# Patient Record
Sex: MALE | Race: WHITE | NOT HISPANIC OR LATINO | Employment: OTHER | URBAN - METROPOLITAN AREA
[De-identification: names, ages, dates, MRNs, and addresses within clinical notes are randomized per-mention and may not be internally consistent; named-entity substitution may affect disease eponyms.]

---

## 2023-01-12 ENCOUNTER — TRANSFERRED RECORDS (OUTPATIENT)
Dept: HEALTH INFORMATION MANAGEMENT | Facility: CLINIC | Age: 63
End: 2023-01-12

## 2023-06-19 ENCOUNTER — TRANSFERRED RECORDS (OUTPATIENT)
Dept: HEALTH INFORMATION MANAGEMENT | Facility: CLINIC | Age: 63
End: 2023-06-19
Payer: COMMERCIAL

## 2023-06-20 ENCOUNTER — TRANSFERRED RECORDS (OUTPATIENT)
Dept: HEALTH INFORMATION MANAGEMENT | Facility: CLINIC | Age: 63
End: 2023-06-20

## 2023-06-27 ENCOUNTER — TRANSCRIBE ORDERS (OUTPATIENT)
Dept: OTHER | Age: 63
End: 2023-06-27

## 2023-06-27 ENCOUNTER — TRANSFERRED RECORDS (OUTPATIENT)
Dept: HEALTH INFORMATION MANAGEMENT | Facility: CLINIC | Age: 63
End: 2023-06-27

## 2023-06-27 DIAGNOSIS — H46.9 OPTIC NEUROPATHY: ICD-10-CM

## 2023-06-27 DIAGNOSIS — H25.10 NUCLEAR SCLEROSIS: ICD-10-CM

## 2023-06-27 DIAGNOSIS — H52.10 MYOPIA, UNSPECIFIED LATERALITY: Primary | ICD-10-CM

## 2023-06-28 ENCOUNTER — TELEPHONE (OUTPATIENT)
Dept: OPHTHALMOLOGY | Facility: CLINIC | Age: 63
End: 2023-06-28
Payer: COMMERCIAL

## 2023-06-28 NOTE — TELEPHONE ENCOUNTER
Called and lvm     Don can make an appointment with DR. Serrano for next available and add to wait list    Thao Gayle Communication Facilitator on 6/28/2023 at 12:32 PM

## 2023-07-03 ENCOUNTER — HOSPITAL ENCOUNTER (EMERGENCY)
Facility: CLINIC | Age: 63
Discharge: HOME | End: 2023-07-04
Attending: EMERGENCY MEDICINE | Admitting: EMERGENCY MEDICINE
Payer: COMMERCIAL

## 2023-07-03 ENCOUNTER — APPOINTMENT (OUTPATIENT)
Dept: MRI IMAGING | Facility: CLINIC | Age: 63
End: 2023-07-03
Attending: EMERGENCY MEDICINE
Payer: COMMERCIAL

## 2023-07-03 DIAGNOSIS — H53.9 VISION CHANGES: ICD-10-CM

## 2023-07-03 DIAGNOSIS — H57.11 EYE PAIN, RIGHT: ICD-10-CM

## 2023-07-03 LAB
ALBUMIN SERPL BCG-MCNC: 4.4 G/DL (ref 3.5–5.2)
ALP SERPL-CCNC: 64 U/L (ref 40–129)
ALT SERPL W P-5'-P-CCNC: 18 U/L (ref 0–70)
ANION GAP SERPL CALCULATED.3IONS-SCNC: 12 MMOL/L (ref 7–15)
AST SERPL W P-5'-P-CCNC: 24 U/L (ref 0–45)
BASOPHILS # BLD MANUAL: 0.1 10E3/UL (ref 0–0.2)
BASOPHILS NFR BLD MANUAL: 1 %
BILIRUB SERPL-MCNC: 0.7 MG/DL
BUN SERPL-MCNC: 17.1 MG/DL (ref 8–23)
CALCIUM SERPL-MCNC: 9.4 MG/DL (ref 8.8–10.2)
CHLORIDE SERPL-SCNC: 96 MMOL/L (ref 98–107)
CREAT SERPL-MCNC: 0.93 MG/DL (ref 0.67–1.17)
CRP SERPL-MCNC: <3 MG/L
DEPRECATED HCO3 PLAS-SCNC: 25 MMOL/L (ref 22–29)
EOSINOPHIL # BLD MANUAL: 0.1 10E3/UL (ref 0–0.7)
EOSINOPHIL NFR BLD MANUAL: 1 %
ERYTHROCYTE [DISTWIDTH] IN BLOOD BY AUTOMATED COUNT: 13.4 % (ref 10–15)
ERYTHROCYTE [SEDIMENTATION RATE] IN BLOOD BY WESTERGREN METHOD: 2 MM/HR (ref 0–20)
GFR SERPL CREATININE-BSD FRML MDRD: >90 ML/MIN/1.73M2
GLUCOSE BLDC GLUCOMTR-MCNC: 132 MG/DL (ref 70–99)
GLUCOSE SERPL-MCNC: 129 MG/DL (ref 70–99)
GLUCOSE SERPL-MCNC: 129 MG/DL (ref 70–99)
HCT VFR BLD AUTO: 40.1 % (ref 40–53)
HGB BLD-MCNC: 13.8 G/DL (ref 13.3–17.7)
LYMPHOCYTES # BLD MANUAL: 1.8 10E3/UL (ref 0.8–5.3)
LYMPHOCYTES NFR BLD MANUAL: 32 %
MCH RBC QN AUTO: 28.5 PG (ref 26.5–33)
MCHC RBC AUTO-ENTMCNC: 34.4 G/DL (ref 31.5–36.5)
MCV RBC AUTO: 83 FL (ref 78–100)
MONOCYTES # BLD MANUAL: 2 10E3/UL (ref 0–1.3)
MONOCYTES NFR BLD MANUAL: 36 %
NEUTROPHILS # BLD MANUAL: 1.7 10E3/UL (ref 1.6–8.3)
NEUTROPHILS NFR BLD MANUAL: 30 %
PLAT MORPH BLD: ABNORMAL
PLATELET # BLD AUTO: 117 10E3/UL (ref 150–450)
POTASSIUM SERPL-SCNC: 3.6 MMOL/L (ref 3.4–5.3)
PROT SERPL-MCNC: 7.1 G/DL (ref 6.4–8.3)
RBC # BLD AUTO: 4.84 10E6/UL (ref 4.4–5.9)
RBC MORPH BLD: ABNORMAL
SODIUM SERPL-SCNC: 133 MMOL/L (ref 136–145)
WBC # BLD AUTO: 5.6 10E3/UL (ref 4–11)

## 2023-07-03 PROCEDURE — 82962 GLUCOSE BLOOD TEST: CPT

## 2023-07-03 PROCEDURE — 99207 PR SERVICE NOT STAFFED W/SUPERV PROV: CPT | Performed by: OPHTHALMOLOGY

## 2023-07-03 PROCEDURE — 36415 COLL VENOUS BLD VENIPUNCTURE: CPT | Performed by: EMERGENCY MEDICINE

## 2023-07-03 PROCEDURE — 70553 MRI BRAIN STEM W/O & W/DYE: CPT

## 2023-07-03 PROCEDURE — 250N000011 HC RX IP 250 OP 636: Performed by: EMERGENCY MEDICINE

## 2023-07-03 PROCEDURE — 99285 EMERGENCY DEPT VISIT HI MDM: CPT | Mod: 25 | Performed by: EMERGENCY MEDICINE

## 2023-07-03 PROCEDURE — 255N000002 HC RX 255 OP 636: Performed by: EMERGENCY MEDICINE

## 2023-07-03 PROCEDURE — 80053 COMPREHEN METABOLIC PANEL: CPT | Performed by: EMERGENCY MEDICINE

## 2023-07-03 PROCEDURE — 85027 COMPLETE CBC AUTOMATED: CPT | Performed by: EMERGENCY MEDICINE

## 2023-07-03 PROCEDURE — 85007 BL SMEAR W/DIFF WBC COUNT: CPT | Performed by: EMERGENCY MEDICINE

## 2023-07-03 PROCEDURE — A9585 GADOBUTROL INJECTION: HCPCS | Performed by: EMERGENCY MEDICINE

## 2023-07-03 PROCEDURE — 99285 EMERGENCY DEPT VISIT HI MDM: CPT | Performed by: EMERGENCY MEDICINE

## 2023-07-03 PROCEDURE — 86140 C-REACTIVE PROTEIN: CPT | Performed by: EMERGENCY MEDICINE

## 2023-07-03 PROCEDURE — 96365 THER/PROPH/DIAG IV INF INIT: CPT | Mod: 59 | Performed by: EMERGENCY MEDICINE

## 2023-07-03 PROCEDURE — 85652 RBC SED RATE AUTOMATED: CPT | Performed by: EMERGENCY MEDICINE

## 2023-07-03 PROCEDURE — 258N000003 HC RX IP 258 OP 636: Performed by: EMERGENCY MEDICINE

## 2023-07-03 RX ORDER — LISINOPRIL 10 MG/1
10 TABLET ORAL EVERY EVENING
Status: ON HOLD | COMMUNITY
End: 2023-08-18

## 2023-07-03 RX ORDER — METHYLPREDNISOLONE 32 MG/1
96 TABLET ORAL DAILY
Qty: 21 TABLET | Refills: 0 | Status: SHIPPED | OUTPATIENT
Start: 2023-07-03 | End: 2023-07-10

## 2023-07-03 RX ORDER — LISINOPRIL AND HYDROCHLOROTHIAZIDE 20; 25 MG/1; MG/1
1 TABLET ORAL DAILY
COMMUNITY
Start: 2022-11-08

## 2023-07-03 RX ORDER — GADOBUTROL 604.72 MG/ML
9.85 INJECTION INTRAVENOUS ONCE
Status: COMPLETED | OUTPATIENT
Start: 2023-07-03 | End: 2023-07-03

## 2023-07-03 RX ORDER — METHYLPREDNISOLONE SODIUM SUCCINATE 125 MG/2ML
1000 INJECTION, POWDER, LYOPHILIZED, FOR SOLUTION INTRAMUSCULAR; INTRAVENOUS ONCE
Status: DISCONTINUED | OUTPATIENT
Start: 2023-07-03 | End: 2023-07-03

## 2023-07-03 RX ORDER — CARVEDILOL 6.25 MG/1
TABLET ORAL
Status: ON HOLD | COMMUNITY
Start: 2022-09-07 | End: 2023-08-18

## 2023-07-03 RX ADMIN — METHYLPREDNISOLONE SODIUM SUCCINATE 1000 MG: 1 INJECTION INTRAMUSCULAR; INTRAVENOUS at 23:44

## 2023-07-03 RX ADMIN — GADOBUTROL 9.85 ML: 604.72 INJECTION INTRAVENOUS at 22:36

## 2023-07-03 ASSESSMENT — ACTIVITIES OF DAILY LIVING (ADL)
ADLS_ACUITY_SCORE: 35
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
## 2023-07-03 NOTE — DISCHARGE INSTRUCTIONS
TODAY'S VISIT:  You were seen today for vision problem     - We suspect you MAY have temporal arteritis, you will need an additional test (biopsy) to evaluate for this    - you will be prescribed a 7 day course of steroids     - If you had any labs or imaging/radiology tests performed today, you should also discuss these tests with your usual provider.     FOLLOW-UP:  A provider from neuro-ophthalmology should call you tomorrow to schedule an appointment for this Wednesday.   If they do not call, please make an appointment to follow up with:  - [facility] (phone: [phone]) - ask for the neuro-ophthalmology clinic    - Have your provider review the results from today's visit with you again to make sure no further follow-up or additional testing is needed based on those results.     RETURN TO THE EMERGENCY DEPARTMENT  Return to the Emergency Department at any time for any new or worsening symptoms or any concerns.

## 2023-07-03 NOTE — ED TRIAGE NOTES
Pt has been having diminishing vision in right eye and pain around right eye; also more fatigued this weekend.  Unable to receive explanation for this issues; pain has been present since June 1 and has seen different providers but unable to diagnose.  He has been told he had a microinfarction of optic nerve but hasn't been able to have pain explanation;  Pain started over right eye but now has spread to right temple area. Pain 6/10

## 2023-07-03 NOTE — CONSULTS
"  OPHTHALMOLOGY CONSULT NOTE  07/03/23    Patient: Amado Butler      ASSESSMENT/PLAN:     Amado Butler is a 63 year old male who presented with acute on sub-acute painful vision loss.    Optic disc edema right eye   APD right eye   Progressive vision loss right eye   History of HTN, HLD,  Patient reports right temporal headache and worsening vision over the last week. Previously evaluated with inflammatory markers and MRI with no concerning findings. Endorses right shoulder pain which started about a month ago at the same time that his right frontal headache and vision loss started. The shoulder pain he describes as scapular and neck \"strain\" which has improved with time. Pain is present at most times and can be exacerbated with movement but does not require consistent, repetitive movement to elicit. No pain of left shoulder or either hip. Denies scalp tenderness, jaw claudication, fever, fatigue, unintended weight loss, double vision, vision loss, muscle pain in shoulders.  Exam showed no temporal tenderness. Temporal artery is non-pulsatile right temple without gross palpable thickening of either side. APD right eye, disc edema right eye. Right abduction deficit was suspected, but not elicited on cover testing.  He was previously diagnosed with NAION 6/21/23 at [facility], at that time vision right eye was 20/50. However, given worsening pain and new abduction deficit in the context of persistent, worsening vision loss, recommend further workup for GCA. NAION remains high on the differential.   Recommended admission for further workup but did offer outpatient workup as an option. He prefers outpatient workup and given normal inflammatory markers with minimal GCA symptoms, this seems like a safe option for the patient.  -Recommend ESR, CRP, and CBC STAT  -Recommend TAB outpatient - Wednesday at 11 am at [address] [facility], 4th floor  -1g IV methylprednisolone now then 1mg/kg steroids PO daily to start tomorrow at " noon  -Repeat MRI wwo orbits before discharge    It is our pleasure to participate in this patient's care and treatment. Please contact us with any further questions or concerns.    Discussed with Dr. Zuniga who agreed with this assessment and plan.     Ophthalmology will continue to follow. Please contact ophthalmologist on call with any questions or concerns. We appreciate your care of this patient.    Thank you for entrusting us with your care  Mirian Chau MD, PGY2  Ophthalmology Resident  [facility]    HISTORY OF PRESENTING ILLNESS:     Amado Butler is a 63 year old male who presented originally 6/17/23 with temporal headache and right vision loss. His inflammatory markers were tested at the time and were normal. He was subsequently seen with [facility] and found to have NAION and sent to neuro-ophthalmology for further follow up. Has not yet been able to get appointment with neuro-ophthalmology.    Today he reports that his right temporal headache has worsened.      10+ review of systems were otherwise negative except for that which has been stated above.      OCULAR/MEDICAL/SURGICAL HISTORIES:     Past Ocular History:   Last eye exam: 6/21/23  Previously diagnosed ocular conditions: NAION right eye , choroidal nevus right eye  Prior eye surgery/laser: None  Contact lens wear: Yes  Glasses: Yes  Eyedrops: None    Pertinent Systemic Medications:   Antihypertensives    Past Medical History:  HTN, T2DM    Past Surgical History:   History reviewed. No pertinent surgical history.    Family History:   No history of macular degeneration or glaucoma    Social History:   No tobacco use    EXAMINATION:     Base Eye Exam       Visual Acuity (Snellen - Linear)         Right Left    Dist cc 20/200 20/25 -2              Tonometry (Tonopen, 8:59 PM)         Right Left    Pressure 20 21              Pupils         Shape React APD    Right Round Brisk Yes    Left Round Brisk None               Visual Fields         Left Right     Full     Restrictions  Partial outer superior nasal deficiency              Extraocular Movement         Right Left     0 0 0   -1  0   0 0 0    0 0 0   0  0   0 0 0               *Per Dr. Chau, orthophoric on cover testing in ED       Dilation       Both eyes: 1.0% Mydriacyl, 2.5% Sriram Synephrine @ 6:34 PM                  Additional Tests       Color         Right Left    Ishihara 1/16 16/16                  Slit Lamp and Fundus Exam       External Exam         Right Left    External Normal Normal              Slit Lamp Exam         Right Left    Lids/Lashes Normal Normal    Conjunctiva/Sclera White and quiet White and quiet    Cornea PEE PEE    Anterior Chamber Deep and quiet Deep and quiet    Iris Round and reactive Round and reactive    Lens Clear Clear    Anterior Vitreous Normal Normal              Fundus Exam         Right Left    Disc Grade 3 papilledema with ITdisc hemorrhage Normal    C/D Ratio  0.1    Macula Choroidal nevus temporal Normal    Vessels Normal Normal    Periphery Normal Normal                    Labs/Studies/Imaging Performed  MR head/brains/orbit 6/27/23  HEAD MRI:   1.  Right frontal lobe encephalomalacia and surrounding gliotic   change without acute intracranial abnormality.   ORBIT MRI:   1.  Unremarkable orbits.    ESR 6/17/23 1, CRP 6/17/23 <0.3     Thank you for entrusting us with your care  Denise Chau MD  Resident Physician, PGY2  Department of Ophthalmology  07/03/23 6:32 PM   Pager: [phone]

## 2023-07-04 VITALS
BODY MASS INDEX: 26.99 KG/M2 | DIASTOLIC BLOOD PRESSURE: 85 MMHG | SYSTOLIC BLOOD PRESSURE: 161 MMHG | OXYGEN SATURATION: 97 % | HEART RATE: 63 BPM | WEIGHT: 217.1 LBS | RESPIRATION RATE: 17 BRPM | TEMPERATURE: 97.8 F | HEIGHT: 75 IN

## 2023-07-04 NOTE — ED NOTES
/85 right before pt discharge. Pt declined to take BP meds stating he will take his own when gets home.

## 2023-07-05 ENCOUNTER — OFFICE VISIT (OUTPATIENT)
Dept: OPHTHALMOLOGY | Facility: CLINIC | Age: 63
End: 2023-07-05
Payer: COMMERCIAL

## 2023-07-05 ENCOUNTER — LAB (OUTPATIENT)
Dept: LAB | Facility: CLINIC | Age: 63
End: 2023-07-05
Payer: COMMERCIAL

## 2023-07-05 DIAGNOSIS — H46.9 OPTIC NEUROPATHY: Primary | ICD-10-CM

## 2023-07-05 DIAGNOSIS — H46.9 OPTIC NEUROPATHY: ICD-10-CM

## 2023-07-05 LAB
B BURGDOR IGG+IGM SER QL: 0.08
T PALLIDUM AB SER QL: NONREACTIVE

## 2023-07-05 PROCEDURE — 82164 ANGIOTENSIN I ENZYME TEST: CPT | Mod: 90 | Performed by: PATHOLOGY

## 2023-07-05 PROCEDURE — 88305 TISSUE EXAM BY PATHOLOGIST: CPT | Mod: 26 | Performed by: OPHTHALMOLOGY

## 2023-07-05 PROCEDURE — 36415 COLL VENOUS BLD VENIPUNCTURE: CPT | Performed by: PATHOLOGY

## 2023-07-05 PROCEDURE — 88305 TISSUE EXAM BY PATHOLOGIST: CPT | Mod: TC | Performed by: OPHTHALMOLOGY

## 2023-07-05 PROCEDURE — 88313 SPECIAL STAINS GROUP 2: CPT | Mod: 26 | Performed by: OPHTHALMOLOGY

## 2023-07-05 PROCEDURE — 82787 IGG 1 2 3 OR 4 EACH: CPT | Performed by: OPHTHALMOLOGY

## 2023-07-05 PROCEDURE — 37609 LIGATION/BX TEMPORAL ARTERY: CPT | Mod: RT | Performed by: OPHTHALMOLOGY

## 2023-07-05 PROCEDURE — 86618 LYME DISEASE ANTIBODY: CPT | Performed by: OPHTHALMOLOGY

## 2023-07-05 PROCEDURE — 99000 SPECIMEN HANDLING OFFICE-LAB: CPT | Performed by: PATHOLOGY

## 2023-07-05 PROCEDURE — 86038 ANTINUCLEAR ANTIBODIES: CPT | Performed by: OPHTHALMOLOGY

## 2023-07-05 PROCEDURE — 86753 PROTOZOA ANTIBODY NOS: CPT | Mod: 90 | Performed by: PATHOLOGY

## 2023-07-05 PROCEDURE — 85549 MURAMIDASE: CPT | Mod: 90 | Performed by: PATHOLOGY

## 2023-07-05 PROCEDURE — 86780 TREPONEMA PALLIDUM: CPT | Performed by: OPHTHALMOLOGY

## 2023-07-05 PROCEDURE — 86037 ANCA TITER EACH ANTIBODY: CPT | Performed by: OPHTHALMOLOGY

## 2023-07-05 PROCEDURE — 86363 MOG-IGG1 ANTB FLO CYTMTRY EA: CPT | Mod: 90 | Performed by: PATHOLOGY

## 2023-07-05 PROCEDURE — 86053 AQAPRN-4 ANTB FLO CYTMTRY EA: CPT | Mod: 90 | Performed by: PATHOLOGY

## 2023-07-05 PROCEDURE — 86666 EHRLICHIA ANTIBODY: CPT | Mod: 90 | Performed by: PATHOLOGY

## 2023-07-05 NOTE — PROGRESS NOTES
Assessment & Plan     Amado Butler is a 63 year old male with the following diagnoses:   1. Optic neuropathy       Patient underwent right temporal artery biopsy without complication.  Patient instructed to remove patch tomorrow.  No swimming for a week.  Stitches will dissolve. Ok to wash starting tomorrow.  Will use bacitracin ointment twice a day until tube is gone.  Patient instructed to put ice on the patch today and take tylenol for pain.  Patient instructed to put direct pressure on the wound if it bleeds.  If still bleeding > 20 min, patient instructed to call the office at [phone].                 Attending Physician Attestation:  Complete documentation of historical and exam elements from today's encounter can be found in the full encounter summary report (not reduplicated in this progress note).  I personally obtained the chief complaint(s) and history of present illness.  I confirmed and edited as necessary the review of systems, past medical/surgical history, family history, social history, and examination findings as documented by others; and I examined the patient myself.  I personally reviewed the relevant tests, images, and reports as documented above.  I formulated and edited as necessary the assessment and plan and discussed the findings and management plan with the patient and family. - Darin Serrano MD

## 2023-07-06 ENCOUNTER — TELEPHONE (OUTPATIENT)
Dept: OPHTHALMOLOGY | Facility: CLINIC | Age: 63
End: 2023-07-06
Payer: COMMERCIAL

## 2023-07-06 DIAGNOSIS — H46.9 OPTIC NEUROPATHY: Primary | ICD-10-CM

## 2023-07-06 LAB
ACE SERPL-CCNC: <10 U/L
ANA SER QL IF: NEGATIVE
PATH REPORT.COMMENTS IMP SPEC: NORMAL
PATH REPORT.COMMENTS IMP SPEC: NORMAL
PATH REPORT.FINAL DX SPEC: NORMAL
PATH REPORT.GROSS SPEC: NORMAL
PATH REPORT.MICROSCOPIC SPEC OTHER STN: NORMAL
PATH REPORT.RELEVANT HX SPEC: NORMAL
PHOTO IMAGE: NORMAL

## 2023-07-06 RX ORDER — OMEPRAZOLE 40 MG/1
40 CAPSULE, DELAYED RELEASE ORAL DAILY
Qty: 70 CAPSULE | Refills: 3 | Status: ON HOLD | OUTPATIENT
Start: 2023-07-06 | End: 2023-08-18

## 2023-07-06 RX ORDER — PREDNISONE 20 MG/1
TABLET ORAL
Qty: 120 TABLET | Refills: 0 | Status: ON HOLD | OUTPATIENT
Start: 2023-07-06 | End: 2023-08-18

## 2023-07-06 NOTE — TELEPHONE ENCOUNTER
Called patient to follow-up on results of his temporal artery biopsy, which was negative for arteritis.  In the context of his normal acute phase reactants this result significantly lowers the likelihood of giant cell arteritis.  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  He will continue PPI and close monitoring of his blood sugars while on this medication.    Many labs drawn yesterday for evaluation of optic perineuritis have returned as normal thus far.  Others may not return for another 1-2 weeks. We discussed that many cases of optic perineuritis are ultimately idiopathic.  The subjective improvement of his vision and pain following initiation of steroids is encouraging.    He reports swelling at the incision site with extension of edema to the right periorbital region, but otherwise no significant bleeding or discharge.  We discussed that edema and ecchymosis can track along fascial planes to cause these changes after temporal artery biopsy.  Callback precautions discussed and patient verbalized understanding.    All other questions answered.    Niels Zuniga MD PhD  Fellow, Neuro-Ophthalmology

## 2023-07-07 LAB
B MICROTI IGG TITR SER: NORMAL {TITER}
B MICROTI IGM TITR SER: NORMAL {TITER}
IGG SERPL-MCNC: 1174 MG/DL (ref 610–1616)
IGG1 SER-MCNC: 741 MG/DL (ref 382–929)
IGG2 SER-MCNC: 273 MG/DL (ref 242–700)
IGG3 SER-MCNC: 46 MG/DL (ref 22–176)
IGG4 SER-MCNC: 37 MG/DL (ref 4–86)
SUBCLASSES, PERCENT: 93 %

## 2023-07-08 LAB
A PHAGOCYTOPH IGG TITR SER IF: NORMAL {TITER}
A PHAGOCYTOPH IGM TITR SER IF: NORMAL {TITER}
LYSOZYME SERPL-MCNC: >10 UG/ML

## 2023-07-09 LAB
E CHAFFEENSIS IGG TITR SER IF: NORMAL {TITER}
E CHAFFEENSIS IGM TITR SER IF: NORMAL {TITER}

## 2023-07-10 DIAGNOSIS — H46.9 OPTIC PERINEURITIS: Primary | ICD-10-CM

## 2023-07-10 LAB
ANCA AB PATTERN SER IF-IMP: ABNORMAL
AQP4 H2O CHANNEL IGG SERPL QL: NEGATIVE
C-ANCA TITR SER IF: ABNORMAL {TITER}
CNS DEMYELINATING DISEASE INTERPRETATION, SERUM: NORMAL
MOG FACS, S: NEGATIVE

## 2023-07-11 ENCOUNTER — ANCILLARY PROCEDURE (OUTPATIENT)
Dept: CT IMAGING | Facility: CLINIC | Age: 63
End: 2023-07-11
Attending: OPHTHALMOLOGY
Payer: COMMERCIAL

## 2023-07-11 ENCOUNTER — TELEPHONE (OUTPATIENT)
Dept: OPHTHALMOLOGY | Facility: CLINIC | Age: 63
End: 2023-07-11

## 2023-07-11 DIAGNOSIS — H46.9 OPTIC PERINEURITIS: ICD-10-CM

## 2023-07-11 PROCEDURE — 71260 CT THORAX DX C+: CPT | Mod: TC | Performed by: RADIOLOGY

## 2023-07-11 RX ORDER — IOPAMIDOL 755 MG/ML
100 INJECTION, SOLUTION INTRAVASCULAR ONCE
Status: COMPLETED | OUTPATIENT
Start: 2023-07-11 | End: 2023-07-11

## 2023-07-11 RX ADMIN — IOPAMIDOL 100 ML: 755 INJECTION, SOLUTION INTRAVASCULAR at 15:30

## 2023-07-11 NOTE — TELEPHONE ENCOUNTER
Health Call Center    Phone Message    May a detailed message be left on voicemail: yes     Reason for Call: Other: Pt had symptoms over the weekend of severe stomach pain, nausea, jaw pain; in addition to the vision in the Rt eye not being as good. He states Dr. Serrano had placed an order for at CT for him. He is leaving out of state tomorrow for 2 weeks and is concerned as to whether he should be going out of town with these symptoms and the pending CT scan. Please call pt ASAP to discuss. Thank you.     Action Taken: Message routed to:  [facility] ([facility] EYE    Travel Screening: Not Applicable

## 2023-07-11 NOTE — TELEPHONE ENCOUNTER
Spoke to pt at 1545    Pt reviewed nausea, vomiting, stomach pain last Saturday and Sunday that resolved now    Pt on 60 mg oral prednisone and believes taking prilosec.    Pt leaving tomorrow to [place] for 2 weeks.    Reviewed if reoccurs may to to urgent care and likely can assist in treatment to ease pain/nausea and provide IV fluids if electrolytes inbalanced.    Per Dr. Zuniga the biopsy for Giant Cell Arteritis was negative and greatly reduces the diagnosis of GCA.    Pt lower the oral prednisone last week.    Pt states was having improvement in vision until lower and feels maybe having some more haze in vision--slightly worsening maybe.    Offered eye exam tomorrow for any vision changes- pt did not feel necessary.    Pt reporting when eating couple times over past week had jaw pain    Pt also with some headaches, but unsure related to N/V over weekend    Pt also had CT done today-- results not finalized by call.    Pt requesting call from Dr. Zuniga/Dr. Serrano to review symptoms and review the CT results    Again I offered appt tomorrow before leaving and pt declining at this time.    Reviewed with pt if has any acute vision changes may seek care in [place] emergency department if would occur and pt verbally demonstrated understanding    Note to Dr. Serrano/team for review/confirming plan of care and review request for callback from provider.    Garland Champagne, RN 4:19 PM 07/11/23

## 2023-07-17 ENCOUNTER — NURSE TRIAGE (OUTPATIENT)
Dept: NURSING | Facility: CLINIC | Age: 63
End: 2023-07-17

## 2023-07-17 NOTE — TELEPHONE ENCOUNTER
Nurse Triage SBAR    Is this a 2nd Level Triage?  None    Situation:   Increased vision loss in Right eye    Background/Assessment:     Pt reporting increased vision loss in the right eye after starting the Prednisone order.     Pt is supposed to start tapering the dose this coming Saturday.  But seems to be having more difficulty seeing out of the right eye.    Pt flew to [place] last Thursday and noticed his sight was worse in the right eye after flying.     Pt went to a Ophthalmologist  in [place] after he got to [place] because his eye sight was worse in the right eye.      But he had 20/20 vision in the left eye and 20/50 vision in the right eye.    Pt is having more pain in the right side of his neck, right side of his skull and shoulder.      The vision in the right eye seems more hazy, loss of contrast and washing out of color.    When he closes his eyes he can see like ghostly shapes from what he saw when he had his eyes open.     No floaters or flashes of light noted.    Pt is wondering if there is anything else they can do for the Pt?   Any other treatment or procedure for the loss of vision in the right eye?    Pt would like to speak with a Provider to get all of his questions, concerns and any new ideas of what can be done for Pt care.    Please have a Provider call the Pt back @ [phone] for further assistance for Pt care.    Roxy Bradshaw RN  Central Triage Red Flags/Med Refills    Protocol Recommended Disposition:   See in Office Today      Reason for Disposition    Patient wants to be seen    Additional Information    Negative: Weakness of the face, arm or leg on one side of the body    Negative: Followed getting substance in the eye    Negative: Foreign body or object is or was lodged in the eye    Negative: Followed an eye injury    Negative: Followed sun lamp or sun exposure (UV keratitis)    Negative: Yellow or green discharge (pus) in the eye    Negative: Pregnant    Negative: Complete loss of  vision in 1 or both eyes    Negative: SEVERE eye pain    Negative: Severe headache    Negative: Double vision    Negative: Blurred vision or visual changes and present now and sudden onset or new (e.g., minutes, hours, days)  (Exception: Seeing floaters / black specks OR previously diagnosed migraine headaches with same symptoms.)    Negative: Patient sounds very sick or weak to the triager    Negative: Flashes of light  (Exception: brief from pressing on the eyeball)    Negative: Eye pain and brief (now gone) blurred vision or visual changes    Negative: Taking digoxin (e.g., Lanoxin, Digitek, Cardoxin, Lanoxicaps; Toloxin in [place]) and blurred vision, yellow vision, or yellow-green halos    Negative: Many floaters in the eye    Negative: Jaw pain while eating and age > 50 years    Negative: Headache and age > 50 years    Protocols used: VISION LOSS OR CHANGE-A-OH

## 2023-07-18 NOTE — TELEPHONE ENCOUNTER
Spoke to pt at 0915    Left eye vision hazy after plane flight last Wednesday    Pt seen last Thursday by ophthalmologist and left eye 20/20-- pt states vision improved that day.  Pt also reports the vision in Right eye was 20/50.    Pt states right eye vision past 2 days worsening and reports the neck and shoulder pain returning along with temple pain-- symptoms pt previous not had prior to vision loss.    Pt now on 60 mg oral prednisone-- pt reviewed recommended to decrease more on Saturday per tapering instructions.    Reviewed with patient to see the ophthalmologist seen last Thursday for exam an may go to Emergency Department.    Pt currently in [place] til July 31st and chartering sailboat with family.    Pt would like to know testing or if has recommendations if needs to be seen or can hold on seeing provider/ED visit.    Reviewed with patient Dr. Serrano would Not be able to provide an OK to hold on being seen if having vision changes or new symptoms.    Reviewed would need to see providers while in [place] for acute vision changes/symptoms    Reviewed able to send eye notes to provider and can review provider to provider call if applicable.    Reviewed several times with pt the recommendations and pt very hestistant to seek treatment--- pt would need to leave family/sailing charter for eval.    Reviewed again the recommendation to seek treatment in [place] if having new vision changes systemic symptoms.      Reviewed would forward to Dr. Serrano for review and provider alternate plan if applicable, but reviewed not likely would be able to provide care for patient while in [place] if having vision changes and systemic symptoms.    Garland Champagne RN 9:41 AM 07/18/23

## 2023-07-18 NOTE — TELEPHONE ENCOUNTER
Left message at 1215    Reviewed by Dr. Serrano and agrees if feels having worsening vision or symptoms should be evaluated in [place].    Reviewed the recommendations and provided direct number for questions, able to assist in records to eye provider, and can assist in coordinating call if indicated.    Reviewed facilitator will also look into scheduling visit with Dr. Serrano when pt travels back on July 31st      Garland Champagne RN 12:17 PM 07/18/23

## 2023-07-18 NOTE — TELEPHONE ENCOUNTER
Pt in [place] for two weeks per previous note from last week.    Vision 7-3-2023 in right eye 20/200    Per note vision in [place] at visit 20/50 right eye    I will reach out to pt today to review plan of care.    Garland Champagne RN 8:32 AM 07/18/23

## 2023-07-18 NOTE — TELEPHONE ENCOUNTER
Ok for august 9th at 0815 with Dr. Serrano at [facility] location    Will send [employer] message with information  -- not able to reach pt at last call    Garland Champagne RN 2:22 PM 07/18/23

## 2023-08-07 NOTE — PROGRESS NOTES
Amado Butler is a 63 year old male with the following diagnoses:   1. Visual field defect    2. Optic neuropathy    3. Optic perineuritis         Patient was sent for consultation by Dr. Zuniga for NAION left eye.    HPI:    Patient was last seen 7/5/23 for temporal artery biopsy due to concern for GCA in the right eye. Pathology was negative for GCA.    Patient went to [place] and experienced worsening vision in his right eye several times. On 7/24/23 he went to [facility]. He was restarted on high dose 1000 mg IV steroids. The vision in the right eye improved with the high dose IV steroids. He was seen again the following Monday, however over the week the vision deteriorated some but not significantly. On 7/31/23 he was seen in the eye clinic against and was admitted directly to the hospital on a Neurology floor for 5 days. He underwent another course of daily 1000 mg IV steroids. He underwent PET and MRI imaging and was diagnosed with optic perineuritis. He reports they have done extensive testing but nothing has come back positive. He is currently taking 80 mg daily of steroids. He is also taking an antiviral and antibiotic. The vision improved with steroids each time back to 20/50. One week ago his vision was improved to 20/30 at visit with eye doctor which it was the best it had been since onset of symptoms. He was hospitalized however last week and his vision continued to deteriorate throughout admission. He flew home three days ago and the vision has deteriorated dramatically in the last 3 days. He is currently on oral prednisone 80 mg daily. He has had a general headache but does not have eye pain. During his hospital admission his pupils initially had an afferent defect in the right eye but reports it went away and both pupils were reacting equally last week after high dose IV steroids.    He has had difficulty with controlling blood sugars while on steroids. His A1c was >9. His blood  sugars have been running 200-300s. He was started on glipizide 5 mg daily.    The patient is presenting with an acute illness that potentially poses a threat to life or bodily function (vision).    Independent historians:  Patient  Wife  Daughter    Review of outside testing:    MRI Brain/Orbits 7/3/23  HEAD MRI:  1.  No acute infarct.  2.  Age-related changes described above.  3.  Right frontal lobe small area tissue loss and gliosis.     ORBIT MRI:  1.  Unremarkable MRI of the orbits.   2.  No evidence for optic neuritis..    MRI Brain/Orbits 6/28/23  HEAD MRI:   1.  Right frontal lobe encephalomalacia and surrounding gliotic   change without acute intracranial abnormality.     ORBIT MRI:   1.  Unremarkable orbits     Ehrlichia - negative  Babesia- negative  Anaplasma - negative  Lysozyme elevated to >10  IgG subclasses - normal  ANCA elevated to 1:20  Lyme negative  Treponema negative  BUDDY negative  ACE decreased to <10  CNS demyelinating panel negative  CRP <3  ESR 2  CBC normal      Temporal artery biopsy 7/5/23  Temporal artery, right, biopsy:   No evidence of giant cell arteritis.  Moderate arteriosclerosis.  Mild medial calcific sclerosis.     Labs from [facility] 8/1/23  Lyme CNS negative  Hep B surface antibody negative      My interpretation performed today of outside testing:  I have independently reviewed the MRI images.  There is an enhancing abnormality in the posterior orbit on the right.  This is lateral to the optic nerve        Review of outside clinical notes:    7/31/23  #Vision Loss OD  Patient presents with subacute course of progressive vision loss in the R eye that has been somewhat responsive to Solumedrol in the past although has relapsed even on high dose prednisone. MRI brain and orbit done at previous visit with evidence of perineuritis but not optic neuritis. Previously had Lyme, ANCA, Quantiferon, Bartonella negative. VZV IgG/IgM consistent with prior infection. Other serum  studies including tspot, erlichia/anaplasma were negative. LP was performed which was unremarkable (no nucleated cells, 2000 RBCs from traumatic tap, VZV/ACE/EBV/Cytology/MS/Cx/Smear all negative). PET-CT without evidence of FDG-avid malignancy (adrenal nodules not avid). Patient was maintained on valacyclovir. Patient received 5 days total of 1g IV solumedrol (1 before the LP, 4 after).  MRI C/T spine with no evidence of demyelinating disease or transverse myelitis.   [] Continue prednisone 80mg until follow up with your neuro-ophthalmologist (discharged on 80mg with 30 month aliquot)  [] Follow up pending CSF autoimmune panel  [] Follow up pending serum autoimmune encephalopathy panel  [] Follow up formal read of MRI adrenals (study performed inpatient but no final read)    #Anemia  #Thrombocytopenia  Patient noted to have persistent anemia and thrombocytopenia this admission. On PET-CT, marrow was slightly avid (read as possibly in the setting of anemia). Differential notable for monocytosis to 12% on differential, some neutrophilia as well this admission although notably in the setting of steroids. In the past, had more significant monocytosis. Ordered SPEP/SFLC/UPEP, peripheral smear. Peripheral smear on quick read in the lab notable for some atypical lymphocytes, although not officially read by hematology. May be worth repeating in the outpatient setting if ongoing concerns.  [] Follow up SPEP/SFLC/UPEP    #Pre-diabetes  Patient with h/o DM2, not currently on medications. Rising glucose iso receiving prednisone, A1C on admission 9.1% in setting of receiving 2 months of steroids. Maintained on low dose ISS while inpatient.  [] Start taking metformin 500mg daily with dinner x1 week followed by metformin 500mg BID with meals - uptitration to be further followed by PCP  [] Follow up outpatient with PCP    #SARA  Patient uses CPAP at night at home. RT involved this admission for CPAP.    #HTN  Continued home BP meds  (carvedilol, lisinopril, lisinopril-HCTZ)    #Hepatitis B non-immune  Patient found to be non-immune on hepatitis testing.   [] Hepatitis B vaccine booster         7/6/23 -- Telephone encounter with Dr. Zuniga    Called patient to follow-up on results of his temporal artery biopsy, which was negative for arteritis.  In the context of his normal acute phase reactants this result significantly lowers the likelihood of giant cell arteritis.  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  He will continue PPI and close monitoring of his blood sugars while on this medication.     Many labs drawn yesterday for evaluation of optic perineuritis have returned as normal thus far.  Others may not return for another 1-2 weeks. We discussed that many cases of optic perineuritis are ultimately idiopathic.  The subjective improvement of his vision and pain following initiation of steroids is encouraging.     He reports swelling at the incision site with extension of edema to the right periorbital region, but otherwise no significant bleeding or discharge.  We discussed that edema and ecchymosis can track along fascial planes to cause these changes after temporal artery biopsy.  Callback precautions discussed and patient verbalized understanding.    7/3/23 -- Hospital Consult - Dr. Zuniga  Attestation with edits by Niels Zuniga MD at 7/4/2023  4:27 PM (Updated)     I have not seen or examined the patient, but was available if the need had arisen. I have reviewed the chart and key elements of this encounter and I concur with the resident's assessment and plan with the following summary/additions:     Gentleman recently diagnosed with NAION OD after presenting in mid-6/2023 with vision loss and frontal headache. His ESR/CRP at that time were normal. He describes gradually worsening vision since then until 4-5 days ago, when his vision deteriorated and his frontal headache  "migrated to the right temple. On GCA ROS, he does report right neck and shoulder \"strain\" that has gradually improved over the past month without therapy.      In addition to decreasing VA to 20/200 (from 20/50 at outside clinic last month) our exam notes difficulty palpating the right STA. There was some suggestion of a right abduction deficit based on motility, which was not corroborated by cover testing. He does have a conventional \"disc-at-risk\" in the fellow eye.  His acute phase reactants remain normal. Interestingly, his MRI shows mild right-sided perineural enhancement that increases in prominence near the orbital apex; there is some accompanying enhancement in the vicinity of the optic canal.     Although unilateral, receding shoulder pain is not suggestive of PMR -- and progressive vision loss can certainly occur in NAION -- these features in combination with the patient's perineural enhancement do raise the possibility of GCA despite his normal acute phase reactants. I think initiation of high-dose steroids and TAB are reasonable in an abundance of caution. Patient discussed the risks/benefits of inpatient versus outpatient management and elected for the latter.      Will plan for TAB in Dr. Serrano's clinic on 7/5 AM. If pathology is negative for arteritis, it may still be prudent to continue steroid therapy in case this represents optic perineuritis or another inflammatory process. Following this visit we will draw expanded labs including CDS1, BUDDY, ANCA, ACE, lysozyme, IgG/subclasses, treponemal serologies, and Lyme screen. Additional workup, such as LP, will depend on his response to steroids.         Past medical history:  Hypertension  Type 2 Diabetes    Medications:   carvedilol  lisinopril  lisinopril-hydrochlorothiazide  omeprazole  predniSONE 80 mg daily      Family history / social history:  Patient's family history is not on file.     Patient  reports that he has never smoked. He has never used " smokeless tobacco. He reports current alcohol use. He reports that he does not currently use drugs.       Exam:  Visual acuity Hand Motion right eye 20/20 left eye.  Color vision 0/11 right eye and 11/11 left eye.  Pupils with right APD.  Intraocular pressure 21 right eye and 20 left eye. Color vision   Anterior segment exam with mild cataracts bilaterally.  Fundus exam with grade 1 optic disc edema in the right eye.     Tests ordered and interpreted today:  OCT RNFL 8/9/23  Right eye: mean thickness 96, elevation inferonasal  Left eye: mean thickness 76 with thinning temporally    GTOP visual fields 8/9/23:  Right eye: reliable, mean deviation -30.0, dense peripheral constriction  Left eye: reliable, mean deviation -3.4, scattered non-specific deficits    Normal both eyes    Discussion of management / interpretation with another provider:   None    Assessment/Plan:   It is my impression that patient has a right optic neuropathy that has been going on since late June/early July.  He has undergone a right temporal artery biopsy which was negative.  His optic neuropathy has been steroid responsive.  He has been on prednisone 60 mg or more since July 4.  He has had multiple rounds of IV steroids with significant improvement in his vision.  Most recently he states that his vision started to decline while on IV steroids in the hospital on Saturday.  He is currently on 80 mg of prednisone and has continued to experience declining vision in the right eye.  He has a lesion that is lateral to the right optic nerve in the right orbit at the orbital apex.  This does not appear to be changing over time.  It has been there on July 3, July 24, and today.  The differential diagnosis here would be infectious, inflammatory, and neoplastic.  Given that he has enjoyed some improvement with corticosteroids in the past, it is reasonable to consider plasma exchange to see if we can improve his vision.  If this is not beneficial after 2  or 3 treatments, then it may be reasonable to consider an exploration of this area with a biopsy and cultures.  I discussed his case with Dr. Lesley Boykin who agreed to admit the patient.  I called bed placement and the hospital is full.  Therefore I will send the patient to the emergency room to begin a central line placement and plasmapheresis.    The patient is presenting with an acute illness that potentially poses a threat to life or bodily function (vision).              Attending Physician Attestation:  Complete documentation of historical and exam elements from today's encounter can be found in the full encounter summary report (not reduplicated in this progress note).  I personally obtained the chief complaint(s) and history of present illness.  I confirmed and edited as necessary the review of systems, past medical/surgical history, family history, social history, and examination findings as documented by others; and I examined the patient myself.  I personally reviewed the relevant tests, images, and reports as documented above.  I formulated and edited as necessary the assessment and plan and discussed the findings and management plan with the patient and family. I personally reviewed the ophthalmic test(s) associated with this encounter, agree with the interpretation(s) as documented by the resident/fellow, and have edited the corresponding report(s) as necessary.  - Darin Estrada MD  Ophthalmology Resident PGY3

## 2023-08-09 ENCOUNTER — OFFICE VISIT (OUTPATIENT)
Dept: OPHTHALMOLOGY | Facility: CLINIC | Age: 63
End: 2023-08-09
Payer: COMMERCIAL

## 2023-08-09 ENCOUNTER — TELEPHONE (OUTPATIENT)
Dept: OPHTHALMOLOGY | Facility: CLINIC | Age: 63
End: 2023-08-09

## 2023-08-09 ENCOUNTER — PRE VISIT (OUTPATIENT)
Dept: OPHTHALMOLOGY | Facility: CLINIC | Age: 63
End: 2023-08-09

## 2023-08-09 ENCOUNTER — HOSPITAL ENCOUNTER (OUTPATIENT)
Dept: MRI IMAGING | Facility: CLINIC | Age: 63
Discharge: HOME | DRG: 025 | End: 2023-08-09
Attending: OPHTHALMOLOGY | Admitting: OPHTHALMOLOGY
Payer: COMMERCIAL

## 2023-08-09 ENCOUNTER — HOSPITAL ENCOUNTER (INPATIENT)
Facility: CLINIC | Age: 63
LOS: 9 days | Discharge: HOME | DRG: 025 | End: 2023-08-18
Attending: EMERGENCY MEDICINE | Admitting: PSYCHIATRY & NEUROLOGY
Payer: COMMERCIAL

## 2023-08-09 DIAGNOSIS — H46.9 OPTIC NEUROPATHY: ICD-10-CM

## 2023-08-09 DIAGNOSIS — I10 BENIGN ESSENTIAL HYPERTENSION: ICD-10-CM

## 2023-08-09 DIAGNOSIS — T38.0X5A STEROID-INDUCED HYPERGLYCEMIA: ICD-10-CM

## 2023-08-09 DIAGNOSIS — H53.131 VISION, LOSS, SUDDEN, RIGHT: ICD-10-CM

## 2023-08-09 DIAGNOSIS — H46.9 OPTIC PERINEURITIS: ICD-10-CM

## 2023-08-09 DIAGNOSIS — Z98.890 S/P CRANIOTOMY: ICD-10-CM

## 2023-08-09 DIAGNOSIS — E87.1 HYPONATREMIA: ICD-10-CM

## 2023-08-09 DIAGNOSIS — H53.40 VISUAL FIELD DEFECT: Primary | ICD-10-CM

## 2023-08-09 DIAGNOSIS — R73.9 STEROID-INDUCED HYPERGLYCEMIA: ICD-10-CM

## 2023-08-09 DIAGNOSIS — D64.9 ANEMIA, UNSPECIFIED TYPE: ICD-10-CM

## 2023-08-09 DIAGNOSIS — H54.61 VISION LOSS OF RIGHT EYE: Primary | ICD-10-CM

## 2023-08-09 LAB
ALBUMIN SERPL BCG-MCNC: 4.3 G/DL (ref 3.5–5.2)
ALP SERPL-CCNC: 71 U/L (ref 40–129)
ALT SERPL W P-5'-P-CCNC: 41 U/L (ref 0–70)
ANION GAP SERPL CALCULATED.3IONS-SCNC: 13 MMOL/L (ref 7–15)
AST SERPL W P-5'-P-CCNC: 35 U/L (ref 0–45)
BASOPHILS # BLD AUTO: 0 10E3/UL (ref 0–0.2)
BASOPHILS NFR BLD AUTO: 0 %
BILIRUB SERPL-MCNC: 1.3 MG/DL
BUN SERPL-MCNC: 21.8 MG/DL (ref 8–23)
CALCIUM SERPL-MCNC: 9.1 MG/DL (ref 8.8–10.2)
CHLORIDE SERPL-SCNC: 92 MMOL/L (ref 98–107)
CREAT SERPL-MCNC: 1.07 MG/DL (ref 0.67–1.17)
CRP SERPL-MCNC: <3 MG/L
DEPRECATED HCO3 PLAS-SCNC: 22 MMOL/L (ref 22–29)
EOSINOPHIL # BLD AUTO: 0 10E3/UL (ref 0–0.7)
EOSINOPHIL NFR BLD AUTO: 0 %
ERYTHROCYTE [DISTWIDTH] IN BLOOD BY AUTOMATED COUNT: 15.4 % (ref 10–15)
ERYTHROCYTE [SEDIMENTATION RATE] IN BLOOD BY WESTERGREN METHOD: 2 MM/HR (ref 0–20)
GFR SERPL CREATININE-BSD FRML MDRD: 77 ML/MIN/1.73M2
GLUCOSE BLDC GLUCOMTR-MCNC: 288 MG/DL (ref 70–99)
GLUCOSE SERPL-MCNC: 278 MG/DL (ref 70–99)
HCT VFR BLD AUTO: 37.7 % (ref 40–53)
HGB BLD-MCNC: 13.4 G/DL (ref 13.3–17.7)
IMM GRANULOCYTES # BLD: 0.2 10E3/UL
IMM GRANULOCYTES NFR BLD: 2 %
LYMPHOCYTES # BLD AUTO: 1.2 10E3/UL (ref 0.8–5.3)
LYMPHOCYTES NFR BLD AUTO: 17 %
MCH RBC QN AUTO: 29.7 PG (ref 26.5–33)
MCHC RBC AUTO-ENTMCNC: 35.5 G/DL (ref 31.5–36.5)
MCV RBC AUTO: 84 FL (ref 78–100)
MONOCYTES # BLD AUTO: 1.2 10E3/UL (ref 0–1.3)
MONOCYTES NFR BLD AUTO: 17 %
NEUTROPHILS # BLD AUTO: 4.6 10E3/UL (ref 1.6–8.3)
NEUTROPHILS NFR BLD AUTO: 64 %
NRBC # BLD AUTO: 0 10E3/UL
NRBC BLD AUTO-RTO: 0 /100
PLATELET # BLD AUTO: 114 10E3/UL (ref 150–450)
POTASSIUM SERPL-SCNC: 3.6 MMOL/L (ref 3.4–5.3)
PROT SERPL-MCNC: 6.4 G/DL (ref 6.4–8.3)
RBC # BLD AUTO: 4.51 10E6/UL (ref 4.4–5.9)
SODIUM SERPL-SCNC: 127 MMOL/L (ref 136–145)
WBC # BLD AUTO: 7.3 10E3/UL (ref 4–11)

## 2023-08-09 PROCEDURE — 86362 MOG-IGG1 ANTB CBA EACH: CPT | Performed by: STUDENT IN AN ORGANIZED HEALTH CARE EDUCATION/TRAINING PROGRAM

## 2023-08-09 PROCEDURE — 85025 COMPLETE CBC W/AUTO DIFF WBC: CPT | Performed by: EMERGENCY MEDICINE

## 2023-08-09 PROCEDURE — 120N000002 HC R&B MED SURG/OB UMMC

## 2023-08-09 PROCEDURE — 85652 RBC SED RATE AUTOMATED: CPT | Performed by: EMERGENCY MEDICINE

## 2023-08-09 PROCEDURE — 36415 COLL VENOUS BLD VENIPUNCTURE: CPT | Performed by: EMERGENCY MEDICINE

## 2023-08-09 PROCEDURE — 99285 EMERGENCY DEPT VISIT HI MDM: CPT | Performed by: EMERGENCY MEDICINE

## 2023-08-09 PROCEDURE — 92083 EXTENDED VISUAL FIELD XM: CPT | Mod: GC | Performed by: OPHTHALMOLOGY

## 2023-08-09 PROCEDURE — 99215 OFFICE O/P EST HI 40 MIN: CPT | Mod: GC | Performed by: OPHTHALMOLOGY

## 2023-08-09 PROCEDURE — 80053 COMPREHEN METABOLIC PANEL: CPT | Performed by: EMERGENCY MEDICINE

## 2023-08-09 PROCEDURE — 84999 UNLISTED CHEMISTRY PROCEDURE: CPT | Performed by: STUDENT IN AN ORGANIZED HEALTH CARE EDUCATION/TRAINING PROGRAM

## 2023-08-09 PROCEDURE — 92133 CPTRZD OPH DX IMG PST SGM ON: CPT | Mod: GC | Performed by: OPHTHALMOLOGY

## 2023-08-09 PROCEDURE — 255N000002 HC RX 255 OP 636: Mod: JZ | Performed by: OPHTHALMOLOGY

## 2023-08-09 PROCEDURE — 70543 MRI ORBT/FAC/NCK W/O &W/DYE: CPT

## 2023-08-09 PROCEDURE — 86140 C-REACTIVE PROTEIN: CPT | Performed by: EMERGENCY MEDICINE

## 2023-08-09 PROCEDURE — 99285 EMERGENCY DEPT VISIT HI MDM: CPT | Mod: 25 | Performed by: EMERGENCY MEDICINE

## 2023-08-09 PROCEDURE — 70543 MRI ORBT/FAC/NCK W/O &W/DYE: CPT | Mod: 26 | Performed by: RADIOLOGY

## 2023-08-09 PROCEDURE — A9585 GADOBUTROL INJECTION: HCPCS | Mod: JZ | Performed by: OPHTHALMOLOGY

## 2023-08-09 RX ORDER — LISINOPRIL AND HYDROCHLOROTHIAZIDE 20; 25 MG/1; MG/1
1 TABLET ORAL DAILY
Status: DISCONTINUED | OUTPATIENT
Start: 2023-08-10 | End: 2023-08-09

## 2023-08-09 RX ORDER — HYDROCHLOROTHIAZIDE 25 MG/1
25 TABLET ORAL DAILY
Status: DISCONTINUED | OUTPATIENT
Start: 2023-08-10 | End: 2023-08-14

## 2023-08-09 RX ORDER — CALCIUM CARBONATE/VITAMIN D3 600 MG-10
1 TABLET ORAL 2 TIMES DAILY WITH MEALS
Status: DISCONTINUED | OUTPATIENT
Start: 2023-08-09 | End: 2023-08-14

## 2023-08-09 RX ORDER — DEXTROSE MONOHYDRATE 25 G/50ML
25-50 INJECTION, SOLUTION INTRAVENOUS
Status: DISCONTINUED | OUTPATIENT
Start: 2023-08-09 | End: 2023-08-14

## 2023-08-09 RX ORDER — CARVEDILOL 12.5 MG/1
12.5 TABLET ORAL 2 TIMES DAILY WITH MEALS
Status: DISCONTINUED | OUTPATIENT
Start: 2023-08-09 | End: 2023-08-14

## 2023-08-09 RX ORDER — ENOXAPARIN SODIUM 100 MG/ML
40 INJECTION SUBCUTANEOUS EVERY 24 HOURS
Status: DISCONTINUED | OUTPATIENT
Start: 2023-08-10 | End: 2023-08-14

## 2023-08-09 RX ORDER — SULFAMETHOXAZOLE AND TRIMETHOPRIM 400; 80 MG/1; MG/1
1 TABLET ORAL
Status: DISCONTINUED | OUTPATIENT
Start: 2023-08-11 | End: 2023-08-14

## 2023-08-09 RX ORDER — POLYETHYLENE GLYCOL 3350 17 G/17G
17 POWDER, FOR SOLUTION ORAL DAILY PRN
Status: DISCONTINUED | OUTPATIENT
Start: 2023-08-09 | End: 2023-08-14

## 2023-08-09 RX ORDER — GADOBUTROL 604.72 MG/ML
10 INJECTION INTRAVENOUS ONCE
Status: COMPLETED | OUTPATIENT
Start: 2023-08-09 | End: 2023-08-09

## 2023-08-09 RX ORDER — PANTOPRAZOLE SODIUM 40 MG/1
40 TABLET, DELAYED RELEASE ORAL
Status: DISCONTINUED | OUTPATIENT
Start: 2023-08-10 | End: 2023-08-14

## 2023-08-09 RX ORDER — NICOTINE POLACRILEX 4 MG
15-30 LOZENGE BUCCAL
Status: DISCONTINUED | OUTPATIENT
Start: 2023-08-09 | End: 2023-08-14

## 2023-08-09 RX ORDER — LIDOCAINE 40 MG/G
CREAM TOPICAL
Status: DISCONTINUED | OUTPATIENT
Start: 2023-08-09 | End: 2023-08-14

## 2023-08-09 RX ADMIN — GADOBUTROL 10 ML: 604.72 INJECTION INTRAVENOUS at 10:51

## 2023-08-09 ASSESSMENT — SLIT LAMP EXAM - LIDS
COMMENTS: NORMAL
COMMENTS: NORMAL

## 2023-08-09 ASSESSMENT — EXTERNAL EXAM - RIGHT EYE: OD_EXAM: NORMAL

## 2023-08-09 ASSESSMENT — REFRACTION_WEARINGRX
SPECS_TYPE: TRIFOCAL
OS_SPHERE: -8.75
OD_CYLINDER: +1.00
OS_ADD: +2.50
OS_AXIS: 009
OD_ADD: +2.50
OS_CYLINDER: +1.00
OD_SPHERE: -8.50
OD_AXIS: 009

## 2023-08-09 ASSESSMENT — TONOMETRY
IOP_METHOD: ICARE
OS_IOP_MMHG: 20
OD_IOP_MMHG: 21

## 2023-08-09 ASSESSMENT — ACTIVITIES OF DAILY LIVING (ADL)
WALKING_OR_CLIMBING_STAIRS_DIFFICULTY: NO
DIFFICULTY_COMMUNICATING: NO
CONCENTRATING,_REMEMBERING_OR_MAKING_DECISIONS_DIFFICULTY: NO
ADLS_ACUITY_SCORE: 35
ADLS_ACUITY_SCORE: 35
DIFFICULTY_EATING/SWALLOWING: NO
DOING_ERRANDS_INDEPENDENTLY_DIFFICULTY: NO
CHANGE_IN_FUNCTIONAL_STATUS_SINCE_ONSET_OF_CURRENT_ILLNESS/INJURY: NO
VISION_MANAGEMENT: GLASSES/CONTACTS
WEAR_GLASSES_OR_BLIND: YES
FALL_HISTORY_WITHIN_LAST_SIX_MONTHS: NO
DRESSING/BATHING_DIFFICULTY: NO
TOILETING_ISSUES: NO

## 2023-08-09 ASSESSMENT — VISUAL ACUITY
METHOD: SNELLEN - LINEAR
CORRECTION_TYPE: GLASSES
OD_CC: HM
OS_CC: 20/20

## 2023-08-09 ASSESSMENT — CUP TO DISC RATIO: OS_RATIO: 0.1

## 2023-08-09 ASSESSMENT — EXTERNAL EXAM - LEFT EYE: OS_EXAM: NORMAL

## 2023-08-09 ASSESSMENT — CONF VISUAL FIELD: OD_SUPERIOR_NASAL_RESTRICTION: 3

## 2023-08-09 NOTE — TELEPHONE ENCOUNTER
Spoke to patient on the phone. We discussed that his MRI findings, the lack of resolution of the lesion in the right orbit suggesting that this may not be typical inflammation.  We discussed the differential diagnosis includes infection and neoplasm as well.     I asked him to go to the ED to initiate plasmapheresis, since the hospital has no beds. Dr. Boykin had suggested this to get the process started with a central line.

## 2023-08-09 NOTE — NURSING NOTE
Chief Complaints and History of Present Illnesses   Patient presents with    Optic Atrophy Follow Up     Pt here for 5 week follow up on Optic atrophy.      Chief Complaint(s) and History of Present Illness(es)       Optic Atrophy Follow Up              Associated symptoms: headache.  Negative for double vision    Comments: Pt here for 5 week follow up on Optic atrophy.               Comments    Pt notes decreased vision since last exam. He also brought in an imaging disc from [place] [facility]. Pt has a headache today.   MARIE Ayala on 8/9/2023 at 7:54 AM

## 2023-08-10 ENCOUNTER — APPOINTMENT (OUTPATIENT)
Dept: LAB | Facility: CLINIC | Age: 63
DRG: 025 | End: 2023-08-10
Payer: COMMERCIAL

## 2023-08-10 ENCOUNTER — APPOINTMENT (OUTPATIENT)
Dept: INTERVENTIONAL RADIOLOGY/VASCULAR | Facility: CLINIC | Age: 63
DRG: 025 | End: 2023-08-10
Attending: PHYSICIAN ASSISTANT
Payer: COMMERCIAL

## 2023-08-10 LAB
ABO/RH(D): NORMAL
ANION GAP SERPL CALCULATED.3IONS-SCNC: 10 MMOL/L (ref 7–15)
ANTIBODY SCREEN: NEGATIVE
BUN SERPL-MCNC: 23 MG/DL (ref 8–23)
CALCIUM SERPL-MCNC: 8.7 MG/DL (ref 8.8–10.2)
CHLORIDE SERPL-SCNC: 97 MMOL/L (ref 98–107)
CREAT SERPL-MCNC: 0.98 MG/DL (ref 0.67–1.17)
DEPRECATED HCO3 PLAS-SCNC: 25 MMOL/L (ref 22–29)
ERYTHROCYTE [DISTWIDTH] IN BLOOD BY AUTOMATED COUNT: 15.3 % (ref 10–15)
FIBRINOGEN PPP-MCNC: 174 MG/DL (ref 170–490)
GFR SERPL CREATININE-BSD FRML MDRD: 86 ML/MIN/1.73M2
GLUCOSE BLDC GLUCOMTR-MCNC: 102 MG/DL (ref 70–99)
GLUCOSE BLDC GLUCOMTR-MCNC: 106 MG/DL (ref 70–99)
GLUCOSE BLDC GLUCOMTR-MCNC: 123 MG/DL (ref 70–99)
GLUCOSE BLDC GLUCOMTR-MCNC: 192 MG/DL (ref 70–99)
GLUCOSE BLDC GLUCOMTR-MCNC: 354 MG/DL (ref 70–99)
GLUCOSE BLDC GLUCOMTR-MCNC: 473 MG/DL (ref 70–99)
GLUCOSE BLDC GLUCOMTR-MCNC: 493 MG/DL (ref 70–99)
GLUCOSE SERPL-MCNC: 131 MG/DL (ref 70–99)
HCT VFR BLD AUTO: 35.2 % (ref 40–53)
HGB BLD-MCNC: 12.5 G/DL (ref 13.3–17.7)
INR PPP: 1.07 (ref 0.85–1.15)
INR PPP: 1.13 (ref 0.85–1.15)
MCH RBC QN AUTO: 30.1 PG (ref 26.5–33)
MCHC RBC AUTO-ENTMCNC: 35.5 G/DL (ref 31.5–36.5)
MCV RBC AUTO: 85 FL (ref 78–100)
PLATELET # BLD AUTO: 94 10E3/UL (ref 150–450)
POTASSIUM SERPL-SCNC: 3.4 MMOL/L (ref 3.4–5.3)
RBC # BLD AUTO: 4.15 10E6/UL (ref 4.4–5.9)
SODIUM SERPL-SCNC: 132 MMOL/L (ref 136–145)
SPECIMEN EXPIRATION DATE: NORMAL
WBC # BLD AUTO: 5.7 10E3/UL (ref 4–11)

## 2023-08-10 PROCEDURE — 120N000002 HC R&B MED SURG/OB UMMC

## 2023-08-10 PROCEDURE — 250N000011 HC RX IP 250 OP 636: Performed by: PATHOLOGY

## 2023-08-10 PROCEDURE — 250N000013 HC RX MED GY IP 250 OP 250 PS 637: Performed by: STUDENT IN AN ORGANIZED HEALTH CARE EDUCATION/TRAINING PROGRAM

## 2023-08-10 PROCEDURE — 77001 FLUOROGUIDE FOR VEIN DEVICE: CPT | Mod: 26 | Performed by: PHYSICIAN ASSISTANT

## 2023-08-10 PROCEDURE — 250N000009 HC RX 250: Performed by: PHYSICIAN ASSISTANT

## 2023-08-10 PROCEDURE — 36415 COLL VENOUS BLD VENIPUNCTURE: CPT | Performed by: PHYSICIAN ASSISTANT

## 2023-08-10 PROCEDURE — 250N000011 HC RX IP 250 OP 636: Mod: JZ

## 2023-08-10 PROCEDURE — 250N000009 HC RX 250: Performed by: PATHOLOGY

## 2023-08-10 PROCEDURE — 258N000003 HC RX IP 258 OP 636

## 2023-08-10 PROCEDURE — 5A09357 ASSISTANCE WITH RESPIRATORY VENTILATION, LESS THAN 24 CONSECUTIVE HOURS, CONTINUOUS POSITIVE AIRWAY PRESSURE: ICD-10-PCS | Performed by: PSYCHIATRY & NEUROLOGY

## 2023-08-10 PROCEDURE — 86901 BLOOD TYPING SEROLOGIC RH(D): CPT | Performed by: PATHOLOGY

## 2023-08-10 PROCEDURE — 36415 COLL VENOUS BLD VENIPUNCTURE: CPT | Performed by: STUDENT IN AN ORGANIZED HEALTH CARE EDUCATION/TRAINING PROGRAM

## 2023-08-10 PROCEDURE — C1769 GUIDE WIRE: HCPCS

## 2023-08-10 PROCEDURE — 85384 FIBRINOGEN ACTIVITY: CPT | Performed by: PATHOLOGY

## 2023-08-10 PROCEDURE — 272N000504 HC NEEDLE CR4

## 2023-08-10 PROCEDURE — 76937 US GUIDE VASCULAR ACCESS: CPT | Mod: 26 | Performed by: PHYSICIAN ASSISTANT

## 2023-08-10 PROCEDURE — 86850 RBC ANTIBODY SCREEN: CPT | Performed by: PATHOLOGY

## 2023-08-10 PROCEDURE — 85048 AUTOMATED LEUKOCYTE COUNT: CPT | Performed by: STUDENT IN AN ORGANIZED HEALTH CARE EDUCATION/TRAINING PROGRAM

## 2023-08-10 PROCEDURE — 36556 INSERT NON-TUNNEL CV CATH: CPT

## 2023-08-10 PROCEDURE — 250N000012 HC RX MED GY IP 250 OP 636 PS 637: Performed by: STUDENT IN AN ORGANIZED HEALTH CARE EDUCATION/TRAINING PROGRAM

## 2023-08-10 PROCEDURE — 02H633Z INSERTION OF INFUSION DEVICE INTO RIGHT ATRIUM, PERCUTANEOUS APPROACH: ICD-10-PCS | Performed by: PHYSICIAN ASSISTANT

## 2023-08-10 PROCEDURE — C1752 CATH,HEMODIALYSIS,SHORT-TERM: HCPCS

## 2023-08-10 PROCEDURE — 80048 BASIC METABOLIC PNL TOTAL CA: CPT | Performed by: STUDENT IN AN ORGANIZED HEALTH CARE EDUCATION/TRAINING PROGRAM

## 2023-08-10 PROCEDURE — 36514 APHERESIS PLASMA: CPT

## 2023-08-10 PROCEDURE — 999N000157 HC STATISTIC RCP TIME EA 10 MIN

## 2023-08-10 PROCEDURE — P9045 ALBUMIN (HUMAN), 5%, 250 ML: HCPCS | Mod: JZ | Performed by: PATHOLOGY

## 2023-08-10 PROCEDURE — 99223 1ST HOSP IP/OBS HIGH 75: CPT | Performed by: PSYCHIATRY & NEUROLOGY

## 2023-08-10 PROCEDURE — 99222 1ST HOSP IP/OBS MODERATE 55: CPT | Mod: 25 | Performed by: PHYSICIAN ASSISTANT

## 2023-08-10 PROCEDURE — 85610 PROTHROMBIN TIME: CPT | Performed by: PATHOLOGY

## 2023-08-10 PROCEDURE — 250N000011 HC RX IP 250 OP 636: Mod: JZ | Performed by: PHYSICIAN ASSISTANT

## 2023-08-10 PROCEDURE — 258N000003 HC RX IP 258 OP 636: Performed by: STUDENT IN AN ORGANIZED HEALTH CARE EDUCATION/TRAINING PROGRAM

## 2023-08-10 PROCEDURE — 85014 HEMATOCRIT: CPT | Performed by: STUDENT IN AN ORGANIZED HEALTH CARE EDUCATION/TRAINING PROGRAM

## 2023-08-10 PROCEDURE — 85610 PROTHROMBIN TIME: CPT | Performed by: PHYSICIAN ASSISTANT

## 2023-08-10 PROCEDURE — 272N000192 HC ACCESSORY CR2

## 2023-08-10 PROCEDURE — 36556 INSERT NON-TUNNEL CV CATH: CPT | Performed by: PHYSICIAN ASSISTANT

## 2023-08-10 PROCEDURE — 250N000012 HC RX MED GY IP 250 OP 636 PS 637: Performed by: PHYSICIAN ASSISTANT

## 2023-08-10 RX ORDER — ALBUMIN HUMAN 25 %
4000 INTRAVENOUS SOLUTION INTRAVENOUS
Status: COMPLETED | OUTPATIENT
Start: 2023-08-10 | End: 2023-08-10

## 2023-08-10 RX ORDER — HEPARIN SODIUM (PORCINE) LOCK FLUSH IV SOLN 100 UNIT/ML 100 UNIT/ML
3 SOLUTION INTRAVENOUS ONCE
Status: CANCELLED | OUTPATIENT
Start: 2023-08-10 | End: 2023-08-10

## 2023-08-10 RX ORDER — HEPARIN SODIUM (PORCINE) LOCK FLUSH IV SOLN 100 UNIT/ML 100 UNIT/ML
3 SOLUTION INTRAVENOUS
Status: DISCONTINUED | OUTPATIENT
Start: 2023-08-10 | End: 2023-08-19 | Stop reason: HOSPADM

## 2023-08-10 RX ORDER — CEFAZOLIN SODIUM 2 G/100ML
2 INJECTION, SOLUTION INTRAVENOUS
Status: DISCONTINUED | OUTPATIENT
Start: 2023-08-10 | End: 2023-08-10

## 2023-08-10 RX ORDER — CALCIUM GLUCONATE 100 MG/ML
AMPUL (ML) INTRAVENOUS
Status: COMPLETED | OUTPATIENT
Start: 2023-08-10 | End: 2023-08-10

## 2023-08-10 RX ORDER — HEPARIN SODIUM (PORCINE) LOCK FLUSH IV SOLN 100 UNIT/ML 100 UNIT/ML
3 SOLUTION INTRAVENOUS ONCE
Status: COMPLETED | OUTPATIENT
Start: 2023-08-10 | End: 2023-08-10

## 2023-08-10 RX ORDER — HEPARIN SODIUM (PORCINE) LOCK FLUSH IV SOLN 100 UNIT/ML 100 UNIT/ML
3 SOLUTION INTRAVENOUS EVERY 24 HOURS
Status: DISCONTINUED | OUTPATIENT
Start: 2023-08-10 | End: 2023-08-11

## 2023-08-10 RX ORDER — HEPARIN SODIUM (PORCINE) LOCK FLUSH IV SOLN 100 UNIT/ML 100 UNIT/ML
5 SOLUTION INTRAVENOUS ONCE
Status: DISCONTINUED | OUTPATIENT
Start: 2023-08-10 | End: 2023-08-10

## 2023-08-10 RX ORDER — LIDOCAINE HYDROCHLORIDE 10 MG/ML
1-30 INJECTION, SOLUTION EPIDURAL; INFILTRATION; INTRACAUDAL; PERINEURAL
Status: COMPLETED | OUTPATIENT
Start: 2023-08-10 | End: 2023-08-10

## 2023-08-10 RX ORDER — DIPHENHYDRAMINE HYDROCHLORIDE 50 MG/ML
50 INJECTION INTRAMUSCULAR; INTRAVENOUS
Status: CANCELLED | OUTPATIENT
Start: 2023-08-10

## 2023-08-10 RX ORDER — ACETAMINOPHEN 325 MG/1
650 TABLET ORAL EVERY 4 HOURS PRN
Status: DISCONTINUED | OUTPATIENT
Start: 2023-08-10 | End: 2023-08-14

## 2023-08-10 RX ADMIN — INSULIN HUMAN 15 UNITS: 100 INJECTION, SUSPENSION SUBCUTANEOUS at 16:36

## 2023-08-10 RX ADMIN — CALCIUM CARBONATE 600 MG (1,500 MG)-VITAMIN D3 400 UNIT TABLET 1 TABLET: at 07:44

## 2023-08-10 RX ADMIN — PANTOPRAZOLE SODIUM 40 MG: 40 TABLET, DELAYED RELEASE ORAL at 07:44

## 2023-08-10 RX ADMIN — Medication 1.5 ML: at 10:30

## 2023-08-10 RX ADMIN — SODIUM CHLORIDE 1000 MG: 9 INJECTION, SOLUTION INTRAVENOUS at 16:36

## 2023-08-10 RX ADMIN — ANTICOAGULANT CITRATE DEXTROSE SOLUTION FORMULA A 914 ML: 12.25; 11; 3.65 SOLUTION INTRAVENOUS at 14:35

## 2023-08-10 RX ADMIN — LIDOCAINE HYDROCHLORIDE 2 ML: 10 INJECTION, SOLUTION EPIDURAL; INFILTRATION; INTRACAUDAL; PERINEURAL at 10:15

## 2023-08-10 RX ADMIN — INSULIN ASPART 5 UNITS: 100 INJECTION, SOLUTION INTRAVENOUS; SUBCUTANEOUS at 12:54

## 2023-08-10 RX ADMIN — CALCIUM CARBONATE 600 MG (1,500 MG)-VITAMIN D3 400 UNIT TABLET 1 TABLET: at 17:28

## 2023-08-10 RX ADMIN — ALBUMIN HUMAN 4000 ML: 0.05 INJECTION, SOLUTION INTRAVENOUS at 14:35

## 2023-08-10 RX ADMIN — CARVEDILOL 12.5 MG: 12.5 TABLET, FILM COATED ORAL at 07:44

## 2023-08-10 RX ADMIN — CALCIUM GLUCONATE 4.8 G: 98 INJECTION, SOLUTION INTRAVENOUS at 14:35

## 2023-08-10 RX ADMIN — Medication 3 ML: at 16:30

## 2023-08-10 RX ADMIN — HYDROCHLOROTHIAZIDE 25 MG: 25 TABLET ORAL at 07:43

## 2023-08-10 RX ADMIN — PREDNISONE 80 MG: 20 TABLET ORAL at 07:43

## 2023-08-10 RX ADMIN — Medication 1 MG: at 02:15

## 2023-08-10 RX ADMIN — SODIUM CHLORIDE 1000 ML: 9 INJECTION, SOLUTION INTRAVENOUS at 00:07

## 2023-08-10 RX ADMIN — ACETAMINOPHEN 650 MG: 325 TABLET, FILM COATED ORAL at 05:55

## 2023-08-10 RX ADMIN — CARVEDILOL 12.5 MG: 12.5 TABLET, FILM COATED ORAL at 17:28

## 2023-08-10 ASSESSMENT — ACTIVITIES OF DAILY LIVING (ADL)
ADLS_ACUITY_SCORE: 20

## 2023-08-10 NOTE — UTILIZATION REVIEW
Inpatient appropriate    Admission Status; Secondary Review Determination      Under the authority of the Utilization Management Committee, the utilization review process indicated a secondary review on the above patient. The review outcome is based on review of the medical records, discussions with staff, and applying clinical experience noted on the date of the review.    (x) Inpatient Status Appropriate - This patient's medical care is consistent with medical management for inpatient care and reasonable inpatient medical practice.    RATIONALE FOR DETERMINATION  63-year-old male with history of obstructive sleep apnea, hypertension, diabetes mellitus type 2 was admitted to [facility] on 8/9/2023 with waxing and waning right eye pain and visual loss since June.  MRI showed enhancement of the right optic nerve sheath suggesting optic perineuritis.  Patient has been started on high-dose methylprednisolone at 1 g IV daily and will also be started on plasma exchange with plan for 5 exchanges every other day.  Inpatient care is appropriate at this time.      At the time of admission with the information available to the attending physician more than 2 nights Hospital complex care was anticipated, based on patient risk of adverse outcome if treated as outpatient and complex care required. Inpatient admission is appropriate based on the Medicare guidelines.    This document was produced using voice recognition software      The information on this document is developed by the utilization review team in order for the business office to ensure compliance. This only denotes the appropriateness of proper admission status and does not reflect the quality of care rendered.  The definitions of Inpatient Status and Observation Status used in making the determination above are those provided in the CMS Coverage Manual, Chapter 1 and Chapter 6, section 70.4.    Sincerely,    Utilization Review  Physician Advisor  [facility]  Services.

## 2023-08-10 NOTE — PLAN OF CARE
Status: pt admitted for optic neuritis  Vitals: VSS on RA; CPAP at bedside for NOC  Neuros: intact except R eye opaque per pt and bilateral sluggish pupils  IV: PIV SL  Labs/Electrolytes: BG ACHS  Resp/trach: LSC  Diet: regular w/ carb coverage  Bowel status: BS+ LBM 8/10  : void spont  Skin: R eye bruise  Pain: denies  Activity: IND  Social: pt family at bedside  Plan: continue PLEX for 5 total treatments, continue IV steroids; needs UA  Updates this shift: PLEX #1 completed this shift

## 2023-08-10 NOTE — ED TRIAGE NOTES
Pt ambulated into the ER due to loss of vision in his right eye  Pt had a biopsy done 5 weeks ago

## 2023-08-10 NOTE — PLAN OF CARE
Pt admitted for optic perineuritis, vss, neuros include: R eye blurry and bilateral sluggish pupils, otherwise intact. PIV SL, CVC HL'd, regular diet, voids spont, up ad john, BM today. Pt received first dose of PLEX, c/o minor headache but declined any meds. Pt ambulates a lot, working on phone and laptop. Continue to care per orders.

## 2023-08-10 NOTE — H&P
"[facility]  Neurology History and Physical    Patient Name:  Amado Butler  MRN:  [id number]    :  1960  Date of Admission:  2023  Date of Service:  2023  Primary care provider:  Joel Sandra      Chief Complaint:  \"worsening right eye vision\"    History of Present Illness:   Amado Butler is a 63 year old male PMHx of SARA, HTN, DM2, paroxysmal SVT p/w waxing and waning R-eye pain and visual loss since .     Per chart review, he has had a complicated course since Sx first developed , when he first started experiencing pain superior and lateral to his R-eye, w/ hazy monocular vision, no pain on eye movements.  He was seen in urgent care, subsequently an ED, and ultimately general ophthalmology and finally a retinal specialist Dr. Huan Conde.  Evaluation at the time n/f R 20/50 L 20/20, IOP 21/22 (R/L), R disc edema w/ c/f non-arteritic anterior ischemic optic neuropathy (NAION) vs much less-likely giant cell arteritis in c/o reportedly normal ESR/CRP.    Pain and visual Sx persisted and he saw his PCP  who ordered an MRI Brain/Orbits wwo.  ROS per PCP Dr. Sandra at the time n/f pan-negativity: no scalp tenderness, no fevers, no jaw claudication, no problems with chewing, no recent medication changes.  Per read (image not avail) MRI returned s/f R-frontal encephalomalacia, presumed microvascular changes but was otherwise unremarkable -- MR orbits read as normal.   He was referred to Dr. Serrano of neuro-ophthalmology, but was unable to get in before August.      However, pain/vision loss persisted and patient re-presented to the [facility] ED 7/3.  He was seen by ophthalmology who noted decreasing visual acuity (down to 20/200 R), with repeat MRI brain/orbits showing subtle perineural enhancement per ophthalmology overread, most prominent at the apex.  Acute phase reactants remained normal. However, given R-shoulder pain noted by ophthalmology, progressing " "vision loss, and perineural enhancement, concern for GCA was high enough that, in addition to a number of labs (see EMR), and high-dose steroids (1g methylpred IV x1 to 96 mg PO for 1 week) biopsy per Dr. Serrano of neuro-ophthalmology was pursued on an outpatient basis (per pt preference).      Biopsy was negative for arteritis, given this and persistently normal acute phase reactants steroids were tapered to 60 mg qday.  However, he represented to a [place] ED 7/22 (was on vacation) with worsening vision.  He reported to the ED physician that vision and pain had significantly improved on methylprednisolone, but had progressively worsened on 60 mg prednisone.  He was again given 1g IV methylpred w/ improvement in pain and vision.  After a couple additional ED visits in [place], he was ultimately admitted to the [facility] Neurology service 7/31.    During his course at [facility], he was restarted on high-dose steroids (1g IV methylpred x5) w/ improvement and received an extensive work-up including LP w/ broad laboratory w/u, as well as extensive imaging (MRI Brain, Face, C/T spine, Abdomen, and a PET-CT ).  Per [facility] Neurology notes, LP was unremarkable, PET-CT was without evidence of abnormal metabolic activity, and no specific diagnosis was reached.  Vision at the time of discharge 8/5 was 20/40 R 20/20 L.      This morning (8/9), he presented to Dr. Serrano of neuro-ophthalmology.  Dr. Serrano's exam was n/f severe R vision loss (hand motion) w/ APD, still 20/20 on L.  Dr. Serrano's read of Mr Butler's prior imaging is notable for a lesion lateral to his R optic nerve near the R orbital apex which has been stable over time.  Again, no clear diagnosis was reached, and Dr. Serrano feels the differential remains broad: \"infectious, inflammatory, neoplastic.\"   In the interest of preserving vision Mr. Butler was instructed to present to the [facility] ED to initiate plasmapheresis, with plan for possible biopsy and " "cultures of his orbit.      On discussion with Mr. Butler, he reports that he presents emergency department due to worsening vision right eye since discharge from [facility].  He reports that he grades his vision by having different levels of loss being \" hazy,\" \"foggy,\" and \" opaque,\" with opaque being the worst and that what he is reporting today.  He says that he can see movement but cannot discern objects or faces.  He also cannot see color out of his right eye.  Denies any left eye involvement.  Also without any painful eye movements.  He said he previously did have pain behind his right eye that radiated down into his right shoulder though this is not present today.  Denies any changes in sensation on the face.  No facial drooping or slurring speech.  No involvement of the extremities.      ROS: A comprehensive ROS was performed and pertinent findings were included in HPI.     PMH:  History reviewed. No pertinent past medical history.  History reviewed. No pertinent surgical history.    Medications   I have personally reviewed the patient's medication list.     Allergies  I have personally reviewed the patient's allergy list.     Social History:  Social History     Socioeconomic History    Marital status:      Spouse name: None    Number of children: None    Years of education: None    Highest education level: None   Tobacco Use    Smoking status: Never    Smokeless tobacco: Never   Substance and Sexual Activity    Alcohol use: Yes     Comment: social    Drug use: Not Currently      Family History:    History reviewed. No pertinent family history.     Physical Examination:   Constitutional  Vitals: BP (!) 165/91   Pulse 55   Temp 97.7  F (36.5  C) (Oral)   Resp 18   SpO2 96%   General: sitting up in chair, no acute distress  Head: NC/AT  Eyes: no icterus, right bethel orbital bruising  Cardiac: Regular rate on monitor  Respiratory: non-labored on RA  GI: Nontender  Skin: scattered bruises on " extremities  Psych: Mood pleasant, affect congruent  Neuro:  Mental status: Awake, alert, attentive, oriented to self, time, place, and circumstance. Language is fluent and coherent with intact comprehension of complex commands, naming and repetition.  Cranial nerves: VFF in left eye, only able to see hand movement in right, R APD, conjugate gaze, EOMI, facial sensation intact, face symmetric, shoulder shrug strong, tongue/uvula midline, no dysarthria  Motor: Normal bulk and tone. No abnormal movements. 5/5 strength bilaterally in deltoids, biceps, triceps, hand , hip flexors, hip extensors, knee flexion, knee extension, plantarflexion, dorsiflexion.   Reflexes: Normoreflexic and symmetric biceps, brachioradialis, triceps, patellae, and achilles.   Sensory: Intact to light touch in all 4 extremities  Coordination: FNF without ataxia or dysmetria.    Gait: Normal width, stride length, turn, and arm swing.    Investigations:    MRI brain and orbits w/o contrast 6/28/23  IMPRESSION:  HEAD MRI:   1.  Right frontal lobe encephalomalacia and surrounding gliotic   change without acute intracranial abnormality.   ORBIT MRI:   1.  Unremarkable orbits.     MRI brain and orbits with and w/o contrast 7/3/23  IMPRESSION:  HEAD MRI:  1.  No acute infarct.  2.  Age-related changes described above.  3.  Right frontal lobe small area tissue loss and gliosis.  ORBIT MRI:  1.  Unremarkable MRI of the orbits.   2.  No evidence for optic neuritis.    MRI BRAIN WITH AND WITHOUT CONTRAST, MRI FACE (ORBITS) WITH AND WITHOUT CONTRAST 7/28/23  1.  No acute intracranial hemorrhage, mass lesion, or acute infarction.   2.  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   3.  Minimal T2 hyperintensity and enhancement in the right optic nerve sheath, which may represent mild perineuritis. No clear evidence of optic neuritis.     18F-FDG PET Scan With Attenuation Correction CT Only 8/3/23  1. No specific evidence of an FDG-avid malignancy.   2.  Non-avid left adrenal nodule, which may be too small to be accurately characterized by FDG PET. Reported right adrenal nodules not visualized on the low dose CT. Adrenal nodules are not well characterized by FDG-PET, and would be more sensitively/specifically assessed with an adrenal protocolled CT or MRI.   3. Apparent abrupt termination of the left maxilla posteriorly without abnormal tracer uptake, suspected to be related to prior dental extraction or post-surgical changes.     MRI CERVICAL SPINE WITH AND WITHOUT CONTRAST, MRI THORACIC SPINE WITH AND WITHOUT CONTRAST 23  No evidence of demyelinating disease or transverse myelitis within the cervical or thoracic spine.     MR ORBIT W/O & W CONTRAST 2023 11:35 AM  Impression:    1. Regarding the orbits and globes, there is stable slight asymmetric  hyperintensity and perhaps slight enhancement of the right optic nerve  sheath, suggesting optic perineuritis. No underlying mass lesion.  2. Regarding the remainder of the brain, chronic right frontal infarct  and mild small vessel ischemic disease.     Temporal artery, right, biopsy 23  No evidence of giant cell arteritis.  Moderate arteriosclerosis.  Mild medial calcific sclerosis.     CRP < 3.0 on multiple occasions  ESR < 2 on multiple occasions    CNS demyelinating disease (serum): negative  Encephalopathy/autoimmune panel (serum): negative  ACE: < 10  BUDDY: negative  ANCA Ig:20 (high)  IgG subclass: negative  [place], [place] light chain: negative  SPEP: negative  Lysozyme (serum): > 10.00 (high)    HBV: negative  Treponema juany: negative  Lyme juany: negative  Anaplamsa IgG IgM: negative  [place] IgG IgM: negative  Erlichia IgG IgM: negative  Quantiferon TB: negative    CSF 23  Nucleated cells: 0  RBC: 2,967  Protein: 20  Glucose: 133  Kappa Free light chain: negative  ACE: 4  Lyme: non-reactive    Assessment & Plan:  Amado Butler is a 63 year old male w/ PMHx of of SARA, HTN, DM2, paroxysmal SVT p/w  vision loss and periorbital pain w/ MRI showing subtle perineural enhancement, disc edema on prior dilated exams, c/f unilateral optic perineuritis of unclear etiology.    Extensive work-up by multiple services as summarized in HPI without clear final diagnosis.  Suspect auto-immune etiology given some degree of steroid responsiveness, negative extensive work-up, however, especially in light of seemingly isolated involvement of his R-eye (w/o evidence of even subtle involvement elsewhere), we cannot necessarily rule out a simmering infiltrative vs significantly less likely infectious process.      Given persistent symptoms and suspicion for an autoimmune etiology, we are admitting for empiric PLEX, further work-up pending further discussion and patient course.      #R-eye vision loss, concern for optic perineuritis of unclear etiology  -Start PLEX, planned 5 sessions  -IR consult for non-tunneled central catheter  -Transfusion medicine consult  -Ophthalmology consult  -Continue pta prednisone 80 mg daily, will consider high-dose pending clinical course   - Continue GI ppx with pantoprazole   - Vitamin D and calcium supplementation    - PJP ppx with Bactrim three times weekly  -Serum MOG ([facility] send out lab)  -No need for immediate repeat LP / repeat imaging at this time  -Low threshold for NSGY involvement / biopsy if non-responsive to PLEX    #Hyponatremia   #Hypochloremia  Possibly secondary to hypovolemia as patient endorses feeling dehydrated and poor PO intake. Also possible component of chronic steroids causing hyponatremia.  - Will give 1 L NS IV and recheck Na in AM  - If no improvement may need urine sodium studies    #Thrombocytopenia  Appears to be chronic with unclear etiology. No concerns for active bleeding currently. Will continue to monitor.  - Daily CBC    #Hypertension  - Continue pta coreg 12.5 mg BID  - Takes combination lisinopril-hydrochlorothiazide at home however ACE not recommended when  undergoing PLEX therefore will just continue hydrochlorothiazide at 25 mg daily    #Pre-diabetes  - Insulin sliding scale  - Holding pta metformin and glipizide while inpatient    #SARA  - CPAP at night    FEN: Regular diet  Malnutrition: Patient does not meet two of the above criteria necessary for diagnosing malnutrition  PPx: lovenox  Code: FULL    Patient was seen and discussed with Dr. Boykin.    Anneliese Anderson MD  Neurology Resident PGY4

## 2023-08-10 NOTE — PROGRESS NOTES
"CLINICAL NUTRITION SERVICES - ASSESSMENT NOTE     Nutrition Prescription    RECOMMENDATIONS FOR MDs/PROVIDERS TO ORDER:  - [place] diet as tolerated     Malnutrition Status:    - Unable to determine due to unable to assess all parameters of NFPE    Recommendations already ordered by Registered Dietitian (RD):  - Ordered oral nutrition supplements: Ensure Enlive with meals     Future/Additional Recommendations:  - PO/supp adequacy   - Weight trends      REASON FOR ASSESSMENT  Amado Butler is a/an 63 year old male assessed by the dietitian for Admission Nutrition Risk Screen for positive    Medical History: PMHx of of SARA, HTN, DM2, paroxysmal SVT p/w vision loss and periorbital pain w/ MRI showing subtle perineural enhancement, disc edema on prior dilated exams, c/f unilateral optic perineuritis of unclear etiology.     NUTRITION HISTORY  Assess as able. Some weight loss noted per weight history, but overall not significantly down compared to 2022. Attempted visits, pt with procedure and then other provider/cares.     CURRENT NUTRITION ORDERS  Diet: Regular  Intake/Tolerance: 100% PO     LABS  Labs reviewed  Na+ 132 (L)  Glucose: 131    MEDICATIONS  Medications reviewed  Caltrate  Insulin   Prednisone  Miralax     ANTHROPOMETRICS  Height: 0 cm (Data Unavailable)   Ht Readings from Last 2 Encounters:   07/03/23 1.905 m (6' 3\")   75\"  Most Recent Weight: 91.1 kg (200 lb 12.8 oz)    IBW: 89 kg  BMI: Overweight BMI 25-29.9  Weight History:   Wt Readings from Last 9 Encounters:   08/10/23 91.1 kg (200 lb 12.8 oz)   07/03/23 98.5 kg (217 lb 1.6 oz)   8% weight loss over ~ 1.25 months     Dosing Weight: 91 kg (admit weight)    ASSESSED NUTRITION NEEDS  Estimated Energy Needs: 2028-0555 kcals/day (25 - 30 kcals/kg)  Justification: Maintenance  Estimated Protein Needs: 91 -109 grams protein/day (1.2 - 1.5 grams of pro/kg)  Justification: Increased needs  Estimated Fluid Needs:(1 mL/kcal)   Justification: Maintenance and Per " provider pending fluid status    PHYSICAL FINDINGS  See malnutrition section below.     MALNUTRITION  % Intake: Unable to assess  % Weight Loss: > 5% in 1 month (severe) - follow trends and weight history   Subcutaneous Fat Loss: Unable to assess  Muscle Loss: Unable to assess  Fluid Accumulation/Edema: Unable to assess  Malnutrition Diagnosis: Unable to determine due to unable to assess all parameters of NFPE    NUTRITION DIAGNOSIS  Predicted inadequate nutrient intake related to suspected decreased appetite vs neur status as evidenced by pt report of decreased PO and weight loss PTA       INTERVENTIONS  Implementation  Nutrition Education: Will be provided if education needs arise   Medical food supplement therapy     Goals  Patient to consume % of nutritionally adequate meal trays TID, or the equivalent with supplements/snacks.     Monitoring/Evaluation  Progress toward goals will be monitored and evaluated per protocol.  Blaze Mancilla RD, [place], [facility]  Neuro ICU  Pager: [phone]

## 2023-08-10 NOTE — PROGRESS NOTES
"[facility]  General Neurology - Progress Note    Patient Name:  Amado Butler  Date of Service:  August 10, 2023    Subjective:    NAEO.  Pt confirms history taken overnight, please see HPI.  Newly reported that during [facility] hospitalization he improved during the first 2 doses of methylprednisolone (1g IV), however his vision worsened over the final 3 doses, and has continued to worsen since on 80 mg pred PO.      Reports ongoing vision loss, minimal pain, although he does report some post-orbital / temporal \"hints\" of pain.  When asked to rate: 1-2/10 vs 5/10 at peak.      Objective:    Vitals: BP (!) 140/79 (BP Location: Right arm, Patient Position: Supine, Cuff Size: Adult Regular)   Pulse 63   Temp 97.7  F (36.5  C) (Oral)   Resp 16   Wt 91.1 kg (200 lb 12.8 oz)   SpO2 96%   BMI 25.10 kg/m    General: Seated in chair, NAD  Head: Atraumatic, normocephalic   Cardiac: no lower extremity edema  Neurologic:  Mental Status: Fully alert, attentive and oriented. Speech clear and fluent.  Asks thoughtful questions.  Cranial Nerves: EOM intact, without nystagmus. Facial movements symmetric at rest and with activation. No dysarthria.  RAPD on right.  Visual acuity 25/20 on L, no perception of hand motion on R.    Motor: No abnormal movements.  Moving all 4 extremities antigravity.  Able to stand with crossed legs without difficulty.   Station/Gait: Stable, steady casual gait.    Pertinent Investigations:    I have personally reviewed most recent and pertinent labs, tests, and radiological images.     ~Prior to admission~  MRI brain and orbits w/o contrast 6/28/23  IMPRESSION:  HEAD MRI:   1.  Right frontal lobe encephalomalacia and surrounding gliotic   change without acute intracranial abnormality.   ORBIT MRI:   1.  Unremarkable orbits.      MRI brain and orbits with and w/o contrast 7/3/23  IMPRESSION:  HEAD MRI:  1.  No acute infarct.  2.  Age-related changes described " above.  3.  Right frontal lobe small area tissue loss and gliosis.  ORBIT MRI:  1.  Unremarkable MRI of the orbits.   2.  No evidence for optic neuritis.     MRI BRAIN WITH AND WITHOUT CONTRAST, MRI FACE (ORBITS) WITH AND WITHOUT CONTRAST 7/28/23  1.  No acute intracranial hemorrhage, mass lesion, or acute infarction.   2.  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   3.  Minimal T2 hyperintensity and enhancement in the right optic nerve sheath, which may represent mild perineuritis. No clear evidence of optic neuritis.      18F-FDG PET Scan With Attenuation Correction CT Only 8/3/23  1. No specific evidence of an FDG-avid malignancy.   2. Non-avid left adrenal nodule, which may be too small to be accurately characterized by FDG PET. Reported right adrenal nodules not visualized on the low dose CT. Adrenal nodules are not well characterized by FDG-PET, and would be more sensitively/specifically assessed with an adrenal protocolled CT or MRI.   3. Apparent abrupt termination of the left maxilla posteriorly without abnormal tracer uptake, suspected to be related to prior dental extraction or post-surgical changes.      MRI CERVICAL SPINE WITH AND WITHOUT CONTRAST, MRI THORACIC SPINE WITH AND WITHOUT CONTRAST 8/5/23  No evidence of demyelinating disease or transverse myelitis within the cervical or thoracic spine.      MR ORBIT W/O & W CONTRAST 8/9/2023 11:35 AM  Impression:    1. Regarding the orbits and globes, there is stable slight asymmetric  hyperintensity and perhaps slight enhancement of the right optic nerve  sheath, suggesting optic perineuritis. No underlying mass lesion.  2. Regarding the remainder of the brain, chronic right frontal infarct  and mild small vessel ischemic disease.      Temporal artery, right, biopsy 7/6/23  No evidence of giant cell arteritis.  Moderate arteriosclerosis.  Mild medial calcific sclerosis.      CRP < 3.0 on multiple occasions  ESR < 2 on multiple occasions     CNS  demyelinating disease (serum): negative  Encephalopathy/autoimmune panel (serum): negative  ACE: < 10  BUDDY: negative  ANCA Ig:20 (high)  IgG subclass: negative  [place], [place]an light chain: negative  SPEP: negative  Lysozyme (serum): > 10.00 (high)     HBV: negative  Treponema juany: negative  Lyme juany: negative  Anaplamsa IgG IgM: negative  Babseia IgG IgM: negative  Erlichia IgG IgM: negative  Quantiferon TB: negative     CSF 23  Nucleated cells: 0  RBC: 2,967  Protein: 20  Glucose: 133  Kappa Free light chain: negative  ACE: 4  Lyme: non-reactive    Assessment and Plan:  Amado Butler is a 63 year old male w/ PMHx of of SARA, HTN, DM2, paroxysmal SVT p/w vision loss and periorbital pain w/ MRI showing subtle perineural enhancement, disc edema on prior dilated exams, 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     Extensive work-up by multiple services as summarized in HPI without clear final diagnosis.  Auto-immune etiology certainly possible given steroid responsiveness.  However, given negative extensive work-up, responsiveness to high-dose steroids with rapid return of visual loss at lower doses, his presentation is concerning for infiltrative (donny. lymphoma) vs significantly less likely a simmering infectious process.     Given persistent symptoms and suspicion for an autoimmune etiology, we are admitting for empiric PLEX.  Should he significantly respond to PLEX, this would point us towards an autoimmune cause. Should he not, infiltrative pathology would be significantly more likely and we would likely recommend perineural biopsy per ENT.   We discussed the risks/benefits of repeat steroid administration w/ Mr. Butler (preserving vision vs interfering w/ utility of biopsy), and he would like to proceed.  See details below.      #R-eye vision loss, concern for optic perineuritis of unclear etiology  -PLEX, planned 5 sessions  -s/p  non-tunneled cath per IR  -Transfusion medicine following   *Planning for accelerated 7d protocol pending further review  -Ophthalmology consult pending  -Starting 1 g IV methylprednisolone for planned 3 day course   - Anticipate restart of PTA 80 mg prednisone thereafter             - Continue GI ppx with pantoprazole             - Vitamin D and calcium supplementation              - PJP ppx with Bactrim three times weekly  -Will assess response to PLEX, further work-up and mgmt pending course   - If not responsive, will likely recommend perineural biopsy per ENT     #Pseudohyponatremia vs Euvolemic Hyponatremia   #Hypochloremia  #b/l Hypokalemia  To 127 at admission c/o BG near 300.  Improved s/p 1L NS to 132, but with concomitant reduction in BG to 131.  Suspect combination of pseudohyponatremia 2/2 hyperglycemia vs medication effect on thiazide and sulfonylurea (donny c/o borderline hypokalemia).  Possible nutritional component given report of recently poor PO intake, however he appears well nourished and is euvolemic on exam.    - Sending studies   >Serum Osm in AM w/ BMP   >FeNa, Urine Urea (c/o thiazide), Urine Osm, UA  - Daily BMP     #Thrombocytopenia  Appears to be chronic with unclear etiology. No concerns for active bleeding currently. Will continue to monitor.  - Transfusion medicine following, appreciate recommendations on this front  - Daily CBC     #Hypertension  - Continue pta coreg 12.5 mg BID  - HOLD lisinopril-hydrochlorothiazide given contraindicated w/ PLEX  - Continue hydrochlorothiazide   >Depending on final hyponatremia diagnosis, may consider recommending a change    #Pre-diabetes  #Steroid induced hyperglycemia  Pt w/ Hx of pre-diabetes PTA on glipizide and metformin, now presenting with BG in intermittent in the 300s, with plan to start high dose methylpred as above.  - High resistance sliding scale  - Endocrine diabetes consulted, assistance appreciated  - Holding pta metformin and  glipizide while inpatient     #SARA  - CPAP at night, home settings     FEN: Regular diet  Malnutrition: Patient does not meet two of the above criteria necessary for diagnosing malnutrition  PPx: lovenox  Code: FULL     Patient was seen and discussed with Dr. Boykin.      Richmond Joseph MD, MS  PGY-2, Neurology  Please page GenNeuro pass pager at [phone] ([facility])

## 2023-08-10 NOTE — CONSULTS
Transfusion Medicine Consultation    Amado Butler MRN# [id number]   YOB: 1960 Age: 63 year old   Date of Admission: 8/9/2023     Reason for consult: Therapeutic Plasma Exchange           Assessment and Plan:   63 year old male presents for consultation for therapeutic plasma exchange in the setting of optic perineuritis of uncertain etiology. The current plan is for 5 exchanges occurring every other day beginning on 08/10/2023.          Chief Complaint:   Transfusion medicine consultation.         History of Present Illness:   63 year old male presents for consultation for therapeutic plasma exchange.  His past medical history includes a two month history of intermittent vision problems that have been treated with various modalities including steroids with varying success. However, his vision concerns have acutely worsened and resulted in the need for a more aggressive treatment plan. The patient reports a history of idiopathic thrombocytopenia that has been present for many years that results in easy bruising but has never been significant enough to require transfusion. His medical history is also notable for diabetes and hypertension which is treated with lisinopril-hydrochlorothiazide that will be held for plasma exchange due to significant interactions between ACE inhibitors and plasmapheresis. Other than his vision complaints, he is currently well.  The patient does not report any other significant history or allergies that would make therapeutic plasma exchange contraindicated.  The procedure, risks/benefits were discussed with the patient, his questions were answered to the best of my ability, and consent was obtained.              Past Medical History:   Hypertension  Type II Diabetes  Idiopathic thrombocytopenia          Past Surgical History:   History reviewed. No pertinent surgical history.           Social History:     Social History     Tobacco Use    Smoking status: Never    Smokeless  tobacco: Never   Substance Use Topics    Alcohol use: Yes     Comment: social             Family History:   History reviewed. No pertinent family history.          Immunizations:     Immunization History   Administered Date(s) Administered    COVID-19 Bivalent 12+ (Pfizer) 10/20/2022    COVID-19 Monovalent 18+ (Moderna) 02/26/2021, 03/26/2021, 05/25/2022             Allergies:   No Known Allergies          Medications:     Current Facility-Administered Medications   Medication    acetaminophen (TYLENOL) tablet 650 mg    albumin human 5 % injection 4,000 mL    Anticoagulant Citrate Dextrose Formula A at ratio of 1:10 with blood ([facility])    calcium carbonate-vitamin D (CALTRATE) 600-10 MG-MCG per tablet 1 tablet    calcium gluconate with 5% albumin (administered by [facility]heres[facility] Staff ONLY)    carvedilol (COREG) tablet 12.5 mg    glucose gel 15-30 g    Or    dextrose 50 % injection 25-50 mL    Or    glucagon injection 1 mg    enoxaparin ANTICOAGULANT (LOVENOX) injection 40 mg    heparin 100 unit/mL injection 3 mL    heparin 100 unit/mL injection 3 mL    hydrochlorothiazide (HYDRODIURIL) tablet 25 mg    insulin aspart (NovoLOG) injection (RAPID ACTING)    insulin aspart (NovoLOG) injection (RAPID ACTING)    lidocaine (LMX4) cream    lidocaine 1 % 0.1-1 mL    melatonin tablet 1 mg    methylPREDNISolone sodium succinate (solu-MEDROL) 1,000 mg in sodium chloride 0.9 % 291 mL intermittent infusion    pantoprazole (PROTONIX) EC tablet 40 mg    polyethylene glycol (MIRALAX) Packet 17 g    [Held by provider] predniSONE (DELTASONE) tablet 80 mg    sodium chloride (PF) 0.9% PF flush 10 mL    sodium chloride (PF) 0.9% PF flush 10 mL    sodium chloride (PF) 0.9% PF flush 3 mL    sodium chloride (PF) 0.9% PF flush 3 mL    [START ON 8/11/2023] sulfamethoxazole-trimethoprim (BACTRIM) 400-80 MG per tablet 1 tablet             Review of Systems:     The Review of Systems is negative other than noted in the HPI           Vital  Signs:   Vitals were reviewed  Temp: 97.7  F (36.5  C) Temp src: Oral BP: (!) 140/79 Pulse: 63   Resp: 16 SpO2: 96 % O2 Device: None (Room air)               Data:      Blood type Rh(D)    No results found for: RH      Last CBC:  Lab Results   Component Value Date    WBC 5.7 08/10/2023    HGB 12.5 (L) 08/10/2023    HCT 35.2 (L) 08/10/2023    MCV 85 08/10/2023    PLT 94 (L) 08/10/2023       All laboratory data reviewed

## 2023-08-10 NOTE — IR NOTE
Patient Name: Amado Butler  Medical Record Number: [id number]  Today's Date: 8/10/2023    Procedure: non-tunneled central venous catheter  Proceduralist: Idris SAXENA  Pathology present: N/A  Brief completed: N/A    Procedure Start: 1009  Procedure end: 1030  Sedation medications administered: local lidocaine only    Report given to: Jaylene GONZALEZ RN  : N/A    Other Notes: Pt arrived to IR room 02 from . Consent reviewed. Pt denies any questions or concerns regarding procedure. Pt positioned supine and monitored per protocol. Pt tolerated procedure without any noted complications. Right neck site cleansed and dressed per protocol. Both lumens of central venous catheter heparin locked with 150 units of heparin (1.5 mL). Pt transferred back to .

## 2023-08-10 NOTE — CONSULTS
"    Interventional Radiology  [facility] Consult Service Note  08/10/23   7:25 AM    Consult Requested: \"non tunneled catheter for PLEX\"    Recommendations/Plan:    Patient is on IR schedule 8/10/23 for a non-tunneled CVC.   Labs WNL for procedure. INR ordered.   Orders entered for procedure, pre procedure IV antibiotics. No NPO required.  Medications to be held include: None. Lovenox held this AM   Consent will be done prior to procedure.     Please contact the IR charge RN at [phone] for estimated time of procedure.     Case and imaging discussed with IR attending, Dr. Thapa. Dr. Boykin notified of recommendations via page.     This is a 63 year old male with past medical history of SARA, HTN, DM2, paroxysmal SVT who has had waxing and waning R-eye pain and visual loss since 6/1. MRI on 8/9/23 shows subtle perineural enhancement, disc edema on prior dilated exams, c/f unilateral optic perineuritis of unclear etiology. Pt has had multidisciplinary team involvement. At this time, there is suspicion for auto immune etiology, possible infiltrative or infectious process. Plan is to initiate patient on plasmapheresis with plan for possible biopsy and cultures of his orbit to preserve vision.      Pertinent Imaging Reviewed:   CT chest w/ contrast 7/11/23:  IMPRESSION:   1.  A few scattered calcified granulomas seen in the lungs. Otherwise  lungs appear unremarkable.  2.  Indeterminate bilateral adrenal nodules measuring 1.3 cm on the  right and 1.4 cm on the left. Suggest CT or MRI adrenal protocol for  definitive characterization.        Expected date of discharge:   TBD  Vitals:   /78 (BP Location: Right arm)   Pulse 72   Temp 98  F (36.7  C) (Oral)   Resp 16   SpO2 98%     Pertinent Labs:   Lab Results   Component Value Date    WBC 7.3 08/09/2023    WBC 5.6 07/03/2023     Lab Results   Component Value Date    HGB 13.4 08/09/2023    HGB 13.8 07/03/2023     Lab Results   Component Value " Date     08/09/2023     07/03/2023     No results found for: INR, PTT  Lab Results   Component Value Date    POTASSIUM 3.6 08/09/2023        COVID-19 Antibody Results, Testing for Immunity           No data to display              COVID-19 PCR Results          5/3/2022    12:59   COVID-19 PCR Results   COVID-19 Virus by PCR (External Result) Negative          Details          This result is from an external source.               MAHAMED SteinC  Interventional Radiology  Pager: [phone]

## 2023-08-10 NOTE — PROGRESS NOTES
Brief Ophthalmology Note 08/10/2023      Assessment & Plan:    #Optic Neuropathy, Right Eye  63 year old male presenting for recurrent R eye vision loss. Has been occurring since 06/01 of this year. TAB negative. No orbital signs. Hospitalized at [facility] at the end of July, given IV methylpred and received negative workup including LP and extensive imaging. See H&P for full summary of findings. Some stability at 20/40 vision until he noticed it again begin to worsen at the beginning of August. Seen by Dr. Serrano and admitted for PLEX on 08/09/23. Since then has not seen any improvement on increased dose of steroids. Monitoring for improvement on plasmapheresis.      Plan:   - Continue PLEX  - Continue prednisone with neuro input  - If no improvement after 2-3 treatments, can consider biopsy or exploration with cultures to pursue infectious/inflammatory/neoplastic etiologies    Subjective:   Saw this patient today, states that his vision has not improved since he was seen in clinic. Additionally, was admitted for PLEX but had not yet received that therapy prior to being seen today. Is currently receiving prednisone 80 mg qd.    Objective:      Base Eye Exam       Visual Acuity (Snellen - Linear)         Right Left    Near sc LP 20/25              Tonometry (Tonopen, 5:59 PM)         Right Left    Pressure 19 20              Pupils         Shape React APD    Right Round Minimal Yes    Left Round Brisk None              Visual Fields         Left Right     Full               Extraocular Movement         Right Left     Full, Ortho Full, Ortho              Neuro/Psych       Oriented x3: Yes    Mood/Affect: Normal                  Slit Lamp and Fundus Exam       External Exam         Right Left    External Normal Normal              Slit Lamp Exam         Right Left    Lids/Lashes Normal Normal    Conjunctiva/Sclera White and quiet White and quiet    Cornea PEE PEE    Anterior Chamber Deep and quiet Deep and quiet    Iris  Dilated Dilated    Lens 1+ NS with trace PSC 1+ NS    Anterior Vitreous Normal Normal

## 2023-08-10 NOTE — PHARMACY-ADMISSION MEDICATION HISTORY
Pharmacist Admission Medication History    Admission medication history is complete. The information provided in this note is only as accurate as the sources available at the time of the update.    Medication reconciliation/reorder completed by provider prior to medication history? Yes    Information Source(s): Patient and CareEverywhere/SureScripts via in-person    Pertinent Information: Patient takes both lisinopril-hydrochlorothiazide in the morning and lisinopril in the evening    Changes made to PTA medication list:  Added: None  Deleted: None  Changed: prednisone to 80 mg every day       Allergies reviewed with patient and updates made in EHR: no    Medication History Completed By: Frank Virgen [facility] 8/10/2023 2:25 PM    Prior to Admission medications    Medication Sig Last Dose Taking? Auth Provider Long Term End Date   carvedilol (COREG) 6.25 MG tablet 6.25mg 2/day  Yes Reported, Patient Yes    lisinopril (ZESTRIL) 10 MG tablet Take 10 mg by mouth every evening  Yes Reported, Patient Yes    lisinopril-hydrochlorothiazide (ZESTORETIC) 20-25 MG tablet Take 1 tablet by mouth daily  Yes Reported, Patient Yes    omeprazole (PRILOSEC) 40 MG DR capsule Take 1 capsule (40 mg) by mouth daily for 70 days while on prednisone  Yes Niels Zuniga MD  9/14/23   predniSONE (DELTASONE) 20 MG tablet Take 3 tablets (60 mg) by mouth daily for 14 days, THEN 2.5 tablets (50 mg) daily for 7 days, THEN 2 tablets (40 mg) daily for 7 days, THEN 1.5 tablets (30 mg) daily for 7 days, THEN 1 tablet (20 mg) daily for 7 days, THEN 0.5 tablets (10 mg) daily for 7 days. Take with omeprazole. Check blood sugar twice daily, call doctor if elevated >250 on two consecutive readings or 300 at any time.  Patient taking differently: Take 80 mg once daily  Yes Niels Zuniga MD  8/24/23

## 2023-08-10 NOTE — CONSULTS
"Diabetes/Hyperglycemia Management Consult    Chief Complaint Hyperglycemia on steroids   Consult requested by:     Jorge Joseph MD     History of Present Illness   Mr. 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 Seems to have been steroid responsive initially but now apparently went from 20/40 to unable to count fingers in one week despite reasonable high doses of steroids. Recurrent relapsing course noted with negative work up to date.     He reports has had prediabetes for some years, but has just monitored checking BG fasting. Reports prior to June fasting BG were 120 - 130s. Following high dose steroids at [facility], July 3rd he was first told BG above goal and >200, by late July and another course of steroids became >300 and they were trying to get his BG into 230's for a PET scan.  Initially he was just advise to use diet, but by late July was on insulin.  Notes at most recent discharge note that hyperglycemia was managed with \"low dose sliding scale.\"  He has not used insulin after leaving the hospital. He was recently prescribed Metformin at [facility] discharge, but has not filled this.    A1C was 6.4% 9/7/2022    Recent Labs   Lab 08/10/23  1245 08/10/23  0740 08/10/23  0736 08/10/23  0723 08/10/23  0622 08/10/23  0213   * 106* 131* 123* 102* 192*         Diabetes Type: Type 2 diabetes now with A1C rising to 9.1 after 2 months of intermittent high dose steroids   Type 2 Diabetes  Diabetes Duration: Diagnosed with A1C 6.6 5/26/2022.  Usual Diabetes Regimen:   Checking BG only.  Fasting BG off steroid now 120 - 160, later in day >200 and 300s at times.  Shares My Sugr guera readings with me.   Recently was Metformin 500 mg prescribed, 1 tab " times 2 weeks, then 2 tablets daily.  Has not yet taken.    Ability to [place] Prescribed Regimen: unknown  Diabetes Control: 9.1% - following two months high dose steroids  Diabetes Complications: None known  History of DKA: No  Able to Detect Hypoglycemia: Has not had  Usual Diabetes Care Provider: T[facility]  Factors Impacting Glucose Control: Recurrent High dose steroids      Review of Systems  10 point ROS completed with pertinent positives and negatives noted in the HPI    Past medical, family and social histories are reviewed and updated.    Past Medical History  History reviewed. No pertinent past medical history.    Family History  History reviewed. No pertinent family history.    Social History  Social History     Socioeconomic History     Marital status:      Spouse name: None     Number of children: None     Years of education: None     Highest education level: None   Tobacco Use     Smoking status: Never     Smokeless tobacco: Never   Substance and Sexual Activity     Alcohol use: Yes     Comment: social     Drug use: Not Currently         Physical Exam  Temp: 97.5  F (36.4  C) Temp  Min: 97.5  F (36.4  C)  Max: 98  F (36.7  C)  Resp: 16 Resp  Min: 11  Max: 18  SpO2: 96 % SpO2  Min: 96 %  Max: 99 %  Pulse: 64 Pulse  Min: 55  Max: 72    No data recorded  BP: (!) 159/90 (pre-apheresis at the bedside) Systolic (24hrs), Av , Min:128 , Max:169  Diastolic (24hrs), Av, Min:75, Max:100   General:  pleasant individual resting in bed, in no distress. Swelling and erythema at right eye. Pre-apheresis at bedside. Holding head still as stent fragile.    HEENT: NC/AT, PER and anicteric, non-injected, oral mucous membranes moist.   Lungs: Easy unlabored  respiration, No cough.  ABD: Mildly protuberant.  Skin: Appears normal elasticity, no obvious lesions.  MSK:  fluid movement of all extremities  Lymp: no LE edema appreciated  Mental status: alert, oriented x3, communicating  clearly  Psych: calm, even mood.  Affect congruent. TF/TC fluent and coherent.  Neuro: CN II-XII grossly intact.  Speech even and articulate.   Glycemic control:  Medium dose correction effective last night prior to steroid. BG wendy to 354 following a breakfast of omelet with fresh fruit cup, orange juice, blueberry muffin and  80 Prednisone this morning.        Laboratory  Recent Labs   Lab Test 08/10/23  1245 08/10/23  0740 08/10/23  0736 08/09/23  2246 08/09/23 2045   NA  --   --  132*  --  127*   POTASSIUM  --   --  3.4  --  3.6   CHLORIDE  --   --  97*  --  92*   CO2  --   --  25  --  22   ANIONGAP  --   --  10  --  13   * 106* 131*   < > 278*   BUN  --   --  23.0  --  21.8   CR  --   --  0.98  --  1.07   FELIX  --   --  8.7*  --  9.1    < > = values in this interval not displayed.     CBC RESULTS:   Recent Labs   Lab Test 08/10/23  0736   WBC 5.7   RBC 4.15*   HGB 12.5*   HCT 35.2*   MCV 85   MCH 30.1   MCHC 35.5   RDW 15.3*   PLT 94*       Liver Function Studies -   Recent Labs   Lab Test 08/09/23 2045   PROTTOTAL 6.4   ALBUMIN 4.3   BILITOTAL 1.3*   ALKPHOS 71   AST 35   ALT 41         Active Medications  Current Facility-Administered Medications   Medication     acetaminophen (TYLENOL) tablet 650 mg     calcium carbonate-vitamin D (CALTRATE) 600-10 MG-MCG per tablet 1 tablet     carvedilol (COREG) tablet 12.5 mg     glucose gel 15-30 g    Or     dextrose 50 % injection 25-50 mL    Or     glucagon injection 1 mg     enoxaparin ANTICOAGULANT (LOVENOX) injection 40 mg     heparin 100 unit/mL injection 3 mL     heparin 100 unit/mL injection 3 mL     hydrochlorothiazide (HYDRODIURIL) tablet 25 mg     insulin aspart (NovoLOG) injection (RAPID ACTING)     insulin aspart (NovoLOG) injection (RAPID ACTING)     lidocaine (LMX4) cream     lidocaine 1 % 0.1-1 mL     melatonin tablet 1 mg    And     melatonin tablet 1 mg     methylPREDNISolone sodium succinate (solu-MEDROL) 1,000 mg in sodium chloride 0.9 % 291  mL intermittent infusion     pantoprazole (PROTONIX) EC tablet 40 mg     polyethylene glycol (MIRALAX) Packet 17 g     [Held by provider] predniSONE (DELTASONE) tablet 80 mg     sodium chloride (PF) 0.9% PF flush 10 mL     sodium chloride (PF) 0.9% PF flush 10 mL     sodium chloride (PF) 0.9% PF flush 3 mL     sodium chloride (PF) 0.9% PF flush 3 mL     [START ON 8/11/2023] sulfamethoxazole-trimethoprim (BACTRIM) 400-80 MG per tablet 1 tablet     No current outpatient medications on file.       Current Diet  Orders Placed This Encounter      Regular Diet Adult        Assessment  63 year old M with history of SARA, HTN, paroxysmal SVT and type 2 diabetes previously well managed (A1C 6.6 - 6.4) with lifestyle now exacerbated by recurrent high dose steroids and A1C rising to 9.1 now here again on high dose steroids in hopes of treating vision loss and periorbital pain with extensive work-up but unknown cause.        1) Vision loss and R eye pain of unknown etiology partially responsive to high dose steroids.  2) Type 2 diabetes exacerbated by recurrent high dose steroids in the last 2 months. BG >300 PTA off steroids and again here now receiving 1000 mg IV Methylprednisolone  daily x three days.    Plan   - Give aspart coverage 1 unit : 15 g CHO for current meal.  Consider further need tomorrow.     - Give NPH 15 units with Methylprednisone infusion today, tomorrow and following.  - Continue high dose sliding scale for now.   - Hypoglycemia protocol in place.   - Expect will discharge on Metformin and likely insulin.   -Recommend moderate carb diet <60 g /meal.     90 minutes spent on the date of the encounter doing chart review, history and exam, coordinating care/communication, documentation and further activities per the note.  >50% time spent on the floor with patient and care givers.          It is my privilege to be involved in the care of the above patient.     Autumn Grnaados PA-C, MPAS  Diabetes,  Endocrinology, and Metabolism  [phone] pager  On [facility] (inpatient)  [phone] office (page if no answer)        To contact Endocrine Diabetes service:   From 8AM-4PM: page inpatient diabetes provider that is following the patient that day (see filed or incomplete progress notes/consult notes).  If uncertain of provider assignment: page job code [phone].  For questions or updates from 4PM-8AM: page the diabetes job code for on call fellow: [phone]    Please notify inpatient diabetes service if changes are planned that will impact glycemia, such as changes to steroids, enteral feeding, parenteral feeding, dextrose fluids or procedures requiring prolonged NPO status.

## 2023-08-10 NOTE — DISCHARGE INSTRUCTIONS
"Learning About Apheresis  What is apheresis?  Apheresis (say \"re-pc-CPX-anabel\") is the process of withdrawing blood, filtering something out of the blood, and then putting the filtered blood back into the body. It has different names depending on what is being filtered from the blood. Some of these names include plasmapheresis (when plasma is removed from the blood) and therapeutic cytapheresis (when abnormal cells are removed from the blood).  It may be used to treat certain blood disorders, cancers, and other diseases. It also may be used to remove harmful things from the blood, such as toxins or extra iron.  How is apheresis done?  Blood is taken by inserting an intravenous (I.V.) needle into a vein. The blood is processed in a machine. Then the blood goes back into the body through another I.V. If it's not possible to use an I.V., a central vascular access device may be used instead. It's a thin, flexible tube that goes into a large vein in the neck or chest.  Your doctor may have you go to the bathroom first. After you're attached to the machine, you can't leave it until the procedure is finished.  You may need only one treatment. Or you may need many treatments over weeks or months. It depends on what type of apheresis is used and what is being treated.  How long does apheresis take?  The treatment takes about 2 to 5 hours, depending on which type of apheresis you get.  What are the risks?  Some people have side effects from apheresis. These may include an allergic reaction, fatigue, nausea, dizziness, or low blood pressure. You may feel numbness, tingling, and itching. Most side effects will stop when the treatment ends. You may feel tired for a few days.  You may get calcium supplements during the procedure to help with numbness or tingling.  If you need a central vascular access device, your doctor will talk to you about the risks. There is a low risk of things such as bleeding, infection, lung puncture, and " "an air bubble blocking a blood vessel (air embolism).  What can you expect after apheresis?  Your care team will watch you closely for signs of any side effects.  Follow-up care is a key part of your treatment and safety. Be sure to make and go to all appointments, and call your doctor if you are having problems. It's also a good idea to know your test results and keep a list of the medicines you take.  Where can you learn more?  Go to https://www.[employer].net/patiented  Enter T539 in the search box to learn more about \"Learning About Apheresis.\"  Current as of: September 8, 2022               Content Version: 13.7    4024-3207 [employer].   Care instructions adapted under license by your healthcare professional. If you have questions about a medical condition or this instruction, always ask your healthcare professional. [employer] disclaims any warranty or liability for your use of this information.  Apheresis Blood Donor Center Post Instructions  You may feel tired after your procedure today.  You have a temporary Central Venous Catheter:  Notify your doctor if you have had a fever, chills, shaking  or redness, warmth, swelling, drainage at the exit-site.  This could be a sign of infection.    The Apheresis/Blood Donor Center is open Monday-Friday 7:30 a.m. to 5 p.m.  The phone number is [phone].  A Transfusion Medicine physician can be reached after 5:00 p.m. weekdays and on weekends /Holidays by calling [phone], and asking for the physician on call.      Plasma exchange:  If you received blood products (plasma or red blood cells) as part of your treatment, you need to be aware that transfusion reactions can occur up to several hours after they have been given to you.  Call your physician if you experience any symptoms in the next 48 hours, including: breathing problems, rash, itching, hives, nausea or vomiting, fever or chills, blood in your urine or stools, or joint pain. "  Please inform the Transfusion Medicine Physician by calling [phone] and asking for the physician on call.  If you received albumin as part of your treatment (this is the most common), some of your clotting factors have been removed.  You body will replace these factors, but you could be at a slight risk for bleeding.  Please inform us if you have had any bleeding or a recent invasive procedure, such as a biopsy or surgery.    Certain medications that lower your blood pressure (ace inhibitors) such as Lisinopril are contraindicated while you are receiving plasma exchange.  Please inform us if you have started taking this medication during your plasma exchange series.      Diabetes Management Plan   Check glucose before breakfast and before evening meal.  Report to provider if you have two consecutive values greater than 300 (you may be instructed to increase your metformin).  Glucose target during transition off the steroid is .  Later, your previous lower target of  will be again appropriate.  Your last dose of dexamethasone is at 4 pm on 8/18 (or they may decide to skip this-- either way is okay because metformin doesn't cause low blood glucose)    Tonight, take metformin XR 1000 mg with supper.  Tomorrow 8/19, take metformin XR 1000 mg with breakfast.  From 8/20 onward, take metformin HCl 500 mg with a meal (this is the formulation you already have).  Review your blood glucose data with Dr. Sandra after 7 days of taking metformin 500 mg per day.  You will decide together to stop it, maintain it, or increase it.    Today and tomorrow (Saturday), aim for your most modest carbohydrate meals (try to keep the carbohydrates under 60 grams).  Starting Sunday, more usual intake of carbohydrates should be tolerated with less hyperglycemia as a result. Increasing water intake will help improve a higher glucose.  Also, some activity after a meal will help.    If you have questions regarding your diabetes  discharge treatment plan within the first week following discharge, call the the hospital  [phone], who will connect you with the on-call endocrinologist

## 2023-08-10 NOTE — PROCEDURES
Transfusion Medicine Procedure    Amado Butler MRN# [id number]   YOB: 1960 Age: 63 year old   Date of Admission: 8/9/2023     Reason for consult: Therapeutic Plasma Exchange           Assessment and Plan:   63 year old male presents for consultation for therapeutic plasma exchange in the setting of optic perineuritis of uncertain etiology. The current plan is for 5 exchanges occurring every other day beginning on 08/10/2023.     The patient tolerated TPE #1/5 with no issues.  We will continue with the plan below:    Summary of Plan:  - Plan 5 TPE approximately every other day, first to be performed August 10, 2023.  - Will use the central line for access.  - Will monitor INR and fibrinogen for coagulapathy.  Some or all TPEs may require replacement with plasma, at least in part.  - ACD-A for anticoagulation. Will give calcium gluconate in the return to offset effects and monitor iCa.    For clinical team:  - Do not start ACE inhibitors throughout the duration of the TPE series as these have been associated with reactions during apheresis.    - Please notify the Transfusion Medicine physician of any upcoming procedures, surgeries, or biopsies as TPE with albumin replacement will affect coagulation factor levels.  - Consider the impact of TPE on laboratory studies and medication levels.  IVIG and other immune therapies such as rituximab should NOT be given prior to TPE but rather after due to removal.           History of Present Illness:   63 year old male presents for consultation for therapeutic plasma exchange.  His past medical history includes a two month history of intermittent vision problems that have been treated with various modalities including steroids with varying success. However, his vision concerns have acutely worsened and resulted in the need for a more aggressive treatment plan. The patient reports a history of idiopathic thrombocytopenia that has been present for many years that  results in easy bruising but has never been significant enough to require transfusion. His medical history is also notable for diabetes and hypertension which is treated with lisinopril-hydrochlorothiazide that will be held for plasma exchange due to significant interactions between ACE inhibitors and plasmapheresis. Other than his vision complaints, he is currently well.  The patient does not report any other significant history or allergies that would make therapeutic plasma exchange contraindicated.  The procedure, risks/benefits were discussed with the patient, his questions were answered to the best of my ability, and consent was obtained.              Past Medical History:   Hypertension  Type II Diabetes  Idiopathic thrombocytopenia          Past Surgical History:   History reviewed. No pertinent surgical history.           Social History:     Social History     Tobacco Use    Smoking status: Never    Smokeless tobacco: Never   Substance Use Topics    Alcohol use: Yes     Comment: social             Family History:   History reviewed. No pertinent family history.          Immunizations:     Immunization History   Administered Date(s) Administered    COVID-19 Bivalent 12+ (Pfizer) 10/20/2022    COVID-19 Monovalent 18+ (Moderna) 02/26/2021, 03/26/2021, 05/25/2022             Allergies:   No Known Allergies          Medications:     Current Facility-Administered Medications   Medication    acetaminophen (TYLENOL) tablet 650 mg    calcium carbonate-vitamin D (CALTRATE) 600-10 MG-MCG per tablet 1 tablet    carvedilol (COREG) tablet 12.5 mg    glucose gel 15-30 g    Or    dextrose 50 % injection 25-50 mL    Or    glucagon injection 1 mg    enoxaparin ANTICOAGULANT (LOVENOX) injection 40 mg    heparin 100 unit/mL injection 3 mL    heparin 100 unit/mL injection 3 mL    hydrochlorothiazide (HYDRODIURIL) tablet 25 mg    insulin aspart (NovoLOG) injection (RAPID ACTING)    insulin aspart (NovoLOG) injection (RAPID  ACTING)    insulin aspart (NovoLOG) injection (RAPID ACTING)    insulin NPH injection 15 Units    lidocaine (LMX4) cream    lidocaine 1 % 0.1-1 mL    melatonin tablet 1 mg    And    melatonin tablet 1 mg    methylPREDNISolone sodium succinate (solu-MEDROL) 1,000 mg in sodium chloride 0.9 % 291 mL intermittent infusion    pantoprazole (PROTONIX) EC tablet 40 mg    polyethylene glycol (MIRALAX) Packet 17 g    [Held by provider] predniSONE (DELTASONE) tablet 80 mg    sodium chloride (PF) 0.9% PF flush 10 mL    sodium chloride (PF) 0.9% PF flush 10 mL    sodium chloride (PF) 0.9% PF flush 3 mL    sodium chloride (PF) 0.9% PF flush 3 mL    [START ON 8/11/2023] sulfamethoxazole-trimethoprim (BACTRIM) 400-80 MG per tablet 1 tablet             Review of Systems:     The Review of Systems is negative other than noted in the HPI           Vital Signs:   Vitals were reviewed  Temp: 97.6  F (36.4  C) Temp src: Oral BP: 135/72 (Therapeutic Plasma Exchange (TPE) complete at the bedside) Pulse: 80   Resp: 18 SpO2: 96 % O2 Device: None (Room air)               Data:      Blood type Rh(D)    No results found for: RH      Last CBC:  Lab Results   Component Value Date    WBC 5.7 08/10/2023    HGB 12.5 (L) 08/10/2023    HCT 35.2 (L) 08/10/2023    MCV 85 08/10/2023    PLT 94 (L) 08/10/2023       A single volume plasma exchange was performed with 5% albumin. The central line was used for access. ACD-A was used for anticoagulation. To offset the effects of the citrate, calcium gluconate was given in the return line. The patient's vital signs were stable throughout the TPE. The patient tolerated the procedure well.    ATTESTATION STATEMENT:   During the procedure this patient was directly seen and evaluated by me , Myrna Contreras MD, PhD.      Myrna Contreras MD, PhD  Transfusion Medicine Attending  Medical Director, Blood Bank Laboratory  Pager [phone]

## 2023-08-10 NOTE — PROCEDURES
Amado Butler  [id number]    Completed placement of 12 [place] 16 cm dual lumen, [employer] brand, non-tunneled central venous access catheter via RIJV. Tip lying in the right atrium. Catheter okay to use immediately. Dx: apheresis therapy. Idris. LOCAL <1

## 2023-08-10 NOTE — ED PROVIDER NOTES
[facility] EMERGENCY DEPARTMENT ([facility])    8/09/23       ED PROVIDER NOTE    History     Chief Complaint   Patient presents with    Eye Problem     HPI  Amado Butler is a 63 year old male with past medical history of SARA, HTN, and DM2 who comes with worsening right eye vision after a long course of multiple hospitalizations with a presumed diagnosis of optic neuritis. Patient has had inpatient admissions related to this, most recently to [facility] in [place] where they initiated IV steroids. He is currently on 80 mg of prednisone daily. Patient had an MRI today and was seen by Dr. Darin Serrano, ophthalmologist who recommended the patient present to the emergency department for admission. Neurology has been aware of this patient as well. Patient will likely require inpatient plasmapheresis plus or minus other IV therapies.    This part of the medical record was transcribed by Mela Duvall, Medical Scribe, from a dictation done by Sony Love MD.     Past Medical History  History reviewed. No pertinent past medical history.  History reviewed. No pertinent surgical history.  carvedilol (COREG) 6.25 MG tablet  lisinopril (ZESTRIL) 10 MG tablet  lisinopril-hydrochlorothiazide (ZESTORETIC) 20-25 MG tablet  omeprazole (PRILOSEC) 40 MG DR capsule  predniSONE (DELTASONE) 20 MG tablet      No Known Allergies  Family History  History reviewed. No pertinent family history.  Social History   Social History     Tobacco Use    Smoking status: Never    Smokeless tobacco: Never   Substance Use Topics    Alcohol use: Yes     Comment: social    Drug use: Not Currently      Past medical history, past surgical history, medications, allergies, family history, and social history were reviewed with the patient. No additional pertinent items.      A medically appropriate review of systems was performed with pertinent positives and negatives noted in the HPI, and all other systems negative.    Physical  Exam   BP: (!) 165/91  Pulse: 55  Temp: 97.7  F (36.5  C)  Resp: 18  SpO2: 96 %    Physical Exam  Patient overall well-appearing with this. Ecchymosis around right orbit, minimal swelling. Visual acuity in right eye only light and motion. Left eye 20/30. Equal pupils and reactive to light. Lids and lashes normal. Extraocular motions intact.     This part of the medical record was transcribed by Mela Duvall, Medical Scribe, from a dictation done by Sony Love MD.     ED Course, Procedures, & Data     ED Course as of 08/09/23 2326   Wed Aug 09, 2023   2049 Discussed w/ neurology, who will admit patient.  Canceled admission to medicine.      Procedures                 Results for orders placed or performed during the hospital encounter of 08/09/23   MR Orbit w/o & w Contrast     Status: None    Narrative    MR ORBIT W/O & W CONTRAST 8/9/2023 11:35 AM    Orbit MRI without and with contrast  Brain MRI without and with contrast    History:  optic neuropathy, rule out structural lesion; Optic  neuropathy; Optic perineuritis.   ICD-10: Optic neuropathy; Optic perineuritis    Comparison: outside Brain MRI 7/24/2023     Technique:   Orbits: Axial and coronal T1-weighted, and coronal T2-weighted images  obtained without intravenous contrast. Post-intravenous contrast  (using gadolinium) sagittal FLAIR, and axial and coronal T1-weighted  images were obtained with fat-saturation, focused on the orbits.  Brain: Axial susceptibility-weighted and FLAIR sequences were obtained  of the whole brain without intravenous contrast, and postcontrast  axial T1-weighted images were obtained through the whole brain.     Contrast: 10ml Gadavist    Findings:  Regarding the orbits, there is slight asymmetric hyperintensity within  the right intraorbital optic nerve sheath. There may be subtle  enhancement along the right optic nerve. The left optic nerve appears  normal. Extraocular muscles appear normal. No mass lesion  identified.     Regarding the remainder of the brain, the ventricles are proportionate  to the cerebral sulci. There is no mass effect or midline shift  intracranially. The major intracranial vascular flow-voids are patent.  Post-contrast images of the whole brain demonstrate no abnormal intra  or extra-axial contrast enhancement. Chronic right frontal infarcts.  Mild diffuse cerebral atrophy. There are mild scattered white matter  T2 hyperintensities.      Impression    Impression:    1. Regarding the orbits and globes, there is stable slight asymmetric  hyperintensity and perhaps slight enhancement of the right optic nerve  sheath, suggesting optic perineuritis. No underlying mass lesion.    2. Regarding the remainder of the brain, chronic right frontal infarct  and mild small vessel ischemic disease.    I have personally reviewed the examination and initial interpretation  and I agree with the findings.    MYKE CHILDS MD         SYSTEM ID:  [id number]     Medications - No data to display  Labs Ordered and Resulted from Time of ED Arrival to Time of ED Departure - No data to display  No orders to display          Critical care was not performed.     Medical Decision Making  The patient's presentation was of high complexity (an acute health issue posing potential threat to life or bodily function).    The patient's evaluation involved:  review of external note(s) from 3+ sources ([facility], ophthalmology, neurology)  ordering and/or review of 3+ test(s) in this encounter (see separate area of note for details)  review of 3+ test result(s) ordered prior to this encounter (see separate area of note for details)  discussion of management or test interpretation with another health professional (neurology and ophthalmology)    The patient's management necessitated high risk (a decision regarding hospitalization).    Assessment & Plan      I discussed the case with ophthalmology consult as well as neurology  consult. Will admit to neurology who will initiate inpatient therapy, screen for electrolyte abnormalities, and perform ESR and CRP.    2015 -discussed with ophthalmology consult who has no further recommendations other than ESR and CRP.  They are aware of the patient and will consult during admission    2049 Discussed w/ neurology, who will admit patient.  Canceled admission to medicine.        This part of the medical record was transcribed by Mela Duvall, Medical Scribe, from a dictation done by Sony Love MD.     I have reviewed the nursing notes. I have reviewed the findings, diagnosis, plan and need for follow up with the patient.    New Prescriptions    No medications on file       Final diagnoses:   None     Sony Love MD   [facility] EMERGENCY DEPARTMENT  8/9/2023     Sony Love MD  08/10/23 0100

## 2023-08-10 NOTE — PLAN OF CARE
"Status: admitted last night for optic perineuritis   Vitals: VSS on RA  Neuros: A&Ox4. R eye sluggish and slightly bigger than L. Reports that his eye was dilated in ED. Also endorses a \"fog\" over his R eye, able to see big shapes and light. 5/5 throughout  IV: PIV SL, bolus given overnight  Diet: Regular  Bowel status: BS+ LBM PTA  : voids spontaneously  Pain: denies  Activity: independent  Plan: plan for 5 PLEX        Arrived from: ED  Belongings/meds: meds sent home with wife, Mary Lou. Belonging at bedside  2 RN Skin Assessment Completed by: Azam RN & MARNI Donahue  Non-intact findings documented (yes/no/NA): bruising to R eye, bilateral bruising to UE          "

## 2023-08-11 ENCOUNTER — APPOINTMENT (OUTPATIENT)
Dept: LAB | Facility: CLINIC | Age: 63
DRG: 025 | End: 2023-08-11
Payer: COMMERCIAL

## 2023-08-11 ENCOUNTER — HOSPITAL ENCOUNTER (INPATIENT)
Facility: CLINIC | Age: 63
Setting detail: SURGERY ADMIT
End: 2023-08-11
Attending: NEUROLOGICAL SURGERY | Admitting: NEUROLOGICAL SURGERY
Payer: COMMERCIAL

## 2023-08-11 LAB
ALBUMIN UR-MCNC: 10 MG/DL
ANION GAP SERPL CALCULATED.3IONS-SCNC: 15 MMOL/L (ref 7–15)
APPEARANCE UR: CLEAR
BILIRUB UR QL STRIP: NEGATIVE
BLD PROD TYP BPU: NORMAL
BLOOD COMPONENT TYPE: NORMAL
BUN SERPL-MCNC: 23.6 MG/DL (ref 8–23)
CALCIUM SERPL-MCNC: 9.8 MG/DL (ref 8.8–10.2)
CHLORIDE SERPL-SCNC: 95 MMOL/L (ref 98–107)
CODING SYSTEM: NORMAL
COLOR UR AUTO: ABNORMAL
CREAT SERPL-MCNC: 0.92 MG/DL (ref 0.67–1.17)
CREAT SERPL-MCNC: 0.93 MG/DL (ref 0.67–1.17)
CREAT UR-MCNC: 49.6 MG/DL
DEPRECATED HCO3 PLAS-SCNC: 23 MMOL/L (ref 22–29)
ERYTHROCYTE [DISTWIDTH] IN BLOOD BY AUTOMATED COUNT: 15.7 % (ref 10–15)
FIBRINOGEN PPP-MCNC: 78 MG/DL (ref 170–490)
FRACT EXCRET NA UR+SERPL-RTO: 0.4 %
GFR SERPL CREATININE-BSD FRML MDRD: >90 ML/MIN/1.73M2
GLUCOSE BLDC GLUCOMTR-MCNC: 140 MG/DL (ref 70–99)
GLUCOSE BLDC GLUCOMTR-MCNC: 190 MG/DL (ref 70–99)
GLUCOSE BLDC GLUCOMTR-MCNC: 241 MG/DL (ref 70–99)
GLUCOSE BLDC GLUCOMTR-MCNC: 245 MG/DL (ref 70–99)
GLUCOSE BLDC GLUCOMTR-MCNC: 255 MG/DL (ref 70–99)
GLUCOSE BLDC GLUCOMTR-MCNC: 274 MG/DL (ref 70–99)
GLUCOSE BLDC GLUCOMTR-MCNC: 387 MG/DL (ref 70–99)
GLUCOSE BLDC GLUCOMTR-MCNC: 400 MG/DL (ref 70–99)
GLUCOSE SERPL-MCNC: 243 MG/DL (ref 70–99)
GLUCOSE UR STRIP-MCNC: >=1000 MG/DL
HCT VFR BLD AUTO: 36.6 % (ref 40–53)
HGB BLD-MCNC: 12.7 G/DL (ref 13.3–17.7)
HGB UR QL STRIP: NEGATIVE
ISSUE DATE AND TIME: NORMAL
KETONES UR STRIP-MCNC: NEGATIVE MG/DL
LEUKOCYTE ESTERASE UR QL STRIP: NEGATIVE
MCH RBC QN AUTO: 29.1 PG (ref 26.5–33)
MCHC RBC AUTO-ENTMCNC: 34.7 G/DL (ref 31.5–36.5)
MCV RBC AUTO: 84 FL (ref 78–100)
NITRATE UR QL: NEGATIVE
OSMOLALITY SERPL: 278 MMOL/KG (ref 280–301)
OSMOLALITY UR: 612 MMOL/KG (ref 100–1200)
PH UR STRIP: 6.5 [PH] (ref 5–7)
PLATELET # BLD AUTO: 102 10E3/UL (ref 150–450)
POTASSIUM SERPL-SCNC: 3.4 MMOL/L (ref 3.4–5.3)
RBC # BLD AUTO: 4.37 10E6/UL (ref 4.4–5.9)
RBC URINE: 0 /HPF
SODIUM SERPL-SCNC: 133 MMOL/L (ref 136–145)
SODIUM SERPL-SCNC: 133 MMOL/L (ref 136–145)
SODIUM UR-SCNC: 30 MMOL/L
SP GR UR STRIP: 1.02 (ref 1–1.03)
UNIT ABO/RH: NORMAL
UNIT NUMBER: NORMAL
UNIT STATUS: NORMAL
UNIT TYPE ISBT: 5100
UNIT TYPE ISBT: 6200
UNIT TYPE ISBT: 9500
UNIT TYPE ISBT: 9500
UROBILINOGEN UR STRIP-MCNC: NORMAL MG/DL
UUN UR-MCNC: 646 MG/DL (ref 801–1666)
WBC # BLD AUTO: 5.6 10E3/UL (ref 4–11)
WBC URINE: 1 /HPF

## 2023-08-11 PROCEDURE — 250N000011 HC RX IP 250 OP 636: Mod: JZ | Performed by: PATHOLOGY

## 2023-08-11 PROCEDURE — 250N000013 HC RX MED GY IP 250 OP 250 PS 637: Performed by: PSYCHIATRY & NEUROLOGY

## 2023-08-11 PROCEDURE — 83935 ASSAY OF URINE OSMOLALITY: CPT

## 2023-08-11 PROCEDURE — P9059 PLASMA, FRZ BETWEEN 8-24HOUR: HCPCS | Performed by: PATHOLOGY

## 2023-08-11 PROCEDURE — 99233 SBSQ HOSP IP/OBS HIGH 50: CPT | Performed by: PHYSICIAN ASSISTANT

## 2023-08-11 PROCEDURE — 250N000009 HC RX 250: Performed by: PHYSICIAN ASSISTANT

## 2023-08-11 PROCEDURE — 84540 ASSAY OF URINE/UREA-N: CPT

## 2023-08-11 PROCEDURE — 84295 ASSAY OF SERUM SODIUM: CPT

## 2023-08-11 PROCEDURE — 99222 1ST HOSP IP/OBS MODERATE 55: CPT | Mod: 57 | Performed by: NEUROLOGICAL SURGERY

## 2023-08-11 PROCEDURE — 250N000013 HC RX MED GY IP 250 OP 250 PS 637: Performed by: STUDENT IN AN ORGANIZED HEALTH CARE EDUCATION/TRAINING PROGRAM

## 2023-08-11 PROCEDURE — 87449 NOS EACH ORGANISM AG IA: CPT

## 2023-08-11 PROCEDURE — 82565 ASSAY OF CREATININE: CPT

## 2023-08-11 PROCEDURE — 36514 APHERESIS PLASMA: CPT

## 2023-08-11 PROCEDURE — 85384 FIBRINOGEN ACTIVITY: CPT

## 2023-08-11 PROCEDURE — 120N000002 HC R&B MED SURG/OB UMMC

## 2023-08-11 PROCEDURE — 258N000003 HC RX IP 258 OP 636: Performed by: PHYSICIAN ASSISTANT

## 2023-08-11 PROCEDURE — 84295 ASSAY OF SERUM SODIUM: CPT | Performed by: STUDENT IN AN ORGANIZED HEALTH CARE EDUCATION/TRAINING PROGRAM

## 2023-08-11 PROCEDURE — 83930 ASSAY OF BLOOD OSMOLALITY: CPT

## 2023-08-11 PROCEDURE — 250N000011 HC RX IP 250 OP 636: Mod: JZ

## 2023-08-11 PROCEDURE — 81001 URINALYSIS AUTO W/SCOPE: CPT

## 2023-08-11 PROCEDURE — 258N000003 HC RX IP 258 OP 636

## 2023-08-11 PROCEDURE — 250N000013 HC RX MED GY IP 250 OP 250 PS 637: Performed by: PATHOLOGY

## 2023-08-11 PROCEDURE — 99232 SBSQ HOSP IP/OBS MODERATE 35: CPT | Performed by: PSYCHIATRY & NEUROLOGY

## 2023-08-11 PROCEDURE — 250N000009 HC RX 250: Performed by: PATHOLOGY

## 2023-08-11 PROCEDURE — 85027 COMPLETE CBC AUTOMATED: CPT | Performed by: STUDENT IN AN ORGANIZED HEALTH CARE EDUCATION/TRAINING PROGRAM

## 2023-08-11 PROCEDURE — 36415 COLL VENOUS BLD VENIPUNCTURE: CPT

## 2023-08-11 PROCEDURE — 250N000012 HC RX MED GY IP 250 OP 636 PS 637

## 2023-08-11 PROCEDURE — 84300 ASSAY OF URINE SODIUM: CPT

## 2023-08-11 RX ORDER — AMOXICILLIN 250 MG
1 CAPSULE ORAL DAILY
Status: DISCONTINUED | OUTPATIENT
Start: 2023-08-12 | End: 2023-08-14

## 2023-08-11 RX ORDER — CALCIUM GLUCONATE 100 MG/ML
AMPUL (ML) INTRAVENOUS
Status: CANCELLED | OUTPATIENT
Start: 2023-08-11

## 2023-08-11 RX ORDER — DIPHENHYDRAMINE HYDROCHLORIDE 50 MG/ML
50 INJECTION INTRAMUSCULAR; INTRAVENOUS
Status: CANCELLED | OUTPATIENT
Start: 2023-08-11

## 2023-08-11 RX ORDER — CALCIUM CARBONATE 500 MG/1
1000 TABLET, CHEWABLE ORAL ONCE
Status: COMPLETED | OUTPATIENT
Start: 2023-08-11 | End: 2023-08-11

## 2023-08-11 RX ORDER — HEPARIN SODIUM (PORCINE) LOCK FLUSH IV SOLN 100 UNIT/ML 100 UNIT/ML
3 SOLUTION INTRAVENOUS ONCE
Status: COMPLETED | OUTPATIENT
Start: 2023-08-11 | End: 2023-08-11

## 2023-08-11 RX ORDER — CALCIUM GLUCONATE 100 MG/ML
AMPUL (ML) INTRAVENOUS
Status: COMPLETED | OUTPATIENT
Start: 2023-08-11 | End: 2023-08-11

## 2023-08-11 RX ORDER — SODIUM CHLORIDE 9 MG/ML
INJECTION, SOLUTION INTRAVENOUS CONTINUOUS
Status: DISCONTINUED | OUTPATIENT
Start: 2023-08-11 | End: 2023-08-14

## 2023-08-11 RX ORDER — ALBUMIN HUMAN 25 %
4000 INTRAVENOUS SOLUTION INTRAVENOUS
Status: CANCELLED | OUTPATIENT
Start: 2023-08-11

## 2023-08-11 RX ORDER — HEPARIN SODIUM (PORCINE) LOCK FLUSH IV SOLN 100 UNIT/ML 100 UNIT/ML
3 SOLUTION INTRAVENOUS EVERY 24 HOURS
Status: DISCONTINUED | OUTPATIENT
Start: 2023-08-11 | End: 2023-08-14

## 2023-08-11 RX ADMIN — CARVEDILOL 12.5 MG: 12.5 TABLET, FILM COATED ORAL at 07:51

## 2023-08-11 RX ADMIN — SULFAMETHOXAZOLE AND TRIMETHOPRIM 1 TABLET: 400; 80 TABLET ORAL at 08:14

## 2023-08-11 RX ADMIN — CALCIUM CARBONATE 600 MG (1,500 MG)-VITAMIN D3 400 UNIT TABLET 1 TABLET: at 07:51

## 2023-08-11 RX ADMIN — Medication 3 ML: at 13:10

## 2023-08-11 RX ADMIN — CALCIUM GLUCONATE 4.1 G: 98 INJECTION, SOLUTION INTRAVENOUS at 15:53

## 2023-08-11 RX ADMIN — SODIUM CHLORIDE 936 MG: 9 INJECTION, SOLUTION INTRAVENOUS at 18:08

## 2023-08-11 RX ADMIN — PANTOPRAZOLE SODIUM 40 MG: 40 TABLET, DELAYED RELEASE ORAL at 07:51

## 2023-08-11 RX ADMIN — Medication 1 MG: at 22:19

## 2023-08-11 RX ADMIN — CALCIUM CARBONATE 600 MG (1,500 MG)-VITAMIN D3 400 UNIT TABLET 1 TABLET: at 18:09

## 2023-08-11 RX ADMIN — INSULIN HUMAN 3 UNITS/HR: 1 INJECTION, SOLUTION INTRAVENOUS at 21:23

## 2023-08-11 RX ADMIN — Medication 3 ML: at 17:34

## 2023-08-11 RX ADMIN — POLYETHYLENE GLYCOL 3350 17 G: 17 POWDER, FOR SOLUTION ORAL at 22:16

## 2023-08-11 RX ADMIN — CALCIUM CARBONATE (ANTACID) CHEW TAB 500 MG 1000 MG: 500 CHEW TAB at 17:56

## 2023-08-11 RX ADMIN — ANTICOAGULANT CITRATE DEXTROSE SOLUTION FORMULA A 852 ML: 12.25; 11; 3.65 SOLUTION INTRAVENOUS at 15:53

## 2023-08-11 RX ADMIN — CARVEDILOL 12.5 MG: 12.5 TABLET, FILM COATED ORAL at 18:09

## 2023-08-11 RX ADMIN — Medication 1 MG: at 02:53

## 2023-08-11 RX ADMIN — PREDNISONE 80 MG: 20 TABLET ORAL at 10:08

## 2023-08-11 RX ADMIN — SODIUM CHLORIDE: 9 INJECTION, SOLUTION INTRAVENOUS at 21:26

## 2023-08-11 RX ADMIN — HYDROCHLOROTHIAZIDE 25 MG: 25 TABLET ORAL at 07:51

## 2023-08-11 ASSESSMENT — ACTIVITIES OF DAILY LIVING (ADL)
ADLS_ACUITY_SCORE: 20
ADLS_ACUITY_SCORE: 24
ADLS_ACUITY_SCORE: 20
ADLS_ACUITY_SCORE: 24
ADLS_ACUITY_SCORE: 20

## 2023-08-11 NOTE — PROGRESS NOTES
[facility]  General Neurology - Progress Note    Patient Name:  Amado Butler  Date of Service:  August 11, 2023    Subjective:    NAEO.  Vision is stably poor, possible slight increase in light perception after steroids.      Discussed transfusion and possible biopsy plan, and risks / benefits of steroids given concern this may represent lymphoma and require biopsy, and he has been quite hyperglycemic.  For vision protection/salvage he would like to proceed.    Objective:    Vitals: BP (!) 158/86 (BP Location: Left arm)   Pulse 76   Temp 98.4  F (36.9  C) (Oral)   Resp 18   Wt 91.1 kg (200 lb 12.8 oz)   SpO2 98%   BMI 25.10 kg/m    General: Seated in chair, NAD  Head: Atraumatic, normocephalic   Cardiac: no lower extremity edema  Neurologic:  Mental Status: Fully alert, attentive and oriented. Speech clear and fluent.  Asks thoughtful questions.  Cranial Nerves: EOM intact, without nystagmus. Facial movements symmetric at rest and with activation. No dysarthria.  RAPD on right.    Motor: No abnormal movements.  Moving all 4 extremities antigravity.  Able to stand with crossed legs without difficulty.   Station/Gait: Stable, steady casual gait.    Pertinent Investigations:    I have personally reviewed most recent and pertinent labs, tests, and radiological images.     ~Prior to admission~  MRI brain and orbits w/o contrast 6/28/23  IMPRESSION:  HEAD MRI:   1.  Right frontal lobe encephalomalacia and surrounding gliotic   change without acute intracranial abnormality.   ORBIT MRI:   1.  Unremarkable orbits.      MRI brain and orbits with and w/o contrast 7/3/23  IMPRESSION:  HEAD MRI:  1.  No acute infarct.  2.  Age-related changes described above.  3.  Right frontal lobe small area tissue loss and gliosis.  ORBIT MRI:  1.  Unremarkable MRI of the orbits.   2.  No evidence for optic neuritis.     MRI BRAIN WITH AND WITHOUT CONTRAST, MRI FACE (ORBITS) WITH AND WITHOUT  CONTRAST 23  1.  No acute intracranial hemorrhage, mass lesion, or acute infarction.   2.  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   3.  Minimal T2 hyperintensity and enhancement in the right optic nerve sheath, which may represent mild perineuritis. No clear evidence of optic neuritis.      18F-FDG PET Scan With Attenuation Correction CT Only 8/3/23  1. No specific evidence of an FDG-avid malignancy.   2. Non-avid left adrenal nodule, which may be too small to be accurately characterized by FDG PET. Reported right adrenal nodules not visualized on the low dose CT. Adrenal nodules are not well characterized by FDG-PET, and would be more sensitively/specifically assessed with an adrenal protocolled CT or MRI.   3. Apparent abrupt termination of the left maxilla posteriorly without abnormal tracer uptake, suspected to be related to prior dental extraction or post-surgical changes.      MRI CERVICAL SPINE WITH AND WITHOUT CONTRAST, MRI THORACIC SPINE WITH AND WITHOUT CONTRAST 23  No evidence of demyelinating disease or transverse myelitis within the cervical or thoracic spine.      MR ORBIT W/O & W CONTRAST 2023 11:35 AM  Impression:    1. Regarding the orbits and globes, there is stable slight asymmetric  hyperintensity and perhaps slight enhancement of the right optic nerve  sheath, suggesting optic perineuritis. No underlying mass lesion.  2. Regarding the remainder of the brain, chronic right frontal infarct  and mild small vessel ischemic disease.      Temporal artery, right, biopsy 23  No evidence of giant cell arteritis.  Moderate arteriosclerosis.  Mild medial calcific sclerosis.      CRP < 3.0 on multiple occasions  ESR < 2 on multiple occasions     CNS demyelinating disease (serum): negative  Encephalopathy/autoimmune panel (serum): negative  ACE: < 10  BUDDY: negative  ANCA Ig:20 (high)  IgG subclass: negative  Avon, Kenan light chain: negative  SPEP: negative  Lysozyme (serum): >  10.00 (high)     HBV: negative  Treponema juany: negative  Lyme juany: negative  Anaplamsa IgG IgM: negative  Babseia IgG IgM: negative  Erlichia IgG IgM: negative  Quantiferon TB: negative     CSF 8/1/23  Nucleated cells: 0  RBC: 2,967  Protein: 20  Glucose: 133  Kappa Free light chain: negative  ACE: 4  Lyme: non-reactive    Assessment and Plan:  Amado Butler is a 63 year old male w/ PMHx of of SARA, HTN, DM2, paroxysmal SVT p/w vision loss and periorbital pain w/ MRI showing subtle perineural enhancement, disc edema on prior dilated exams, 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     Extensive work-up by multiple services as summarized in HPI without clear final diagnosis.  Auto-immune etiology certainly possible given steroid responsiveness.  However, given negative extensive work-up, responsiveness to high-dose steroids with rapid return of visual loss at lower doses, his presentation is concerning for infiltrative (donny. lymphoma) vs significantly less likely a simmering infectious process.     Given persistent symptoms and suspicion for an autoimmune etiology, we are admitting for empiric PLEX.  Should he significantly respond to PLEX, this would point us towards an autoimmune cause. Should he not, infiltrative pathology would be significantly more likely and we would likely recommend perineural biopsy per ENT.   We discussed the risks/benefits of repeat steroid administration w/ Mr. Butler (preserving vision vs interfering w/ utility of biopsy), and he would like to proceed.  See details below.      #R-eye vision loss, concern for optic perineuritis of unclear etiology  -PLEX, planned 5 sessions  -s/p non-tunneled cath per IR  -Transfusion medicine following   *Will attempt to accelerate protocol from every other day as able per fibrinogen levels.  -Ophthalmology seen 8/10:   -Recommending ongoing PLEX, discussed steroid plan     -Checking fungitell  -Continue 1 g IV methylprednisolone for planned 3 day course             - Continue GI ppx with pantoprazole             - Vitamin D and calcium supplementation              - PJP ppx with Bactrim three times weekly  -Will assess response to PLEX, further work-up and mgmt pending course   - If not responsive, will likely recommend perineural biopsy per ENT or NSGY     #Pseudohyponatremia vs Euvolemic Hyponatremia   #Hypochloremia  #b/l Hypokalemia  To 127 at admission c/o BG near 300.  Improved s/p 1L NS to 132, but with concomitant reduction in BG to 131.  Suspect combination of pseudohyponatremia 2/2 hyperglycemia vs medication effect on thiazide and sulfonylurea (donny c/o borderline hypokalemia).  Possible nutritional component given report of recently poor PO intake, however he appears well nourished and is euvolemic on exam.    - Improving on re-check  - Daily BMP     #Thrombocytopenia  Appears to be chronic with unclear etiology. No concerns for active bleeding currently. Will continue to monitor.  - Transfusion medicine following, appreciate recommendations on this front  - Daily CBC     #Hypertension  - Continue pta coreg 12.5 mg BID  - HOLD lisinopril-hydrochlorothiazide given contraindicated w/ PLEX  - Continue hydrochlorothiazide   >Depending on final hyponatremia diagnosis, may consider recommending a change    #Pre-diabetes  #Steroid induced hyperglycemia  Pt w/ Hx of pre-diabetes PTA on glipizide and metformin, now presenting with BG in intermittent in the 300s, with plan to start high dose methylpred as above.  - High resistance sliding scale  - Endocrine diabetes consulted, assistance appreciated  - Holding pta metformin and glipizide while inpatient     #SARA  - CPAP at night, home settings     FEN: Regular diet  Malnutrition: Patient does not meet two of the above criteria necessary for diagnosing malnutrition  PPx: lovenox  Code: FULL     Patient was seen and discussed with  Dr. Boykin.      Richmond Joseph MD, MS  PGY-2, Neurology  Please page GenNeuro pass pager at [phone] ([facility])

## 2023-08-11 NOTE — PLAN OF CARE
Status: Admitted for optic neuritis with R eye vision loss   Vitals: VSS on home CPAP overnight   Neuros: A&Ox4. Pupils PERRLA except pt states R eye is foggy.   IV: PIV SL. R CVC.   Labs/Electrolytes: BG slightly elevated, carb coverage and long acting insulin started per orders.  Resp/trach: WDL  Diet: Regular, Carb controlled   Bowel status: LBM 8/10  : Voiding spontaneously   Skin: R eye bruising   Pain: Denies   Activity: Up ad john   Plan: PLEx 2/5 currently infusing   Updates this shift: oral prednisone started this shift. One more dose of IV solumedrol to be given after PLEx. Neurosurgery saw pt for possible frontotemporal craniotomy for optic mass.

## 2023-08-11 NOTE — PLAN OF CARE
Status: Admitted for optic neuritis with R eye vision loss.   Vitals: VSS on home CPAP overnight.   Neuros: AOx4. R eye opaque/blurry and sluggish. Strength 5/5 throughout.   IV: PIV SL. R CVC   Labs/Electrolytes: BG checks ACHS and elevated - primary team aware.   Resp/trach: LSC.   Diet: Regular.   Bowel status: LBM 8/10. BS+   : Voiding spontaneously. UA sent down for sodium, osmolality, and creatinine serum.   Skin: R eye bruising.   Pain: Denies. PRN melatonin given to promote rest.   Activity: Up ad john   Plan: PLEx 2/5 today @1230. Continue IV steroids.   Updates this shift: @3AM, pt BG = 400. MD paged and ordered one time dose of insulin aspart for 10 units. Recheck @0678 = 241           Overall Patient Progress: no changeOverall Patient Progress: no change    Outcome Evaluation: Neuros unchanged

## 2023-08-11 NOTE — PROVIDER NOTIFICATION
0045 paged gen neuro: FYI 2300 , gave 7 units of insulin per bedtime sliding scale. Will recheck at 0200.

## 2023-08-11 NOTE — PLAN OF CARE
1900 - 2300     A&Ox4. R eye opaque per pt and bilateral sluggish pupils. VSS on RA. Up ad john. Voiding spont. LBM 8/10. BG elevated, pre-dinner ; neuro paged. 2300 . See next note. PLEX #1 completed. Plan for 5.

## 2023-08-11 NOTE — PROGRESS NOTES
Diabetes/Hyperglycemia Management Consult    Chief Complaint Hyperglycemia on steroids   Consult requested by:     Jorge Joseph MD     History of Present Illness   Mr. 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 Seems to have been steroid responsive initially but now apparently went from 20/40 to unable to count fingers in one week despite reasonable high doses of steroids. Recurrent relapsing course noted with negative work up to date.     Assessment  63 year old M with history of SARA, HTN, paroxysmal SVT and type 2 diabetes previously well managed (A1C 6.6 - 6.4) with lifestyle now exacerbated by recurrent high dose steroids and A1C rising to 9.1 now here again on high dose steroids in hopes of treating vision loss and periorbital pain with extensive work-up but unknown cause.        1) Vision loss and R eye pain of unknown etiology partially responsive to high dose steroids.  2) Type 2 diabetes exacerbated by recurrent high dose steroids in the last 2 months. BG >300 PTA off steroids and again here now initially planned to receive  1000 mg IV Methylprednisolone  daily x three days.  First dose 8/10/2023 at 1630.  Plan earlier today was for Prednisone 80 mg daily, but around noon again planning for Methylprednisolone 936 mg today as of 1:15 pm not yet getting.    3) Steroid exacerbated diabetes requiring 55 units insulin in last 24 hours and BG still above goal.      Plan   - Give aspart coverage 1 unit : 10g CHO tid today as still with IV Methylprednisone effect and Prednisone 80 mg.    - Give NPH 27 units (0.3u/kg)with breakfast and 11 units evening today. Will plan for 27 units  NPH tomorrow morning with anticipated Prednisone 80 mg.    -  Increase to 1/20 custom insulin sliding scale tid ac >140 today as well as >200 qhs and overnight.   - Hypoglycemia protocol in place.   - Expect will discharge on Metformin AND insulin. Discussed this with patient and he is accepting of this likelihood.  He will need more test strips.  CDE order not yet placed as plan unclear.    -Ordered moderate carb diet <60 g /meal.   -If BG again >400 reasonable to resume drip to better determine insulin needs.     Plan discussed with neurology, nursing, pharmacy and patient multiple times, per multiple changes.      PLAN IS FOR daily Prednisone 80 mg daily qam for foreseeable future    Interval History:    BG far above goal overnight. High dose sliding scale (10 units) given at 2027 and repeated at 2247 with minimal effect (BG decrease from 493 to 400) and additional 10 units given at 354 brought BG  to 241 this am.  BG stable since this. Given 5 units with breakfast.    Amado initially with a lot of questions, but understanding.  TDD 64 u last 24 h.        Recent Labs   Lab 08/11/23  0805 08/11/23  0657 08/11/23  0628 08/11/23  0240 08/10/23  2247 08/10/23  2027   * 243* 241* 400* 493* 473*         Diabetes Type: Type 2 diabetes now with A1C rising to 9.1 after 2 months of intermittent high dose steroids   Type 2 Diabetes  Diabetes Duration: Diagnosed with A1C 6.6 5/26/2022.  Usual Diabetes Regimen:   Checking BG only.  Fasting BG off steroid now 120 - 160, later in day >200 and 300s at times.  Shares My [employer] gu[employer] readings with me.   Recently was Metformin 500 mg prescribed, 1 tab times 2 weeks, then 2 tablets daily.  Has not yet taken.    Ability to [place] Prescribed Regimen: unknown  Diabetes Control: 9.1% - following two months high dose steroids  Diabetes Complications: None known  History of DKA: No  Able to Detect Hypoglycemia: Has not had  Usual Diabetes Care Provider: [facility]  Factors Impacting Glucose Control: Recurrent High dose  steroids  Plan beginning  is daily am Prednisone 80 mg    Review of Systems  10 point ROS completed with pertinent positives and negatives noted in the HPI    Past medical, family and social histories are reviewed and updated.    Past Medical History  History reviewed. No pertinent past medical history.    Family History  History reviewed. No pertinent family history.    Social History  Social History     Socioeconomic History    Marital status:      Spouse name: None    Number of children: None    Years of education: None    Highest education level: None   Tobacco Use    Smoking status: Never    Smokeless tobacco: Never   Substance and Sexual Activity    Alcohol use: Yes     Comment: social    Drug use: Not Currently         Physical Exam  Temp: 98.4  F (36.9  C) Temp  Min: 97.5  F (36.4  C)  Max: 98.4  F (36.9  C)  Resp: 18 Resp  Min: 11  Max: 18  SpO2: 98 % SpO2  Min: 96 %  Max: 98 %  Pulse: 76 Pulse  Min: 63  Max: 82    No data recorded  BP: (!) 158/86 Systolic (24hrs), Av , Min:127 , Max:159   Diastolic (24hrs), Av, Min:72, Max:90    General:  pleasant individual resting in bed, in no distress. Swelling and erythema at right eye. Pre-apheresis at bedside. Holding head still as stent fragile.    HEENT: NC/AT, PER and anicteric, non-injected, oral mucous membranes moist.   Lungs: Easy unlabored  respiration, No cough.  ABD: Mildly protuberant.  Skin: Appears normal elasticity, no obvious lesions.  MSK:  fluid movement of all extremities  Lymp: no LE edema appreciated  Mental status: alert, oriented x3, communicating clearly  Psych: calm, even mood.  Affect congruent. TF/TC fluent and coherent.  Neuro: CN II-XII grossly intact.  Speech even and articulate.         Laboratory  Recent Labs   Lab Test 08/10/23  1245 08/10/23  0740 08/10/23  0736 23  2246 23  2045   NA  --   --  132*  --  127*   POTASSIUM  --   --  3.4  --  3.6   CHLORIDE  --   --  97*  --  92*   CO2  --   --  25  --   22   ANIONGAP  --   --  10  --  13   * 106* 131*   < > 278*   BUN  --   --  23.0  --  21.8   CR  --   --  0.98  --  1.07   FELIX  --   --  8.7*  --  9.1    < > = values in this interval not displayed.     CBC RESULTS:   Recent Labs   Lab Test 08/10/23  0736   WBC 5.7   RBC 4.15*   HGB 12.5*   HCT 35.2*   MCV 85   MCH 30.1   MCHC 35.5   RDW 15.3*   PLT 94*       Liver Function Studies -   Recent Labs   Lab Test 08/09/23  2045   PROTTOTAL 6.4   ALBUMIN 4.3   BILITOTAL 1.3*   ALKPHOS 71   AST 35   ALT 41         Active Medications  Current Facility-Administered Medications   Medication    acetaminophen (TYLENOL) tablet 650 mg    calcium carbonate-vitamin D (CALTRATE) 600-10 MG-MCG per tablet 1 tablet    carvedilol (COREG) tablet 12.5 mg    glucose gel 15-30 g    Or    dextrose 50 % injection 25-50 mL    Or    glucagon injection 1 mg    enoxaparin ANTICOAGULANT (LOVENOX) injection 40 mg    heparin 100 unit/mL injection 3 mL    heparin 100 unit/mL injection 3 mL    hydrochlorothiazide (HYDRODIURIL) tablet 25 mg    insulin aspart (NovoLOG) injection (RAPID ACTING)    insulin aspart (NovoLOG) injection (RAPID ACTING)    insulin aspart (NovoLOG) injection (RAPID ACTING)    insulin NPH injection 15 Units    insulin NPH injection 20 Units    lidocaine (LMX4) cream    lidocaine 1 % 0.1-1 mL    melatonin tablet 1 mg    And    melatonin tablet 1 mg    [Held by provider] methylPREDNISolone sodium succinate (solu-MEDROL) 1,000 mg in sodium chloride 0.9 % 291 mL intermittent infusion    pantoprazole (PROTONIX) EC tablet 40 mg    polyethylene glycol (MIRALAX) Packet 17 g    predniSONE (DELTASONE) tablet 80 mg    sodium chloride (PF) 0.9% PF flush 10 mL    sodium chloride (PF) 0.9% PF flush 10 mL    sodium chloride (PF) 0.9% PF flush 3 mL    sodium chloride (PF) 0.9% PF flush 3 mL    sulfamethoxazole-trimethoprim (BACTRIM) 400-80 MG per tablet 1 tablet     No current outpatient medications on file.       Current Diet  Orders  Placed This Encounter      Regular Diet Adult        90 minutes spent on the date of the encounter doing chart review, history and exam, coordinating care/communication, documentation and further activities per the note.  >50% time spent on the floor with patient and care givers.    It is my privilege to be involved in the care of the above patient.     Autumn Granados PA-C, MPAS  Diabetes, Endocrinology, and Metabolism  [phone] pager  On [facility] (inpatient)  [phone] office (page if no answer)        To contact Endocrine Diabetes service:   From 8AM-4PM: page inpatient diabetes provider that is following the patient that day (see filed or incomplete progress notes/consult notes).  If uncertain of provider assignment: page job code [phone].  For questions or updates from 4PM-8AM: page the diabetes job code for on call fellow: [phone]    Please notify inpatient diabetes service if changes are planned that will impact glycemia, such as changes to steroids, enteral feeding, parenteral feeding, dextrose fluids or procedures requiring prolonged NPO status.

## 2023-08-11 NOTE — CONSULTS
[facility]       NEUROSURGERY CONSULTATION NOTE    This consultation was requested by Dr. Boykin from the Neurology service.    Reason for Consultation: Consideration for biopsy for hyperintensity in right optic nerve    HPI:    Amado Butler is a 63 year old male, ambidextrous, retired , not on blood thinners, with past medical history suggestive of chronic thrombocytopenia, SARA, HTN, DM2, paroxysmal SVT p/w waxing and waning R-eye pain and visual loss since 6/1.     Patient was apparently well 2 months back when he started to develop pain in the upper and outer aspect of right eye and decrease in vision in the right eye. Of note, he has prescription glasses/ contact lenses for bilateral eyes- his vision started to become more hazier. He then saw a retina specialist who diagnosed him with non-arteritic anterior ischemic optic neuropathy (NAION). He underwent biopsy of right temporal artery to rule out giant cell arteritis around 6 weeks back- that came back as negative for giant cell arteritis. He has been seeing his neuro-ophthalmologist multiple  times in last 2 months- and was prescribed high dose intravenous steroids. Initially, his vision improved after high dose steroids, but then he did not notice any significant improvement in vision when on oral steroids or repeated high dose intravenous steroids. Of note, he was admitted at [facility] for 5 days where an exhaustive workup was performed for vision loss (where he presented with deterioration in vision in right eye when on vacation). He noticed that his vision again improved after high dose steroids (first two days post-admission), but then again deteriorated. Re: the current admission, he presented to [facility] ED after recommendation from his eye doctor with complaints of deterioration in vision.     Patient is currently on PLEX therapy (receiving second run today), and is  currently on high dose steroids. Neurosurgery is consulted for evaluation and for consideration of biopsy of the hyperintensity of right optic nerve.    He lives with his wife, a cat- Benvolio, and a goat. Has three kids- one settles in [place], other near [place], and the third kid is in [place].    PAST MEDICAL HISTORY: History reviewed. No pertinent past medical history.    PAST SURGICAL HISTORY: History reviewed. No pertinent surgical history.    FAMILY HISTORY: History reviewed. No pertinent family history.    SOCIAL HISTORY:   Social History     Tobacco Use    Smoking status: Never    Smokeless tobacco: Never   Substance Use Topics    Alcohol use: Yes     Comment: social       MEDICATIONS:  No current outpatient medications on file.       Allergies:  No Known Allergies    ROS: 10 point ROS of systems including Constitutional, Eyes, Respiratory, Cardiovascular, Gastroenterology, Genitourinary, Integumentary, Muscularskeletal, Psychiatric were all negative except for pertinent positives noted in my HPI.    Physical exam:   Blood pressure (!) 149/79, pulse 68, temperature 97.4  F (36.3  C), temperature source Oral, resp. rate 16, weight 91.1 kg (200 lb 12.8 oz), SpO2 97 %.  General: awake and alert  HEENT: Bruising around right eye  PULM: breathing comfortably on room air  NEUROLOGIC:  -- Awake; Alert; oriented x 3  -- Follows commands briskly  -- +repetition, calculation, and naming  -- Speech fluent, spontaneous. No aphasia or dysarthria.  -- no gaze preference. No apparent hemineglect.  Cranial Nerves:  -- visual fields full to confrontation, PERRL 3-2mm bilat and brisk, extraocular movements intact  -- face symmetrical, tongue midline  -- sensory V1-V3 intact bilaterally  -- palate elevates symmetrically, uvula midline  -- hearing grossly intact bilat  -- Trapezii 5/5 strength bilat symmetric  -- Cerebellar: Finger nose finger without dysmetria, intact rapid alternating motions  bilaterally    Vision:  Right eye: can only see hand waving, no finger counting  Left eye: 20/30- confirmed with Neurology- per charts    Motor:  Normal bulk / tone; no tremor, rigidity, or bradykinesia.  No muscle wasting or fasciculations  No Pronator Drift     Delt Bi Tri Hand Flexion/  Extension Iliopsoas Quadriceps Hamstrings Tibialis Anterior Gastroc    C5 C6 C7 C8/T1 L2 L3 L4-S1 L4 S1   R 5 5 5 5 5 5 5 5 5   L 5 5 5 5 5 5 5 5 5   Sensory:  intact to LT x 4 extremities       Reflexes:       Bi Tri BR Merissa Pat Ach Bab     C5-6 C7-8 C6 UMN L2-4 S1 UMN   R 2+ 2+ 2+ Norm 2+ 2+ Norm   L 2+ 2+ 2+ Norm 2+ 2+ Norm      Gait: Deferred    IMAGING:  MRI Brain and Orbit:  Impression:    1. Regarding the orbits and globes, there is stable slight asymmetric  hyperintensity and perhaps slight enhancement of the right optic nerve  sheath, suggesting optic perineuritis. No underlying mass lesion.     2. Regarding the remainder of the brain, chronic right frontal infarct  and mild small vessel ischemic disease.    LABS:   Last Comprehensive Metabolic Panel:  Lab Results   Component Value Date     (L) 08/11/2023     (L) 08/11/2023    POTASSIUM 3.4 08/11/2023    CHLORIDE 95 (L) 08/11/2023    CO2 23 08/11/2023    ANIONGAP 15 08/11/2023     (H) 08/11/2023    BUN 23.6 (H) 08/11/2023    CR 0.92 08/11/2023    CR 0.93 08/11/2023    GFRESTIMATED >90 08/11/2023    FELIX 9.8 08/11/2023         Lab Results   Component Value Date    WBC 5.6 08/11/2023     Lab Results   Component Value Date    RBC 4.37 08/11/2023     Lab Results   Component Value Date    HGB 12.7 08/11/2023     Lab Results   Component Value Date    HCT 36.6 08/11/2023     Lab Results   Component Value Date    MCV 84 08/11/2023     Lab Results   Component Value Date    MCH 29.1 08/11/2023     Lab Results   Component Value Date    MCHC 34.7 08/11/2023     Lab Results   Component Value Date    RDW 15.7 08/11/2023     Lab Results   Component Value Date    PLT  102 08/11/2023     INR   Date Value Ref Range Status   08/10/2023 1.07 0.85 - 1.15 Final      ASSESSMENT:    63 year old male, ambidextrous, not on blood thinners, with past medical history suggestive of chronic thrombocytopenia, SARA, HTN, DM2, paroxysmal SVT p/w waxing and waning R-eye pain and visual loss since 6/1. Has been on high dose steroids intermittently with off/on improvement in vision in right eye.     Given the patient is a known case of chronic thrombocytopenia, with drop in fibrinogen levels noted after PLEX run- the craniotomy remains a very high risk procedure. Given, his vision is deteriorating and that we do not have a finalized diagnosis, reasonable to consider the patient for optic nerve biopsy. We will discuss with Neurology team as well re: future PLEX runs, and their assessment of improvement in vision with PLEX/intravenous steroids.    We will get:    - Repeat MRI Brain with Orbits +/- contrast- Stereotaxy protocol with thin cuts  - Hematology consult for assessment for thrombocytopenia and low fibrinogen  - CT/CTA Head- stereotaxy with Fiducials night before surgery  - OR tentatively planned for Monday 8/14/2023- right frontotemporal craniotomy for optic nerve biopsy  - Neurosurgery will continue to follow    Judd Blancowan  Neurosurgery Resident, PGY3    The patient was discussed with Dr. Thapa, neurosurgery chief resident, and Dr. Camacho, neurosurgery staff.

## 2023-08-12 ENCOUNTER — APPOINTMENT (OUTPATIENT)
Dept: MRI IMAGING | Facility: CLINIC | Age: 63
DRG: 025 | End: 2023-08-12
Attending: STUDENT IN AN ORGANIZED HEALTH CARE EDUCATION/TRAINING PROGRAM
Payer: COMMERCIAL

## 2023-08-12 LAB
1,3 BETA GLUCAN SER-MCNC: 37 PG/ML
ANION GAP SERPL CALCULATED.3IONS-SCNC: 11 MMOL/L (ref 7–15)
APTT PPP: 25 SECONDS (ref 22–38)
BUN SERPL-MCNC: 20.6 MG/DL (ref 8–23)
CALCIUM SERPL-MCNC: 9 MG/DL (ref 8.8–10.2)
CHLORIDE SERPL-SCNC: 97 MMOL/L (ref 98–107)
CREAT SERPL-MCNC: 0.86 MG/DL (ref 0.67–1.17)
CREAT SERPL-MCNC: 0.86 MG/DL (ref 0.67–1.17)
DEPRECATED HCO3 PLAS-SCNC: 29 MMOL/L (ref 22–29)
ERYTHROCYTE [DISTWIDTH] IN BLOOD BY AUTOMATED COUNT: 15.8 % (ref 10–15)
FIBRINOGEN PPP-MCNC: 161 MG/DL (ref 170–490)
GFR SERPL CREATININE-BSD FRML MDRD: >90 ML/MIN/1.73M2
GFR SERPL CREATININE-BSD FRML MDRD: >90 ML/MIN/1.73M2
GLUCOSE BLDC GLUCOMTR-MCNC: 105 MG/DL (ref 70–99)
GLUCOSE BLDC GLUCOMTR-MCNC: 105 MG/DL (ref 70–99)
GLUCOSE BLDC GLUCOMTR-MCNC: 112 MG/DL (ref 70–99)
GLUCOSE BLDC GLUCOMTR-MCNC: 112 MG/DL (ref 70–99)
GLUCOSE BLDC GLUCOMTR-MCNC: 113 MG/DL (ref 70–99)
GLUCOSE BLDC GLUCOMTR-MCNC: 117 MG/DL (ref 70–99)
GLUCOSE BLDC GLUCOMTR-MCNC: 120 MG/DL (ref 70–99)
GLUCOSE BLDC GLUCOMTR-MCNC: 123 MG/DL (ref 70–99)
GLUCOSE BLDC GLUCOMTR-MCNC: 153 MG/DL (ref 70–99)
GLUCOSE BLDC GLUCOMTR-MCNC: 228 MG/DL (ref 70–99)
GLUCOSE SERPL-MCNC: 106 MG/DL (ref 70–99)
HCT VFR BLD AUTO: 31.5 % (ref 40–53)
HGB BLD-MCNC: 11.1 G/DL (ref 13.3–17.7)
INR PPP: 1.24 (ref 0.85–1.15)
MCH RBC QN AUTO: 29.8 PG (ref 26.5–33)
MCHC RBC AUTO-ENTMCNC: 35.2 G/DL (ref 31.5–36.5)
MCV RBC AUTO: 85 FL (ref 78–100)
OBSERVATION IMP: NEGATIVE
PLATELET # BLD AUTO: 77 10E3/UL (ref 150–450)
POTASSIUM SERPL-SCNC: 2.8 MMOL/L (ref 3.4–5.3)
POTASSIUM SERPL-SCNC: 3.6 MMOL/L (ref 3.4–5.3)
RBC # BLD AUTO: 3.72 10E6/UL (ref 4.4–5.9)
SODIUM SERPL-SCNC: 137 MMOL/L (ref 136–145)
WBC # BLD AUTO: 5.3 10E3/UL (ref 4–11)

## 2023-08-12 PROCEDURE — 36415 COLL VENOUS BLD VENIPUNCTURE: CPT | Performed by: STUDENT IN AN ORGANIZED HEALTH CARE EDUCATION/TRAINING PROGRAM

## 2023-08-12 PROCEDURE — 70553 MRI BRAIN STEM W/O & W/DYE: CPT

## 2023-08-12 PROCEDURE — 85384 FIBRINOGEN ACTIVITY: CPT | Performed by: HOSPITALIST

## 2023-08-12 PROCEDURE — 36415 COLL VENOUS BLD VENIPUNCTURE: CPT

## 2023-08-12 PROCEDURE — 80048 BASIC METABOLIC PNL TOTAL CA: CPT | Performed by: STUDENT IN AN ORGANIZED HEALTH CARE EDUCATION/TRAINING PROGRAM

## 2023-08-12 PROCEDURE — 85610 PROTHROMBIN TIME: CPT | Performed by: HOSPITALIST

## 2023-08-12 PROCEDURE — 255N000002 HC RX 255 OP 636: Mod: JZ | Performed by: PSYCHIATRY & NEUROLOGY

## 2023-08-12 PROCEDURE — 99232 SBSQ HOSP IP/OBS MODERATE 35: CPT | Mod: GC | Performed by: STUDENT IN AN ORGANIZED HEALTH CARE EDUCATION/TRAINING PROGRAM

## 2023-08-12 PROCEDURE — 250N000013 HC RX MED GY IP 250 OP 250 PS 637

## 2023-08-12 PROCEDURE — 999N000128 HC STATISTIC PERIPHERAL IV START W/O US GUIDANCE

## 2023-08-12 PROCEDURE — 250N000011 HC RX IP 250 OP 636: Mod: JZ

## 2023-08-12 PROCEDURE — 250N000013 HC RX MED GY IP 250 OP 250 PS 637: Performed by: STUDENT IN AN ORGANIZED HEALTH CARE EDUCATION/TRAINING PROGRAM

## 2023-08-12 PROCEDURE — 99233 SBSQ HOSP IP/OBS HIGH 50: CPT | Performed by: PSYCHIATRY & NEUROLOGY

## 2023-08-12 PROCEDURE — 93005 ELECTROCARDIOGRAM TRACING: CPT

## 2023-08-12 PROCEDURE — 250N000012 HC RX MED GY IP 250 OP 636 PS 637

## 2023-08-12 PROCEDURE — 99223 1ST HOSP IP/OBS HIGH 75: CPT | Mod: GC | Performed by: INTERNAL MEDICINE

## 2023-08-12 PROCEDURE — 120N000002 HC R&B MED SURG/OB UMMC

## 2023-08-12 PROCEDURE — 250N000011 HC RX IP 250 OP 636: Mod: JZ | Performed by: PATHOLOGY

## 2023-08-12 PROCEDURE — 84132 ASSAY OF SERUM POTASSIUM: CPT

## 2023-08-12 PROCEDURE — A9585 GADOBUTROL INJECTION: HCPCS | Mod: JZ | Performed by: PSYCHIATRY & NEUROLOGY

## 2023-08-12 PROCEDURE — 85730 THROMBOPLASTIN TIME PARTIAL: CPT | Performed by: HOSPITALIST

## 2023-08-12 PROCEDURE — 85014 HEMATOCRIT: CPT | Performed by: STUDENT IN AN ORGANIZED HEALTH CARE EDUCATION/TRAINING PROGRAM

## 2023-08-12 PROCEDURE — 70553 MRI BRAIN STEM W/O & W/DYE: CPT | Mod: 26 | Performed by: RADIOLOGY

## 2023-08-12 PROCEDURE — 99222 1ST HOSP IP/OBS MODERATE 55: CPT | Performed by: PHYSICIAN ASSISTANT

## 2023-08-12 RX ORDER — POTASSIUM CHLORIDE 7.45 MG/ML
10 INJECTION INTRAVENOUS
Status: DISCONTINUED | OUTPATIENT
Start: 2023-08-12 | End: 2023-08-12

## 2023-08-12 RX ORDER — POTASSIUM CHLORIDE 20MEQ/15ML
20 LIQUID (ML) ORAL ONCE
Status: COMPLETED | OUTPATIENT
Start: 2023-08-12 | End: 2023-08-12

## 2023-08-12 RX ORDER — GADOBUTROL 604.72 MG/ML
0.1 INJECTION INTRAVENOUS ONCE
Status: COMPLETED | OUTPATIENT
Start: 2023-08-12 | End: 2023-08-12

## 2023-08-12 RX ORDER — POTASSIUM CHLORIDE 20MEQ/15ML
40 LIQUID (ML) ORAL ONCE
Status: COMPLETED | OUTPATIENT
Start: 2023-08-12 | End: 2023-08-12

## 2023-08-12 RX ADMIN — GADOBUTROL 9.1 ML: 604.72 INJECTION INTRAVENOUS at 12:35

## 2023-08-12 RX ADMIN — CALCIUM CARBONATE 600 MG (1,500 MG)-VITAMIN D3 400 UNIT TABLET 1 TABLET: at 17:41

## 2023-08-12 RX ADMIN — PREDNISONE 80 MG: 20 TABLET ORAL at 08:25

## 2023-08-12 RX ADMIN — PANTOPRAZOLE SODIUM 40 MG: 40 TABLET, DELAYED RELEASE ORAL at 08:25

## 2023-08-12 RX ADMIN — POTASSIUM CHLORIDE 40 MEQ: 40 SOLUTION ORAL at 08:48

## 2023-08-12 RX ADMIN — POTASSIUM CHLORIDE 20 MEQ: 1.5 SOLUTION ORAL at 12:14

## 2023-08-12 RX ADMIN — CARVEDILOL 12.5 MG: 12.5 TABLET, FILM COATED ORAL at 17:41

## 2023-08-12 RX ADMIN — POTASSIUM CHLORIDE 10 MEQ: 7.46 INJECTION, SOLUTION INTRAVENOUS at 08:40

## 2023-08-12 RX ADMIN — SENNOSIDES AND DOCUSATE SODIUM 1 TABLET: 50; 8.6 TABLET ORAL at 08:25

## 2023-08-12 RX ADMIN — HYDROCHLOROTHIAZIDE 25 MG: 25 TABLET ORAL at 08:25

## 2023-08-12 RX ADMIN — CALCIUM CARBONATE 600 MG (1,500 MG)-VITAMIN D3 400 UNIT TABLET 1 TABLET: at 08:25

## 2023-08-12 RX ADMIN — ACETAMINOPHEN 650 MG: 325 TABLET, FILM COATED ORAL at 05:28

## 2023-08-12 RX ADMIN — CARVEDILOL 12.5 MG: 12.5 TABLET, FILM COATED ORAL at 08:25

## 2023-08-12 RX ADMIN — Medication 3 ML: at 12:15

## 2023-08-12 ASSESSMENT — ACTIVITIES OF DAILY LIVING (ADL)
ADLS_ACUITY_SCORE: 24
ADLS_ACUITY_SCORE: 20
ADLS_ACUITY_SCORE: 24
ADLS_ACUITY_SCORE: 24
ADLS_ACUITY_SCORE: 20
ADLS_ACUITY_SCORE: 24
ADLS_ACUITY_SCORE: 20
ADLS_ACUITY_SCORE: 24

## 2023-08-12 NOTE — PLAN OF CARE
Status: Admitted for optic neuritis with R eye vision loss   Vitals: VSS on home CPAP overnight   Neuros: A&Ox4. Pupils PERRLA except pt states R eye is foggy.   IV: PIV SL. R CVC HL.   Labs/Electrolytes: Potassium replaced this AM. IV insulin stopped at 1000 and NPH insulin started daily with sliding scale and carb coverage.   Resp/trach: WDL  Diet: Regular, Carb controlled   Bowel status: LB 8/12  : Voiding spontaneously   Skin: R eye bruising   Pain: Denies   Activity: Up ad john   Plan: OR tentatively planned for Monday 8/14/2023- right frontotemporal craniotomy for optic nerve biopsy   Updates this shift: MRI completed, suggestive of right optic perineuritis.

## 2023-08-12 NOTE — PROCEDURES
Transfusion Medicine Procedure    Amado Butler MRN# [id number]   YOB: 1960 Age: 63 year old   Date of Admission: 8/9/2023     Reason for consult: Therapeutic Plasma Exchange           Assessment and Plan:   63 year old male presents for consultation for therapeutic plasma exchange in the setting of optic perineuritis of uncertain etiology. The current plan is for 5 exchanges occurring every other day beginning on 08/10/2023.     The patient tolerated TPE #2/5 with no issues.  We will continue with the plan below:    Summary of Plan:  - Plan 5 TPE approximately every other day, first to be performed August 10, 2023.  - Will use the central line for access.  - Will monitor INR and fibrinogen for coagulapathy.  Some or all TPEs may require replacement with plasma, at least in part.  - ACD-A for anticoagulation. Will give calcium gluconate in the return to offset effects and monitor iCa.    For clinical team:  - Do not start ACE inhibitors throughout the duration of the TPE series as these have been associated with reactions during apheresis.    - Please notify the Transfusion Medicine physician of any upcoming procedures, surgeries, or biopsies as TPE with albumin replacement will affect coagulation factor levels.  - Consider the impact of TPE on laboratory studies and medication levels.  IVIG and other immune therapies such as rituximab should NOT be given prior to TPE but rather after due to removal.           History of Present Illness:   63 year old male presents for consultation for therapeutic plasma exchange.  His past medical history includes a two month history of intermittent vision problems that have been treated with various modalities including steroids with varying success. However, his vision concerns have acutely worsened and resulted in the need for a more aggressive treatment plan. The patient reports a history of idiopathic thrombocytopenia that has been present for many years that  results in easy bruising but has never been significant enough to require transfusion. His medical history is also notable for diabetes and hypertension which is treated with lisinopril-hydrochlorothiazide that will be held for plasma exchange due to significant interactions between ACE inhibitors and plasmapheresis. Other than his vision complaints, he is currently well.  The patient does not report any other significant history or allergies that would make therapeutic plasma exchange contraindicated.  The procedure, risks/benefits were discussed with the patient, his questions were answered to the best of my ability, and consent was obtained.              Past Medical History:   Hypertension  Type II Diabetes  Idiopathic thrombocytopenia          Past Surgical History:   History reviewed. No pertinent surgical history.           Social History:     Social History     Tobacco Use    Smoking status: Never    Smokeless tobacco: Never   Substance Use Topics    Alcohol use: Yes     Comment: social             Family History:   History reviewed. No pertinent family history.          Immunizations:     Immunization History   Administered Date(s) Administered    COVID-19 Bivalent 12+ (Pfizer) 10/20/2022    COVID-19 Monovalent 18+ (Moderna) 02/26/2021, 03/26/2021, 05/25/2022             Allergies:   No Known Allergies          Medications:     Current Facility-Administered Medications   Medication    acetaminophen (TYLENOL) tablet 650 mg    calcium carbonate-vitamin D (CALTRATE) 600-10 MG-MCG per tablet 1 tablet    carvedilol (COREG) tablet 12.5 mg    glucose gel 15-30 g    Or    dextrose 50 % injection 25-50 mL    Or    glucagon injection 1 mg    enoxaparin ANTICOAGULANT (LOVENOX) injection 40 mg    gadobutrol (GADAVIST) injection 9.11 mL    heparin 100 unit/mL injection 3 mL    heparin 100 unit/mL injection 3 mL    hydrochlorothiazide (HYDRODIURIL) tablet 25 mg    insulin aspart (NovoLOG) injection (RAPID ACTING)     insulin NPH injection 27 Units    lidocaine (LMX4) cream    lidocaine 1 % 0.1-1 mL    melatonin tablet 1 mg    And    melatonin tablet 1 mg    pantoprazole (PROTONIX) EC tablet 40 mg    polyethylene glycol (MIRALAX) Packet 17 g    potassium chloride (KAYCIEL) solution 20 mEq    predniSONE (DELTASONE) tablet 80 mg    senna-docusate (SENOKOT-S/PERICOLACE) 8.6-50 MG per tablet 1 tablet    sodium chloride (PF) 0.9% PF flush 10 mL    sodium chloride (PF) 0.9% PF flush 10 mL    sodium chloride (PF) 0.9% PF flush 3 mL    sodium chloride (PF) 0.9% PF flush 3 mL    sodium chloride 0.9% infusion    sulfamethoxazole-trimethoprim (BACTRIM) 400-80 MG per tablet 1 tablet             Review of Systems:     The Review of Systems is negative other than noted in the HPI           Vital Signs:   Vitals were reviewed  Temp: 98.1  F (36.7  C) Temp src: Oral BP: 137/82 Pulse: 57   Resp: 16 SpO2: 99 % O2 Device: None (Room air)               Data:      Blood type Rh(D)    No results found for: RH      Last CBC:  Lab Results   Component Value Date    WBC 5.3 08/12/2023    HGB 11.1 (L) 08/12/2023    HCT 31.5 (L) 08/12/2023    MCV 85 08/12/2023    PLT 77 (L) 08/12/2023       A single volume plasma exchange was performed with plasma. The central line was used for access. ACD-A was used for anticoagulation. To offset the effects of the citrate, calcium gluconate was given in the return line. The patient's vital signs were stable throughout the TPE. The patient tolerated the procedure well.    ATTESTATION STATEMENT:   During the procedure this patient was directly seen and evaluated by me , Myrna Contreras MD, PhD.      Myrna Contreras MD, PhD  Transfusion Medicine Attending  Medical Director, Blood Bank Laboratory  Pager [phone]

## 2023-08-12 NOTE — PLAN OF CARE
Status: Admitted for optic neuritis with R eye vision loss   Vitals: VSS on home CPAP overnight   Neuros: A&Ox4. R eye blurry/foggy   IV: PIV SL. R CVC HL.   Labs/Electrolytes: K:3.6  Resp/trach: WDL  Diet: Regular diet, Good PO intake. On carb coverage   Bowel status: LBM 8/12  : Voiding spontaneously   Skin: R eye bruising   Pain: Denies   Activity: Up ad john  Plan: Plan for OR Monday 8/14-right frontotemporal craniotomy for optic nerve biopsy     Updated this shift: EKG 12-lead completed this shift for pre-op evaluation

## 2023-08-12 NOTE — PROGRESS NOTES
Diabetes/Hyperglycemia Management Consult    Chief Complaint Hyperglycemia on steroids   Consult requested by:     Jorge Joseph MD     History of Present Illness   Mr. 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 Seems to have been steroid responsive initially but now apparently went from 20/40 to unable to count fingers in one week despite reasonable high doses of steroids. Recurrent relapsing course noted with negative work up to date.     Assessment  63 year old M with history of SARA, HTN, paroxysmal SVT and type 2 diabetes previously well managed (A1C 6.6 - 6.4) with lifestyle now exacerbated by recurrent high dose steroids and A1C rising to 9.1 now here again on high dose steroids in hopes of treating vision loss and periorbital pain with extensive work-up but unknown cause.        1) Vision loss and R eye pain of unknown etiology partially responsive to high dose steroids.  2) Type 2 diabetes exacerbated by recurrent high dose steroids in the last 2 months. BG >300 PTA off steroids and again here now initially planned to receive  1000 mg IV Methylprednisolone  daily x three days.  First dose 8/10/2023 at 1630.  Plan earlier today was for Prednisone 80 mg daily, but around noon again planning for Methylprednisolone 936 mg today as of 1:15 pm not yet getting.    3) Steroid exacerbated diabetes requiring 55 units insulin in last 24 hours and BG still above goal.      Plan  - Give aspart coverage 1 unit : 5g CHO with meals  - Give NPH 27 units with breakfast   - Insulin drip stopped    - Increase to 1/20 custom insulin sliding scale tid ac >140 today as well as >200 qhs and overnight.   - Hypoglycemia protocol in place.   - Expect will discharge on  Metformin AND insulin. Discussed this with patient and he is accepting of this likelihood.  He will need more test strips.  CDE order not yet placed as plan unclear.    -Ordered moderate carb diet <60 g /meal.   -If BG again >400 reasonable to resume drip to better determine insulin needs.       Interval History:    Due to persistent hyperglycemia overnight, insulin drip was started in addition to meal time insulin coverage.     He is not planned to receive additional doses of IV methylprednisolone, but he will continue on oral prednisone 80 mg for now    Last dose of methylprednisolone was 8/11 at 1800    Given decreasing insulin requirement in AM, I have discontinued IV insulin and transitioned back to NPH insulin to match prednisone effect        Diabetes Type: Type 2 diabetes now with A1C rising to 9.1 after 2 months of intermittent high dose steroids   Type 2 Diabetes  Diabetes Duration: Diagnosed with A1C 6.6 5/26/2022.  Usual Diabetes Regimen:   Checking BG only.  Fasting BG off steroid now 120 - 160, later in day >200 and 300s at times.  Shares [employer] guera readings with me.   Recently was Metformin 500 mg prescribed, 1 tab times 2 weeks, then 2 tablets daily.  Has not yet taken.    Ability to [place] Prescribed Regimen: unknown  Diabetes Control: 9.1% - following two months high dose steroids  Diabetes Complications: None known  History of DKA: No  Able to Detect Hypoglycemia: Has not had  Usual Diabetes Care Provider: TESS Calero - [facility]  Factors Impacting Glucose Control: Recurrent High dose steroids  Plan beginning 8/12 is daily am Prednisone 80 mg    Review of Systems  10 point ROS completed with pertinent positives and negatives noted in the HPI    Past medical, family and social histories are reviewed and updated.          Physical Exam  Temp: 98.1  F (36.7  C) Temp  Min: 97.4  F (36.3  C)  Max: 98.1  F (36.7  C)  Resp: 16 Resp  Min: 16  Max: 16  SpO2: 99 % SpO2  Min: 97 %  Max: 99  %  Pulse: 57 Pulse  Min: 57  Max: 69    No data recorded  BP: 137/82 Systolic (24hrs), Av , Min:137 , Max:149   Diastolic (24hrs), Av, Min:73, Max:82    GENERAL: Healthy, alert and no distress  EYES: Eyes grossly normal to inspection.  No discharge or erythema, or obvious scleral/conjunctival abnormalities.  RESP: No audible wheeze, cough, or visible cyanosis.  No visible retractions or increased work of breathing.    SKIN: Visible skin clear. No significant rash, abnormal pigmentation or lesions.  NEURO: Cranial nerves grossly intact.  Mentation and speech appropriate for age.  PSYCH: Mentation appears normal, affect normal/bright, judgement and insight intact, normal speech and appearance well-groomed.    Laboratory  Recent Labs   Lab Test 08/10/23  1245 08/10/23  0740 08/10/23  0736 23  2246 23  204   NA  --   --  132*  --  127*   POTASSIUM  --   --  3.4  --  3.6   CHLORIDE  --   --  97*  --  92*   CO2  --   --  25  --  22   ANIONGAP  --   --  10  --  13   * 106* 131*   < > 278*   BUN  --   --  23.0  --  21.8   CR  --   --  0.98  --  1.07   FELIX  --   --  8.7*  --  9.1    < > = values in this interval not displayed.     CBC RESULTS:   Recent Labs   Lab Test 08/10/23  0736   WBC 5.7   RBC 4.15*   HGB 12.5*   HCT 35.2*   MCV 85   MCH 30.1   MCHC 35.5   RDW 15.3*   PLT 94*       Liver Function Studies -   Recent Labs   Lab Test 23   PROTTOTAL 6.4   ALBUMIN 4.3   BILITOTAL 1.3*   ALKPHOS 71   AST 35   ALT 41         No current outpatient medications on file.       Current Diet  Orders Placed This Encounter      Moderate Consistent Carb (60 g CHO per Meal) Diet      To contact Endocrine Diabetes service:   From 8AM-4PM: page inpatient diabetes provider that is following the patient that day (see filed or incomplete progress notes/consult notes).  If uncertain of provider assignment: page job code [phone].  For questions or updates from 4PM-8AM: page the diabetes job code for on  call fellow: [phone]    Please notify inpatient diabetes service if changes are planned that will impact glycemia, such as changes to steroids, enteral feeding, parenteral feeding, dextrose fluids or procedures requiring prolonged NPO status.    Luc Beltrán MD  Endocrinology Fellow

## 2023-08-12 NOTE — CONSULTS
[facility]  Consult Note - Hospitalist Service  Date of Admission:  8/9/2023  Consult Requested by: Dr. Castillo  Reason for Consult: Pre-operative evaluation    Assessment & Plan   Amado Butler is a 63 year old man with a history of DM2, HTN, SARA, paroxysmal SVT, DM2, subacute R eye pain and vision loss, who was admitted to Neurology service with worsening vision. Medicine consulted for pre-operative evaluation prior to right frontotemporal craniotomy for optic nerve biopsy.     Preoperative risk assessment: Surgery is not emergent. Functional capacity is >4 METs without symptoms. Patient's cardiac risk by revised cardiac risk index is 1%. ACS NSQIP lists their  cardiac risk similarly at 1.2%. Their risk of serious complications 17.5%. They have not required urgent intubation in the past.  It is certainly possible that they might require a longer time to recover from anesthesia than normal. Patient is currently euvolemic and is not on supplemental oxygen.    - Check EKG   - Pending EKG results I do not think that any further testing is indicated at this time as all of their chronic issues seem to be maximized       Management of Medications & Comorbidities:    Hypertension: Blood pressure stable.   - Continue hydrochlorothiazide 25 mg daily for now. Hold hydrochlorothiazide on day of surgery and resume post-operatively as BP and renal function allow.   - Continue carvedilol 12.5 mg BID    DM2, steroid-induced hyperglycemia: Endocrinology managing.        Recommendations discussed with Dr. Castillo of Neurosurgery. Medicine will sign off pending EKG findings. Please do not hesitate to contact if new questions or concerns arise.     Clinically Significant Risk Factors        # Hypokalemia: Lowest K = 2.8 mmol/L in last 2 days, will replace as needed        # Coagulation Defect: INR = 1.24 (Ref range: 0.85 - 1.15) and/or PTT = 25 Seconds (Ref range: 22 - 38 Seconds), will  "monitor for bleeding  # Thrombocytopenia: Lowest platelets = 77 in last 2 days, will monitor for bleeding                   Shanice Yi PA-C  Hospitalist Service  Securely message with [employer] (more info)  Text page via [facility] Paging/Directory   ______________________________________________________________________    Chief Complaint   Vision loss    History is obtained from the patient    History of Present Illness   Amado Butler is a 63 year old man with a history of DM2, HTN, SARA, paroxysmal SVT, DM2, subacute R eye pain and vision loss, who was admitted to Neurology service with worsening vision. Medicine consulted for pre-operative evaluation prior to right frontotemporal craniotomy for optic nerve biopsy.     He has tolerated anesthesia well in the past without complications. No history of PE or DVT. No current or recent chest pain, dyspnea, or exertional symptoms. No cough or fevers. He has been trying to walk about 1 mile per day while hospitalized. He has a history of occasional \"skipped beats,\" chart notes hx of paroxysmal SVT. He is on carvedilol for this and also for BP control. No history of major cardiovascular events. No known history of pulmonary disease.       Past Medical History    History reviewed. No pertinent past medical history.    Past Surgical History   History reviewed. No pertinent surgical history.    Medications   Current Facility-Administered Medications   Medication    acetaminophen (TYLENOL) tablet 650 mg    calcium carbonate-vitamin D (CALTRATE) 600-10 MG-MCG per tablet 1 tablet    carvedilol (COREG) tablet 12.5 mg    glucose gel 15-30 g    Or    dextrose 50 % injection 25-50 mL    Or    glucagon injection 1 mg    enoxaparin ANTICOAGULANT (LOVENOX) injection 40 mg    heparin 100 unit/mL injection 3 mL    heparin 100 unit/mL injection 3 mL    hydrochlorothiazide (HYDRODIURIL) tablet 25 mg    insulin aspart (NovoLOG) injection (RAPID ACTING)    insulin aspart (NovoLOG) " injection (RAPID ACTING)    insulin aspart (NovoLOG) injection (RAPID ACTING)    insulin NPH injection 27 Units    lidocaine (LMX4) cream    lidocaine 1 % 0.1-1 mL    melatonin tablet 1 mg    And    melatonin tablet 1 mg    pantoprazole (PROTONIX) EC tablet 40 mg    polyethylene glycol (MIRALAX) Packet 17 g    predniSONE (DELTASONE) tablet 80 mg    senna-docusate (SENOKOT-S/PERICOLACE) 8.6-50 MG per tablet 1 tablet    sodium chloride (PF) 0.9% PF flush 10 mL    sodium chloride (PF) 0.9% PF flush 10 mL    sodium chloride (PF) 0.9% PF flush 3 mL    sodium chloride (PF) 0.9% PF flush 3 mL    sodium chloride 0.9% infusion    sulfamethoxazole-trimethoprim (BACTRIM) 400-80 MG per tablet 1 tablet          Review of Systems    The 10 point Review of Systems is negative other than noted in the HPI or here.      Physical Exam   Vital Signs: Temp: 97.5  F (36.4  C) Temp src: Oral BP: 108/73 Pulse: 78   Resp: 16 SpO2: 96 % O2 Device: None (Room air)    Weight: 200 lbs 12.8 oz    Constitutional: Awake and alert, in no apparent distress. Sitting up comfortably in chair.   Eyes: Sclera clear, anicteric   Respiratory: Breathing non-labored. CTAB.  Cardiovascular:  RRR, normal S1/S2. No rubs or murmurs. No peripheral edema.   GI: Non-distended. Normoactive bowel sounds.   Skin:  Good color. No jaundice.   Neurologic: Alert and fully oriented.    Medical Decision Making       60 MINUTES SPENT BY ME on the date of service doing chart review, history, exam, documentation & further activities per the note.      Data   ------------------------- PAST 24 HR DATA REVIEWED -----------------------------------------------    I have personally reviewed the following data over the past 24 hrs:    5.3  \   11.1 (L)   / 77 (L)     137 97 (L) 20.6 /  153 (H)   3.6 29 0.86; 0.86 \     INR:  1.24 (H) PTT:  25   D-dimer:  N/A Fibrinogen:  161 (L)       Imaging results reviewed over the past 24 hrs:   Recent Results (from the past 24 hour(s))   MR  Brain and Orbits w/o & w Contrast    Narrative    MR BRAIN AND ORBITS W/O & W CONTRAST 8/12/2023 1:33 PM    Provided History:  Planned for craniotomy on Monday 8/14 for optic  nerve biopsy  ICD-10:    Comparison:  MRI 8/9/2023, 7/24/23, 7/3/2023    Technique:    1. MRI of the Brain: Multiplanar, multisequence images of the brain  without and with contrast per [facility] protocol.  2. MRI of the Orbits focused on the orbits/visual pathways:  Axial  T2-weighted with inversion recovery and coronal T1-weighted images  were obtained without intravenous contrast. Axial and coronal  T1-weighted images with fat saturation were obtained after intravenous  gadolinium administration.    Contrast: 9.1 mL gadavist    Findings:    Orbits: Again seen is subtle asymmetric increased T2 hyperintensity  about the right optic nerve sheath. Subtle perineural enhancement of  the posterior orbital right optic nerve. Normal extraocular muscles.  No intraorbital mass. The globes appear normal.    Brain MRI: Chronic right frontal encephalomalacia. Few scattered  cerebral white matter T2 hyperintensities, likely sequelae of very  mild chronic small vessel ischemic disease given age. Mild diffuse  cerebral atrophy. There is no mass effect, midline shift, or evidence  of intracranial hemorrhage. The ventricles are proportionate to the  cerebral sulci. Normal major vascular intracranial flow-voids.    Postcontrast images demonstrate no abnormal intracranial enhancement.    No abnormality of the skull marrow signal. The visualized portions of  paranasal sinuses, and mastoid air cells are relatively clear.      Impression    Impression:    1. Similar findings suggestive of right optic perineuritis.  2. Chronic right frontal infarct.    I have personally reviewed the examination and initial interpretation  and I agree with the findings.    LÓPEZ JIMENEZ MD         SYSTEM ID:  [id number]

## 2023-08-12 NOTE — PROGRESS NOTES
[facility]  General Neurology - Progress Note    Patient Name:  Amado Butler  Date of Service:  August 11, 2023    Subjective:    NAEO.  Vision somewhat improved today.  Able to perceive gross movements, outlines of people, scant colors.  In c/o interval Hbg drop as below, ROS for bleeding negative.      Extended discussion re risks/benefits of ongoing steroids and PLEX.  Given limited response to steroids and PLEX, and respective risks of interfering with diagnostic utility of biopsy and risk of procedural bleeding, we have elected to hold further high dose steroids and defer PLEX pending biopsy.      Objective:    Vitals: /82 (BP Location: Left arm)   Pulse 57   Temp 98.1  F (36.7  C) (Oral)   Resp 16   Wt 91.1 kg (200 lb 12.8 oz)   SpO2 99%   BMI 25.10 kg/m    General: Seated in chair, NAD  Head: Atraumatic, normocephalic   Cardiac: no lower extremity edema  Neurologic:  Mental Status: Fully alert, attentive and oriented. Speech clear and fluent.  Asks thoughtful questions.  Cranial Nerves: EOM intact, without nystagmus. Facial movements symmetric at rest and with activation. No dysarthria.  RAPD on right, more subtle than yesterday.  Able to perceive light and gross movement in R eye, scant color.  Motor: No abnormal movements.  Moving all 4 extremities antigravity.  Able to stand with crossed legs without difficulty.   Station/Gait: Stable, steady casual gait.    Pertinent Investigations:    I have personally reviewed most recent and pertinent labs, tests, and radiological images.     ~Prior to admission~  MRI brain and orbits w/o contrast 6/28/23  IMPRESSION:  HEAD MRI:   1.  Right frontal lobe encephalomalacia and surrounding gliotic   change without acute intracranial abnormality.   ORBIT MRI:   1.  Unremarkable orbits.      MRI brain and orbits with and w/o contrast 7/3/23  IMPRESSION:  HEAD MRI:  1.  No acute infarct.  2.  Age-related changes described  above.  3.  Right frontal lobe small area tissue loss and gliosis.  ORBIT MRI:  1.  Unremarkable MRI of the orbits.   2.  No evidence for optic neuritis.     MRI BRAIN WITH AND WITHOUT CONTRAST, MRI FACE (ORBITS) WITH AND WITHOUT CONTRAST 7/28/23  1.  No acute intracranial hemorrhage, mass lesion, or acute infarction.   2.  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   3.  Minimal T2 hyperintensity and enhancement in the right optic nerve sheath, which may represent mild perineuritis. No clear evidence of optic neuritis.      18F-FDG PET Scan With Attenuation Correction CT Only 8/3/23  1. No specific evidence of an FDG-avid malignancy.   2. Non-avid left adrenal nodule, which may be too small to be accurately characterized by FDG PET. Reported right adrenal nodules not visualized on the low dose CT. Adrenal nodules are not well characterized by FDG-PET, and would be more sensitively/specifically assessed with an adrenal protocolled CT or MRI.   3. Apparent abrupt termination of the left maxilla posteriorly without abnormal tracer uptake, suspected to be related to prior dental extraction or post-surgical changes.      MRI CERVICAL SPINE WITH AND WITHOUT CONTRAST, MRI THORACIC SPINE WITH AND WITHOUT CONTRAST 8/5/23  No evidence of demyelinating disease or transverse myelitis within the cervical or thoracic spine.      MR ORBIT W/O & W CONTRAST 8/9/2023 11:35 AM  Impression:    1. Regarding the orbits and globes, there is stable slight asymmetric  hyperintensity and perhaps slight enhancement of the right optic nerve  sheath, suggesting optic perineuritis. No underlying mass lesion.  2. Regarding the remainder of the brain, chronic right frontal infarct  and mild small vessel ischemic disease.      Temporal artery, right, biopsy 7/6/23  No evidence of giant cell arteritis.  Moderate arteriosclerosis.  Mild medial calcific sclerosis.      CRP < 3.0 on multiple occasions  ESR < 2 on multiple occasions     CNS  demyelinating disease (serum): negative  Encephalopathy/autoimmune panel (serum): negative  ACE: < 10  BUDDY: negative  ANCA Ig:20 (high)  IgG subclass: negative  [employer], Kenan light chain: negative  SPEP: negative  Lysozyme (serum): > 10.00 (high)     HBV: negative  Treponema juany: negative  Lyme juany: negative  Anaplamsa IgG IgM: negative  Babseia IgG IgM: negative  Erlichia IgG IgM: negative  Quantiferon TB: negative     CSF 23  Nucleated cells: 0  RBC: 2,967  Protein: 20  Glucose: 133  Kappa Free light chain: negative  ACE: 4  Lyme: non-reactive    Assessment and Plan:  Amado Butler is a 63 year old male w/ PMHx of of SARA, HTN, DM2, paroxysmal SVT p/w vision loss and periorbital pain w/ MRI showing subtle perineural enhancement, disc edema on prior dilated exams, 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      Extensive work-up by multiple services as summarized in HPI without clear final diagnosis.  Auto-immune etiology certainly possible given steroid responsiveness.  However, given negative extensive work-up, responsiveness to high-dose steroids with rapid return of visual loss at lower doses, his presentation is concerning for infiltrative (donny. lymphoma) vs significantly less likely a simmering infectious process.     Given persistent symptoms and suspicion for an autoimmune etiology, patient was admitted for empiric PLEX, with repeat high dose steroids per pt preference after risk/benefit discussion.      Unfortunately, although he has had some return of vision, PLEX/steroids have not been very efficacious.  After extended risk/benefit discussion w/ Mr. Butler, we have elected to defer further steroids given 1) complications (hyperglycemia requiring insulin gtt per endo, itself c/b hypokalemia 2) limited benefit and 3) possibility of non-diagnostic biopsy.  Regarding PLEX, given low fibrinogen and platelets, NSGY has  appropriately raised concerns re bleeding risk given planned biopsy Monday.  After extended risk/benefit discussion w/ Mr. Butler, he would like to defer further PLEX to prevent biopsy delay/complications, with possible PLEX restart Tu or Wed pending course.  We feel this is appropriate.       #R-eye vision loss, concern for optic perineuritis of unclear etiology  -Plan for biopsy M 8/14 per NSGY  -s/p PLEX x2, IV methylprednisone 1g x 2   >Not very efficacious so far   >Holding further methylpred given high-grade hyperglycemia, limited efficacy   >Given c/f bleeding c/o low fibrinogen, TCP, holding PLEX until post-procedure   -Ophthalmology seen 8/10:   >Recommending ongoing PLEX at the time (prior to above developments)  >Checking fungitell, pending  -Restart PLEX Tu vs Wed pending biopsy course (bleeding?)     #Hypokalemia  To nixon 2.8 8/12 c/o overnight start of insulin gtt per endo, hydrochlorothiazide PTA.  Suspect combination of chronic low-grade hypokalemia, exacerbated by insulin gtt.  -s/p 10 mEq IV K+, 50 meq oral, 20 meq repeat oral  -Recheck @1300   >Further oral repletion as indicated    #Pre-diabetes  #Steroid induced hyperglycemia  Pt w/ Hx of pre-diabetes PTA on glipizide and metformin, now presenting with BG intermittently in the 300s prior to steroids.  With methylpred, BG to near 500 prompting start of insulin gtt c/b hypokalemia as above.  Insulin gtt now discontinued given improved ctrl, no plans for further steroids.  -Inpatient diabetes following  -Co-management per their assessment appreciated.    #Pseudohyponatremia, resolved   #Hypochloremia, ongoing  Na to 127 at admission c/o BG near 300.  Resolved with better BG control.     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 No evidence of active bleeding on exam, ROS. Will continue to monitor.  - Hematology consulted, recommendations pending  - Transfusion  medicine following, appreciate recommendations on this front  - Daily CBC     #Hypertension  - Continue pta coreg 12.5 mg BID  - HOLD lisinopril-hydrochlorothiazide given contraindicated w/ PLEX  - Continue hydrochlorothiazide   >Depending on final hyponatremia diagnosis, may consider recommending a change     #SARA  - CPAP at night, home settings     FEN: Regular diet  Malnutrition: Patient does not meet two of the above criteria necessary for diagnosing malnutrition  PPx: lovenox  Code: FULL     Patient was seen and discussed with Dr. Boykin.      Richmond Joseph MD, MS  PGY-2, Neurology  Please page [facility] pass pager at [phone] ([facility])

## 2023-08-12 NOTE — PROGRESS NOTES
[facility], [place]     Neurosurgery Progress Note:  08/12/2023      Interval History: CHAYOON, fibrinogen low, will discuss with hematology regarding OR plans     Assessment:  63 year old male, ambidextrous, not on blood thinners, with past medical history suggestive of chronic thrombocytopenia, SARA, HTN, DM2, paroxysmal SVT p/w waxing and waning R-eye pain and visual loss since 6/1. Has been on high dose steroids intermittently with off/on improvement in vision in right eye. Neurosurgery is consulted for potential biopsy for diagnosis.     Given the patient is a known case of chronic thrombocytopenia, with drop in fibrinogen levels noted after PLEX run- the craniotomy remains a very high risk procedure. Given, his vision is deteriorating and that we do not have a finalized diagnosis, reasonable to consider the patient for optic nerve biopsy. We will discuss with Neurology/hematology team as well re: future PLEX runs, and their assessment of improvement in vision with PLEX/intravenous steroids.    Clinically Significant Risk Factors        # Hypokalemia: Lowest K = 2.8 mmol/L in last 2 days, will replace as needed        # Coagulation Defect: INR = 1.24 (Ref range: 0.85 - 1.15) and/or PTT = 25 Seconds (Ref range: 22 - 38 Seconds), will monitor for bleeding  # Thrombocytopenia: Lowest platelets = 77 in last 2 days, will monitor for bleeding                    Plan:  Repeat MRI Brain with Orbits +/- contrast- Stereotaxy protocol with thin cuts  Hematology consult for assessment for thrombocytopenia and low fibrinogen  Medicine pre op eval  CT/CTA Head- stereotaxy with Fiducials night before surgery  OR tentatively planned for Monday 8/14/2023- right frontotemporal craniotomy for optic nerve biopsy  Serial neuro exams  Neurosurgery will continue to follow  -----------------------------------  Maxwell Castillo MD  Neurosurgery resident, PGY-2  Pager  [phone]  -----------------------------------    General: awake and alert  HEENT: Bruising around right eye  PULM: breathing comfortably on room air  NEUROLOGIC:  -- Awake; Alert; oriented x 3  -- Follows commands briskly  -- +repetition, calculation, and naming  -- Speech fluent, spontaneous. No aphasia or dysarthria.  -- no gaze preference. No apparent hemineglect.  Cranial Nerves:  -- visual fields full to confrontation, PERRL 3-2mm bilat and brisk, extraocular movements intact  -- face symmetrical, tongue midline  -- sensory V1-V3 intact bilaterally  -- palate elevates symmetrically, uvula midline  -- hearing grossly intact bilat  -- Trapezii 5/5 strength bilat symmetric  -- Cerebellar: Finger nose finger without dysmetria, intact rapid alternating motions bilaterally     Vision:  Right eye: can only see hand waving, no finger counting  Left eye: 20/30- confirmed with Neurology- per charts     Motor:  Normal bulk / tone; no tremor, rigidity, or bradykinesia.  No muscle wasting or fasciculations  No Pronator Drift       Delt Bi Tri Hand Flexion/  Extension Iliopsoas Quadriceps Hamstrings Tibialis Anterior Gastroc     C5 C6 C7 C8/T1 L2 L3 L4-S1 L4 S1   R 5 5 5 5 5 5 5 5 5   L 5 5 5 5 5 5 5 5 5   Sensory:  intact to LT x 4 extremities         Reflexes:       Bi Tri BR Merissa Pat Ach Bab     C5-6 C7-8 C6 UMN L2-4 S1 UMN   R 2+ 2+ 2+ Norm 2+ 2+ Norm   L 2+ 2+ 2+ Norm 2+ 2+ Norm      Gait: Deferred    Objective:   Temp:  [97.4  F (36.3  C)-98.1  F (36.7  C)] 97.5  F (36.4  C)  Pulse:  [57-78] 78  Resp:  [16] 16  BP: (108-149)/(73-82) 108/73  SpO2:  [96 %-99 %] 96 %  I/O last 3 completed shifts:  In: 203 [P.O.:180; I.V.:23]  Out: -         LABS:  Recent Labs   Lab 08/12/23  0957 08/12/23  0753 08/12/23  0622 08/11/23  0805 08/11/23  0657 08/10/23  0740 08/10/23  0736   NA  --   --  137  --  133*  133*  --  132*   POTASSIUM  --   --  2.8*  --  3.4  --  3.4   CHLORIDE  --   --  97*  --  95*  --  97*   CO2  --   --  29 -- 23   --  25   ANIONGAP  --   --  11  --  15  --  10   * 123* 106*   < > 243*   < > 131*   BUN  --   --  20.6  --  23.6*  --  23.0   CR  --   --  0.86  0.86  --  0.92  0.93  --  0.98   FELIX  --   --  9.0  --  9.8  --  8.7*    < > = values in this interval not displayed.       Recent Labs   Lab 08/12/23  0622   WBC 5.3   RBC 3.72*   HGB 11.1*   HCT 31.5*   MCV 85   MCH 29.8   MCHC 35.2   RDW 15.8*   PLT 77*       IMAGING:  No results found for this or any previous visit (from the past 24 hour(s)).    Please contact neurosurgery resident on call with questions.    Dial * * [phone], enter [phone] when prompted.

## 2023-08-12 NOTE — PROGRESS NOTES
[id number]  Patient admitted on 8/9 with R eye vision loss. VSS. Neuros unchanged, patient continuous to have blurry vision of right eye. Right CVC heparin locked after second does of PLEX treatment, R PIV infusing methylprednisolone. Order for continuous insulin infusion, awaiting insulin from pharmacy. Up independently, voiding spontaneously, Moderate carb diet. MRI ordered, checklist at bedside, plan for OR Monday for right frontotemporal craniotomy for optic nerve biopsy, continue to monitor and follow POC.

## 2023-08-12 NOTE — CONSULTS
Hematology Consult / Initial Consult Progress Note    Date of Service 08/12/2023  Admit Date 8/9/2023   Initial Consult 08/12/23  Hospital Day: 4     Reason for consult: thrombocytopenia, low fibrinogen after plasma exchange  Consult requested by: Dr. Lucretia MD    History of Present Illness  Amado Butler is a 63 year old man with a history of SARA, HTN, DM2, proxysmal SVT who was admitted 8/9/2023 for waxxing and waning R eye pain and vision loss for 2 months found to have concern for unilateral optic perineuritis of unclear etiology. Hematology was consulted due to thrombocytopenia and low fibrinogen.     The etiology of the pt's unilateral optic perineuritis is unclear at this moment after w/up and has been suspected to be due to either autoimmune vs neoplastic (like lymphoma) vs infectious. He has been seen by multiple providers (PCP, ED, ophtho, neuro-ophtho) and underwent multiple w/up prior this admission including an MRI brain/orbits on 6/26 showing R-frontal encephalomalacia (presumed microvascular changes), [facility] ED visit on 7/3 with repeat MRI brain/orbits showing subtle perineural enhancement per ophthalmology overread most prominent at the apex. At that time, due to progressing vision loss, perineural enhancement and R shoulder pain, he was suspected by optho to have GCA and he was started on steroids (methylpred 1g x1 dose, then 96mg po x 7 days) and subsequent bx per neuro-ophtho Dr. Serrano which was neg for arteritis. His steroids was tapered to 60mg daily.     He then represented to [place] ED on 7/22 during his vacation with worsening vision, and he was ultimately admitted to [facility] neurology service on 7/31. During that admission, he was restarted on high-dose steroids (methylpred 1g x5 doses) with improvement and underwent additional w/up (MRI brain, face, CT spine, abd, and PET/CT). Per report, LP was umremarkable, PET-CT with no e/o abnl metabolic activity.     On 8/9, the  "pt presented to neuro-ophtho Dr. Serrano. Dr. Serrano felt that the differential for the cause of the pt's vision loss is broad. The pt was advised to come to the hospital for plasmapheresis, with additional w/up. After admission, he underwent two sessions of plasma exchange on 8/10, and on 8/11/2023.     Per review of his plt level, he had a plt level of 101 in 2012 which underwent evaluation. Around that time, given that he also had leukopenia (more neutrophilia), anemia and thrombocytopenia, he underwent a BMBx (8/22/2012). The karyotype showed NL findings. Of note, he did not have any monocytosis at that time. Between 2012 and until recently, his plt level has ranged between 100-110s (and WBC, Hb both wnl). More recently, during his stay at [facility], his plt ranged between 80s to low 100s (went down to 80s, but recovered to low 100s). Per his US abd from 6/2022 he has a NL looking liver and NL spleen size, however, his MRI from 8/4/2023 shows boderline splenomegaly of 13cm (with no focal lesion on liver). He had a recent PET/CT on 8/3/2023 that showed no specific e/o malignancy, but it reported \"Diffusely elevated marrow uptake may be related to anemia\", though his Hb level was only around 11-12s at that time.     Other w/up per chart review includes neg HIT Ab (8/1/23), neg hep B (8/1/23), neg HIV (8/1/23), wnl range CRP and ESR (7/24/23), CSF EBV PCR neg (7/31/2023). Lyme IgG/IgM Ab were neg (8/1/23).    At the time of the visit, the pt states that his R sided vision has been getting worse and then got slightly better with high dose steroids and plasma exchange. He states he had some response and he can now see people's face and light, but saying it is still foggy.     ROS positive for 20 lbs weight loss but he states this has now stabilized, intermittent abd bloating, occasional flaky rash on his scalp. The patient does not report any SOB, CP, abd pain/n/v/d, dysuria, joint pain, rash, or fever/night sweats.     PMH: " DM2, HTN, SARA, paroxysmal SVT   FamHx: h/o stroke in both grandfather  SocHx: non smoker  Meds reviewed in Epic.  Allergies reviewed in Epic    Physical Exam  BP (!) 143/81 (BP Location: Left arm)   Pulse 59   Temp 97.5  F (36.4  C) (Oral)   Resp 16   Wt 91.1 kg (200 lb 12.8 oz)   SpO2 97%   BMI 25.10 kg/m       Constitutional: Awake, alert, cooperative, in NAD.  Eyes: PERRL  ENT: Normocephalic  Respiratory: Non-labored breathing  Skin: No concerning lesions or rash on exposed areas.  Musculoskeletal: No edema joseph LEs.  Neurologic: Awake, alert & oriented   Psych: appropriate affect        Recent Labs   Lab 08/11/23  0657   WBC 5.6   HGB 12.7*   *   MCV 84   RDW 15.7*     Recent Labs   Lab 08/11/23  0657   *  133*   POTASSIUM 3.4   CHLORIDE 95*   CO2 23   BUN 23.6*   CR 0.92  0.93   FELIX 9.8     Recent Labs   Lab 08/09/23  2045   AST 35   ALT 41   ALKPHOS 71   ALBUMIN 4.3   PROTTOTAL 6.4   BILITOTAL 1.3*      Recent Labs   Lab 08/11/23  1111 08/10/23  1404   INR  --  1.07   FIBR 78* 174      Smear (our review):   No schistocytes    2012 BMBx cytogenetics  INTERPRETATION:   The twenty metaphases analyzed from this bone marrow specimen have an   apparently normal male karyotype with no evidence of clonal abnormalities.   CBC at that time showed WBC 4.0, HB 13.2, and plt 101 (ANC 0.9, monocyte was wnl).    MRI brain 7/24/2023  1.  No acute intracranial hemorrhage, mass lesion, or acute infarction.   2.  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   3.  Minimal T2 hyperintensity and enhancement in the right optic nerve sheath, which may represent mild perineuritis. No clear evidence of optic neuritis.     PET/CT 8/3/2023  *  No specific evidence of an FDG-avid malignancy.   *  Non-avid left adrenal nodule, which may be too small to be accurately characterized by FDG PET. Reported right adrenal nodules not visualized on the low dose CT. Adrenal nodules are not well characterized by FDG-PET, and  "would be more sensitively/specifically assessed with an adrenal protocolled CT or MRI.   *  Apparent abrupt termination of the left maxilla posteriorly without abnormal tracer uptake, suspected to be related to prior dental extraction or post-surgical changes.  Correlation with clinical history is suggested.     MRI abd 8/7/2023  1.  Bilateral adrenal adenomas measuring 1.4 cm on the left and 1.3 cm on the right.   2.  Borderline splenomegaly.     Impression:    #Acute on chronic thrombocytopenia  #Low fibrinogen  #Monocytosis  #Normocytic anemia  #Bordeline splenomegaly  #Unilateral vision loss of unclear etiology  #T2DM/Hyperglycemia    1. His thrombocytopenia is a chronic problem with plt level in low 100s for more than 10 yrs and he had a prior BMBx evaluation in 2012 due associated leukopenia and anemia. That BMBx result seems to have been unremarkable at that time (cytogenetics was with no abnl findings), and also he did not have any monocytosis then. Recent w/up showed NL looking liver, borderline splenomegaly, neg hep B, neg HIV, and neg CSF EBV (not serum EBV). Currently, his CBC findings shows chronic thrombocytopenia still at low 100s, normocytic anemia, and a intermittent monocytosis. Of note, given his chronic stable thrombocytopenia, one differential was ITP. However, the pt has received steroids over the past month and his plt did not rise at all, making ITP quite less likely.     2. The combination of monocytosis, chronic thrombocytopenia along with his loss of vision raises the possibility of hematologic malignancies. There have been case reports of CMML (see reference below) causing optic neuritis. Interestingly, his recent PET/CT from 8/3/23 did not show any significant abnl finding but did comment on \"Diffusely elevated marrow uptake may be related to anemia\" though he was not that anemic with Hb in 11-12s. Given all these, we would like to go ahead with BMBx to investigate any possible malignant " condition like CMML. If it is feasible (based on our hematology labs tech availability), we may consider obtaining a BMBx on Monday 8/14 when he is in the OR with NSG, otherwise we plan for some time later at bedside.     3. The pt's plt and fibrinogen dropped from his baseline (plt 102 -> 77, fibrinogen 174 -> 78 then today up to 161) and they are mostly likely due to the plasma exchange. When you replace his plasma with alb product, fibrinogen can drop post plasma exchange as well as the plt count. We talked with neurology today and they will hold off further exchange session before he goes to his R frontotemporal craniotomy with optic nerve bx, tentatively scheduled for Monday 8/14/23. If his plt and/or fibrinogen level is still low tomorrow and NSG concerned about taking him to the OR, can consider plt and/or cryo transfusion product.     Recommendations:  -the cause of his acute on chronic thrombocytopenia and drop in fibrinogen is most likely from the plasma exchange. Plasma exchange in on hold for now so hopefully his plt and fibrinogen will improve or at least will not drop further.   -check daily CBC/diff, coags (INR, APTT, fibrinogen)  -if the pt's plt and fibrinogen are still low before surgery, can give plt and/or cryo transfusion.    -if plt is < 75K and pt cannot go to OR per NSG, consider plt transfusion (or transfuse plt with a higher threshold per NSG)   -f/up fibrinogen level, can consider cryo transfusion if still low for NSG for them to take the pt to the OR  -we will schedule a BMBx early next week. This could happen possibly in the OR during his craniotomy from NSG on Monday 8/14 (depending on hem  availability), or we may do it later as a bedside procedure.   -check ferritin, iron/TIBC, B12, folate, copper, HepC (ordered for you)    We will continue to follow.    The above plan was discussed with Dr. Llamas, who agrees with the assessment and plan.     Thank you for allowing me to  participate in the care of this patient. Please do not hesitate to contact me if there are any concerns or questions.     Reference:   1. 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 Shahana Gutierrez. 2014.   2. A diagnostic challenge: chronic myelomonocytic leukaemia and recurrent anterior ischaemic optic neuropathy. Nafisa Stark. PMID: 70015861.    Jah Echeverria MD  Hem/onc fellow  Division of Hematology, Oncology, and Transplantation  [facility]  Pager: [phone]

## 2023-08-12 NOTE — PLAN OF CARE
Status: Admitted for R eye vision change/loss and potential optic neuritis.   Vitals: VSS  Neuros: A/Ox4, R eye vision opaque (improving per pt). Strengths 5/5 throughout.    IV: PIV w/ NS @ TKO and insulin gtt (algorithm 1)  Labs/Electrolytes: q2hr bg checks  Resp/trach: home CPAP overnight   Diet: Regular  Bowel status: LBM 8/10  : Voiding spontaneously   Skin: no new deficits   Pain: headache, requested tylenol x1 overnight   Activity: independent   Plan: Continue insulin gtt - pt wanting to talk to endocrine with multiple questions regarding insulin gtt

## 2023-08-13 ENCOUNTER — ANESTHESIA EVENT (OUTPATIENT)
Dept: SURGERY | Facility: CLINIC | Age: 63
DRG: 025 | End: 2023-08-13
Payer: COMMERCIAL

## 2023-08-13 ENCOUNTER — APPOINTMENT (OUTPATIENT)
Dept: LAB | Facility: CLINIC | Age: 63
DRG: 025 | End: 2023-08-13
Payer: COMMERCIAL

## 2023-08-13 LAB
ANION GAP SERPL CALCULATED.3IONS-SCNC: 11 MMOL/L (ref 7–15)
APTT PPP: 23 SECONDS (ref 22–38)
APTT PPP: 25 SECONDS (ref 22–38)
BLD PROD TYP BPU: NORMAL
BLOOD COMPONENT TYPE: NORMAL
BUN SERPL-MCNC: 21.6 MG/DL (ref 8–23)
CALCIUM SERPL-MCNC: 8.8 MG/DL (ref 8.8–10.2)
CHLORIDE SERPL-SCNC: 96 MMOL/L (ref 98–107)
CODING SYSTEM: NORMAL
CREAT SERPL-MCNC: 0.94 MG/DL (ref 0.67–1.17)
DEPRECATED HCO3 PLAS-SCNC: 28 MMOL/L (ref 22–29)
ERYTHROCYTE [DISTWIDTH] IN BLOOD BY AUTOMATED COUNT: 16.8 % (ref 10–15)
FERRITIN SERPL-MCNC: 555 NG/ML (ref 31–409)
FIBRINOGEN PPP-MCNC: 155 MG/DL (ref 170–490)
FIBRINOGEN PPP-MCNC: 211 MG/DL (ref 170–490)
FOLATE SERPL-MCNC: 12.7 NG/ML (ref 4.6–34.8)
GFR SERPL CREATININE-BSD FRML MDRD: 90 ML/MIN/1.73M2
GLUCOSE BLDC GLUCOMTR-MCNC: 109 MG/DL (ref 70–99)
GLUCOSE BLDC GLUCOMTR-MCNC: 121 MG/DL (ref 70–99)
GLUCOSE BLDC GLUCOMTR-MCNC: 124 MG/DL (ref 70–99)
GLUCOSE BLDC GLUCOMTR-MCNC: 126 MG/DL (ref 70–99)
GLUCOSE BLDC GLUCOMTR-MCNC: 262 MG/DL (ref 70–99)
GLUCOSE BLDC GLUCOMTR-MCNC: 275 MG/DL (ref 70–99)
GLUCOSE BLDC GLUCOMTR-MCNC: 54 MG/DL (ref 70–99)
GLUCOSE BLDC GLUCOMTR-MCNC: 72 MG/DL (ref 70–99)
GLUCOSE BLDC GLUCOMTR-MCNC: 89 MG/DL (ref 70–99)
GLUCOSE SERPL-MCNC: 145 MG/DL (ref 70–99)
HCT VFR BLD AUTO: 36.4 % (ref 40–53)
HGB BLD-MCNC: 12.4 G/DL (ref 13.3–17.7)
INR PPP: 1.13 (ref 0.85–1.15)
INR PPP: 1.15 (ref 0.85–1.15)
IRON BINDING CAPACITY (ROCHE): 189 UG/DL (ref 240–430)
IRON SATN MFR SERPL: 53 % (ref 15–46)
IRON SERPL-MCNC: 101 UG/DL (ref 61–157)
ISSUE DATE AND TIME: NORMAL
MCH RBC QN AUTO: 29.7 PG (ref 26.5–33)
MCHC RBC AUTO-ENTMCNC: 34.1 G/DL (ref 31.5–36.5)
MCV RBC AUTO: 87 FL (ref 78–100)
PLAT MORPH BLD: NORMAL
PLATELET # BLD AUTO: 129 10E3/UL (ref 150–450)
POTASSIUM SERPL-SCNC: 3 MMOL/L (ref 3.4–5.3)
RBC # BLD AUTO: 4.18 10E6/UL (ref 4.4–5.9)
RBC MORPH BLD: NORMAL
SODIUM SERPL-SCNC: 135 MMOL/L (ref 136–145)
UNIT ABO/RH: NORMAL
UNIT NUMBER: NORMAL
UNIT STATUS: NORMAL
UNIT TYPE ISBT: 5100
UNIT TYPE ISBT: 9500
UNIT TYPE ISBT: 9500
VIT B12 SERPL-MCNC: 628 PG/ML (ref 232–1245)
WBC # BLD AUTO: 9.1 10E3/UL (ref 4–11)

## 2023-08-13 PROCEDURE — 82607 VITAMIN B-12: CPT | Performed by: HOSPITALIST

## 2023-08-13 PROCEDURE — 99231 SBSQ HOSP IP/OBS SF/LOW 25: CPT | Performed by: INTERNAL MEDICINE

## 2023-08-13 PROCEDURE — 82728 ASSAY OF FERRITIN: CPT | Performed by: HOSPITALIST

## 2023-08-13 PROCEDURE — 36415 COLL VENOUS BLD VENIPUNCTURE: CPT | Performed by: HOSPITALIST

## 2023-08-13 PROCEDURE — 250N000011 HC RX IP 250 OP 636: Mod: JZ | Performed by: PATHOLOGY

## 2023-08-13 PROCEDURE — 82525 ASSAY OF COPPER: CPT | Performed by: HOSPITALIST

## 2023-08-13 PROCEDURE — 85027 COMPLETE CBC AUTOMATED: CPT | Performed by: STUDENT IN AN ORGANIZED HEALTH CARE EDUCATION/TRAINING PROGRAM

## 2023-08-13 PROCEDURE — 250N000013 HC RX MED GY IP 250 OP 250 PS 637: Performed by: STUDENT IN AN ORGANIZED HEALTH CARE EDUCATION/TRAINING PROGRAM

## 2023-08-13 PROCEDURE — 80048 BASIC METABOLIC PNL TOTAL CA: CPT | Performed by: STUDENT IN AN ORGANIZED HEALTH CARE EDUCATION/TRAINING PROGRAM

## 2023-08-13 PROCEDURE — 99232 SBSQ HOSP IP/OBS MODERATE 35: CPT | Mod: GC | Performed by: STUDENT IN AN ORGANIZED HEALTH CARE EDUCATION/TRAINING PROGRAM

## 2023-08-13 PROCEDURE — P9059 PLASMA, FRZ BETWEEN 8-24HOUR: HCPCS

## 2023-08-13 PROCEDURE — 85610 PROTHROMBIN TIME: CPT | Performed by: HOSPITALIST

## 2023-08-13 PROCEDURE — 36514 APHERESIS PLASMA: CPT

## 2023-08-13 PROCEDURE — 250N000012 HC RX MED GY IP 250 OP 636 PS 637

## 2023-08-13 PROCEDURE — 250N000009 HC RX 250: Performed by: PATHOLOGY

## 2023-08-13 PROCEDURE — 83550 IRON BINDING TEST: CPT | Performed by: HOSPITALIST

## 2023-08-13 PROCEDURE — 85384 FIBRINOGEN ACTIVITY: CPT | Performed by: HOSPITALIST

## 2023-08-13 PROCEDURE — 82746 ASSAY OF FOLIC ACID SERUM: CPT | Performed by: HOSPITALIST

## 2023-08-13 PROCEDURE — 86803 HEPATITIS C AB TEST: CPT | Performed by: HOSPITALIST

## 2023-08-13 PROCEDURE — 93005 ELECTROCARDIOGRAM TRACING: CPT

## 2023-08-13 PROCEDURE — 120N000002 HC R&B MED SURG/OB UMMC

## 2023-08-13 PROCEDURE — 85730 THROMBOPLASTIN TIME PARTIAL: CPT | Performed by: HOSPITALIST

## 2023-08-13 PROCEDURE — 85730 THROMBOPLASTIN TIME PARTIAL: CPT | Performed by: PATHOLOGY

## 2023-08-13 PROCEDURE — 85384 FIBRINOGEN ACTIVITY: CPT | Performed by: PATHOLOGY

## 2023-08-13 PROCEDURE — 85610 PROTHROMBIN TIME: CPT | Performed by: PATHOLOGY

## 2023-08-13 PROCEDURE — 250N000011 HC RX IP 250 OP 636: Performed by: PATHOLOGY

## 2023-08-13 PROCEDURE — 93010 ELECTROCARDIOGRAM REPORT: CPT | Performed by: INTERNAL MEDICINE

## 2023-08-13 PROCEDURE — 99232 SBSQ HOSP IP/OBS MODERATE 35: CPT | Performed by: PSYCHIATRY & NEUROLOGY

## 2023-08-13 RX ORDER — CALCIUM GLUCONATE 100 MG/ML
AMPUL (ML) INTRAVENOUS
Status: COMPLETED | OUTPATIENT
Start: 2023-08-13 | End: 2023-08-13

## 2023-08-13 RX ORDER — HEPARIN SODIUM (PORCINE) LOCK FLUSH IV SOLN 100 UNIT/ML 100 UNIT/ML
3 SOLUTION INTRAVENOUS ONCE
Status: COMPLETED | OUTPATIENT
Start: 2023-08-13 | End: 2023-08-13

## 2023-08-13 RX ADMIN — Medication 3 ML: at 13:35

## 2023-08-13 RX ADMIN — PREDNISONE 80 MG: 20 TABLET ORAL at 08:29

## 2023-08-13 RX ADMIN — ANTICOAGULANT CITRATE DEXTROSE SOLUTION FORMULA A 851 ML: 12.25; 11; 3.65 SOLUTION INTRAVENOUS at 11:53

## 2023-08-13 RX ADMIN — SENNOSIDES AND DOCUSATE SODIUM 1 TABLET: 50; 8.6 TABLET ORAL at 08:30

## 2023-08-13 RX ADMIN — PANTOPRAZOLE SODIUM 40 MG: 40 TABLET, DELAYED RELEASE ORAL at 08:29

## 2023-08-13 RX ADMIN — CALCIUM CARBONATE 600 MG (1,500 MG)-VITAMIN D3 400 UNIT TABLET 1 TABLET: at 08:29

## 2023-08-13 RX ADMIN — CALCIUM CARBONATE 600 MG (1,500 MG)-VITAMIN D3 400 UNIT TABLET 1 TABLET: at 19:25

## 2023-08-13 RX ADMIN — CALCIUM GLUCONATE 6.3 G: 98 INJECTION, SOLUTION INTRAVENOUS at 11:53

## 2023-08-13 RX ADMIN — HYDROCHLOROTHIAZIDE 25 MG: 25 TABLET ORAL at 08:55

## 2023-08-13 RX ADMIN — CARVEDILOL 12.5 MG: 12.5 TABLET, FILM COATED ORAL at 19:25

## 2023-08-13 RX ADMIN — POLYETHYLENE GLYCOL 3350 17 G: 17 POWDER, FOR SOLUTION ORAL at 14:48

## 2023-08-13 ASSESSMENT — ACTIVITIES OF DAILY LIVING (ADL)
ADLS_ACUITY_SCORE: 20

## 2023-08-13 NOTE — PROGRESS NOTES
Brief Progress Note:    Preoperative Risk Assessment:   Medicine consulted for Pre-Op clearance for craniotomy. EKG has been done and shown to be in NSR with PVC/PAC, HR 61.     Medicine will sign off at this time. Please reach out with future questions or concerns.    Pamela Muñoz DNP, ACN-  Hospitalist Service

## 2023-08-13 NOTE — PLAN OF CARE
Status: Admitted for optic neuritis with R eye vision loss.   Vitals: VSS with bradycardia as low as 40. Stat EKG normal. Morning carvedilol held per MD  Neuros: A&Ox4. Pupils PERRLA with R eye blurriness. 5/5 strength throughout   IV: R PIV SL. R CVC.   Labs/Electrolytes: Potassium 3.0   Resp/trach: WDL   Diet: Regular, carb controlled   Bowel status: LBM 8/13/2023  : Voiding spontaneously   Skin: R eye bruising   Pain: Denies   Activity: Up ad john  Plan: NPO at midnight for right frontotemporal craniotomy for optic nerve biopsy. CT scheduled for tomorrow at 0400 for fiducial placement.    Updates this shift: PLEx 3/5 done today

## 2023-08-13 NOTE — PROGRESS NOTES
"Hematology Consult / Initial Consult Progress Note    Date of Service 08/13/2023  Admit Date 8/9/2023   Initial Consult 08/12/23  Hospital Day: 5     Reason for consult: thrombocytopenia, low fibrinogen after plasma exchange  Consult requested by: Dr. Lucretia MD      Assessment:    #Acute on chronic thrombocytopenia  #Low fibrinogen  #History of long-standing Monocytosis  #Normocytic anemia  #Bordeline splenomegaly  #Unilateral vision loss of unclear etiology  #T2DM/Hyperglycemia    1. His thrombocytopenia is a chronic problem with plt level in low 100s for more than 10 yrs and he had a prior BMBx evaluation in 2012 due associated leukopenia and anemia. That BMBx result seems to have been unremarkable at that time (cytogenetics was with no abnl findings), and also he did not have any monocytosis then. Recent w/up showed NL looking liver, borderline splenomegaly, neg hep B, neg HIV, and neg CSF EBV (not serum EBV). Currently, his CBC findings shows chronic thrombocytopenia still at low 100s, normocytic anemia, and a intermittent monocytosis. Of note, given his chronic stable thrombocytopenia, one differential was ITP. However, the pt has received steroids over the past month and his plt did not rise at all, making ITP quite less likely. Plasma exchange is also likely contributing at present. Would transfuse to maintain >50,000 and for OR >100,000.     2. The combination of chornic monocytosis, chronic thrombocytopenia along with his loss of vision raises the possibility of hematologic malignancies. There have been case reports of CMML (see reference below) causing optic neuritis. Interestingly, his recent PET/CT from 8/3/23 did not show any significant abnl finding but did comment on \"Diffusely elevated marrow uptake may be related to anemia\" though he was not that anemic with Hb in 11-12s. Given all these, we would like to go ahead with BMBx to investigate any possible malignant condition like CMML. Discussed " with Neurosurgery team members--- will plan to perform biopsy on Tuesday at bedside (likely in NSG intensive care).     3. The pt's plt and fibrinogen dropped from his baseline (plt 102 -> 77, fibrinogen 174 -> 78 then today up to 161) and they are mostly likely due to the plasma exchange. When you replace his plasma with alb product, fibrinogen can drop post plasma exchange as well as the plt count.     Recommendations:  -check daily CBC/diff, coags (INR, APTT, fibrinogen)  -if the pt's plt and fibrinogen are still low before surgery, can give plt and/or cryo transfusion.    -Transfuse platelets to obtain count >100,000 today   -f/up fibrinogen level, can consider cryo transfusion if still low for NSG for them to take the pt to the OR  -we will schedule a BMBx for Tuesday 8/15.     We will continue to follow.    Thank you for allowing me to participate in the care of this patient. Please do not hesitate to contact the Fellow on call if there are any concerns or questions.     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 Sweetie Grant, Shahana Hernandez. 2014.   A diagnostic challenge: chronic myelomonocytic leukaemia and recurrent anterior ischaemic optic neuropathy. Nafisa Stark. PMID: [id number].    Angelica Llamas MD/PhD   of Medicine  Division of Hematology, Oncology and Transplantation    -----------------------------------------------------------------------------------------------------------------------------------------------------------------  Interval History  Episode of hypoglycemia overnight. Patient up and walking around. Reading chart and speaking with team appropriately.     Current medications  Current Facility-Administered Medications   Medication    acetaminophen (TYLENOL) tablet 650 mg    Anticoagulant Citrate Dextrose Formula A at ratio of 1:10 with blood ([facility])     calcium carbonate-vitamin D (CALTRATE) 600-10 MG-MCG per tablet 1 tablet    calcium gluconate with plasma (administered by Apheresis Staff ONLY)    carvedilol (COREG) tablet 12.5 mg    glucose gel 15-30 g    Or    dextrose 50 % injection 25-50 mL    Or    glucagon injection 1 mg    enoxaparin ANTICOAGULANT (LOVENOX) injection 40 mg    heparin 100 unit/mL injection 3 mL    heparin 100 unit/mL injection 3 mL    hydrochlorothiazide (HYDRODIURIL) tablet 25 mg    insulin aspart (NovoLOG) injection (RAPID ACTING)    insulin aspart (NovoLOG) injection (RAPID ACTING)    insulin aspart (NovoLOG) injection (RAPID ACTING)    insulin NPH injection 27 Units    lidocaine (LMX4) cream    lidocaine 1 % 0.1-1 mL    melatonin tablet 1 mg    And    melatonin tablet 1 mg    pantoprazole (PROTONIX) EC tablet 40 mg    polyethylene glycol (MIRALAX) Packet 17 g    predniSONE (DELTASONE) tablet 80 mg    senna-docusate (SENOKOT-S/PERICOLACE) 8.6-50 MG per tablet 1 tablet    sodium chloride (PF) 0.9% PF flush 10 mL    sodium chloride (PF) 0.9% PF flush 10 mL    sodium chloride (PF) 0.9% PF flush 3 mL    sodium chloride (PF) 0.9% PF flush 3 mL    sodium chloride 0.9% infusion    sulfamethoxazole-trimethoprim (BACTRIM) 400-80 MG per tablet 1 tablet       Physical Exam  BP (!) 145/65 (BP Location: Left arm)   Pulse 54   Temp 97.9  F (36.6  C) (Oral)   Resp 16   Wt 91.1 kg (200 lb 12.8 oz)   SpO2 100%   BMI 25.10 kg/m       Constitutional: Awake, alert, cooperative, in NAD.  Eyes: PERRL  ENT: Normocephalic  Respiratory: Non-labored breathing  Skin: No concerning lesions or rash on exposed areas.  Musculoskeletal: No edema joseph LEs.  Neurologic: Awake, alert & oriented   Psych: appropriate affect        Recent Labs   Lab 08/12/23  0622   WBC 5.3   HGB 11.1*   PLT 77*   MCV 85   RDW 15.8*     Recent Labs   Lab 08/12/23  1427 08/12/23  0622   NA  --  137   POTASSIUM 3.6 2.8*   CHLORIDE  --  97*   CO2  --  29   BUN  --  20.6   CR  --  0.86   0.86   FELIX  --  9.0     Recent Labs   Lab 08/09/23 2045   AST 35   ALT 41   ALKPHOS 71   ALBUMIN 4.3   PROTTOTAL 6.4   BILITOTAL 1.3*      Recent Labs   Lab 08/12/23  0622 08/11/23  1111 08/10/23  1404   INR 1.24*  --  1.07   PTT 25  --   --    FIBR 161* 78* 174      Smear (our review):   No schistocytes    2012 BMBx cytogenetics  INTERPRETATION:   The twenty metaphases analyzed from this bone marrow specimen have an   apparently normal male karyotype with no evidence of clonal abnormalities.   CBC at that time showed WBC 4.0, HB 13.2, and plt 101 (ANC 0.9, monocyte was wnl).    MRI brain 7/24/2023  1.  No acute intracranial hemorrhage, mass lesion, or acute infarction.   2.  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   3.  Minimal T2 hyperintensity and enhancement in the right optic nerve sheath, which may represent mild perineuritis. No clear evidence of optic neuritis.     PET/CT 8/3/2023  *  No specific evidence of an FDG-avid malignancy.   *  Non-avid left adrenal nodule, which may be too small to be accurately characterized by FDG PET. Reported right adrenal nodules not visualized on the low dose CT. Adrenal nodules are not well characterized by FDG-PET, and would be more sensitively/specifically assessed with an adrenal protocolled CT or MRI.   *  Apparent abrupt termination of the left maxilla posteriorly without abnormal tracer uptake, suspected to be related to prior dental extraction or post-surgical changes.  Correlation with clinical history is suggested.     MRI abd 8/7/2023  1.  Bilateral adrenal adenomas measuring 1.4 cm on the left and 1.3 cm on the right.   2.  Borderline splenomegaly.

## 2023-08-13 NOTE — PROGRESS NOTES
Diabetes/Hyperglycemia Management Consult    Chief Complaint Hyperglycemia on steroids   Consult requested by:     Jorge Joseph MD     History of Present Illness   Mr. 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 Seems to have been steroid responsive initially but now apparently went from 20/40 to unable to count fingers in one week despite reasonable high doses of steroids. Recurrent relapsing course noted with negative work up to date.     Assessment  63 year old M with history of SARA, HTN, paroxysmal SVT and type 2 diabetes previously well managed (A1C 6.6 - 6.4) with lifestyle now exacerbated by recurrent high dose steroids and A1C rising to 9.1 now here again on high dose steroids in hopes of treating vision loss and periorbital pain with extensive work-up but unknown cause.        1) Vision loss and R eye pain of unknown etiology partially responsive to high dose steroids.  2) Type 2 diabetes exacerbated by recurrent high dose steroids in the last 2 months. BG >300 PTA off steroids and again here now initially planned to receive  1000 mg IV Methylprednisolone  daily x three days.  First dose 8/10/2023 at 1630.  Plan earlier today was for Prednisone 80 mg daily, but around noon again planning for Methylprednisolone 936 mg today as of 1:15 pm not yet getting.    3) Steroid exacerbated diabetes requiring 55 units insulin in last 24 hours and BG still above goal.      Plan  - Decrease aspart coverage 1 unit : 8 g CHO with meals  - Continue NPH 27 units with breakfast   - Continue 1/20 custom insulin sliding scale tid ac >140 today as well as >200 qhs and overnight.     - Hypoglycemia protocol in place.   - Expect will discharge on Metformin AND  insulin. Discussed this with patient and he is accepting of this likelihood.  He will need more test strips.  CDE order not yet placed as plan unclear.    -Ordered moderate carb diet <60 g /meal.   -If BG again >400 reasonable to resume drip to better determine insulin needs.       Interval History:    He is not planned to receive additional doses of IV methylprednisolone, but he will continue on oral prednisone 80 mg for now    Last dose of methylprednisolone was  at 1800    Had a hypoglycemia at 3 AM of 54          Diabetes Type: Type 2 diabetes now with A1C rising to 9.1 after 2 months of intermittent high dose steroids   Type 2 Diabetes  Diabetes Duration: Diagnosed with A1C 6.6 2022.  Usual Diabetes Regimen:   Checking BG only.  Fasting BG off steroid now 120 - 160, later in day >200 and 300s at times.  Shares [employer] readings with me.   Recently was Metformin 500 mg prescribed, 1 tab times 2 weeks, then 2 tablets daily.  Has not yet taken.    Ability to Lowber Prescribed Regimen: unknown  Diabetes Control: 9.1% - following two months high dose steroids  Diabetes Complications: None known  History of DKA: No  Able to Detect Hypoglycemia: Has not had  Usual Diabetes Care Provider: TESS Calero - [facility]  Factors Impacting Glucose Control: Recurrent High dose steroids  Plan beginning  is daily am Prednisone 80 mg    Review of Systems  10 point ROS completed with pertinent positives and negatives noted in the HPI    Past medical, family and social histories are reviewed and updated.          Physical Exam  Temp: 98  F (36.7  C) Temp  Min: 97.5  F (36.4  C)  Max: 98  F (36.7  C)  Resp: 16 Resp  Min: 16  Max: 16  SpO2: 100 % SpO2  Min: 96 %  Max: 100 %  Pulse: 54 Pulse  Min: 52  Max: 78    No data recorded  BP: (!) 145/65 Systolic (24hrs), Av , Min:108 , Max:145   Diastolic (24hrs), Av, Min:65, Max:81    GENERAL: Healthy, alert and no distress  EYES: Eyes grossly normal to  inspection.  No discharge or erythema, or obvious scleral/conjunctival abnormalities.  RESP: No audible wheeze, cough, or visible cyanosis.  No visible retractions or increased work of breathing.    SKIN: Visible skin clear. No significant rash, abnormal pigmentation or lesions.  NEURO: Cranial nerves grossly intact.  Mentation and speech appropriate for age.  PSYCH: Mentation appears normal, affect normal/bright, judgement and insight intact, normal speech and appearance well-groomed.    Laboratory  Recent Labs   Lab Test 08/10/23  1245 08/10/23  0740 08/10/23  0736 08/09/23  2246 08/09/23  2045   NA  --   --  132*  --  127*   POTASSIUM  --   --  3.4  --  3.6   CHLORIDE  --   --  97*  --  92*   CO2  --   --  25  --  22   ANIONGAP  --   --  10  --  13   * 106* 131*   < > 278*   BUN  --   --  23.0  --  21.8   CR  --   --  0.98  --  1.07   FELIX  --   --  8.7*  --  9.1    < > = values in this interval not displayed.     CBC RESULTS:   Recent Labs   Lab Test 08/10/23  0736   WBC 5.7   RBC 4.15*   HGB 12.5*   HCT 35.2*   MCV 85   MCH 30.1   MCHC 35.5   RDW 15.3*   PLT 94*       Liver Function Studies -   Recent Labs   Lab Test 08/09/23  2045   PROTTOTAL 6.4   ALBUMIN 4.3   BILITOTAL 1.3*   ALKPHOS 71   AST 35   ALT 41         No current outpatient medications on file.       Current Diet  Orders Placed This Encounter      Moderate Consistent Carb (60 g CHO per Meal) Diet      To contact Endocrine Diabetes service:   From 8AM-4PM: page inpatient diabetes provider that is following the patient that day (see filed or incomplete progress notes/consult notes).  If uncertain of provider assignment: page job code [phone].  For questions or updates from 4PM-8AM: page the diabetes job code for on call fellow: [phone]    Please notify inpatient diabetes service if changes are planned that will impact glycemia, such as changes to steroids, enteral feeding, parenteral feeding, dextrose fluids or procedures requiring prolonged NPO  status.    Luc Beltrán MD  Endocrinology Fellow

## 2023-08-13 NOTE — PROGRESS NOTES
[facility]  General Neurology - Progress Note    Patient Name:  Amado Butler  Date of Service:  August 13, 2023    Subjective:    Patient hypoglycemic to 50s overnight which improved with crackers. This morning was bradycardic to the low 40s and felt dizzy.  EKG obtained and was unremarkable.  Patient reports relative stability of right eye vision, still being able to make out shapes.  Denies any other vision changes, weakness, numbness, or speech difficulties.      Discussed doing Plex with plasma with the a pheresis team and they felt that it was unlikely to affect the patient's fibrinogen levels and therefore could be done with plan to go to the OR tomorrow.    Objective:    Vitals: /89   Pulse 60   Temp 97.7  F (36.5  C) (Oral)   Resp 16   Wt 91.1 kg (200 lb 12.8 oz)   SpO2 100%   BMI 25.10 kg/m    General: Seated in chair, NAD  Head: Atraumatic, normocephalic   Cardiac: no lower extremity edema  Neurologic:  Mental Status: Fully alert, attentive and oriented. Speech clear and fluent.  Asks thoughtful questions.  Cranial Nerves: EOM intact, without nystagmus. Facial movements symmetric at rest and with activation. No dysarthria.  Subtle RAPD on right.  Able to perceive light and gross movement in R eye, scant color.  Motor: No abnormal movements.  5/5 strength in bilateral upper and lower extremities.   Station/Gait: Stable, steady casual gait.    Pertinent Investigations:    I have personally reviewed most recent and pertinent labs, tests, and radiological images.     ~Prior to admission~  MRI brain and orbits w/o contrast 6/28/23  IMPRESSION:  HEAD MRI:   1.  Right frontal lobe encephalomalacia and surrounding gliotic   change without acute intracranial abnormality.   ORBIT MRI:   1.  Unremarkable orbits.      MRI brain and orbits with and w/o contrast 7/3/23  IMPRESSION:  HEAD MRI:  1.  No acute infarct.  2.  Age-related changes described above.  3.  Right  frontal lobe small area tissue loss and gliosis.  ORBIT MRI:  1.  Unremarkable MRI of the orbits.   2.  No evidence for optic neuritis.     MRI BRAIN WITH AND WITHOUT CONTRAST, MRI FACE (ORBITS) WITH AND WITHOUT CONTRAST 7/28/23  1.  No acute intracranial hemorrhage, mass lesion, or acute infarction.   2.  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   3.  Minimal T2 hyperintensity and enhancement in the right optic nerve sheath, which may represent mild perineuritis. No clear evidence of optic neuritis.      18F-FDG PET Scan With Attenuation Correction CT Only 8/3/23  1. No specific evidence of an FDG-avid malignancy.   2. Non-avid left adrenal nodule, which may be too small to be accurately characterized by FDG PET. Reported right adrenal nodules not visualized on the low dose CT. Adrenal nodules are not well characterized by FDG-PET, and would be more sensitively/specifically assessed with an adrenal protocolled CT or MRI.   3. Apparent abrupt termination of the left maxilla posteriorly without abnormal tracer uptake, suspected to be related to prior dental extraction or post-surgical changes.      MRI CERVICAL SPINE WITH AND WITHOUT CONTRAST, MRI THORACIC SPINE WITH AND WITHOUT CONTRAST 8/5/23  No evidence of demyelinating disease or transverse myelitis within the cervical or thoracic spine.      MR ORBIT W/O & W CONTRAST 8/9/2023 11:35 AM  Impression:    1. Regarding the orbits and globes, there is stable slight asymmetric  hyperintensity and perhaps slight enhancement of the right optic nerve  sheath, suggesting optic perineuritis. No underlying mass lesion.  2. Regarding the remainder of the brain, chronic right frontal infarct  and mild small vessel ischemic disease.      Temporal artery, right, biopsy 7/6/23  No evidence of giant cell arteritis.  Moderate arteriosclerosis.  Mild medial calcific sclerosis.      CRP < 3.0 on multiple occasions  ESR < 2 on multiple occasions     CNS demyelinating disease  (serum): negative  Encephalopathy/autoimmune panel (serum): negative  ACE: < 10  BUDDY: negative  ANCA Ig:20 (high)  IgG subclass: negative  [place], Kenan light chain: negative  SPEP: negative  Lysozyme (serum): > 10.00 (high)     HBV: negative  Treponema juany: negative  Lyme juany: negative  Anaplamsa IgG IgM: negative  Babseia IgG IgM: negative  Erlichia IgG IgM: negative  Quantiferon TB: negative     CSF 23  Nucleated cells: 0  RBC: 2,967  Protein: 20  Glucose: 133  Kappa Free light chain: negative  ACE: 4  Lyme: non-reactive    Assessment and Plan:  Amado Butler is a 63 year old male w/ PMHx of of SARA, HTN, DM2, paroxysmal SVT p/w vision loss and periorbital pain w/ MRI showing subtle perineural enhancement, disc edema on prior dilated exams, 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      Extensive work-up by multiple services as summarized in HPI without clear final diagnosis.  Auto-immune etiology certainly possible given steroid responsiveness.  However, given negative extensive work-up, responsiveness to high-dose steroids with rapid return of visual loss at lower doses, his presentation is concerning for infiltrative (donny. lymphoma) vs significantly less likely a simmering infectious process.     Given persistent symptoms and suspicion for an autoimmune etiology, patient was admitted for empiric PLEX, with repeat high dose steroids per pt preference after risk/benefit discussion.      Unfortunately, although he has had some return of vision, PLEX/steroids have not been very efficacious.  After extended risk/benefit discussion w/ Mr. Butler, we have elected to defer further steroids given 1) complications (hyperglycemia requiring insulin gtt per endo, itself c/b hypokalemia 2) limited benefit and 3) possibility of non-diagnostic biopsy.  Regarding PLEX, given low fibrinogen and platelets, NS has appropriately raised concerns re  bleeding risk given planned biopsy Monday.  Following discussion with apheresis team was determined that plasma exchange with plasma rather than albumin was unlikely to significantly impact fibrinogen levels and that the patient should be at a normal fibrinogen level by Monday.  Given this, elected to do plasma exchange despite borderline hypertension limits.     #R-eye vision loss, concern for optic perineuritis of unclear etiology  -Plan for biopsy M 8/14 per NSGY  -s/p PLEX x2, IV methylprednisone 1g x 2   >Holding further methylpred given high-grade hyperglycemia, limited efficacy   > Changing to plasma exchange with plasma   > Recheck fibrinogen in the AM  -Ophthalmology seen 8/10:   >Recommending ongoing PLEX at the time (prior to above developments)  >Checking fungitell, pending       #Symptomatic bradycardia  Patient developed mild dizziness with bradycardia to as low as 40 8/13/23.  EKG was unremarkable  - Continue to monitor    #Hypokalemia  To nixon 2.8 8/12 with start of insulin gtt per endo, hydrochlorothiazide PTA.  Suspect combination of chronic low-grade hypokalemia, exacerbated by insulin gtt.  - Monitor    #Pre-diabetes  #Steroid induced hyperglycemia  Pt w/ Hx of pre-diabetes PTA on glipizide and metformin, now presenting with BG intermittently in the 300s prior to steroids.  With methylpred, BG to near 500 prompting start of insulin gtt c/b hypokalemia as above.  Insulin gtt now discontinued given improved ctrl, no plans for further steroids.  -Inpatient diabetes following  -Co-management per their assessment appreciated.    #Pseudohyponatremia, resolved   #Hypochloremia, ongoing  Na to 127 at admission c/o BG near 300.  Resolved with better BG control.     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 No evidence of active bleeding on exam, ROS. Will continue to monitor.  - Hematology consulted, recommendations  pending  - Transfusion medicine following, appreciate recommendations on this front  - Daily CBC     #Hypertension  - Continue pta coreg 12.5 mg BID  - HOLD lisinopril-hydrochlorothiazide given contraindicated w/ PLEX  - Continue hydrochlorothiazide   >Depending on final hyponatremia diagnosis, may consider recommending a change     #SARA  - CPAP at night, home settings     FEN: Regular diet  Malnutrition: Patient does not meet two of the above criteria necessary for diagnosing malnutrition  PPx: lovenox  Code: FULL     Patient was discussed with Dr. Boykin.    Sony Dalton MD  PGY-4 Neurology Resident

## 2023-08-13 NOTE — PROGRESS NOTES
[facility], [place]     Neurosurgery Progress Note:  08/13/2023    Interval History: ANASTACIO, fibrinogen low, will discuss with hematology regarding OR plans     Assessment:  63 year old male, ambidextrous, not on blood thinners, with past medical history suggestive of chronic thrombocytopenia, SARA, HTN, DM2, paroxysmal SVT p/w waxing and waning R-eye pain and visual loss since 6/1. Has been on high dose steroids intermittently with off/on improvement in vision in right eye. Neurosurgery is consulted for potential biopsy for diagnosis.     Given the patient is a known case of chronic thrombocytopenia, with drop in fibrinogen levels noted after PLEX run- the craniotomy remains a very high risk procedure. Given, his vision is deteriorating and that we do not have a finalized diagnosis, reasonable to consider the patient for optic nerve biopsy. We will discuss with Neurology/hematology team as well re: future PLEX runs, and their assessment of improvement in vision with PLEX/intravenous steroids.    Clinically Significant Risk Factors        # Hypokalemia: Lowest K = 2.8 mmol/L in last 2 days, will replace as needed             # Thrombocytopenia: Lowest platelets = 77 in last 2 days, will monitor for bleeding                    Plan:  Hematology consult - holding off on PLEX, repeat coags 8/14 AM prior to surgery, transfuse as needed   Medicine pre op eval - cleared for surgery, no further testing   CT/CTA Head- stereotaxy with Fiducials night before surgery  OR planned for Monday 8/14/2023- right frontotemporal craniotomy for optic nerve biopsy  NPO at midnight, hold lovenox  Serial neuro exams  Neurosurgery will continue to follow  -----------------------------------  Apryl Stephens MD  Neurosurgery resident, PGY-1  Pager [phone]  -----------------------------------    General: awake and alert  HEENT: Bruising around right eye  PULM: breathing comfortably on room air  NEUROLOGIC:  -- Awake;  Alert; oriented x 3  -- Follows commands briskly  -- +repetition, calculation, and naming  -- Speech fluent, spontaneous. No aphasia or dysarthria.  -- no gaze preference. No apparent hemineglect.  Cranial Nerves:  -- Light perception on the right eye, not able to count fingers, VFF on the left eye  -- PERRL 3-2mm bilat and brisk, extraocular movements intact  -- face symmetrical, tongue midline  -- sensory V1-V3 intact bilaterally  -- palate elevates symmetrically, uvula midline  -- hearing grossly intact bilat  -- Trapezii 5/5 strength bilat symmetric  -- Cerebellar: Finger nose finger without dysmetria, intact rapid alternating motions bilaterally     Vision:  Right eye: can only see hand waving, no finger counting  Left eye: 20/30- confirmed with Neurology- per charts     Motor:  Normal bulk / tone; no tremor, rigidity, or bradykinesia.  No muscle wasting or fasciculations  No Pronator Drift       Delt Bi Tri Hand Flexion/  Extension Iliopsoas Quadriceps Hamstrings Tibialis Anterior Gastroc     C5 C6 C7 C8/T1 L2 L3 L4-S1 L4 S1   R 5 5 5 5 5 5 5 5 5   L 5 5 5 5 5 5 5 5 5   Sensory:  intact to LT x 4 extremities       Reflexes:       Bi Tri BR Merissa Pat Ach Bab     C5-6 C7-8 C6 UMN L2-4 S1 UMN   R 2+ 2+ 2+ Norm 2+ 2+ Norm   L 2+ 2+ 2+ Norm 2+ 2+ Norm      Gait: Deferred    Objective:   Temp:  [97.4  F (36.3  C)-98  F (36.7  C)] 97.7  F (36.5  C)  Pulse:  [52-64] 60  Resp:  [16] 16  BP: (117-145)/(65-89) 123/89  SpO2:  [98 %-100 %] 100 %  I/O last 3 completed shifts:  In: 23 [I.V.:23]  Out: -       LABS:  Recent Labs   Lab 08/13/23  1410 08/13/23  1133 08/13/23  0822 08/12/23  1732 08/12/23  1427 08/12/23  0753 08/12/23  0622 08/11/23  0805 08/11/23  0657   NA  --  135*  --   --   --   --  137  --  133*  133*   POTASSIUM  --  3.0*  --   --  3.6  --  2.8*  --  3.4   CHLORIDE  --  96*  --   --   --   --  97*  --  95*   CO2  --  28  --   --   --   --  29  --  23   ANIONGAP  --  11  --   --   --   --  11  --  15   GLC  121* 145* 124*   < >  --    < > 106*   < > 243*   BUN  --  21.6  --   --   --   --  20.6  --  23.6*   CR  --  0.94  --   --   --   --  0.86  0.86  --  0.92  0.93   FELIX  --  8.8  --   --   --   --  9.0  --  9.8    < > = values in this interval not displayed.       Recent Labs   Lab 08/13/23  1133   WBC 9.1   RBC 4.18*   HGB 12.4*   HCT 36.4*   MCV 87   MCH 29.7   MCHC 34.1   RDW 16.8*   *       IMAGING:  No results found for this or any previous visit (from the past 24 hour(s)).    Please contact neurosurgery resident on call with questions.    Dial * [phone], enter [phone] when prompted.

## 2023-08-13 NOTE — PROGRESS NOTES
[id number]      Cognitive: A&Ox4, calm and pleasant towards writer and other staff. Receptive to all cares. R eye continues to be blurry but no changes from 8/12.     Tele/cardiac: Not on TELE. VSS. Denied and offered no c/o CP.     Respiratory: LS: CTA, absent of any adventitious sounds. Denied SOB. Continued on RA. SpO2: 100%. Home CPAP at [facility].     Activity: Up independently. Steady gait noted.     Pain: Denied and offered no c/o pain    IV: PIV x1: SL, R jugular double lumen CVC. Dressing remained CDI, reinforced on edge w/ tape.     GI/:  BS: normoactive x 4Q. Last BM was 8/10, per Pt. Voids spontaneously without difficulty.     Skin: R bethel-orbital bruising, scattered bruises on BUE from lab draws.      Diet: Tolerating a 60g carb diet w/ supplemental ensure. Denied nausea.     Medication: Takes pills whole w/ water.     Other: Spot checked Pt's blood sugar at 0300 and it was 54. Pt was diaphoretic but A&Ox4. Apple juice and protein snack administered, which was effective. Last BG check was 109. Stable.

## 2023-08-13 NOTE — ANESTHESIA PREPROCEDURE EVALUATION
Anesthesia Pre-Procedure Evaluation    Patient: Amado Butler   MRN: [id number] : 1960        Procedure : Procedure(s):  stealth assisted Frontotemporal craniotomy for optic mass  possible lumbar drain placement          History reviewed. No pertinent past medical history.   History reviewed. No pertinent surgical history.   No Known Allergies   Social History     Tobacco Use    Smoking status: Never    Smokeless tobacco: Never   Substance Use Topics    Alcohol use: Yes     Comment: social      Wt Readings from Last 1 Encounters:   23 98.5 kg (217 lb 1.6 oz)        Anesthesia Evaluation   Pt has had prior anesthetic. Type: General.    No history of anesthetic complications       ROS/MED HX  ENT/Pulmonary:     (+)     SARA risk factors,                                  Neurologic: Comment: Sudden R. Sided vision loss  Opticperineuritis       Cardiovascular: Comment: History of paroxysmal SVT on Carvedilol     (+)  hypertension- -   -  - -                                      METS/Exercise Tolerance:     Hematologic: Comments: Thrombocytopenia      Musculoskeletal:  - neg musculoskeletal ROS     GI/Hepatic: Comment: History of tubular adenoma      Renal/Genitourinary:  - neg Renal ROS     Endo:     (+)  type II DM,   Using insulin,                 Psychiatric/Substance Use:  - neg psychiatric ROS     Infectious Disease:  - neg infectious disease ROS     Malignancy: Comment: History of tubular adenoma      Other:            Physical Exam    Airway        Mallampati: II   TM distance: > 3 FB   Neck ROM: full   Mouth opening: > 3 cm    Respiratory Devices and Support         Dental       (+) Completely normal teeth      Cardiovascular   cardiovascular exam normal          Pulmonary   pulmonary exam normal                OUTSIDE LABS:  CBC:   Lab Results   Component Value Date    WBC 9.1 2023    WBC 5.3 2023    HGB 12.4 (L) 2023    HGB 11.1 (L) 2023    HCT 36.4 (L) 2023    HCT  31.5 (L) 08/12/2023     (L) 08/13/2023    PLT 77 (L) 08/12/2023     BMP:   Lab Results   Component Value Date     (L) 08/13/2023     08/12/2023    POTASSIUM 3.0 (L) 08/13/2023    POTASSIUM 3.6 08/12/2023    CHLORIDE 96 (L) 08/13/2023    CHLORIDE 97 (L) 08/12/2023    CO2 28 08/13/2023    CO2 29 08/12/2023    BUN 21.6 08/13/2023    BUN 20.6 08/12/2023    CR 0.94 08/13/2023    CR 0.86 08/12/2023    CR 0.86 08/12/2023     (H) 08/13/2023     (H) 08/13/2023     COAGS:   Lab Results   Component Value Date    PTT 25 08/13/2023    INR 1.15 08/13/2023    FIBR 211 08/13/2023     POC: No results found for: BGM, HCG, HCGS  HEPATIC:   Lab Results   Component Value Date    ALBUMIN 4.3 08/09/2023    PROTTOTAL 6.4 08/09/2023    ALT 41 08/09/2023    AST 35 08/09/2023    ALKPHOS 71 08/09/2023    BILITOTAL 1.3 (H) 08/09/2023     OTHER:   Lab Results   Component Value Date    FELIX 8.8 08/13/2023    SED 2 08/09/2023       Anesthesia Plan    ASA Status:  2       Anesthesia Type: General.     - Airway: ETT   Induction: Intravenous.   Maintenance: Balanced.   Techniques and Equipment:     - Lines/Monitors: 2nd IV, Arterial Line, BIS     - Blood: T&S     - Drips/Meds: Phenylephrine     Consents    Anesthesia Plan(s) and associated risks, benefits, and realistic alternatives discussed. Questions answered and patient/representative(s) expressed understanding.     - Discussed:     - Discussed with:  Patient      - Extended Intubation/Ventilatory Support Discussed: No.      - Patient is DNR/DNI Status: No     Use of blood products discussed: Yes.     Postoperative Care    Pain management: IV analgesics, Oral pain medications, Multi-modal analgesia.   PONV prophylaxis: Dexamethasone or Solumedrol, Ondansetron (or other 5HT-3)     Comments:                Tyrone Bishop MD

## 2023-08-14 ENCOUNTER — APPOINTMENT (OUTPATIENT)
Dept: CT IMAGING | Facility: CLINIC | Age: 63
DRG: 025 | End: 2023-08-14
Payer: COMMERCIAL

## 2023-08-14 ENCOUNTER — APPOINTMENT (OUTPATIENT)
Dept: CT IMAGING | Facility: CLINIC | Age: 63
DRG: 025 | End: 2023-08-14
Attending: STUDENT IN AN ORGANIZED HEALTH CARE EDUCATION/TRAINING PROGRAM
Payer: COMMERCIAL

## 2023-08-14 ENCOUNTER — ANESTHESIA (OUTPATIENT)
Dept: SURGERY | Facility: CLINIC | Age: 63
DRG: 025 | End: 2023-08-14
Payer: COMMERCIAL

## 2023-08-14 PROBLEM — H53.131 VISION, LOSS, SUDDEN, RIGHT: Status: ACTIVE | Noted: 2023-08-14

## 2023-08-14 LAB
ABO/RH(D): NORMAL
ANION GAP SERPL CALCULATED.3IONS-SCNC: 10 MMOL/L (ref 7–15)
ANION GAP SERPL CALCULATED.3IONS-SCNC: 10 MMOL/L (ref 7–15)
ANTIBODY SCREEN: NEGATIVE
APTT PPP: 24 SECONDS (ref 22–38)
APTT PPP: 25 SECONDS (ref 22–38)
ATRIAL RATE - MUSE: 49 BPM
ATRIAL RATE - MUSE: 86 BPM
BASE EXCESS BLDA CALC-SCNC: 6.2 MMOL/L (ref -9.6–2)
BLD PROD TYP BPU: NORMAL
BLOOD COMPONENT TYPE: NORMAL
BUN SERPL-MCNC: 14.5 MG/DL (ref 8–23)
BUN SERPL-MCNC: 19.8 MG/DL (ref 8–23)
CA-I BLD-MCNC: 4.1 MG/DL (ref 4.4–5.2)
CALCIUM SERPL-MCNC: 7.6 MG/DL (ref 8.8–10.2)
CALCIUM SERPL-MCNC: 8.6 MG/DL (ref 8.8–10.2)
CHLORIDE SERPL-SCNC: 96 MMOL/L (ref 98–107)
CHLORIDE SERPL-SCNC: 96 MMOL/L (ref 98–107)
CODING SYSTEM: NORMAL
CREAT SERPL-MCNC: 0.86 MG/DL (ref 0.67–1.17)
CREAT SERPL-MCNC: 0.94 MG/DL (ref 0.67–1.17)
DEPRECATED HCO3 PLAS-SCNC: 27 MMOL/L (ref 22–29)
DEPRECATED HCO3 PLAS-SCNC: 31 MMOL/L (ref 22–29)
DIASTOLIC BLOOD PRESSURE - MUSE: NORMAL MMHG
DIASTOLIC BLOOD PRESSURE - MUSE: NORMAL MMHG
ERYTHROCYTE [DISTWIDTH] IN BLOOD BY AUTOMATED COUNT: 16.2 % (ref 10–15)
FIBRINOGEN PPP-MCNC: 183 MG/DL (ref 170–490)
FIBRINOGEN PPP-MCNC: 184 MG/DL (ref 170–490)
GFR SERPL CREATININE-BSD FRML MDRD: 90 ML/MIN/1.73M2
GFR SERPL CREATININE-BSD FRML MDRD: >90 ML/MIN/1.73M2
GLUCOSE BLD-MCNC: 183 MG/DL (ref 70–99)
GLUCOSE BLDC GLUCOMTR-MCNC: 154 MG/DL (ref 70–99)
GLUCOSE BLDC GLUCOMTR-MCNC: 170 MG/DL (ref 70–99)
GLUCOSE BLDC GLUCOMTR-MCNC: 176 MG/DL (ref 70–99)
GLUCOSE BLDC GLUCOMTR-MCNC: 184 MG/DL (ref 70–99)
GLUCOSE BLDC GLUCOMTR-MCNC: 213 MG/DL (ref 70–99)
GLUCOSE BLDC GLUCOMTR-MCNC: 215 MG/DL (ref 70–99)
GLUCOSE BLDC GLUCOMTR-MCNC: 227 MG/DL (ref 70–99)
GLUCOSE BLDC GLUCOMTR-MCNC: 34 MG/DL (ref 70–99)
GLUCOSE BLDC GLUCOMTR-MCNC: 64 MG/DL (ref 70–99)
GLUCOSE BLDC GLUCOMTR-MCNC: 68 MG/DL (ref 70–99)
GLUCOSE BLDC GLUCOMTR-MCNC: 73 MG/DL (ref 70–99)
GLUCOSE BLDC GLUCOMTR-MCNC: 73 MG/DL (ref 70–99)
GLUCOSE SERPL-MCNC: 180 MG/DL (ref 70–99)
GLUCOSE SERPL-MCNC: 73 MG/DL (ref 70–99)
GRAM STAIN RESULT: NORMAL
HCO3 BLDA-SCNC: 29 MMOL/L (ref 21–28)
HCT VFR BLD AUTO: 32.8 % (ref 40–53)
HCV AB SERPL QL IA: NONREACTIVE
HGB BLD-MCNC: 10.8 G/DL (ref 13.3–17.7)
HGB BLD-MCNC: 11.5 G/DL (ref 13.3–17.7)
INR PPP: 1.17 (ref 0.85–1.15)
INR PPP: 1.22 (ref 0.85–1.15)
INTERPRETATION ECG - MUSE: NORMAL
INTERPRETATION ECG - MUSE: NORMAL
ISSUE DATE AND TIME: NORMAL
LACTATE BLD-SCNC: 1.4 MMOL/L
MAGNESIUM SERPL-MCNC: 1.9 MG/DL (ref 1.7–2.3)
MCH RBC QN AUTO: 29.9 PG (ref 26.5–33)
MCHC RBC AUTO-ENTMCNC: 35.1 G/DL (ref 31.5–36.5)
MCV RBC AUTO: 85 FL (ref 78–100)
O2/TOTAL GAS SETTING VFR VENT: 41 %
P AXIS - MUSE: 19 DEGREES
P AXIS - MUSE: 34 DEGREES
PCO2 BLDA: 34 MM HG (ref 35–45)
PH BLDA: 7.54 [PH] (ref 7.35–7.45)
PHOSPHATE SERPL-MCNC: 3.4 MG/DL (ref 2.5–4.5)
PLATELET # BLD AUTO: 94 10E3/UL (ref 150–450)
PO2 BLDA: 191 MM HG (ref 80–105)
POTASSIUM BLD-SCNC: 3.4 MMOL/L (ref 3.5–5)
POTASSIUM SERPL-SCNC: 2.7 MMOL/L (ref 3.4–5.3)
POTASSIUM SERPL-SCNC: 3.3 MMOL/L (ref 3.4–5.3)
POTASSIUM SERPL-SCNC: 3.9 MMOL/L (ref 3.4–5.3)
PR INTERVAL - MUSE: 150 MS
PR INTERVAL - MUSE: 156 MS
QRS DURATION - MUSE: 100 MS
QRS DURATION - MUSE: 102 MS
QT - MUSE: 406 MS
QT - MUSE: 438 MS
QTC - MUSE: 395 MS
QTC - MUSE: 408 MS
R AXIS - MUSE: 26 DEGREES
R AXIS - MUSE: 27 DEGREES
RBC # BLD AUTO: 3.84 10E6/UL (ref 4.4–5.9)
SODIUM BLD-SCNC: 130 MMOL/L (ref 133–144)
SODIUM SERPL-SCNC: 133 MMOL/L (ref 136–145)
SODIUM SERPL-SCNC: 137 MMOL/L (ref 136–145)
SPECIMEN EXPIRATION DATE: NORMAL
SYSTOLIC BLOOD PRESSURE - MUSE: NORMAL MMHG
SYSTOLIC BLOOD PRESSURE - MUSE: NORMAL MMHG
T AXIS - MUSE: 27 DEGREES
T AXIS - MUSE: 41 DEGREES
UNIT ABO/RH: NORMAL
UNIT NUMBER: NORMAL
UNIT STATUS: NORMAL
UNIT TYPE ISBT: 5100
VENTRICULAR RATE- MUSE: 49 BPM
VENTRICULAR RATE- MUSE: 61 BPM
WBC # BLD AUTO: 8.7 10E3/UL (ref 4–11)

## 2023-08-14 PROCEDURE — 93005 ELECTROCARDIOGRAM TRACING: CPT

## 2023-08-14 PROCEDURE — 70450 CT HEAD/BRAIN W/O DYE: CPT | Mod: 26 | Performed by: RADIOLOGY

## 2023-08-14 PROCEDURE — 370N000017 HC ANESTHESIA TECHNICAL FEE, PER MIN: Performed by: NEUROLOGICAL SURGERY

## 2023-08-14 PROCEDURE — 250N000011 HC RX IP 250 OP 636: Mod: JZ | Performed by: PSYCHIATRY & NEUROLOGY

## 2023-08-14 PROCEDURE — 999N000248 HC STATISTIC IV INSERT WITH US BY RN

## 2023-08-14 PROCEDURE — 250N000011 HC RX IP 250 OP 636

## 2023-08-14 PROCEDURE — C1763 CONN TISS, NON-HUMAN: HCPCS | Performed by: NEUROLOGICAL SURGERY

## 2023-08-14 PROCEDURE — 85610 PROTHROMBIN TIME: CPT | Performed by: STUDENT IN AN ORGANIZED HEALTH CARE EDUCATION/TRAINING PROGRAM

## 2023-08-14 PROCEDURE — 82330 ASSAY OF CALCIUM: CPT

## 2023-08-14 PROCEDURE — 88341 IMHCHEM/IMCYTCHM EA ADD ANTB: CPT | Mod: 26 | Performed by: SPECIALIST

## 2023-08-14 PROCEDURE — 70450 CT HEAD/BRAIN W/O DYE: CPT | Mod: 77

## 2023-08-14 PROCEDURE — 00B10ZZ EXCISION OF CEREBRAL MENINGES, OPEN APPROACH: ICD-10-PCS | Performed by: NEUROLOGICAL SURGERY

## 2023-08-14 PROCEDURE — 80048 BASIC METABOLIC PNL TOTAL CA: CPT | Performed by: STUDENT IN AN ORGANIZED HEALTH CARE EDUCATION/TRAINING PROGRAM

## 2023-08-14 PROCEDURE — 88305 TISSUE EXAM BY PATHOLOGIST: CPT | Mod: 26 | Performed by: SPECIALIST

## 2023-08-14 PROCEDURE — 250N000013 HC RX MED GY IP 250 OP 250 PS 637: Performed by: STUDENT IN AN ORGANIZED HEALTH CARE EDUCATION/TRAINING PROGRAM

## 2023-08-14 PROCEDURE — 250N000013 HC RX MED GY IP 250 OP 250 PS 637: Performed by: ANESTHESIOLOGY

## 2023-08-14 PROCEDURE — 250N000025 HC SEVOFLURANE, PER MIN: Performed by: NEUROLOGICAL SURGERY

## 2023-08-14 PROCEDURE — 8E09XBG COMPUTER ASSISTED PROCEDURE OF HEAD AND NECK REGION, WITH COMPUTERIZED TOMOGRAPHY: ICD-10-PCS | Performed by: NEUROLOGICAL SURGERY

## 2023-08-14 PROCEDURE — 86850 RBC ANTIBODY SCREEN: CPT

## 2023-08-14 PROCEDURE — P9037 PLATE PHERES LEUKOREDU IRRAD: HCPCS

## 2023-08-14 PROCEDURE — 00NG0ZZ RELEASE OPTIC NERVE, OPEN APPROACH: ICD-10-PCS | Performed by: NEUROLOGICAL SURGERY

## 2023-08-14 PROCEDURE — 250N000009 HC RX 250

## 2023-08-14 PROCEDURE — 84132 ASSAY OF SERUM POTASSIUM: CPT

## 2023-08-14 PROCEDURE — 85384 FIBRINOGEN ACTIVITY: CPT

## 2023-08-14 PROCEDURE — 99231 SBSQ HOSP IP/OBS SF/LOW 25: CPT | Mod: GC | Performed by: INTERNAL MEDICINE

## 2023-08-14 PROCEDURE — 250N000009 HC RX 250: Performed by: NURSE ANESTHETIST, CERTIFIED REGISTERED

## 2023-08-14 PROCEDURE — 85027 COMPLETE CBC AUTOMATED: CPT

## 2023-08-14 PROCEDURE — 70496 CT ANGIOGRAPHY HEAD: CPT | Mod: 26 | Performed by: RADIOLOGY

## 2023-08-14 PROCEDURE — 36415 COLL VENOUS BLD VENIPUNCTURE: CPT | Performed by: STUDENT IN AN ORGANIZED HEALTH CARE EDUCATION/TRAINING PROGRAM

## 2023-08-14 PROCEDURE — 120N000002 HC R&B MED SURG/OB UMMC

## 2023-08-14 PROCEDURE — 250N000011 HC RX IP 250 OP 636: Performed by: PSYCHIATRY & NEUROLOGY

## 2023-08-14 PROCEDURE — 85610 PROTHROMBIN TIME: CPT

## 2023-08-14 PROCEDURE — 85730 THROMBOPLASTIN TIME PARTIAL: CPT

## 2023-08-14 PROCEDURE — 250N000009 HC RX 250: Performed by: NEUROLOGICAL SURGERY

## 2023-08-14 PROCEDURE — C1713 ANCHOR/SCREW BN/BN,TIS/BN: HCPCS | Performed by: NEUROLOGICAL SURGERY

## 2023-08-14 PROCEDURE — 85027 COMPLETE CBC AUTOMATED: CPT | Performed by: STUDENT IN AN ORGANIZED HEALTH CARE EDUCATION/TRAINING PROGRAM

## 2023-08-14 PROCEDURE — 258N000003 HC RX IP 258 OP 636

## 2023-08-14 PROCEDURE — 250N000011 HC RX IP 250 OP 636: Mod: JZ | Performed by: ANESTHESIOLOGY

## 2023-08-14 PROCEDURE — 93010 ELECTROCARDIOGRAM REPORT: CPT | Performed by: INTERNAL MEDICINE

## 2023-08-14 PROCEDURE — 99232 SBSQ HOSP IP/OBS MODERATE 35: CPT | Performed by: PSYCHIATRY & NEUROLOGY

## 2023-08-14 PROCEDURE — 70496 CT ANGIOGRAPHY HEAD: CPT

## 2023-08-14 PROCEDURE — 85730 THROMBOPLASTIN TIME PARTIAL: CPT | Performed by: STUDENT IN AN ORGANIZED HEALTH CARE EDUCATION/TRAINING PROGRAM

## 2023-08-14 PROCEDURE — 258N000003 HC RX IP 258 OP 636: Performed by: STUDENT IN AN ORGANIZED HEALTH CARE EDUCATION/TRAINING PROGRAM

## 2023-08-14 PROCEDURE — 258N000001 HC RX 258: Performed by: STUDENT IN AN ORGANIZED HEALTH CARE EDUCATION/TRAINING PROGRAM

## 2023-08-14 PROCEDURE — 250N000011 HC RX IP 250 OP 636: Performed by: NURSE ANESTHETIST, CERTIFIED REGISTERED

## 2023-08-14 PROCEDURE — 360N000079 HC SURGERY LEVEL 6, PER MIN: Performed by: NEUROLOGICAL SURGERY

## 2023-08-14 PROCEDURE — 87205 SMEAR GRAM STAIN: CPT | Performed by: NEUROLOGICAL SURGERY

## 2023-08-14 PROCEDURE — 250N000011 HC RX IP 250 OP 636: Performed by: STUDENT IN AN ORGANIZED HEALTH CARE EDUCATION/TRAINING PROGRAM

## 2023-08-14 PROCEDURE — 87102 FUNGUS ISOLATION CULTURE: CPT | Performed by: NEUROLOGICAL SURGERY

## 2023-08-14 PROCEDURE — 99233 SBSQ HOSP IP/OBS HIGH 50: CPT

## 2023-08-14 PROCEDURE — 88305 TISSUE EXAM BY PATHOLOGIST: CPT | Mod: TC | Performed by: NEUROLOGICAL SURGERY

## 2023-08-14 PROCEDURE — 70450 CT HEAD/BRAIN W/O DYE: CPT

## 2023-08-14 PROCEDURE — 88342 IMHCHEM/IMCYTCHM 1ST ANTB: CPT | Mod: 26 | Performed by: SPECIALIST

## 2023-08-14 PROCEDURE — 86901 BLOOD TYPING SEROLOGIC RH(D): CPT

## 2023-08-14 PROCEDURE — 87075 CULTR BACTERIA EXCEPT BLOOD: CPT | Performed by: NEUROLOGICAL SURGERY

## 2023-08-14 PROCEDURE — 272N000001 HC OR GENERAL SUPPLY STERILE: Performed by: NEUROLOGICAL SURGERY

## 2023-08-14 PROCEDURE — 84100 ASSAY OF PHOSPHORUS: CPT

## 2023-08-14 PROCEDURE — 710N000011 HC RECOVERY PHASE 1, LEVEL 3, PER MIN: Performed by: NEUROLOGICAL SURGERY

## 2023-08-14 PROCEDURE — 88307 TISSUE EXAM BY PATHOLOGIST: CPT | Mod: 26 | Performed by: SPECIALIST

## 2023-08-14 PROCEDURE — 87176 TISSUE HOMOGENIZATION CULTR: CPT | Performed by: NEUROLOGICAL SURGERY

## 2023-08-14 PROCEDURE — 85384 FIBRINOGEN ACTIVITY: CPT | Performed by: STUDENT IN AN ORGANIZED HEALTH CARE EDUCATION/TRAINING PROGRAM

## 2023-08-14 PROCEDURE — 0NUP07Z SUPPLEMENT RIGHT ORBIT WITH AUTOLOGOUS TISSUE SUBSTITUTE, OPEN APPROACH: ICD-10-PCS | Performed by: NEUROLOGICAL SURGERY

## 2023-08-14 PROCEDURE — 250N000012 HC RX MED GY IP 250 OP 636 PS 637

## 2023-08-14 PROCEDURE — 36415 COLL VENOUS BLD VENIPUNCTURE: CPT

## 2023-08-14 PROCEDURE — 250N000013 HC RX MED GY IP 250 OP 250 PS 637: Performed by: PSYCHIATRY & NEUROLOGY

## 2023-08-14 PROCEDURE — 83735 ASSAY OF MAGNESIUM: CPT

## 2023-08-14 PROCEDURE — 88307 TISSUE EXAM BY PATHOLOGIST: CPT | Mod: TC | Performed by: NEUROLOGICAL SURGERY

## 2023-08-14 PROCEDURE — 200N000002 HC R&B ICU UMMC

## 2023-08-14 PROCEDURE — 999N000141 HC STATISTIC PRE-PROCEDURE NURSING ASSESSMENT: Performed by: NEUROLOGICAL SURGERY

## 2023-08-14 DEVICE — IMP SCR SYN MATRIX LOW PRO 1.5X04MM SELF DRILL 04.503.104.01: Type: IMPLANTABLE DEVICE | Site: CRANIAL | Status: FUNCTIONAL

## 2023-08-14 DEVICE — IMPLANTABLE DEVICE: Type: IMPLANTABLE DEVICE | Site: BRAIN | Status: FUNCTIONAL

## 2023-08-14 DEVICE — IMP PLATE SYN BURR HOLE COVER 17MM 04.503.023: Type: IMPLANTABLE DEVICE | Site: CRANIAL | Status: FUNCTIONAL

## 2023-08-14 DEVICE — IMP PLATE SYN MATRIXNEURO BOX PLATE 10X16MM 04.503.073: Type: IMPLANTABLE DEVICE | Site: CRANIAL | Status: FUNCTIONAL

## 2023-08-14 RX ORDER — CEFAZOLIN SODIUM/WATER 2 G/20 ML
2 SYRINGE (ML) INTRAVENOUS
Status: COMPLETED | OUTPATIENT
Start: 2023-08-14 | End: 2023-08-14

## 2023-08-14 RX ORDER — HYDROMORPHONE HCL IN WATER/PF 6 MG/30 ML
0.2 PATIENT CONTROLLED ANALGESIA SYRINGE INTRAVENOUS
Status: DISCONTINUED | OUTPATIENT
Start: 2023-08-14 | End: 2023-08-19 | Stop reason: HOSPADM

## 2023-08-14 RX ORDER — ACETAMINOPHEN 325 MG/1
975 TABLET ORAL ONCE
Status: COMPLETED | OUTPATIENT
Start: 2023-08-14 | End: 2023-08-14

## 2023-08-14 RX ORDER — NICARDIPINE HCL-0.9% SOD CHLOR 1 MG/10 ML
SYRINGE (ML) INTRAVENOUS PRN
Status: DISCONTINUED | OUTPATIENT
Start: 2023-08-14 | End: 2023-08-14

## 2023-08-14 RX ORDER — HYDRALAZINE HYDROCHLORIDE 20 MG/ML
10-20 INJECTION INTRAMUSCULAR; INTRAVENOUS EVERY 30 MIN PRN
Status: DISCONTINUED | OUTPATIENT
Start: 2023-08-14 | End: 2023-08-19 | Stop reason: HOSPADM

## 2023-08-14 RX ORDER — ONDANSETRON 2 MG/ML
4 INJECTION INTRAMUSCULAR; INTRAVENOUS EVERY 30 MIN PRN
Status: DISCONTINUED | OUTPATIENT
Start: 2023-08-14 | End: 2023-08-14 | Stop reason: HOSPADM

## 2023-08-14 RX ORDER — PHENYLEPHRINE HCL IN 0.9% NACL 50MG/250ML
.5-1.25 PLASTIC BAG, INJECTION (ML) INTRAVENOUS CONTINUOUS
Status: DISCONTINUED | OUTPATIENT
Start: 2023-08-14 | End: 2023-08-14

## 2023-08-14 RX ORDER — HYDROMORPHONE HCL IN WATER/PF 6 MG/30 ML
0.4 PATIENT CONTROLLED ANALGESIA SYRINGE INTRAVENOUS EVERY 5 MIN PRN
Status: DISCONTINUED | OUTPATIENT
Start: 2023-08-14 | End: 2023-08-14 | Stop reason: HOSPADM

## 2023-08-14 RX ORDER — BUPIVACAINE HYDROCHLORIDE AND EPINEPHRINE 2.5; 5 MG/ML; UG/ML
INJECTION, SOLUTION INFILTRATION; PERINEURAL PRN
Status: DISCONTINUED | OUTPATIENT
Start: 2023-08-14 | End: 2023-08-14 | Stop reason: HOSPADM

## 2023-08-14 RX ORDER — SODIUM CHLORIDE, SODIUM LACTATE, POTASSIUM CHLORIDE, CALCIUM CHLORIDE 600; 310; 30; 20 MG/100ML; MG/100ML; MG/100ML; MG/100ML
INJECTION, SOLUTION INTRAVENOUS CONTINUOUS
Status: DISCONTINUED | OUTPATIENT
Start: 2023-08-14 | End: 2023-08-14 | Stop reason: HOSPADM

## 2023-08-14 RX ORDER — LIDOCAINE 40 MG/G
CREAM TOPICAL
Status: DISCONTINUED | OUTPATIENT
Start: 2023-08-14 | End: 2023-08-19 | Stop reason: HOSPADM

## 2023-08-14 RX ORDER — ACETAMINOPHEN 325 MG/1
650 TABLET ORAL EVERY 4 HOURS PRN
Status: DISCONTINUED | OUTPATIENT
Start: 2023-08-17 | End: 2023-08-19 | Stop reason: HOSPADM

## 2023-08-14 RX ORDER — SODIUM CHLORIDE, SODIUM LACTATE, POTASSIUM CHLORIDE, CALCIUM CHLORIDE 600; 310; 30; 20 MG/100ML; MG/100ML; MG/100ML; MG/100ML
INJECTION, SOLUTION INTRAVENOUS CONTINUOUS PRN
Status: DISCONTINUED | OUTPATIENT
Start: 2023-08-14 | End: 2023-08-14

## 2023-08-14 RX ORDER — SODIUM CHLORIDE, SODIUM GLUCONATE, SODIUM ACETATE, POTASSIUM CHLORIDE AND MAGNESIUM CHLORIDE 526; 502; 368; 37; 30 MG/100ML; MG/100ML; MG/100ML; MG/100ML; MG/100ML
INJECTION, SOLUTION INTRAVENOUS CONTINUOUS PRN
Status: DISCONTINUED | OUTPATIENT
Start: 2023-08-14 | End: 2023-08-14

## 2023-08-14 RX ORDER — HYDRALAZINE HYDROCHLORIDE 20 MG/ML
5 INJECTION INTRAMUSCULAR; INTRAVENOUS ONCE
Status: COMPLETED | OUTPATIENT
Start: 2023-08-14 | End: 2023-08-14

## 2023-08-14 RX ORDER — ONDANSETRON 4 MG/1
4 TABLET, ORALLY DISINTEGRATING ORAL EVERY 30 MIN PRN
Status: DISCONTINUED | OUTPATIENT
Start: 2023-08-14 | End: 2023-08-14 | Stop reason: HOSPADM

## 2023-08-14 RX ORDER — OXYCODONE HYDROCHLORIDE 10 MG/1
10 TABLET ORAL EVERY 4 HOURS PRN
Status: DISCONTINUED | OUTPATIENT
Start: 2023-08-14 | End: 2023-08-19 | Stop reason: HOSPADM

## 2023-08-14 RX ORDER — DEXAMETHASONE SODIUM PHOSPHATE 4 MG/ML
3 INJECTION, SOLUTION INTRA-ARTICULAR; INTRALESIONAL; INTRAMUSCULAR; INTRAVENOUS; SOFT TISSUE EVERY 8 HOURS
Status: COMPLETED | OUTPATIENT
Start: 2023-08-16 | End: 2023-08-16

## 2023-08-14 RX ORDER — HYDROMORPHONE HCL IN WATER/PF 6 MG/30 ML
0.4 PATIENT CONTROLLED ANALGESIA SYRINGE INTRAVENOUS
Status: DISCONTINUED | OUTPATIENT
Start: 2023-08-14 | End: 2023-08-19 | Stop reason: HOSPADM

## 2023-08-14 RX ORDER — AMOXICILLIN 250 MG
2 CAPSULE ORAL 2 TIMES DAILY
Status: DISCONTINUED | OUTPATIENT
Start: 2023-08-14 | End: 2023-08-19 | Stop reason: HOSPADM

## 2023-08-14 RX ORDER — CEFAZOLIN SODIUM/WATER 2 G/20 ML
2 SYRINGE (ML) INTRAVENOUS EVERY 8 HOURS
Status: DISCONTINUED | OUTPATIENT
Start: 2023-08-15 | End: 2023-08-15

## 2023-08-14 RX ORDER — POLYETHYLENE GLYCOL 3350 17 G/17G
17 POWDER, FOR SOLUTION ORAL DAILY
Status: DISCONTINUED | OUTPATIENT
Start: 2023-08-15 | End: 2023-08-19 | Stop reason: HOSPADM

## 2023-08-14 RX ORDER — DEXAMETHASONE SODIUM PHOSPHATE 4 MG/ML
1 INJECTION, SOLUTION INTRA-ARTICULAR; INTRALESIONAL; INTRAMUSCULAR; INTRAVENOUS; SOFT TISSUE EVERY 8 HOURS
Status: COMPLETED | OUTPATIENT
Start: 2023-08-18 | End: 2023-08-18

## 2023-08-14 RX ORDER — DIMENHYDRINATE 50 MG/ML
25 INJECTION, SOLUTION INTRAMUSCULAR; INTRAVENOUS
Status: DISCONTINUED | OUTPATIENT
Start: 2023-08-14 | End: 2023-08-14 | Stop reason: HOSPADM

## 2023-08-14 RX ORDER — PROPOFOL 10 MG/ML
INJECTION, EMULSION INTRAVENOUS PRN
Status: DISCONTINUED | OUTPATIENT
Start: 2023-08-14 | End: 2023-08-14

## 2023-08-14 RX ORDER — SODIUM CHLORIDE 9 MG/ML
INJECTION, SOLUTION INTRAVENOUS CONTINUOUS
Status: CANCELLED | OUTPATIENT
Start: 2023-08-14 | End: 2023-08-15

## 2023-08-14 RX ORDER — LIDOCAINE HYDROCHLORIDE 20 MG/ML
INJECTION, SOLUTION INFILTRATION; PERINEURAL PRN
Status: DISCONTINUED | OUTPATIENT
Start: 2023-08-14 | End: 2023-08-14

## 2023-08-14 RX ORDER — POTASSIUM CHLORIDE 750 MG/1
40 TABLET, EXTENDED RELEASE ORAL ONCE
Status: COMPLETED | OUTPATIENT
Start: 2023-08-14 | End: 2023-08-14

## 2023-08-14 RX ORDER — DEXTROSE MONOHYDRATE 25 G/50ML
25-50 INJECTION, SOLUTION INTRAVENOUS
Status: DISCONTINUED | OUTPATIENT
Start: 2023-08-14 | End: 2023-08-15

## 2023-08-14 RX ORDER — CEFAZOLIN SODIUM/WATER 2 G/20 ML
2 SYRINGE (ML) INTRAVENOUS SEE ADMIN INSTRUCTIONS
Status: DISCONTINUED | OUTPATIENT
Start: 2023-08-14 | End: 2023-08-14 | Stop reason: HOSPADM

## 2023-08-14 RX ORDER — CALCIUM GLUCONATE 20 MG/ML
2 INJECTION, SOLUTION INTRAVENOUS ONCE
Status: DISCONTINUED | OUTPATIENT
Start: 2023-08-14 | End: 2023-08-15

## 2023-08-14 RX ORDER — HYDROMORPHONE HCL IN WATER/PF 6 MG/30 ML
0.2 PATIENT CONTROLLED ANALGESIA SYRINGE INTRAVENOUS EVERY 5 MIN PRN
Status: DISCONTINUED | OUTPATIENT
Start: 2023-08-14 | End: 2023-08-14 | Stop reason: HOSPADM

## 2023-08-14 RX ORDER — EPHEDRINE SULFATE 50 MG/ML
INJECTION, SOLUTION INTRAMUSCULAR; INTRAVENOUS; SUBCUTANEOUS PRN
Status: DISCONTINUED | OUTPATIENT
Start: 2023-08-14 | End: 2023-08-14

## 2023-08-14 RX ORDER — OXYCODONE HYDROCHLORIDE 5 MG/1
5 TABLET ORAL EVERY 4 HOURS PRN
Status: DISCONTINUED | OUTPATIENT
Start: 2023-08-14 | End: 2023-08-19 | Stop reason: HOSPADM

## 2023-08-14 RX ORDER — POTASSIUM CHLORIDE 20MEQ/15ML
60 LIQUID (ML) ORAL DAILY
Status: DISCONTINUED | OUTPATIENT
Start: 2023-08-14 | End: 2023-08-14

## 2023-08-14 RX ORDER — CARVEDILOL 12.5 MG/1
12.5 TABLET ORAL 2 TIMES DAILY WITH MEALS
Status: DISCONTINUED | OUTPATIENT
Start: 2023-08-15 | End: 2023-08-19 | Stop reason: HOSPADM

## 2023-08-14 RX ORDER — SODIUM CHLORIDE 9 MG/ML
INJECTION, SOLUTION INTRAVENOUS CONTINUOUS
Status: DISCONTINUED | OUTPATIENT
Start: 2023-08-15 | End: 2023-08-15

## 2023-08-14 RX ORDER — GLYCOPYRROLATE 0.2 MG/ML
INJECTION, SOLUTION INTRAMUSCULAR; INTRAVENOUS PRN
Status: DISCONTINUED | OUTPATIENT
Start: 2023-08-14 | End: 2023-08-14

## 2023-08-14 RX ORDER — LABETALOL HYDROCHLORIDE 5 MG/ML
10-40 INJECTION, SOLUTION INTRAVENOUS EVERY 10 MIN PRN
Status: DISCONTINUED | OUTPATIENT
Start: 2023-08-14 | End: 2023-08-19 | Stop reason: HOSPADM

## 2023-08-14 RX ORDER — DEXTROSE MONOHYDRATE 50 MG/ML
INJECTION, SOLUTION INTRAVENOUS CONTINUOUS PRN
Status: DISCONTINUED | OUTPATIENT
Start: 2023-08-14 | End: 2023-08-14

## 2023-08-14 RX ORDER — NICOTINE POLACRILEX 4 MG
15-30 LOZENGE BUCCAL
Status: DISCONTINUED | OUTPATIENT
Start: 2023-08-14 | End: 2023-08-15

## 2023-08-14 RX ORDER — FENTANYL CITRATE 50 UG/ML
50 INJECTION, SOLUTION INTRAMUSCULAR; INTRAVENOUS EVERY 5 MIN PRN
Status: DISCONTINUED | OUTPATIENT
Start: 2023-08-14 | End: 2023-08-14 | Stop reason: HOSPADM

## 2023-08-14 RX ORDER — FENTANYL CITRATE 50 UG/ML
25 INJECTION, SOLUTION INTRAMUSCULAR; INTRAVENOUS EVERY 5 MIN PRN
Status: DISCONTINUED | OUTPATIENT
Start: 2023-08-14 | End: 2023-08-14 | Stop reason: HOSPADM

## 2023-08-14 RX ORDER — DEXAMETHASONE SODIUM PHOSPHATE 4 MG/ML
4 INJECTION, SOLUTION INTRA-ARTICULAR; INTRALESIONAL; INTRAMUSCULAR; INTRAVENOUS; SOFT TISSUE EVERY 8 HOURS
Status: COMPLETED | OUTPATIENT
Start: 2023-08-15 | End: 2023-08-15

## 2023-08-14 RX ORDER — HYDROXYZINE HYDROCHLORIDE 10 MG/1
10 TABLET, FILM COATED ORAL EVERY 6 HOURS PRN
Status: DISCONTINUED | OUTPATIENT
Start: 2023-08-14 | End: 2023-08-14 | Stop reason: HOSPADM

## 2023-08-14 RX ORDER — ONDANSETRON 2 MG/ML
INJECTION INTRAMUSCULAR; INTRAVENOUS PRN
Status: DISCONTINUED | OUTPATIENT
Start: 2023-08-14 | End: 2023-08-14

## 2023-08-14 RX ORDER — ACETAMINOPHEN 325 MG/1
975 TABLET ORAL EVERY 8 HOURS
Status: COMPLETED | OUTPATIENT
Start: 2023-08-14 | End: 2023-08-17

## 2023-08-14 RX ORDER — ONDANSETRON 4 MG/1
4 TABLET, ORALLY DISINTEGRATING ORAL EVERY 6 HOURS PRN
Status: DISCONTINUED | OUTPATIENT
Start: 2023-08-14 | End: 2023-08-19 | Stop reason: HOSPADM

## 2023-08-14 RX ORDER — POTASSIUM CHLORIDE 7.45 MG/ML
10 INJECTION INTRAVENOUS
Status: COMPLETED | OUTPATIENT
Start: 2023-08-14 | End: 2023-08-14

## 2023-08-14 RX ORDER — MANNITOL 20 G/100ML
INJECTION, SOLUTION INTRAVENOUS PRN
Status: DISCONTINUED | OUTPATIENT
Start: 2023-08-14 | End: 2023-08-14

## 2023-08-14 RX ORDER — DEXAMETHASONE SODIUM PHOSPHATE 4 MG/ML
2 INJECTION, SOLUTION INTRA-ARTICULAR; INTRALESIONAL; INTRAMUSCULAR; INTRAVENOUS; SOFT TISSUE EVERY 8 HOURS
Status: COMPLETED | OUTPATIENT
Start: 2023-08-17 | End: 2023-08-17

## 2023-08-14 RX ORDER — LISINOPRIL 10 MG/1
10 TABLET ORAL EVERY EVENING
Status: DISCONTINUED | OUTPATIENT
Start: 2023-08-15 | End: 2023-08-16

## 2023-08-14 RX ORDER — FENTANYL CITRATE 50 UG/ML
INJECTION, SOLUTION INTRAMUSCULAR; INTRAVENOUS PRN
Status: DISCONTINUED | OUTPATIENT
Start: 2023-08-14 | End: 2023-08-14

## 2023-08-14 RX ORDER — PROCHLORPERAZINE MALEATE 5 MG
5 TABLET ORAL EVERY 6 HOURS PRN
Status: DISCONTINUED | OUTPATIENT
Start: 2023-08-14 | End: 2023-08-19 | Stop reason: HOSPADM

## 2023-08-14 RX ORDER — LABETALOL HYDROCHLORIDE 5 MG/ML
10 INJECTION, SOLUTION INTRAVENOUS
Status: DISCONTINUED | OUTPATIENT
Start: 2023-08-14 | End: 2023-08-14 | Stop reason: HOSPADM

## 2023-08-14 RX ORDER — DEXTROSE MONOHYDRATE 50 MG/ML
INJECTION, SOLUTION INTRAVENOUS CONTINUOUS
Status: DISCONTINUED | OUTPATIENT
Start: 2023-08-14 | End: 2023-08-14

## 2023-08-14 RX ORDER — IOPAMIDOL 755 MG/ML
75 INJECTION, SOLUTION INTRAVASCULAR ONCE
Status: COMPLETED | OUTPATIENT
Start: 2023-08-14 | End: 2023-08-14

## 2023-08-14 RX ORDER — ONDANSETRON 2 MG/ML
4 INJECTION INTRAMUSCULAR; INTRAVENOUS EVERY 6 HOURS PRN
Status: DISCONTINUED | OUTPATIENT
Start: 2023-08-14 | End: 2023-08-19 | Stop reason: HOSPADM

## 2023-08-14 RX ORDER — BISACODYL 10 MG
10 SUPPOSITORY, RECTAL RECTAL DAILY PRN
Status: DISCONTINUED | OUTPATIENT
Start: 2023-08-14 | End: 2023-08-19 | Stop reason: HOSPADM

## 2023-08-14 RX ADMIN — DEXTROSE MONOHYDRATE 50 ML: 25 INJECTION, SOLUTION INTRAVENOUS at 01:53

## 2023-08-14 RX ADMIN — ACETAMINOPHEN 975 MG: 325 TABLET, FILM COATED ORAL at 23:27

## 2023-08-14 RX ADMIN — EPHEDRINE SULFATE 5 MG: 5 INJECTION INTRAVENOUS at 16:14

## 2023-08-14 RX ADMIN — Medication 100 MCG: at 19:54

## 2023-08-14 RX ADMIN — IOPAMIDOL 75 ML: 755 INJECTION, SOLUTION INTRAVENOUS at 04:56

## 2023-08-14 RX ADMIN — CARVEDILOL 12.5 MG: 12.5 TABLET, FILM COATED ORAL at 09:27

## 2023-08-14 RX ADMIN — FENTANYL CITRATE 25 MCG: 50 INJECTION, SOLUTION INTRAMUSCULAR; INTRAVENOUS at 20:52

## 2023-08-14 RX ADMIN — POTASSIUM CHLORIDE 10 MEQ: 7.46 INJECTION, SOLUTION INTRAVENOUS at 10:39

## 2023-08-14 RX ADMIN — FENTANYL CITRATE 25 MCG: 50 INJECTION, SOLUTION INTRAMUSCULAR; INTRAVENOUS at 19:55

## 2023-08-14 RX ADMIN — SULFAMETHOXAZOLE AND TRIMETHOPRIM 1 TABLET: 400; 80 TABLET ORAL at 09:27

## 2023-08-14 RX ADMIN — PHENYLEPHRINE HYDROCHLORIDE 100 MCG: 10 INJECTION INTRAVENOUS at 15:58

## 2023-08-14 RX ADMIN — Medication 50 MCG: at 16:42

## 2023-08-14 RX ADMIN — Medication 200 MCG: at 20:18

## 2023-08-14 RX ADMIN — PROPOFOL 200 MG: 10 INJECTION, EMULSION INTRAVENOUS at 13:58

## 2023-08-14 RX ADMIN — Medication 20 MG: at 15:33

## 2023-08-14 RX ADMIN — SODIUM CHLORIDE, POTASSIUM CHLORIDE, SODIUM LACTATE AND CALCIUM CHLORIDE: 600; 310; 30; 20 INJECTION, SOLUTION INTRAVENOUS at 21:01

## 2023-08-14 RX ADMIN — GLYCOPYRROLATE 0.2 MG: 0.2 INJECTION, SOLUTION INTRAMUSCULAR; INTRAVENOUS at 15:30

## 2023-08-14 RX ADMIN — FENTANYL CITRATE 25 MCG: 50 INJECTION, SOLUTION INTRAMUSCULAR; INTRAVENOUS at 21:39

## 2023-08-14 RX ADMIN — Medication 5 MG: at 19:34

## 2023-08-14 RX ADMIN — SODIUM CHLORIDE, SODIUM GLUCONATE, SODIUM ACETATE, POTASSIUM CHLORIDE AND MAGNESIUM CHLORIDE: 526; 502; 368; 37; 30 INJECTION, SOLUTION INTRAVENOUS at 15:39

## 2023-08-14 RX ADMIN — DEXTROSE 15 G: 15 GEL ORAL at 06:16

## 2023-08-14 RX ADMIN — PHENYLEPHRINE HYDROCHLORIDE 100 MCG: 10 INJECTION INTRAVENOUS at 15:04

## 2023-08-14 RX ADMIN — FENTANYL CITRATE 100 MCG: 50 INJECTION, SOLUTION INTRAMUSCULAR; INTRAVENOUS at 15:01

## 2023-08-14 RX ADMIN — FENTANYL CITRATE 25 MCG: 50 INJECTION, SOLUTION INTRAMUSCULAR; INTRAVENOUS at 19:53

## 2023-08-14 RX ADMIN — MANNITOL 45 G: 20 INJECTION, SOLUTION INTRAVENOUS at 15:46

## 2023-08-14 RX ADMIN — Medication 5 MG: at 19:27

## 2023-08-14 RX ADMIN — Medication 10 MG: at 18:50

## 2023-08-14 RX ADMIN — PHENYLEPHRINE HYDROCHLORIDE 100 MCG: 10 INJECTION INTRAVENOUS at 19:48

## 2023-08-14 RX ADMIN — DEXTROSE MONOHYDRATE 50 ML: 25 INJECTION, SOLUTION INTRAVENOUS at 06:46

## 2023-08-14 RX ADMIN — Medication 30 MG: at 17:52

## 2023-08-14 RX ADMIN — PHENYLEPHRINE HYDROCHLORIDE 100 MCG: 10 INJECTION INTRAVENOUS at 15:30

## 2023-08-14 RX ADMIN — HUMAN INSULIN 6 UNITS: 100 INJECTION, SOLUTION SUBCUTANEOUS at 21:51

## 2023-08-14 RX ADMIN — Medication 20 MG: at 17:20

## 2023-08-14 RX ADMIN — Medication 30 MG: at 15:01

## 2023-08-14 RX ADMIN — PREDNISONE 80 MG: 20 TABLET ORAL at 09:27

## 2023-08-14 RX ADMIN — Medication 20 MG: at 16:26

## 2023-08-14 RX ADMIN — Medication 50 MCG: at 16:41

## 2023-08-14 RX ADMIN — POTASSIUM CHLORIDE 10 MEQ: 7.46 INJECTION, SOLUTION INTRAVENOUS at 09:26

## 2023-08-14 RX ADMIN — Medication 2 G: at 18:07

## 2023-08-14 RX ADMIN — Medication 100 MCG: at 19:52

## 2023-08-14 RX ADMIN — HYDROCHLOROTHIAZIDE 25 MG: 25 TABLET ORAL at 09:26

## 2023-08-14 RX ADMIN — SODIUM CHLORIDE, SODIUM GLUCONATE, SODIUM ACETATE, POTASSIUM CHLORIDE AND MAGNESIUM CHLORIDE: 526; 502; 368; 37; 30 INJECTION, SOLUTION INTRAVENOUS at 16:49

## 2023-08-14 RX ADMIN — Medication 45 MG: at 14:25

## 2023-08-14 RX ADMIN — Medication 200 MCG: at 20:11

## 2023-08-14 RX ADMIN — SODIUM CHLORIDE, POTASSIUM CHLORIDE, SODIUM LACTATE AND CALCIUM CHLORIDE: 600; 310; 30; 20 INJECTION, SOLUTION INTRAVENOUS at 14:00

## 2023-08-14 RX ADMIN — LIDOCAINE HYDROCHLORIDE 100 MG: 20 INJECTION, SOLUTION INFILTRATION; PERINEURAL at 13:57

## 2023-08-14 RX ADMIN — PANTOPRAZOLE SODIUM 40 MG: 40 TABLET, DELAYED RELEASE ORAL at 09:26

## 2023-08-14 RX ADMIN — DEXTROSE 15 G: 15 GEL ORAL at 06:06

## 2023-08-14 RX ADMIN — Medication 20 MG: at 18:31

## 2023-08-14 RX ADMIN — SUGAMMADEX 200 MG: 100 INJECTION, SOLUTION INTRAVENOUS at 20:00

## 2023-08-14 RX ADMIN — EPHEDRINE SULFATE 5 MG: 5 INJECTION INTRAVENOUS at 18:42

## 2023-08-14 RX ADMIN — Medication 100 MCG: at 20:24

## 2023-08-14 RX ADMIN — PHENYLEPHRINE HYDROCHLORIDE 200 MCG: 10 INJECTION INTRAVENOUS at 14:06

## 2023-08-14 RX ADMIN — Medication 200 MCG: at 20:21

## 2023-08-14 RX ADMIN — Medication 5 MG: at 13:57

## 2023-08-14 RX ADMIN — FENTANYL CITRATE 50 MCG: 50 INJECTION, SOLUTION INTRAMUSCULAR; INTRAVENOUS at 16:19

## 2023-08-14 RX ADMIN — EPHEDRINE SULFATE 5 MG: 5 INJECTION INTRAVENOUS at 15:32

## 2023-08-14 RX ADMIN — Medication 20 MG: at 14:30

## 2023-08-14 RX ADMIN — SODIUM CHLORIDE, SODIUM GLUCONATE, SODIUM ACETATE, POTASSIUM CHLORIDE AND MAGNESIUM CHLORIDE: 526; 502; 368; 37; 30 INJECTION, SOLUTION INTRAVENOUS at 13:47

## 2023-08-14 RX ADMIN — PHENYLEPHRINE HYDROCHLORIDE 100 MCG: 10 INJECTION INTRAVENOUS at 19:08

## 2023-08-14 RX ADMIN — Medication 2 G: at 14:01

## 2023-08-14 RX ADMIN — SUCCINYLCHOLINE CHLORIDE 160 MG: 20 INJECTION, SOLUTION INTRAMUSCULAR; INTRAVENOUS; PARENTERAL at 13:58

## 2023-08-14 RX ADMIN — FENTANYL CITRATE 25 MCG: 50 INJECTION, SOLUTION INTRAMUSCULAR; INTRAVENOUS at 23:31

## 2023-08-14 RX ADMIN — FENTANYL CITRATE 50 MCG: 50 INJECTION, SOLUTION INTRAMUSCULAR; INTRAVENOUS at 18:29

## 2023-08-14 RX ADMIN — FENTANYL CITRATE 50 MCG: 50 INJECTION, SOLUTION INTRAMUSCULAR; INTRAVENOUS at 15:25

## 2023-08-14 RX ADMIN — CALCIUM CARBONATE 600 MG (1,500 MG)-VITAMIN D3 400 UNIT TABLET 1 TABLET: at 09:27

## 2023-08-14 RX ADMIN — PHENYLEPHRINE HYDROCHLORIDE 200 MCG: 10 INJECTION INTRAVENOUS at 14:27

## 2023-08-14 RX ADMIN — PHENYLEPHRINE HYDROCHLORIDE 100 MCG: 10 INJECTION INTRAVENOUS at 15:06

## 2023-08-14 RX ADMIN — POTASSIUM CHLORIDE 40 MEQ: 750 TABLET, EXTENDED RELEASE ORAL at 09:27

## 2023-08-14 RX ADMIN — Medication 10 MG: at 16:59

## 2023-08-14 RX ADMIN — SODIUM CHLORIDE, SODIUM GLUCONATE, SODIUM ACETATE, POTASSIUM CHLORIDE AND MAGNESIUM CHLORIDE: 526; 502; 368; 37; 30 INJECTION, SOLUTION INTRAVENOUS at 15:41

## 2023-08-14 RX ADMIN — SENNOSIDES AND DOCUSATE SODIUM 1 TABLET: 50; 8.6 TABLET ORAL at 09:27

## 2023-08-14 RX ADMIN — PHENYLEPHRINE HYDROCHLORIDE 100 MCG: 10 INJECTION INTRAVENOUS at 19:24

## 2023-08-14 RX ADMIN — HYDRALAZINE HYDROCHLORIDE 5 MG: 20 INJECTION INTRAMUSCULAR; INTRAVENOUS at 21:00

## 2023-08-14 RX ADMIN — HUMAN INSULIN 3 UNITS: 100 INJECTION, SOLUTION SUBCUTANEOUS at 21:17

## 2023-08-14 RX ADMIN — ACETAMINOPHEN 975 MG: 325 TABLET, FILM COATED ORAL at 12:38

## 2023-08-14 RX ADMIN — Medication 0.5 MCG/KG/MIN: at 14:34

## 2023-08-14 RX ADMIN — EPHEDRINE SULFATE 5 MG: 5 INJECTION INTRAVENOUS at 15:39

## 2023-08-14 RX ADMIN — EPHEDRINE SULFATE 5 MG: 5 INJECTION INTRAVENOUS at 19:02

## 2023-08-14 RX ADMIN — POTASSIUM CHLORIDE 10 MEQ: 7.46 INJECTION, SOLUTION INTRAVENOUS at 11:42

## 2023-08-14 RX ADMIN — FENTANYL CITRATE 25 MCG: 50 INJECTION, SOLUTION INTRAMUSCULAR; INTRAVENOUS at 20:35

## 2023-08-14 RX ADMIN — ONDANSETRON 4 MG: 2 INJECTION INTRAMUSCULAR; INTRAVENOUS at 19:57

## 2023-08-14 ASSESSMENT — ACTIVITIES OF DAILY LIVING (ADL)
ADLS_ACUITY_SCORE: 20

## 2023-08-14 ASSESSMENT — VISUAL ACUITY
OU: GLASSES

## 2023-08-14 NOTE — CONSULTS
Discharge Pharmacy Test Claim    Patient has insurance through [facility] Medicare advantage. Requested test claims and expected monthly copays listed below.    Test Claim Copay   dexcom G7 sensors 74.89   freestyle richard 2 sensors 26.14   freestyle richard 3 sensors not covered   humalog kwikpens 35.00   humulin N kwikpens 35.00   novolog flexpens not covered     Katherine Thompson  [facility] Pharmacy Liaison  Ph: [phone] Pager: [phone]   Securely message with the Vocera Web Console (learn more here)

## 2023-08-14 NOTE — OR NURSING
1300: Notified Dr. Owen that patient`s temperature increased from 98.2 to 99.2 15 minutes after beginning platelet infusion. Will stop infusion and provider to come assess patient at bedside. All other VS stable.     1310: Infusion resumed per provider at bedside. Will continue to monitor.

## 2023-08-14 NOTE — PLAN OF CARE
Status: Admitted for optic neuritis with R eye vision loss   Vitals: VSS on home CPAP overnight   Neuros: A&Ox4. R eye blurry/foggy   IV: PIV SL. R CVC HL.   Resp/trach: WDL  Diet: Regular diet, Good PO intake. On carb coverage   Bowel status: LB 8/12  : Voiding spontaneously   Skin: R eye bruising   Pain: Denies   Activity: Up ad john  Plan: CT head-stereotaxy with Fiducials at 0400, NPO at midnight, OR Monday 8/14/2023 at 1500- right frontotemporal craniotomy for optic nerve biopsy

## 2023-08-14 NOTE — PROGRESS NOTES
[facility] Diabetes Management  Daily Note         HPI: Mr. 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 Seems to have been steroid responsive initially but now apparently went from 20/40 to unable to count fingers in one week despite reasonable high doses of steroids. Recurrent relapsing course noted with negative work up to date.     Patient going to OR for biopsy this afternoon      Assessment:   1) Vision loss and R eye pain of unknown etiology partially responsive to high dose steroids.  2) Type 2 diabetes exacerbated by recurrent high dose steroids in the last 2 months. BG >300 PTA off steroids.   3) Steroid exacerbated diabetes   4) Anticipated stress hyperglycemia post procedure    Plan:    -NPH 27 units- discontinued for now due to recurrent hypoglycemia and NPO status   -Novolog 1:8 CHO coverage with meals and snacks/supplements-- decrease dinner and snacks to 1:10 with breakfast and lunch and 1:20 with dinner and snacks.    -Novolog  High intensity sliding scale TID AC and HS-- decrease HS to medium sliding scale to correct >300    -BG monitoring TID AC, HS, 0200   -hypoglycemia protocol   -carb counting protocol   -D5W fluid order switched to PRN to keep BG >100    Discharge Planning:    -Tentative diabetic regimen: likely insulin and metformin   -diabetes education needs: insulin education- consult placed 8/14  -Outpatient follow up: [facility]  -Test claim: CGM, nph, and short acting insulin coverage- pharmacy liaison consult placed 8/14    Plan discussed with patient, bedside RN, and primary team.      Interval History and Assessment: interval glucose trend reviewed:   Elevated reading in 200's yesterday post prandial. Patient received  sliding scale 1 hour after eating supper last evening. Recurrent hypoglycemia last night and NPO for procedure this afternoon. Hold NPH due to NPO status and recurrent hypoglycemia- depleted glycogen stores.  Decreased CHO coverage. Adjusted HS correction to to give unless >300        Currently, denies nausea, vomiting, diarrhea or pain. Patient notes he felt dizzy, shaky and diaphoretic last night and night before with hypoglycemia episodes     Labs:8/14  Bicarb:31  Creatinine: 0.94  eGFR: 90  Anion Gap: 10    Current nutritional intake and type: Orders Placed This Encounter      NPO per Anesthesia Guidelines for Procedure/Surgery Except for: Meds      Planned Procedures/surgeries: biopsy planned for after 1500  Steroid planning: predniosone 80 mg once daily in AM  D5W-containing solutions/medications: dextrose 5% running at 50ml/hr PRN to keep BG >100--- dextrose correction last night for hypoglycemia    PTA Diabetes Regimen:   Checking BG only.  Fasting BG off steroid now 120 - 160, later in day >200 and 300s at times.  Shares My [employer] readings with me.   Recently was Metformin 500 mg prescribed, 1 tab times 2 weeks, then 2 tablets daily.  Has not yet taken.             Diabetes History:   Type of Diabetes: Type 2 Diabetes Mellitus  No results found for: A1C           Review of Systems:     The Review of Systems is negative other than noted in the Interval History.           Medications:     Current Facility-Administered Medications   Medication    acetaminophen (TYLENOL) tablet 650 mg    calcium carbonate-vitamin D (CALTRATE) 600-10 MG-MCG per tablet 1 tablet    carvedilol (COREG) tablet 12.5 mg    dextrose 5% infusion    glucose gel 15-30 g    Or    dextrose 50 % injection 25-50 mL    Or    glucagon injection 1 mg    [Held by provider] enoxaparin ANTICOAGULANT (LOVENOX) injection 40 mg    heparin 100 unit/mL injection 3 mL    heparin 100 unit/mL injection 3 mL    hydrochlorothiazide (HYDRODIURIL) tablet 25  mg    insulin aspart (NovoLOG) injection (RAPID ACTING)    insulin aspart (NovoLOG) injection (RAPID ACTING)    insulin aspart (NovoLOG) injection (RAPID ACTING)    insulin aspart (NovoLOG) injection (RAPID ACTING)    insulin aspart (NovoLOG) injection (RAPID ACTING)    insulin aspart (NovoLOG) injection (RAPID ACTING)    insulin aspart (NovoLOG) injection (RAPID ACTING)    [Held by provider] insulin NPH injection 27 Units    lidocaine (LMX4) cream    lidocaine 1 % 0.1-1 mL    melatonin tablet 1 mg    And    melatonin tablet 1 mg    pantoprazole (PROTONIX) EC tablet 40 mg    polyethylene glycol (MIRALAX) Packet 17 g    predniSONE (DELTASONE) tablet 80 mg    senna-docusate (SENOKOT-S/PERICOLACE) 8.6-50 MG per tablet 1 tablet    sodium chloride (PF) 0.9% PF flush 10 mL    sodium chloride (PF) 0.9% PF flush 10 mL    sodium chloride (PF) 0.9% PF flush 3 mL    sodium chloride (PF) 0.9% PF flush 3 mL    sulfamethoxazole-trimethoprim (BACTRIM) 400-80 MG per tablet 1 tablet            Physical Exam:    /82 (BP Location: Left arm)   Pulse 69   Temp 98.3  F (36.8  C) (Oral)   Resp 16   Wt 91.1 kg (200 lb 12.8 oz)   SpO2 97%   BMI 25.10 kg/m    General: pleasant, in no distress. Sitting in chair   HEENT: normocephalic, atraumatic. Oral mucous membranes moist.   Lungs: unlabored respiration, no cough  ABD: rounded, nondistended appearing  Skin: warm and dry, no obvious lesions- limited assessment  MSK:  moves all extremities  Lymp:  no LE edema   Mental status:  alert, oriented to self, place, time  Psych:  flat affect, calm and appropriate interaction             Data:     Recent Labs   Lab 08/14/23  0659 08/14/23  0630 08/14/23  0615 08/14/23  0602 08/14/23  0426 08/14/23  0414   * 73 68* 64* 73 73     Lab Results   Component Value Date    WBC 8.7 08/14/2023    WBC 9.1 08/13/2023    WBC 5.3 08/12/2023    HGB 11.5 (L) 08/14/2023    HGB 12.4 (L) 08/13/2023    HGB 11.1 (L) 08/12/2023    HCT 32.8 (L) 08/14/2023     HCT 36.4 (L) 08/13/2023    HCT 31.5 (L) 08/12/2023    MCV 85 08/14/2023    MCV 87 08/13/2023    MCV 85 08/12/2023    PLT 94 (L) 08/14/2023     (L) 08/13/2023    PLT 77 (L) 08/12/2023     Lab Results   Component Value Date     08/14/2023     (L) 08/13/2023     08/12/2023    POTASSIUM 2.7 (L) 08/14/2023    POTASSIUM 3.0 (L) 08/13/2023    POTASSIUM 3.6 08/12/2023    CHLORIDE 96 (L) 08/14/2023    CHLORIDE 96 (L) 08/13/2023    CHLORIDE 97 (L) 08/12/2023    CO2 31 (H) 08/14/2023    CO2 28 08/13/2023    CO2 29 08/12/2023     (H) 08/14/2023    GLC 73 08/14/2023    GLC 68 (L) 08/14/2023     Lab Results   Component Value Date    BUN 19.8 08/14/2023    BUN 21.6 08/13/2023    BUN 20.6 08/12/2023     No results found for: TSH  Lab Results   Component Value Date    AST 35 08/09/2023    AST 24 07/03/2023    ALT 41 08/09/2023    ALT 18 07/03/2023    ALKPHOS 71 08/09/2023    ALKPHOS 64 07/03/2023       I spent a total of 60 minutes face to face or coordinating care of Amado Butler.             Over 50% of my time on the unit was spent counseling the patient and/or coordinating care regarding acute hyperglycemia management.  See note for details.      Contacting the Inpatient Diabetes Team   From 7AM-5PM: page inpatient diabetes provider that is following the patient, or utilize the job code paging system. From 5PM-7AM: page the job code for endocrine fellow on call.     Please use the following job code to reach the Inpatient Diabetes team. Note that you must use an in house phone and that job codes cannot receive text pages.   Dial [phone] (or star-star-star [phone] on the new [employer] telephones), then [phone] to reach the endocrine-diabetes provider on call.    Mary Yao, APRN, CNP  Inpatient Diabetes Service   Pager: [phone]

## 2023-08-14 NOTE — PROCEDURES
Transfusion Medicine Procedure    Amado Butler MRN# [id number]   YOB: 1960 Age: 63 year old   Date of Admission: 8/9/2023     Reason for consult: Therapeutic Plasma Exchange           Assessment and Plan:   63 year old male presents for consultation for therapeutic plasma exchange in the setting of optic perineuritis of uncertain etiology. The current plan is for 5 exchanges, originally planned for every other day.  After discussions with neurology we will perform the five TPE as follows:  8/10, 8/11; 8/13; 8/15; 8/16  The patient tolerated TPE #3/5 with no issues.  We used all plasma again today to correct his low fibrinogen in preparation for his upcoming biopsy.  Post-procedure fibrinogen was: 211 mg/dl.  We will continue with the plan below:    Summary of Plan:  - Plan 5 TPE: 8/10, 8/11; 8/13; 8/15; 8/16  - Will use the central line for access.  - Will monitor INR and fibrinogen for coagulapathy.  Some or all TPEs may require replacement with plasma, at least in part.  - ACD-A for anticoagulation. Will give calcium gluconate in the return to offset effects and monitor iCa.    For clinical team:  - Do not start ACE inhibitors throughout the duration of the TPE series as these have been associated with reactions during apheresis.    - Please notify the Transfusion Medicine physician of any upcoming procedures, surgeries, or biopsies as TPE with albumin replacement will affect coagulation factor levels.  - Consider the impact of TPE on laboratory studies and medication levels.  IVIG and other immune therapies such as rituximab should NOT be given prior to TPE but rather after due to removal.           History of Present Illness:   63 year old male presents for consultation for therapeutic plasma exchange.  His past medical history includes a two month history of intermittent vision problems that have been treated with various modalities including steroids with varying success. However, his vision  concerns have acutely worsened and resulted in the need for a more aggressive treatment plan. The patient reports a history of idiopathic thrombocytopenia that has been present for many years that results in easy bruising but has never been significant enough to require transfusion. His medical history is also notable for diabetes and hypertension which is treated with lisinopril-hydrochlorothiazide that will be held for plasma exchange due to significant interactions between ACE inhibitors and plasmapheresis. Other than his vision complaints, he is currently well.  The patient does not report any other significant history or allergies that would make therapeutic plasma exchange contraindicated.  The procedure, risks/benefits were discussed with the patient, his questions were answered to the best of my ability, and consent was obtained.              Past Medical History:   Hypertension  Type II Diabetes  Idiopathic thrombocytopenia          Past Surgical History:   History reviewed. No pertinent surgical history.           Social History:     Social History     Tobacco Use    Smoking status: Never    Smokeless tobacco: Never   Substance Use Topics    Alcohol use: Yes     Comment: social             Family History:   History reviewed. No pertinent family history.          Immunizations:     Immunization History   Administered Date(s) Administered    COVID-19 Bivalent 12+ (Pfizer) 10/20/2022    COVID-19 Monovalent 18+ (Moderna) 02/26/2021, 03/26/2021, 05/25/2022             Allergies:   No Known Allergies          Medications:     Current Facility-Administered Medications   Medication    acetaminophen (TYLENOL) tablet 650 mg    calcium carbonate-vitamin D (CALTRATE) 600-10 MG-MCG per tablet 1 tablet    carvedilol (COREG) tablet 12.5 mg    glucose gel 15-30 g    Or    dextrose 50 % injection 25-50 mL    Or    glucagon injection 1 mg    [Held by provider] enoxaparin ANTICOAGULANT (LOVENOX) injection 40 mg    heparin  100 unit/mL injection 3 mL    heparin 100 unit/mL injection 3 mL    hydrochlorothiazide (HYDRODIURIL) tablet 25 mg    insulin aspart (NovoLOG) injection (RAPID ACTING)    insulin aspart (NovoLOG) injection (RAPID ACTING)    insulin aspart (NovoLOG) injection (RAPID ACTING)    insulin aspart (NovoLOG) injection (RAPID ACTING)    insulin NPH injection 27 Units    lidocaine (LMX4) cream    lidocaine 1 % 0.1-1 mL    melatonin tablet 1 mg    And    melatonin tablet 1 mg    pantoprazole (PROTONIX) EC tablet 40 mg    polyethylene glycol (MIRALAX) Packet 17 g    predniSONE (DELTASONE) tablet 80 mg    senna-docusate (SENOKOT-S/PERICOLACE) 8.6-50 MG per tablet 1 tablet    sodium chloride (PF) 0.9% PF flush 10 mL    sodium chloride (PF) 0.9% PF flush 10 mL    sodium chloride (PF) 0.9% PF flush 3 mL    sodium chloride (PF) 0.9% PF flush 3 mL    sodium chloride 0.9% infusion    sulfamethoxazole-trimethoprim (BACTRIM) 400-80 MG per tablet 1 tablet             Review of Systems:     The Review of Systems is negative other than noted in the HPI           Vital Signs:   Vitals were reviewed  Temp: 98.8  F (37.1  C) Temp src: Oral BP: (!) 142/87 Pulse: 77   Resp: 16 SpO2: 96 % O2 Device: None (Room air)               Data:      Blood type Rh(D)    No results found for: RH      Last CBC:  Lab Results   Component Value Date    WBC 9.1 08/13/2023    HGB 12.4 (L) 08/13/2023    HCT 36.4 (L) 08/13/2023    MCV 87 08/13/2023     (L) 08/13/2023     INR   Date Value Ref Range Status   08/13/2023 1.15 0.85 - 1.15 Final     08/13/23 1501  Fibrinogen activity  Collected: 08/13/23 1348  Final result  Specimen: Blood from Line, venous    Fibrinogen Activity 211 mg/dL            A single volume plasma exchange was performed with plasma. The central line was used for access. ACD-A was used for anticoagulation. To offset the effects of the citrate, calcium gluconate was given in the return line. The patient's vital signs were stable  throughout the TPE. The patient tolerated the procedure well.    ATTESTATION STATEMENT:   During the procedure this patient was directly seen and evaluated by me , Myrna Contreras MD, PhD.      Myrna Contreras MD, PhD  Transfusion Medicine Attending  Medical Director, Blood Bank Laboratory  Pager [phone]

## 2023-08-14 NOTE — PLAN OF CARE
Status: Admitted for optic neuritis with R eye vision loss.   Vitals: VSS on RA, CPAP overnight.  Neuros: A&Ox4, R eye blurry/foggy.  IV: PIV SL, R CVC HL.  Resp/trach: WNL  Labs: Patient had a critical BG of 34 at 0200 check. Dextrose was given and BG came up to 176. Patient had a second event of hypoglycemia (64) at 0600. Protocol was followed again.   Diet: Regular diet. On carb coverage. NPO since midnight for procedure.   Bowel status: LBM 8/12  : Voiding spontaneously  Skin: R eye bruising.  Pain: Denied.  Activity: Up independently  Plan: OR today at 1500, right frontotemporal craniotomy for optic nerve biopsy.  Updates this shift: Fiducial markers were placed in CT this morning.

## 2023-08-14 NOTE — PLAN OF CARE
Status: admitted for optic neuritis w/ R eye vision loss   Vitals: VSS on RA  Neuros: AOx4. R eye blurry.   IV: infusing potassium 3/3 potassium replacements, other PIV SL. CVC for PLEX.   Labs/Electrolytes: K 2.7, PO and IV replacements administered per orders, blood glucose checks  Resp/trach: WNL  Diet: NPO ex meds  Bowel status: BM 8/12. Denies nausea   : Voids without difficulty   Skin: R eye bruising   Pain: denies   Activity: Up ad john  Plan: OR today for crani and optic nerve biopsy, report given and patient taken down to pre-op 12pm.

## 2023-08-14 NOTE — OR NURSING
Paged Dr. Castillo for clarification on ordered blood products. Will transfuse 1 unit platelets and have 1 unit cryo on hold for OR.     Provider aware of K+ 2.7 on AM labs. Patient has received 10 mEq IV replacement x 2 and has 1 additional 10 mEq to give. Orders to recheck BMP 30-45 minutes after total replacement has been given.

## 2023-08-14 NOTE — PROVIDER NOTIFICATION
Team was paged on patient having a critical blood glucose of 34. Patient was given dextrose per protocol and BG came up to 176.

## 2023-08-14 NOTE — PROGRESS NOTES
Hematology Consult Progress Note    Date of Service 08/14/2023  Admit Date 8/9/2023   Initial Consult 08/12/23   Hospital Day: 6     Amado Butler is a 63 year old man with a history of SARA, HTN, DM2, proxysmal SVT who was admitted 8/9/2023 for waxxing and waning R eye pain and vision loss for 2 months found to have concern for unilateral optic perineuritis of unclear etiology. Hematology was consulted due to thrombocytopenia and low fibrinogen.      Interval Events:   Pt is ready to go to surgery, for his craniotomy.     No acute event overnight.     Meds reviewed in Epic.  Allergies reviewed in Epic    Physical Exam  BP (!) 149/90   Pulse 61   Temp 98.2  F (36.8  C) (Oral)   Resp 16   Wt 91.1 kg (200 lb 12.8 oz)   SpO2 95%   BMI 25.10 kg/m       Constitutional: Awake, alert, cooperative, in NAD.  Eyes: PERRL  ENT: Normocephalic  Respiratory: Non-labored breathing  Skin: No concerning lesions or rash on exposed areas.  Musculoskeletal: No edema joseph LEs.  Neurologic: Awake, alert & oriented   Psych: appropriate affect        Labs reviewed, pertinent labs as follows:     Recent Labs   Lab 08/14/23  0414   WBC 8.7   HGB 11.5*   PLT 94*   MCV 85   RDW 16.2*     Recent Labs   Lab 08/14/23  0414      POTASSIUM 2.7*   CHLORIDE 96*   CO2 31*   BUN 19.8   CR 0.94   FELIX 8.6*     Recent Labs   Lab 08/09/23  2045   AST 35   ALT 41   ALKPHOS 71   ALBUMIN 4.3   PROTTOTAL 6.4   BILITOTAL 1.3*       Recent Labs   Lab 08/14/23  0704 08/14/23  0414 08/13/23  1348   INR 1.22* 1.17* 1.15   PTT 25 24 25   FIBR 183 184 211        Impression:    #Acute on chronic thrombocytopenia  #Low fibrinogen  #Monocytosis  #Normocytic anemia  #Bordeline splenomegaly  #Unilateral vision loss of unclear etiology  #T2DM/Hyperglycemia    1. We plan for BMBx tomorrow at 1pm (see initial consult note from 8/12, one ddx for his vision loss could be due to CMML).     2. The pt's plt and fibrinogen dropped from his baseline and we suspect they  are mostly likely due to the plasma exchange. Can give prn plt and/or cyro transfusion to maintain plt and/or fibrinogen above a certain level.     Recommendations:  -plan for BMBx tomorrow 8/15 at 1pm at bedside.   -no need for NPO   -consent will be obtained by our team.   -pt is going to craniotomy today, for optic nerve bx  -check daily CBC/diff, coags (INR, APTT, fibrinogen)  -if the pt's plt and fibrinogen are still low before surgery, can give plt and/or cryo transfusion.               -Transfuse platelets to obtain count >100,000 for OR               -f/up fibrinogen level, can consider cryo transfusion if still low for NSG for them to take the pt to the OR    We will continue to follow.    The above plan was discussed with Dr. Llamas, who agrees with the assessment and plan.     Thank you for allowing me to participate in the care of this patient. Please do not hesitate to contact me if there are any concerns or questions.     Go MD Jacki  Hem/onc fellow  Division of Hematology, Oncology, and Transplantation  [facility]

## 2023-08-14 NOTE — ANESTHESIA PROCEDURE NOTES
Airway       Patient location during procedure: OR       Procedure Start/Stop Times: 8/14/2023 1:57 PM  Staff -        Anesthesiologist:  Ryan Moseley MD       Resident/Fellow: Lane Owen MD       Other Anesthesia Staff: Tyrone Bishop MD       Performed By: with residents and resident       Procedure performed by resident/fellow/CRNA in presence of a teaching physician.  Indications and Patient Condition       Indications for airway management: bethel-procedural       Induction type:intravenous       Mask difficulty assessment: 0 - not attempted    Final Airway Details       Final airway type: endotracheal airway       Successful airway: ETT - single  Endotracheal Airway Details        ETT size (mm): 7.5       Cuffed: yes       Successful intubation technique: direct laryngoscopy       DL Blade Type: MAC 4       Grade View of Cords: 1       Adjucts: stylet       Position: Right       Measured from: lips       Secured at (cm): 24       Bite block used: Soft    Post intubation assessment        Placement verified by: capnometry, equal breath sounds and chest rise        Number of attempts at approach: 1       Number of other approaches attempted: 0       Secured with: pink tape       Ease of procedure: easy       Dentition: Intact    Medication(s) Administered   Medication Administration Time: 8/14/2023 1:57 PM

## 2023-08-15 ENCOUNTER — TRANSFERRED RECORDS (OUTPATIENT)
Dept: HEALTH INFORMATION MANAGEMENT | Facility: CLINIC | Age: 63
End: 2023-08-15

## 2023-08-15 LAB
ANION GAP SERPL CALCULATED.3IONS-SCNC: 13 MMOL/L (ref 7–15)
ANION GAP SERPL CALCULATED.3IONS-SCNC: 8 MMOL/L (ref 7–15)
APTT PPP: 29 SECONDS (ref 22–38)
ATRIAL RATE - MUSE: 86 BPM
B-OH-BUTYR SERPL-SCNC: <0.18 MMOL/L
BASOPHILS # BLD MANUAL: 0 10E3/UL (ref 0–0.2)
BASOPHILS NFR BLD MANUAL: 0 %
BUN SERPL-MCNC: 16.3 MG/DL (ref 8–23)
BUN SERPL-MCNC: 20.5 MG/DL (ref 8–23)
CALCIUM SERPL-MCNC: 7.7 MG/DL (ref 8.8–10.2)
CALCIUM SERPL-MCNC: 8.5 MG/DL (ref 8.8–10.2)
CHLORIDE SERPL-SCNC: 94 MMOL/L (ref 98–107)
CHLORIDE SERPL-SCNC: 96 MMOL/L (ref 98–107)
CREAT SERPL-MCNC: 1.02 MG/DL (ref 0.67–1.17)
CREAT SERPL-MCNC: 1.02 MG/DL (ref 0.67–1.17)
DEPRECATED HCO3 PLAS-SCNC: 25 MMOL/L (ref 22–29)
DEPRECATED HCO3 PLAS-SCNC: 25 MMOL/L (ref 22–29)
DIASTOLIC BLOOD PRESSURE - MUSE: NORMAL MMHG
EOSINOPHIL # BLD MANUAL: 0 10E3/UL (ref 0–0.7)
EOSINOPHIL NFR BLD MANUAL: 0 %
ERYTHROCYTE [DISTWIDTH] IN BLOOD BY AUTOMATED COUNT: 17 % (ref 10–15)
ERYTHROCYTE [DISTWIDTH] IN BLOOD BY AUTOMATED COUNT: 17.1 % (ref 10–15)
ERYTHROCYTE [DISTWIDTH] IN BLOOD BY AUTOMATED COUNT: 17.2 % (ref 10–15)
ERYTHROCYTE [DISTWIDTH] IN BLOOD BY AUTOMATED COUNT: 17.4 % (ref 10–15)
FIBRINOGEN PPP-MCNC: 153 MG/DL (ref 170–490)
GFR SERPL CREATININE-BSD FRML MDRD: 82 ML/MIN/1.73M2
GFR SERPL CREATININE-BSD FRML MDRD: 82 ML/MIN/1.73M2
GLUCOSE BLDC GLUCOMTR-MCNC: 183 MG/DL (ref 70–99)
GLUCOSE BLDC GLUCOMTR-MCNC: 194 MG/DL (ref 70–99)
GLUCOSE BLDC GLUCOMTR-MCNC: 203 MG/DL (ref 70–99)
GLUCOSE BLDC GLUCOMTR-MCNC: 227 MG/DL (ref 70–99)
GLUCOSE BLDC GLUCOMTR-MCNC: 235 MG/DL (ref 70–99)
GLUCOSE BLDC GLUCOMTR-MCNC: 257 MG/DL (ref 70–99)
GLUCOSE BLDC GLUCOMTR-MCNC: 270 MG/DL (ref 70–99)
GLUCOSE BLDC GLUCOMTR-MCNC: 409 MG/DL (ref 70–99)
GLUCOSE SERPL-MCNC: 198 MG/DL (ref 70–99)
GLUCOSE SERPL-MCNC: 311 MG/DL (ref 70–99)
HCT VFR BLD AUTO: 27.2 % (ref 40–53)
HCT VFR BLD AUTO: 30.3 % (ref 40–53)
HCT VFR BLD AUTO: 31.3 % (ref 40–53)
HCT VFR BLD AUTO: 33.8 % (ref 40–53)
HGB BLD-MCNC: 10.5 G/DL (ref 13.3–17.7)
HGB BLD-MCNC: 10.6 G/DL (ref 13.3–17.7)
HGB BLD-MCNC: 11.2 G/DL (ref 13.3–17.7)
HGB BLD-MCNC: 9.5 G/DL (ref 13.3–17.7)
INR PPP: 1.82 (ref 0.85–1.15)
INTERPRETATION ECG - MUSE: NORMAL
LYMPHOCYTES # BLD MANUAL: 2.4 10E3/UL (ref 0.8–5.3)
LYMPHOCYTES NFR BLD MANUAL: 9 %
MAGNESIUM SERPL-MCNC: 3.2 MG/DL (ref 1.7–2.3)
MCH RBC QN AUTO: 29.5 PG (ref 26.5–33)
MCH RBC QN AUTO: 29.8 PG (ref 26.5–33)
MCH RBC QN AUTO: 30.2 PG (ref 26.5–33)
MCH RBC QN AUTO: 30.4 PG (ref 26.5–33)
MCHC RBC AUTO-ENTMCNC: 33.1 G/DL (ref 31.5–36.5)
MCHC RBC AUTO-ENTMCNC: 33.9 G/DL (ref 31.5–36.5)
MCHC RBC AUTO-ENTMCNC: 34.7 G/DL (ref 31.5–36.5)
MCHC RBC AUTO-ENTMCNC: 34.9 G/DL (ref 31.5–36.5)
MCV RBC AUTO: 87 FL (ref 78–100)
MCV RBC AUTO: 87 FL (ref 78–100)
MCV RBC AUTO: 88 FL (ref 78–100)
MCV RBC AUTO: 89 FL (ref 78–100)
METAMYELOCYTES # BLD MANUAL: 0.3 10E3/UL
METAMYELOCYTES NFR BLD MANUAL: 1 %
MONOCYTES # BLD MANUAL: 4.5 10E3/UL (ref 0–1.3)
MONOCYTES NFR BLD MANUAL: 17 %
NEUTROPHILS # BLD MANUAL: 19.5 10E3/UL (ref 1.6–8.3)
NEUTROPHILS NFR BLD MANUAL: 73 %
OSMOLALITY SERPL: 277 MMOL/KG (ref 280–301)
OSMOLALITY UR: 744 MMOL/KG (ref 100–1200)
P AXIS - MUSE: 56 DEGREES
PHOSPHATE SERPL-MCNC: 3.3 MG/DL (ref 2.5–4.5)
PLAT MORPH BLD: ABNORMAL
PLAT MORPH BLD: NORMAL
PLATELET # BLD AUTO: 134 10E3/UL (ref 150–450)
PLATELET # BLD AUTO: 139 10E3/UL (ref 150–450)
PLATELET # BLD AUTO: 144 10E3/UL (ref 150–450)
PLATELET # BLD AUTO: 145 10E3/UL (ref 150–450)
POTASSIUM SERPL-SCNC: 3.9 MMOL/L (ref 3.4–5.3)
POTASSIUM SERPL-SCNC: 4 MMOL/L (ref 3.4–5.3)
PR INTERVAL - MUSE: 156 MS
QRS DURATION - MUSE: 98 MS
QT - MUSE: 400 MS
QTC - MUSE: 479 MS
R AXIS - MUSE: 21 DEGREES
RBC # BLD AUTO: 3.12 10E6/UL (ref 4.4–5.9)
RBC # BLD AUTO: 3.48 10E6/UL (ref 4.4–5.9)
RBC # BLD AUTO: 3.56 10E6/UL (ref 4.4–5.9)
RBC # BLD AUTO: 3.8 10E6/UL (ref 4.4–5.9)
RBC MORPH BLD: ABNORMAL
RBC MORPH BLD: NORMAL
SODIUM SERPL-SCNC: 129 MMOL/L (ref 136–145)
SODIUM SERPL-SCNC: 132 MMOL/L (ref 136–145)
SODIUM SERPL-SCNC: 134 MMOL/L (ref 136–145)
SODIUM UR-SCNC: <20 MMOL/L
SYSTOLIC BLOOD PRESSURE - MUSE: NORMAL MMHG
T AXIS - MUSE: 1 DEGREES
VENTRICULAR RATE- MUSE: 86 BPM
WBC # BLD AUTO: 14.1 10E3/UL (ref 4–11)
WBC # BLD AUTO: 16.4 10E3/UL (ref 4–11)
WBC # BLD AUTO: 22.8 10E3/UL (ref 4–11)
WBC # BLD AUTO: 26.7 10E3/UL (ref 4–11)

## 2023-08-15 PROCEDURE — 83935 ASSAY OF URINE OSMOLALITY: CPT

## 2023-08-15 PROCEDURE — 258N000003 HC RX IP 258 OP 636: Performed by: NURSE PRACTITIONER

## 2023-08-15 PROCEDURE — 88305 TISSUE EXAM BY PATHOLOGIST: CPT | Mod: TC | Performed by: INTERNAL MEDICINE

## 2023-08-15 PROCEDURE — 88189 FLOWCYTOMETRY/READ 16 & >: CPT | Mod: GC | Performed by: STUDENT IN AN ORGANIZED HEALTH CARE EDUCATION/TRAINING PROGRAM

## 2023-08-15 PROCEDURE — 84100 ASSAY OF PHOSPHORUS: CPT | Performed by: STUDENT IN AN ORGANIZED HEALTH CARE EDUCATION/TRAINING PROGRAM

## 2023-08-15 PROCEDURE — 99233 SBSQ HOSP IP/OBS HIGH 50: CPT

## 2023-08-15 PROCEDURE — 250N000013 HC RX MED GY IP 250 OP 250 PS 637

## 2023-08-15 PROCEDURE — 83930 ASSAY OF BLOOD OSMOLALITY: CPT

## 2023-08-15 PROCEDURE — 99292 CRITICAL CARE ADDL 30 MIN: CPT | Mod: 24 | Performed by: NURSE PRACTITIONER

## 2023-08-15 PROCEDURE — 85730 THROMBOPLASTIN TIME PARTIAL: CPT

## 2023-08-15 PROCEDURE — 88313 SPECIAL STAINS GROUP 2: CPT | Mod: 26 | Performed by: STUDENT IN AN ORGANIZED HEALTH CARE EDUCATION/TRAINING PROGRAM

## 2023-08-15 PROCEDURE — 36415 COLL VENOUS BLD VENIPUNCTURE: CPT

## 2023-08-15 PROCEDURE — 88342 IMHCHEM/IMCYTCHM 1ST ANTB: CPT | Mod: 26 | Performed by: STUDENT IN AN ORGANIZED HEALTH CARE EDUCATION/TRAINING PROGRAM

## 2023-08-15 PROCEDURE — 83735 ASSAY OF MAGNESIUM: CPT | Performed by: STUDENT IN AN ORGANIZED HEALTH CARE EDUCATION/TRAINING PROGRAM

## 2023-08-15 PROCEDURE — 88237 TISSUE CULTURE BONE MARROW: CPT | Performed by: INTERNAL MEDICINE

## 2023-08-15 PROCEDURE — 84300 ASSAY OF URINE SODIUM: CPT

## 2023-08-15 PROCEDURE — 88271 CYTOGENETICS DNA PROBE: CPT | Performed by: INTERNAL MEDICINE

## 2023-08-15 PROCEDURE — 88305 TISSUE EXAM BY PATHOLOGIST: CPT | Mod: 26 | Performed by: STUDENT IN AN ORGANIZED HEALTH CARE EDUCATION/TRAINING PROGRAM

## 2023-08-15 PROCEDURE — 258N000003 HC RX IP 258 OP 636: Performed by: STUDENT IN AN ORGANIZED HEALTH CARE EDUCATION/TRAINING PROGRAM

## 2023-08-15 PROCEDURE — 85384 FIBRINOGEN ACTIVITY: CPT

## 2023-08-15 PROCEDURE — 88184 FLOWCYTOMETRY/ TC 1 MARKER: CPT | Performed by: INTERNAL MEDICINE

## 2023-08-15 PROCEDURE — 88264 CHROMOSOME ANALYSIS 20-25: CPT | Performed by: INTERNAL MEDICINE

## 2023-08-15 PROCEDURE — 85007 BL SMEAR W/DIFF WBC COUNT: CPT | Performed by: INTERNAL MEDICINE

## 2023-08-15 PROCEDURE — 88368 INSITU HYBRIDIZATION MANUAL: CPT | Mod: 26 | Performed by: MEDICAL GENETICS

## 2023-08-15 PROCEDURE — 84295 ASSAY OF SERUM SODIUM: CPT

## 2023-08-15 PROCEDURE — 88313 SPECIAL STAINS GROUP 2: CPT | Mod: TC | Performed by: INTERNAL MEDICINE

## 2023-08-15 PROCEDURE — 07DR3ZX EXTRACTION OF ILIAC BONE MARROW, PERCUTANEOUS APPROACH, DIAGNOSTIC: ICD-10-PCS | Performed by: INTERNAL MEDICINE

## 2023-08-15 PROCEDURE — 99291 CRITICAL CARE FIRST HOUR: CPT | Mod: 24 | Performed by: NURSE PRACTITIONER

## 2023-08-15 PROCEDURE — 88184 FLOWCYTOMETRY/ TC 1 MARKER: CPT | Performed by: STUDENT IN AN ORGANIZED HEALTH CARE EDUCATION/TRAINING PROGRAM

## 2023-08-15 PROCEDURE — 250N000009 HC RX 250

## 2023-08-15 PROCEDURE — 85027 COMPLETE CBC AUTOMATED: CPT | Performed by: STUDENT IN AN ORGANIZED HEALTH CARE EDUCATION/TRAINING PROGRAM

## 2023-08-15 PROCEDURE — 81450 HL NEO GSAP 5-50DNA/DNA&RNA: CPT | Performed by: INTERNAL MEDICINE

## 2023-08-15 PROCEDURE — 81277 CYTOGENOMIC NEO MICRORA ALYS: CPT | Performed by: INTERNAL MEDICINE

## 2023-08-15 PROCEDURE — 99231 SBSQ HOSP IP/OBS SF/LOW 25: CPT | Mod: GC | Performed by: INTERNAL MEDICINE

## 2023-08-15 PROCEDURE — 250N000011 HC RX IP 250 OP 636: Performed by: HOSPITALIST

## 2023-08-15 PROCEDURE — 250N000011 HC RX IP 250 OP 636: Mod: JZ | Performed by: PSYCHIATRY & NEUROLOGY

## 2023-08-15 PROCEDURE — 80048 BASIC METABOLIC PNL TOTAL CA: CPT | Performed by: STUDENT IN AN ORGANIZED HEALTH CARE EDUCATION/TRAINING PROGRAM

## 2023-08-15 PROCEDURE — 85014 HEMATOCRIT: CPT | Performed by: STUDENT IN AN ORGANIZED HEALTH CARE EDUCATION/TRAINING PROGRAM

## 2023-08-15 PROCEDURE — G0452 MOLECULAR PATHOLOGY INTERPR: HCPCS | Mod: 26 | Performed by: STUDENT IN AN ORGANIZED HEALTH CARE EDUCATION/TRAINING PROGRAM

## 2023-08-15 PROCEDURE — 85097 BONE MARROW INTERPRETATION: CPT | Mod: GC | Performed by: STUDENT IN AN ORGANIZED HEALTH CARE EDUCATION/TRAINING PROGRAM

## 2023-08-15 PROCEDURE — 250N000011 HC RX IP 250 OP 636: Mod: JZ

## 2023-08-15 PROCEDURE — 85610 PROTHROMBIN TIME: CPT

## 2023-08-15 PROCEDURE — 85027 COMPLETE CBC AUTOMATED: CPT | Performed by: INTERNAL MEDICINE

## 2023-08-15 PROCEDURE — 250N000013 HC RX MED GY IP 250 OP 250 PS 637: Performed by: STUDENT IN AN ORGANIZED HEALTH CARE EDUCATION/TRAINING PROGRAM

## 2023-08-15 PROCEDURE — 88291 CYTO/MOLECULAR REPORT: CPT | Performed by: MEDICAL GENETICS

## 2023-08-15 PROCEDURE — 200N000002 HC R&B ICU UMMC

## 2023-08-15 PROCEDURE — 250N000011 HC RX IP 250 OP 636: Mod: JZ | Performed by: STUDENT IN AN ORGANIZED HEALTH CARE EDUCATION/TRAINING PROGRAM

## 2023-08-15 PROCEDURE — 82010 KETONE BODYS QUAN: CPT

## 2023-08-15 PROCEDURE — 36415 COLL VENOUS BLD VENIPUNCTURE: CPT | Performed by: STUDENT IN AN ORGANIZED HEALTH CARE EDUCATION/TRAINING PROGRAM

## 2023-08-15 PROCEDURE — 88341 IMHCHEM/IMCYTCHM EA ADD ANTB: CPT | Mod: 26 | Performed by: STUDENT IN AN ORGANIZED HEALTH CARE EDUCATION/TRAINING PROGRAM

## 2023-08-15 RX ORDER — NALOXONE HYDROCHLORIDE 0.4 MG/ML
0.4 INJECTION, SOLUTION INTRAMUSCULAR; INTRAVENOUS; SUBCUTANEOUS
Status: DISCONTINUED | OUTPATIENT
Start: 2023-08-15 | End: 2023-08-19 | Stop reason: HOSPADM

## 2023-08-15 RX ORDER — NICOTINE POLACRILEX 4 MG
15-30 LOZENGE BUCCAL
Status: DISCONTINUED | OUTPATIENT
Start: 2023-08-15 | End: 2023-08-19 | Stop reason: HOSPADM

## 2023-08-15 RX ORDER — LEVETIRACETAM 500 MG/1
1000 TABLET ORAL 2 TIMES DAILY
Status: COMPLETED | OUTPATIENT
Start: 2023-08-15 | End: 2023-08-18

## 2023-08-15 RX ORDER — CALCIUM GLUCONATE 20 MG/ML
2 INJECTION, SOLUTION INTRAVENOUS ONCE
Status: COMPLETED | OUTPATIENT
Start: 2023-08-16 | End: 2023-08-16

## 2023-08-15 RX ORDER — CALCIUM GLUCONATE 20 MG/ML
2 INJECTION, SOLUTION INTRAVENOUS ONCE
Status: COMPLETED | OUTPATIENT
Start: 2023-08-15 | End: 2023-08-15

## 2023-08-15 RX ORDER — LANOLIN ALCOHOL/MO/W.PET/CERES
3 CREAM (GRAM) TOPICAL
Status: DISCONTINUED | OUTPATIENT
Start: 2023-08-15 | End: 2023-08-19 | Stop reason: HOSPADM

## 2023-08-15 RX ORDER — POTASSIUM CHLORIDE 1.5 G/1.58G
40 POWDER, FOR SOLUTION ORAL ONCE
Status: COMPLETED | OUTPATIENT
Start: 2023-08-15 | End: 2023-08-15

## 2023-08-15 RX ORDER — METHOCARBAMOL 500 MG/1
500 TABLET, FILM COATED ORAL 2 TIMES DAILY PRN
Status: DISCONTINUED | OUTPATIENT
Start: 2023-08-15 | End: 2023-08-19 | Stop reason: HOSPADM

## 2023-08-15 RX ORDER — DEXTROSE MONOHYDRATE 25 G/50ML
25-50 INJECTION, SOLUTION INTRAVENOUS
Status: DISCONTINUED | OUTPATIENT
Start: 2023-08-15 | End: 2023-08-19 | Stop reason: HOSPADM

## 2023-08-15 RX ORDER — PANTOPRAZOLE SODIUM 40 MG/1
40 TABLET, DELAYED RELEASE ORAL
Status: DISCONTINUED | OUTPATIENT
Start: 2023-08-16 | End: 2023-08-19 | Stop reason: HOSPADM

## 2023-08-15 RX ORDER — DEXTROSE MONOHYDRATE 100 MG/ML
INJECTION, SOLUTION INTRAVENOUS CONTINUOUS PRN
Status: DISCONTINUED | OUTPATIENT
Start: 2023-08-15 | End: 2023-08-19 | Stop reason: HOSPADM

## 2023-08-15 RX ORDER — NALOXONE HYDROCHLORIDE 0.4 MG/ML
0.2 INJECTION, SOLUTION INTRAMUSCULAR; INTRAVENOUS; SUBCUTANEOUS
Status: DISCONTINUED | OUTPATIENT
Start: 2023-08-15 | End: 2023-08-19 | Stop reason: HOSPADM

## 2023-08-15 RX ORDER — CEFAZOLIN SODIUM 2 G/100ML
2 INJECTION, SOLUTION INTRAVENOUS EVERY 8 HOURS
Status: COMPLETED | OUTPATIENT
Start: 2023-08-15 | End: 2023-08-15

## 2023-08-15 RX ORDER — SODIUM CHLORIDE 9 MG/ML
INJECTION, SOLUTION INTRAVENOUS CONTINUOUS
Status: DISCONTINUED | OUTPATIENT
Start: 2023-08-15 | End: 2023-08-15

## 2023-08-15 RX ORDER — MAGNESIUM SULFATE HEPTAHYDRATE 40 MG/ML
2 INJECTION, SOLUTION INTRAVENOUS ONCE
Status: COMPLETED | OUTPATIENT
Start: 2023-08-15 | End: 2023-08-15

## 2023-08-15 RX ORDER — SODIUM CHLORIDE 9 MG/ML
INJECTION, SOLUTION INTRAVENOUS CONTINUOUS
Status: DISCONTINUED | OUTPATIENT
Start: 2023-08-15 | End: 2023-08-17

## 2023-08-15 RX ORDER — CEFAZOLIN SODIUM 1 G/3ML
1 INJECTION, POWDER, FOR SOLUTION INTRAMUSCULAR; INTRAVENOUS EVERY 8 HOURS
Status: DISCONTINUED | OUTPATIENT
Start: 2023-08-15 | End: 2023-08-15 | Stop reason: DRUGHIGH

## 2023-08-15 RX ADMIN — LISINOPRIL 10 MG: 10 TABLET ORAL at 20:27

## 2023-08-15 RX ADMIN — LABETALOL HYDROCHLORIDE 20 MG: 5 INJECTION, SOLUTION INTRAVENOUS at 06:16

## 2023-08-15 RX ADMIN — ONDANSETRON 4 MG: 2 INJECTION INTRAMUSCULAR; INTRAVENOUS at 09:08

## 2023-08-15 RX ADMIN — SODIUM CHLORIDE: 9 INJECTION, SOLUTION INTRAVENOUS at 00:52

## 2023-08-15 RX ADMIN — METHOCARBAMOL TABLETS 500 MG: 500 TABLET, COATED ORAL at 20:27

## 2023-08-15 RX ADMIN — MAGNESIUM SULFATE HEPTAHYDRATE 2 G: 40 INJECTION, SOLUTION INTRAVENOUS at 01:07

## 2023-08-15 RX ADMIN — ACETAMINOPHEN 975 MG: 325 TABLET, FILM COATED ORAL at 15:26

## 2023-08-15 RX ADMIN — HYDRALAZINE HYDROCHLORIDE 20 MG: 20 INJECTION INTRAMUSCULAR; INTRAVENOUS at 01:32

## 2023-08-15 RX ADMIN — DEXAMETHASONE SODIUM PHOSPHATE 4 MG: 4 INJECTION, SOLUTION INTRA-ARTICULAR; INTRALESIONAL; INTRAMUSCULAR; INTRAVENOUS; SOFT TISSUE at 01:34

## 2023-08-15 RX ADMIN — DEXAMETHASONE SODIUM PHOSPHATE 4 MG: 4 INJECTION, SOLUTION INTRA-ARTICULAR; INTRALESIONAL; INTRAMUSCULAR; INTRAVENOUS; SOFT TISSUE at 09:09

## 2023-08-15 RX ADMIN — OMEPRAZOLE 40 MG: 20 CAPSULE, DELAYED RELEASE ORAL at 08:05

## 2023-08-15 RX ADMIN — SENNOSIDES AND DOCUSATE SODIUM 2 TABLET: 50; 8.6 TABLET ORAL at 08:06

## 2023-08-15 RX ADMIN — HYDRALAZINE HYDROCHLORIDE 20 MG: 20 INJECTION INTRAMUSCULAR; INTRAVENOUS at 02:10

## 2023-08-15 RX ADMIN — CEFAZOLIN SODIUM 2 G: 2 INJECTION, SOLUTION INTRAVENOUS at 02:10

## 2023-08-15 RX ADMIN — CALCIUM GLUCONATE 2 G: 20 INJECTION, SOLUTION INTRAVENOUS at 00:29

## 2023-08-15 RX ADMIN — SENNOSIDES AND DOCUSATE SODIUM 2 TABLET: 50; 8.6 TABLET ORAL at 01:20

## 2023-08-15 RX ADMIN — MIDAZOLAM HYDROCHLORIDE 2 MG: 1 INJECTION, SOLUTION INTRAMUSCULAR; INTRAVENOUS at 13:20

## 2023-08-15 RX ADMIN — POLYETHYLENE GLYCOL 3350 17 G: 17 POWDER, FOR SOLUTION ORAL at 08:06

## 2023-08-15 RX ADMIN — HYDROMORPHONE HYDROCHLORIDE 0.2 MG: 0.2 INJECTION, SOLUTION INTRAMUSCULAR; INTRAVENOUS; SUBCUTANEOUS at 13:21

## 2023-08-15 RX ADMIN — HYDRALAZINE HYDROCHLORIDE 20 MG: 20 INJECTION INTRAMUSCULAR; INTRAVENOUS at 03:18

## 2023-08-15 RX ADMIN — CEFAZOLIN SODIUM 2 G: 2 INJECTION, SOLUTION INTRAVENOUS at 10:01

## 2023-08-15 RX ADMIN — DEXAMETHASONE SODIUM PHOSPHATE 4 MG: 4 INJECTION, SOLUTION INTRA-ARTICULAR; INTRALESIONAL; INTRAMUSCULAR; INTRAVENOUS; SOFT TISSUE at 17:28

## 2023-08-15 RX ADMIN — ACETAMINOPHEN 975 MG: 325 TABLET, FILM COATED ORAL at 23:22

## 2023-08-15 RX ADMIN — CARVEDILOL 12.5 MG: 12.5 TABLET, FILM COATED ORAL at 08:06

## 2023-08-15 RX ADMIN — INSULIN HUMAN 1 UNITS/HR: 1 INJECTION, SOLUTION INTRAVENOUS at 23:33

## 2023-08-15 RX ADMIN — LEVETIRACETAM 1000 MG: 500 TABLET, FILM COATED ORAL at 20:27

## 2023-08-15 RX ADMIN — CARVEDILOL 12.5 MG: 12.5 TABLET, FILM COATED ORAL at 18:12

## 2023-08-15 RX ADMIN — POTASSIUM CHLORIDE 40 MEQ: 1.5 POWDER, FOR SOLUTION ORAL at 01:20

## 2023-08-15 RX ADMIN — SODIUM CHLORIDE: 9 INJECTION, SOLUTION INTRAVENOUS at 14:23

## 2023-08-15 RX ADMIN — CEFAZOLIN SODIUM 2 G: 2 INJECTION, SOLUTION INTRAVENOUS at 17:28

## 2023-08-15 RX ADMIN — ACETAMINOPHEN 975 MG: 325 TABLET, FILM COATED ORAL at 08:05

## 2023-08-15 RX ADMIN — SENNOSIDES AND DOCUSATE SODIUM 2 TABLET: 50; 8.6 TABLET ORAL at 20:27

## 2023-08-15 ASSESSMENT — VISUAL ACUITY
OU: GLASSES;OTHER (SEE COMMENT)

## 2023-08-15 ASSESSMENT — ACTIVITIES OF DAILY LIVING (ADL)
ADLS_ACUITY_SCORE: 20

## 2023-08-15 NOTE — PLAN OF CARE
Admitted/transferred from: PACU  Reason for admission/transfer: Close post-op monitoring   2 RN skin assessment: completed by Jyoti RN and Darrion RN  Result of skin assessment and interventions/actions: Np interventions needed.   Height, weight, drug calc weight: Done   Patient belongings: Phone, wallet, glasses, aipods, , shoes, clothes, pillow, CPAP machine, backpack, suitcase  MDRO education added to care plan: N/A  ?

## 2023-08-15 NOTE — PROGRESS NOTES
"Hematology Consult Progress Note    Date of Service 08/15/2023  Admit Date 8/9/2023   Initial Consult 08/12/23   Hospital Day: 7     Amado Butler is a 63 year old man with a history of SARA, HTN, DM2, proxysmal SVT who was admitted 8/9/2023 for waxxing and waning R eye pain and vision loss for 2 months found to have concern for unilateral optic perineuritis of unclear etiology. Hematology was consulted due to thrombocytopenia and low fibrinogen.       Interval Events:   Tired after the craniotomy he got yesterday. Did not sleep well for the past 3 days.    Later, he states he was able to sleep and feeling a little better.      Meds reviewed in Epic.  Allergies reviewed in Epic    Physical Exam  /70   Pulse 87   Temp 98.6  F (37  C) (Oral)   Resp 22   Ht 1.905 m (6' 3\")   Wt 91.6 kg (201 lb 15.1 oz)   SpO2 100%   BMI 25.24 kg/m       Constitutional: Awake, alert, cooperative, but looks tired  Eyes: PERRLA  ENT: Normocephalic, has dressings from his yesterday's craniotomy  Respiratory: Non-labored breathing  Skin: No concerning lesions or rash on exposed areas.  Musculoskeletal: No edema joseph LEs.  Neurologic: Awake, alert & oriented x3.   Psych: appropriate affect      Labs reviewed, pertinent labs as follows:     Recent Labs   Lab 08/15/23  1002   WBC 26.7*   HGB 9.5*   *   MCV 87   RDW 17.2*   ANEU 19.5*   ALYM 2.4   CARLY 4.5*   AEOS 0.0     Recent Labs   Lab 08/15/23  0544   *   POTASSIUM 3.9   CHLORIDE 94*   CO2 25   BUN 16.3   CR 1.02   FELIX 8.5*   MAG 3.2*   PHOS 3.3     Recent Labs   Lab 08/09/23  2045   AST 35   ALT 41   ALKPHOS 71   ALBUMIN 4.3   PROTTOTAL 6.4   BILITOTAL 1.3*        Recent Labs   Lab 08/13/23  1133   IRON 101   IRONSAT 53*   GRABIEL 555*   *   B12 628   FOLIC 12.7     Recent Labs   Lab 08/15/23  0916 08/14/23  0704 08/14/23  0414   INR 1.82* 1.22* 1.17*   PTT 29 25 24   FIBR 153* 183 184        Impression:    #Acute on chronic thrombocytopenia  #Low " fibrinogen  #Monocytosis  #Normocytic anemia  #Bordeline splenomegaly  #Unilateral vision loss of unclear etiology  #T2DM/Hyperglycemia    1. We performed BMBx today. Awaiting result. This was obtained given that one ddx for his vision loss could be due to CMML (see initial consult note from 8/12). He also went to craniotomy and NSG resected his optic canal/intradural mass, path pending.     2. The pt did not have any plasma exchange since 8/11 but his fibrinogen dropped today (prior drops in fibrinogen were felt to be 2/2 plasma exchange). Cont to monitor. If the pt is going to any additional procedure, can consider cryo transfusion prn.     Recommendations:  -await BMBx result  -await NSG surgically resected optic canal/intradural mass pathology result  -monitor CBC/diff, coags    We will continue to follow.    The above plan was discussed with Dr. Llamas, who agrees with the assessment and plan.     Thank you for allowing me to participate in the care of this patient. Please do not hesitate to contact me if there are any concerns or questions.     Go MD Jacki  Hem/onc fellow  Division of Hematology, Oncology, and Transplantation  [facility]

## 2023-08-15 NOTE — PLAN OF CARE
Major Shift Events:  No acute events overnight. Severely blurred vision in R eye- worsening per pt. Endorses mild HA. No other neuro deficits present. Afebrile. Sinus arrhythmia/SR with frequent ectopy. Mag/K+/Calcium gluc given. Prn hydralazine given x3, labetalol given x1 for SBP >140. Tolerating cleaar liquids. AUO in avery. No BM this shift.       Plan:   Continue hourly neuro checks, notify primary team with any changes in pt condition.       For vital signs and complete assessments, please see documentation flowsheets.

## 2023-08-15 NOTE — ANESTHESIA POSTPROCEDURE EVALUATION
Patient: Amado Butler    Procedure: Procedure(s):  stealth assisted Frontotemporal craniotomy for optic mass       Anesthesia Type:  General    Note:  Disposition: Admission   Postop Pain Control: Uneventful            Sign Out: Well controlled pain   PONV: No   Neuro/Psych: Uneventful            Sign Out: Acceptable/Baseline neuro status   Airway/Respiratory: Uneventful            Sign Out: Acceptable/Baseline resp. status   CV/Hemodynamics: Uneventful            Sign Out: Acceptable CV status; No obvious hypovolemia; No obvious fluid overload   Other NRE: NONE   DID A NON-ROUTINE EVENT OCCUR? No           Last vitals:  Vitals Value Taken Time   /80 08/14/23 2113   Temp     Pulse 63 08/14/23 2144   Resp 12 08/14/23 2045   SpO2 97 % 08/14/23 2144   Vitals shown include unvalidated device data.    Electronically Signed By: Sanjana Boykin MD  August 14, 2023  9:46 PM

## 2023-08-15 NOTE — BRIEF OP NOTE
"[facility]    Brief Operative Note    Pre-operative diagnosis: Vision, loss, sudden, right [H53.131]  Post-operative diagnosis Same as pre-operative diagnosis    Procedure: Procedure(s):  stealth assisted Frontotemporal craniotomy for optic mass  Surgeon: Surgeon(s) and Role:     * Jose Guadalupe Camacho MD - Primary     * Judd Carlson MD - Resident - Assisting  Anesthesia: General   Estimated Blood Loss: 25 cc    Drains: None  Specimens:   ID Type Source Tests Collected by Time Destination   1 : Frontal dura Tissue Other SURGICAL PATHOLOGY EXAM Jose Guadalupe Camacho MD 8/14/2023  5:41 PM    2 : Clinoid Dura Tissue Other SURGICAL PATHOLOGY EXAM Jose Guadalupe Camacho MD 8/14/2023  5:57 PM    3 : Optic canal lesion Tissue Other SURGICAL PATHOLOGY EXAM Jose Guadalupe Camacho MD 8/14/2023  6:09 PM    A : Frontal dura Tissue Other ANAEROBIC BACTERIAL CULTURE ROUTINE, GRAM STAIN, FUNGAL OR YEAST CULTURE ROUTINE, AEROBIC BACTERIAL CULTURE ROUTINE Jose Guadalupe Camacho MD 8/14/2023  5:43 PM    B : Clinoid dura Tissue Other ANAEROBIC BACTERIAL CULTURE ROUTINE, GRAM STAIN, FUNGAL OR YEAST CULTURE ROUTINE, AEROBIC BACTERIAL CULTURE ROUTINE Jose Guadalupe Camacho MD 8/14/2023  6:00 PM    C : Optic canal lesion Tissue Other ANAEROBIC BACTERIAL CULTURE ROUTINE, GRAM STAIN, FUNGAL OR YEAST CULTURE ROUTINE, AEROBIC BACTERIAL CULTURE ROUTINE Jose Guadalupe Camacho MD 8/14/2023  6:22 PM      Findings:   Dural samples sent for permanent path and microbiolo. Abnormal tissue identified in para-optic nerve area . Sent for permanent pathology.  Complications: None.  Implants:   Implant Name Type Inv. Item Serial No.  Lot No. LRB No. Used Action   BoardVantage Synthcel DURA REPAIR 7.5cm x 7.5cm (3\"x3\")    Striped Sail [id number] Right 1 Implanted   IMP SCR SYN MATRIX LOW PRO 1.5X04MM SELF DRILL [id number] - SNA [employer]allic Hardware/[employer]an IMP SCR SYN MATRIX LOW PRO " 1.5X04MM SELF DRILL [id number] NA [id number]  Right 8 Implanted   IMP PLATE SYN ALLEN HOLE COVER 17MM [id number] - SNA Metallic Hardware/Orrick IMP PLATE SYN ALLEN HOLE COVER 17MM [id number] NA [id number]  Right 2 Implanted   IMP PLATE SYN MATRIXNEURO BOX PLATE [id number] [phone] - SNA Metallic Hardware/Orrick IMP PLATE SYN MATRIXNEURO BOX PLATE [id number] [id number] NA [employer] [id number]  Right 1 Implanted

## 2023-08-15 NOTE — OR NURSING
Received report from Radha Duval (Pacu RN). Pt returned from CT. Pt is having PVCs and HR has been going from 50-140s. SBP has been 120-160s. Pt denies dizziness, nausea, or chest pain. Dr. LU Saeed (Neuro Surgery Resident) informed and called back. EKG ordered, labs drawn, and Calcium Gluconate given per orders. Wife and daughter at bedside.

## 2023-08-15 NOTE — PROGRESS NOTES
"[facility], [place]   Neurosurgery Progress Note:  08/15/2023    Interval History: OR for right frontotemporal craniotomy and optic canal and orbital apex decompression with resection of optic canal/intradural mass, pathology sent. Recovering well post-operatively other than arrhythmia. Ca, Mag, K replaced. Post-op CT without concerning findings. Feels vision is slightly worse than preop in R eye.    Assessment:  63 year old male, ambidextrous, not on blood thinners, with past medical history suggestive of chronic thrombocytopenia, SARA, HTN, DM2, paroxysmal SVT p/w waxing and waning R-eye pain and visual loss since 6/1. Has been on high dose steroids intermittently with off/on improvement in vision in right eye. He is now POD-1 s/p R FT craniotomy for optic canal and orbital apex decompression and resection of optic canal and intradural mass.    Clinically Significant Risk Factors        # Hypokalemia: Lowest K = 2.7 mmol/L in last 2 days, will replace as needed   # Hypocalcemia: Lowest iCa = 4.1 mg/dL in last 2 days, will monitor and replace as appropriate        # Coagulation Defect: INR = 1.22 (Ref range: 0.85 - 1.15) and/or PTT = 25 Seconds (Ref range: 22 - 38 Seconds), will monitor for bleeding    # Thrombocytopenia: Lowest platelets = 94 in last 2 days, will monitor for bleeding          # Overweight: Estimated body mass index is 25.24 kg/m  as calculated from the following:    Height as of this encounter: 1.905 m (6' 3\").    Weight as of this encounter: 91.6 kg (201 lb 15.1 oz).            Plan:    Serial neurologic exams  NSGY primary while in ICU  Decadron 4 day taper, steroids per neurology team thereafter  Ancef x 3  Follow-up surgical pathology  Replace electrolytes prn  ADAT  SCDs for DVT ppx, hold lovenox for now    -----------------------------------  Pamela Saeed MD, PhD  PGY-2 Neurosurgery    Please contact neurosurgery resident on call with questions.    Dial * * " [phone], enter [phone] when prompted.   -----------------------------------    General: awake and alert  HEENT: Bruising around right eye, incision C/D/I with minimal strike through  PULM: breathing comfortably on room air  NEUROLOGIC:  -- Awake; Alert; oriented x 3  -- Follows commands briskly  -- +repetition, calculation, and naming  -- Speech fluent, spontaneous. No aphasia or dysarthria.  -- no gaze preference. No apparent hemineglect.  Cranial Nerves:  -- Minimal light perception on the right eye, not able to count fingers, VFF on the left eye  -- RAPD on right, L pupil briskly reacts to light extraocular movements intact  -- face symmetrical, tongue midline  -- sensory V1-V3 intact bilaterally  -- palate elevates symmetrically, uvula midline  -- hearing grossly intact bilat  -- Trapezii 5/5 strength bilat symmetric     Vision:  Right eye: can only see minimal light, no finger counting  Left eye: 20/40     Motor:  Normal bulk / tone; no tremor, rigidity, or bradykinesia.  No muscle wasting or fasciculations  No Pronator Drift       Delt Bi Tri Hand Flexion/  Extension Iliopsoas Quadriceps Hamstrings Tibialis Anterior Gastroc     C5 C6 C7 C8/T1 L2 L3 L4-S1 L4 S1   R 5 5 5 5 5 5 5 5 5   L 5 5 5 5 5 5 5 5 5   Sensory:  intact to LT x 4 extremities       Reflexes:       Bi Tri BR Merissa Pat Ach Bab     C5-6 C7-8 C6 UMN L2-4 S1 UMN   R 2+ 2+ 2+ Norm 2+ 2+ Norm   L 2+ 2+ 2+ Norm 2+ 2+ Norm      Gait: Deferred    Objective:   Temp:  [97.4  F (36.3  C)-99.2  F (37.3  C)] 97.5  F (36.4  C)  Pulse:  [] 78  Resp:  [12-16] 14  BP: (127-151)/(71-93) 146/82  MAP:  [73 mmHg-97 mmHg] 76 mmHg  Arterial Line BP: (112-163)/(47-68) 112/56  SpO2:  [94 %-99 %] 99 %  I/O last 3 completed shifts:  In: 5330.75 [P.O.:600; I.V.:4500]  Out: 4520 [Urine:4500; Blood:20]      LABS:  Recent Labs   Lab 08/15/23  0014 08/14/23  2306 08/14/23  2244 08/14/23  2103 08/14/23  1624 08/14/23  1258 08/14/23  0426 08/14/23  0414 08/13/23  1410  08/13/23  1133   NA  --  133*  --   --  130*  --   --  137  --  135*   POTASSIUM  --  3.3*  --   --  3.4* 3.9  --  2.7*  --  3.0*   CHLORIDE  --  96*  --   --   --   --   --  96*  --  96*   CO2  --  27  --   --   --   --   --  31*  --  28   ANIONGAP  --  10  --   --   --   --   --  10  --  11   * 180* 184*   < > 183*  --    < > 73   < > 145*   BUN  --  14.5  --   --   --   --   --  19.8  --  21.6   CR  --  0.86  --   --   --   --   --  0.94  --  0.94   FELIX  --  7.6*  --   --   --   --   --  8.6*  --  8.8    < > = values in this interval not displayed.       Recent Labs   Lab 08/14/23  2306   WBC 14.1*   RBC 3.48*   HGB 10.5*   HCT 30.3*   MCV 87   MCH 30.2   MCHC 34.7   RDW 17.0*   *       IMAGING:  Recent Results (from the past 24 hour(s))   CT Head w/o Contrast    Narrative    Stealth CT imaging for purposes of stereotactic evaluation    Provided History: Stealth with fiducials for OR    Comparison: MRI 8/12/2023, 7/3/23    Technique: CT imaging performed with axial, sagittal, and coronal  reconstructed images obtained without intravenous contrast.      Findings:   Limited imaging for stereotactic localization demonstrates no overt  mass effect, midline shift, or acute intracranial hemorrhage. Stable  chronic right frontal encephalomalacia. Mild diffuse cerebral volume  loss. The ventricles are not enlarged, and there is no evidence of  hydrocephalus. Periapical lucency with resorption of the posterior  aspect of the left maxillary bone. Mild mucosal thickening in the left  sphenoid locule. The mastoid air cells are clear. Left posterior  maxillary alveolar ridge bony dehiscence/sclerosis.      Impression    Impression: Limited imaging performed primarily for the purposes of  stereotactic localization.     1. No acute intracranial pathology.  2. Stable chronic right frontal encephalomalacia.  3. Left posterior maxillary alveolar ridge bony dehiscence/sclerosis,  could be secondary to chronic process,  including dental extraction,  surgery or infectious/inflammatory process. Non-masslike signal  changes on MRI. Comparison with 1-2 years prior CTs and questioning  the prior surgery would be helpful.      I have personally reviewed the examination and initial interpretation  and I agree with the findings.    LORETTA CLINE MD         SYSTEM ID:  [id number]   CTA Head with Contrast    Narrative    EXAM: CTA HEAD WITH CONTRAST  8/14/2023 5:09 AM     HISTORY: Stealth with fiducials for OR       COMPARISON:  MRI 8/12/2023, 7/3/2023    TECHNIQUE:    HEAD and NECK CTA: During rapid bolus intravenous injection of  nonionic contrast material, axial images were obtained using thin  collimation multidetector helical technique from the base of the upper  aortic arch through the [place]. This CT angiogram data was  reconstructed at thin intervals with mild overlap. Images were sent to  the 3D workstation, and 3D reconstructions were obtained. The axial  source images, multiplanar reformations, 3D reconstructions in both  maximum intensity projection display and volume rendered models were  reviewed, with reconstructions performed by the technologist.    CONTRAST: iopamidol (ISOVUE-370) solution 75 mL    FINDINGS:  Head CTA demonstrates no aneurysm or stenosis of the major  intracranial arteries. Trifurcated PERNELL, a normal variant. Bilateral  posterior communicating arteries are not visualized.    Neck CTA demonstrates no stenosis of the major cervical arteries. The  origins of the great vessels from the aortic arch are patent. The  normal distal right internal carotid artery measures 5 mm. The normal  distal left internal carotid artery measures 7 mm. Mild calcified  plaque at the bifurcation.    No mass within the visualized portions of the cervical soft tissues or  lung apices. Partially visualized right IJ central venous catheter.  Incidentally noted there is a 0.5 cm hypodense nodule in the inferior  right thyroid  gland.      Impression    IMPRESSION:  1. Head CTA demonstrates no aneurysm or stenosis of the major  intracranial arteries. Trifurcated PERNELL, a normal variant.  2. Neck CTA demonstrates no stenosis of the major cervical arteries.    I have personally reviewed the examination and initial interpretation  and I agree with the findings.    LORETTA CLINE MD         SYSTEM ID:  [id number]   CT Head w/o Contrast    Impression    RESIDENT PRELIMINARY INTERPRETATION  IMPRESSION: Interval right frontotemporal craniotomy for underlying  optic canal/intradural mass resection with trace hemorrhage along the  right frontotemporal craniotomy and pneumocephalus along bifrontal  convexity.       Please contact neurosurgery resident on call with questions.    Dial * * [phone], enter [phone] when prompted.

## 2023-08-15 NOTE — OP NOTE
[facility]  Department of Neurosurgery  Operative report    Procedure date: 8/14/2023    Preoperative diagnoses:  1.  Right optic canal mass  2.  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  Right optic canal mass  2.  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  Right frontotemporal craniotomy and decompression of the right optic canal and orbital apex  2.  Resection of right optic canal and intradural mass  3.  Stereotactic neuro navigation  4.  Use of operative microscope    Surgeon: Jose Guadalupe Camacho MD    Assistant: Judd Carlson MD    Estimated blood loss: 200 cc    Anesthesia: General    Indications for procedure: Mr. Butler is a 63-year-old ambidextrous male with history of chronic thrombocytopenia who presented 2 months ago with right-sided vision loss.  This was initially steroid responsive however his vision loss recurred.  Initial MRI scans were negative however the most recent scan showed enhancement around the right optic canal and orbital apex.  He was admitted to neurology and steroids have been of limited use and he has received plasma exchange with limited effect.  We were consulted for management.  We met discussed options including medical management, optic decompression via transcranial or endonasal routes, for optic decompression with attempted resection of mass.  I favored a transcranial over endonasal route if surgery was performed.  We discussed the risk and benefits of each approach.  Given his recent plasma exchange and chronic thrombocytopenia, we discussed supporting his coagulopathy through surgery with hematology service.  Given his vision loss, he requested we proceed with surgery via transcranial route.  Informed consent was obtained.    Procedure in detail: After informed consent was obtained, the patient was brought to the operating room and placed supine on the operating room table.  We requested that steroids be held.  The  anesthesia service performed an intubation and establish intravenous and intra-arterial access.  The bed was turned 180 degrees.  The patient's head was placed in a Richardson head nuno and turned slightly towards the contralateral side with mild extension.  The patient was in supine position and all pressure points were carefully padded.  Stereotactic neuro navigation was registered and its accuracy was confirmed.  A right pterional incision was marked and a small hair shave was performed.  The cranial area was sterilely prepped and draped in usual fashion.  The patient received mannitol 0.5 g/kg as well as cefazolin for antibiotic prophylactics.  A timeout was performed to confirm details of the procedure.    We again began the approach.  The skin incision was made and a myocutaneous flap was reflected anteriorly.  Navigation was used to confirm our trajectory and plan a craniotomy.  Bur holes were placed and a pterional craniotomy was performed.  The frontal sinus was not entered.  The dura was largely intact.  The dura was off-white and appeared abnormally thickened.  Hemostasis at this point did not appear abnormal given his chronic thrombocytopenia.  We then worked extradurally to perform an optic canal decompression.  We brought in the operating microscope.  We used navigation to confirm our landmarks.  We then used the irrigating drill to drill the sphenoid wing and identify the lateral orbit and orbital apex.  We drilled this to decompress the orbital apex and continued our exposure posteriorly.  We then identified the optic nerve and the optic canal.  We then used the irrigating drill to decompress the optic canal fully from the intradural space out to the orbital apex.  We then performed a limited lateral decompression as well, but stopped after we blue lined the pneumatization of the optic strut, and then stopped at this point to prevent having a CSF leak into the sphenoid sinus.  This completed our  approach.  We then irrigated copiously and hemostasis appeared excellent.    We then worked intradurally to perform exploration and resection of the mass.  We open the dura in a C shape and reflected this anteriorly.  A small piece of the frontal dura was saved for microbiology and permanent pathology specimens, given its abnormal thickness.  We then performed a subfrontal dissection and found small little pearls over the dura reminiscent of the meningioma.  We therefore cut the dura over the anterior clinoid process and sent this for permanent pathology and microbiology specimens.  We then open the proximal sylvian fissure and connected this down to the opticocarotid cistern.  The optic nerve appeared healthy.  The arachnoid in this area was abnormally thickened so we took down the thickened arachnoid around the optic nerve and carotid artery.  After taking down all the arachnoid, we identified a red-brown mass on the lateral aspect of the optic nerve as it entered intradurally.  We were able to dissect and separate this from both the optic nerve and carotid artery.  We saved small pieces of specimen for permanent pathology, however there was not enough to send for microbiology or frozen section.  We then used sharp dissection to open the falciform ligament and decompress the optic nerve in its canal.  We then mobilized the optic nerve medially and identified additional small areas of tumor in the optic canal along its midpoint.  These were dissected free and also saved for permanent pathology and added to the prior specimen.  We then reexplored the area and found no evidence of residual tumor that could be removed.  We identified the ophthalmic artery just inferior to the nerve and this appeared robust.  Hemostasis appeared excellent.  We irrigated copiously.  We did appear to accomplish the goals of surgery, therefore return to closure.    After irrigating copiously again, we then lined the frontal lobe with  Surgicel.  The microscope was then removed.  The dura was tacked back into position using Nurolon sutures.  A durarepair graft was used as an onlay.  We then placed a small portion of temporalis muscle over the drilled anterior clinoid over the area of pneumatization to help prevent CSF leak.  We then placed central and peripheral tack ups.  The bone flap was then replaced using the Synthes plating system.  We then obtained hemostasis over the temporalis muscle.  The temporalis was then closed with interrupted Vicryl sutures.  The galea was then closed with interrupted Vicryl sutures.  The skin was then closed with a running nylon stitch.  The wound was sterilely dressed.  The sterile drapes were removed.  The patient's head was released from the Richardson head nuno and the head of bed was replaced.  The patient was then brought to the postoperative care unit in route to the ICU for monitoring overnight.    Upon conclusion of the procedure, I called the patient's spouse for an update.  I was scrubbed and performed the critical portions of the procedure, and remained immediately available throughout.    Jose Guadalupe Camacho MD

## 2023-08-15 NOTE — PROCEDURES
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 Informed consent was obtained. Following the administration of Midazolam as pre-medication, patient was placed in the right lateral decubitus position and prepped and draped in a sterile manner. Approximately 10 cc of 1% Lidocaine was used over the left posterior iliac spine. Following this a 3 mm incision was made. Trephine bone marrow core(s) was (were) obtained from the [facility]. Bone marrow aspirates were obtained from the [facility]. Aspirates were sent for morphology, immunophenotyping, cytogenetics, and molecular diagnostics. A total of approximately 25 ml of marrow was aspirated. Following this procedure a sterile dressing was applied to the bone marrow biopsy site(s). The patient was placed in the supine position to maintain pressure on the biopsy site. Post-procedure wound care instructions were given.     Complications: NO    Length of procedure:21 minutes to 45 minutes  Procedure performed by: MD Dr. Farhad Sullivan, hematology attending was present in the patient room for the entire procedure.

## 2023-08-15 NOTE — OR NURSING
Pt is stable. Vitals remain unchanged. Pharmacy needed to dispense Calcium Glucinate to Pacu but the SICU has this med on floor so they will give it. Neuro status is stable and unchanged. Pt had met Pacu transfer criteria and is in route to SICU at this time.

## 2023-08-15 NOTE — PLAN OF CARE
Cardiac: Sinus Rhythm so sinus tachy. BP stable. Art-line removed. Cuff pressures.  Neuro: Intact. Forgetful at times. A&Ox4. Pupils equal reactive. R-pupil sluggish. Moves all extremities. Strength equal bilaterally. Q4hr neuro's. Makes needs known.  Respiratory: RA. LS clear.  GI: Regular diet. BGM. Diabetic team following. Good appetite. No BM. Passing gas. Bowel program in place.  : Ponce removed. Voiding. Unmeasured X2. UA sent.  Skin: R-fontal lobe prima-pore dressings in place X3. Intact. Dressing R-wrist art-line. Gauze with clear dressing on Lumbar region r/t bone marrow biopsy.    Sig Events: Floor orders placed. Up in chair X6 hours. Neuro unchanged. Bone marrow biopsy sent.    Joe Moreira RN SICU Neuro-ICU

## 2023-08-15 NOTE — PROGRESS NOTES
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     24 hour events: Transferred to ICU post Right frontotemporal craniotomy and decompression of the right optic canal and orbital apex resection of right optic canal and intradural mass. 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     CV  #HTN  #Paroxysmal SVT  -Cardiac monitoring  -SBP goal < 160 mmHg  -PRN Labetalol and Hydralazine  -PTA Lisinopril  -PTA Coreg    Resp  #MARIELA  Oxygen/vent: Room Air  -Continuous pulse ox  -Maintain O2 saturations greater than 92%    GI  #Nauseated  #Emesis  -PRN Zofran   Diet: ADAT  Last BM: PTA  GI prophylaxis: PTA Prilosec for steroid taper  -Bowel regimen: scheduled senna-docusate and Miralax    Renal/  #Hyponatremia  #Hypochloremia  #Hypocalcemia  #Hypermagnesemia   -Daily BMP  -IV fluids: NS @ 100  -Electrolyte replacement protocol    Endo  #Hyperglycemia  -Endocrine consulted  -qAM NPH  -sliding scale insulin  -Monitor glucose levels    Heme  #Thrombocytopenia   #Low fibrinogen  #Normocytic anemia  -Daily CBC  -Hgb goal >7, plt goal >50k  -Transfuse to meet  "Hgb and plt goals  -Heme consult  -Bone marrow biopsy today-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  Code: Full  Dispo: ICU - [facility]    Clinically Significant Risk Factors        # Hypokalemia: Lowest K = 2.7 mmol/L in last 2 days, will replace as needed   # Hypocalcemia: Lowest iCa = 4.1 mg/dL in last 2 days, will monitor and replace as appropriate      # Coagulation Defect: INR = 1.22 (Ref range: 0.85 - 1.15) and/or PTT = 25 Seconds (Ref range: 22 - 38 Seconds), will monitor for bleeding  # Thrombocytopenia: Lowest platelets = 94 in last 2 days, will monitor for bleeding          # Overweight: Estimated body mass index is 25.24 kg/m  as calculated from the following:    Height as of this encounter: 1.905 m (6' 3\").    Weight as of this encounter: 91.6 kg (201 lb 15.1 oz).                TIME SPENT ON THIS ENCOUNTER   I spent 50 minutes of critical care time on the unit/floor managing the care of Amado Butler excluding time performing procedures. Upon evaluation, this patient had a high probability of imminent or life-threatening deterioration due to right eye blindness s/p resection of mass, which required my direct attention, intervention, and personal management. Greater than 50% of my time was spent at the bedside counseling the patient and/or coordinating care including chart review, history, exam, documentation, and further activities per this note. I have personally reviewed the following data/imaging over the past 24 hours.     The patient was seen and discussed with the [facility] attending, Dr. Munson.    Robbie Choi, [facility] *[id number]    24 Hour Vital Signs Summary:  Temp: 97.8  F (36.6  C) Temp  Min: 97.4  F (36.3  C)  Max: 99.2  F (37.3  C)  Resp: 11 Resp  Min: 10  Max: 16  SpO2: 99 % SpO2  Min: 94 %  Max: 99 %  Pulse: 78 Pulse  Min: 56  Max: 116    No " "data recorded  BP: 120/66 Systolic (24hrs), Av , Min:111 , Max:151   Diastolic (24hrs), Av, Min:56, Max:93      Respiratory monitoring:   Resp: 11      I/O last 3 completed shifts:  In: 6231.75 [P.O.:1000; I.V.:5001]  Out: 5370 [Urine:5350; Blood:20]    Current Medications:   acetaminophen  975 mg Oral Q8H    carvedilol  12.5 mg Oral BID w/meals    ceFAZolin  2 g Intravenous Q8H    dexAMETHasone  4 mg Intravenous Q8H    Followed by    [START ON 2023] dexAMETHasone  3 mg Intravenous Q8H    Followed by    [START ON 2023] dexAMETHasone  2 mg Intravenous Q8H    Followed by    [START ON 2023] dexAMETHasone  1 mg Intravenous Q8H    insulin aspart  1-7 Units Subcutaneous TID AC    insulin aspart  1-6 Units Subcutaneous At Bedtime    insulin aspart   Subcutaneous Daily with breakfast    insulin NPH  9 Units Subcutaneous QAM    lisinopril  10 mg Oral QPM    omeprazole  40 mg Oral Daily    polyethylene glycol  17 g Oral Daily    senna-docusate  2 tablet Oral BID    sodium chloride (PF)  3 mL Intracatheter Q8H       PRN Medications:  [START ON 2023] acetaminophen, bisacodyl, glucose **OR** dextrose **OR** glucagon, heparin, hydrALAZINE, HYDROmorphone **OR** HYDROmorphone, insulin aspart, labetalol, lidocaine 4%, lidocaine (buffered or not buffered), magnesium hydroxide, naloxone **OR** naloxone **OR** naloxone **OR** naloxone, ondansetron **OR** ondansetron, oxyCODONE **OR** oxyCODONE, prochlorperazine **OR** prochlorperazine, sodium chloride (PF)    Infusions:   niCARdipine Stopped (08/15/23 0408)       No Known Allergies    Physical Examination:  Vitals: /66   Pulse 78   Temp 97.8  F (36.6  C) (Oral)   Resp 11   Ht 1.905 m (6' 3\")   Wt 91.6 kg (201 lb 15.1 oz)   SpO2 99%   BMI 25.24 kg/m    General: Adult male patient, lying in bed, critically-ill  HEENT: crani sight, surgical dressing, oral cavity/oropharynx pink and moist  Cardiac: RRR, s1/s2 auscultated without murmur  Pulm: CTAB, " unlabored, expansion symmetric, no retractions or use of accessory muscles  Abdomen: Soft, non-distended, bowel sounds present  Extremities: Warm, no edema, distal pulses +2, well perfused  Skin: No rash or lesion  Psych: Calm and cooperative  Neuro:  Mental status: Awake, alert, attentive, oriented to self, time, place, and circumstance. Language is fluent and coherent with intact comprehension of complex commands, naming and repetition.  Cranial nerves: right eye blindness, EOMI, facial sensation intact, face symmetric, shoulder shrug strong, tongue midline, no dysarthria.   Motor: Normal bulk and tone. No abnormal movements. 5/5 strength in 4/4 extremities.   Sensory: Intact to light touch x 4 extremities  Coordination: FNF and HS without ataxia or dysmetria. Rapid alternating movements intact.   Gait: LON, deferred.      Labs and Imaging:    Results for orders placed or performed during the hospital encounter of 08/09/23 (from the past 24 hour(s))   Arterial Panel POCT   Result Value Ref Range    pH Arterial POCT 7.54 (H) 7.35 - 7.45    pCO2 Arterial POCT 34 (L) 35 - 45 mm Hg    pO2 Arterial POCT 191 (H) 80 - 105 mm Hg    Bicarbonate Arterial POCT 29 (H) 21 - 28 mmol/L    Sodium POCT 130 (L) 133 - 144 mmol/L    Potassium POCT 3.4 (L) 3.5 - 5.0 mmol/L    Hemoglobin POCT 10.8 (L) 13.3 - 17.7 g/dL    Glucose Whole Blood POCT 183 (H) 70 - 99 mg/dL    Calcium, Ionized Whole Blood POCT 4.1 (L) 4.4 - 5.2 mg/dL    Base Excess/Deficit (+/-) POCT 6.2 (H) -9.6 - 2.0 mmol/L    FIO2 POCT 41.0 %    Lactic Acid POCT 1.4 <=2.0 mmol/L   Gram Stain    Specimen: Brain; Tissue   Result Value Ref Range    Gram Stain Result No organisms seen     Gram Stain Result No white blood cells seen    Tissue Aerobic Bacterial Culture Routine    Specimen: Brain; Tissue   Result Value Ref Range    Culture No growth, less than 1 day    Gram Stain    Specimen: Brain; Tissue   Result Value Ref Range    Gram Stain Result No organisms seen     Gram  Stain Result No white blood cells seen    Tissue Aerobic Bacterial Culture Routine    Specimen: Brain; Tissue   Result Value Ref Range    Culture No growth, less than 1 day    Gram Stain    Specimen: Eye, Right; Tissue   Result Value Ref Range    Gram Stain Result No organisms seen     Gram Stain Result 1+ WBC seen    Tissue Aerobic Bacterial Culture Routine    Specimen: Eye, Right; Tissue   Result Value Ref Range    Culture No growth, less than 1 day     Narrative    This specimen was received on a swab. Results may not be optimal. For maximum sensitivity of detection submit tissue, fluid or fine needle aspirate.         Glucose by meter   Result Value Ref Range    GLUCOSE BY METER POCT 215 (H) 70 - 99 mg/dL   Glucose by meter   Result Value Ref Range    GLUCOSE BY METER POCT 213 (H) 70 - 99 mg/dL   Glucose by meter   Result Value Ref Range    GLUCOSE BY METER POCT 184 (H) 70 - 99 mg/dL   CT Head w/o Contrast    Narrative    EXAM: CT HEAD W/O CONTRAST  8/14/2023 10:47 PM     HISTORY: post-op decompression of optic canal       COMPARISON: Head CT 8/14/2023, brain MRI 8/12/2023    TECHNIQUE: Using multidetector thin collimation helical acquisition  technique, axial, coronal and sagittal CT images from the skull base  to the vertex were obtained without intravenous contrast.   (topogram) image(s) also obtained and reviewed.    FINDINGS:  Interval right frontotemporal craniotomy for underlying resection of  right optic canal/intradural mass. Trace hyperdensity along the right  frontotemporal craniotomy. Small/moderate pneumocephalus along the  bifrontal convexity with associated mild mass effect. Unchanged right  frontal encephalomalacia. No midline shift. No acute loss of  gray-white matter differentiation in the cerebral hemispheres.  Ventricles are proportionate to the cerebral sulci. Clear basal  cisterns.    Soft tissue emphysema along the right frontal scalp. The visualized  portions of the paranasal sinuses  and mastoid air cells are clear.  Similar-appearing left posterior maxillary alveolar ridge bony  dehiscence/sclerosis. Grossly normal orbits.       Impression    IMPRESSION: Interval right frontotemporal craniotomy for underlying  optic canal/intradural mass resection with trace hemorrhage along the  right frontotemporal craniotomy and pneumocephalus along bifrontal  convexity.    I have personally reviewed the examination and initial interpretation  and I agree with the findings.    MYKE CHILDS MD         SYSTEM ID:  [id number]   EKG 12-lead, complete   Result Value Ref Range    Systolic Blood Pressure  mmHg    Diastolic Blood Pressure  mmHg    Ventricular Rate 86 BPM    Atrial Rate 86 BPM    WA Interval 156 ms    QRS Duration 98 ms     ms    QTc 479 ms    P Axis 56 degrees    R AXIS 21 degrees    T Axis 1 degrees    Interpretation ECG       Sinus rhythm with frequent premature atrial complexes  Minimal voltage criteria for LVH, may be normal variant ( [place] product )  Abnormal ECG  When compared with ECG of 13-AUG-2023 09:13,  PACs are now present  T wave inversion now evident in Inferior leads    Confirmed by MD STAPLETON JANE ([id number]) on 8/15/2023 12:22:17 PM     CBC with platelets   Result Value Ref Range    WBC Count 14.1 (H) 4.0 - 11.0 10e3/uL    RBC Count 3.48 (L) 4.40 - 5.90 10e6/uL    Hemoglobin 10.5 (L) 13.3 - 17.7 g/dL    Hematocrit 30.3 (L) 40.0 - 53.0 %    MCV 87 78 - 100 fL    MCH 30.2 26.5 - 33.0 pg    MCHC 34.7 31.5 - 36.5 g/dL    RDW 17.0 (H) 10.0 - 15.0 %    Platelet Count 139 (L) 150 - 450 10e3/uL   Basic metabolic panel   Result Value Ref Range    Sodium 133 (L) 136 - 145 mmol/L    Potassium 3.3 (L) 3.4 - 5.3 mmol/L    Chloride 96 (L) 98 - 107 mmol/L    Carbon Dioxide (CO2) 27 22 - 29 mmol/L    Anion Gap 10 7 - 15 mmol/L    Urea Nitrogen 14.5 8.0 - 23.0 mg/dL    Creatinine 0.86 0.67 - 1.17 mg/dL    Calcium 7.6 (L) 8.8 - 10.2 mg/dL    Glucose 180 (H) 70 - 99 mg/dL    GFR Estimate >90  >60 mL/min/1.73m2   Magnesium   Result Value Ref Range    Magnesium 1.9 1.7 - 2.3 mg/dL   Phosphorus   Result Value Ref Range    Phosphorus 3.4 2.5 - 4.5 mg/dL   RBC and Platelet Morphology   Result Value Ref Range    Platelet Assessment  Automated Count Confirmed. Platelet morphology is normal.     Automated Count Confirmed. Platelet morphology is normal.    RBC Morphology Confirmed RBC Indices    Glucose by meter   Result Value Ref Range    GLUCOSE BY METER POCT 183 (H) 70 - 99 mg/dL   CBC with platelets   Result Value Ref Range    WBC Count 16.4 (H) 4.0 - 11.0 10e3/uL    RBC Count 3.56 (L) 4.40 - 5.90 10e6/uL    Hemoglobin 10.6 (L) 13.3 - 17.7 g/dL    Hematocrit 31.3 (L) 40.0 - 53.0 %    MCV 88 78 - 100 fL    MCH 29.8 26.5 - 33.0 pg    MCHC 33.9 31.5 - 36.5 g/dL    RDW 17.1 (H) 10.0 - 15.0 %    Platelet Count 134 (L) 150 - 450 10e3/uL   Glucose by meter   Result Value Ref Range    GLUCOSE BY METER POCT 203 (H) 70 - 99 mg/dL   Glucose by meter   Result Value Ref Range    GLUCOSE BY METER POCT 194 (H) 70 - 99 mg/dL   Basic metabolic panel   Result Value Ref Range    Sodium 132 (L) 136 - 145 mmol/L    Potassium 3.9 3.4 - 5.3 mmol/L    Chloride 94 (L) 98 - 107 mmol/L    Carbon Dioxide (CO2) 25 22 - 29 mmol/L    Anion Gap 13 7 - 15 mmol/L    Urea Nitrogen 16.3 8.0 - 23.0 mg/dL    Creatinine 1.02 0.67 - 1.17 mg/dL    Calcium 8.5 (L) 8.8 - 10.2 mg/dL    Glucose 198 (H) 70 - 99 mg/dL    GFR Estimate 82 >60 mL/min/1.73m2   Magnesium   Result Value Ref Range    Magnesium 3.2 (H) 1.7 - 2.3 mg/dL   Phosphorus   Result Value Ref Range    Phosphorus 3.3 2.5 - 4.5 mg/dL   CBC with platelets   Result Value Ref Range    WBC Count 22.8 (H) 4.0 - 11.0 10e3/uL    RBC Count 3.80 (L) 4.40 - 5.90 10e6/uL    Hemoglobin 11.2 (L) 13.3 - 17.7 g/dL    Hematocrit 33.8 (L) 40.0 - 53.0 %    MCV 89 78 - 100 fL    MCH 29.5 26.5 - 33.0 pg    MCHC 33.1 31.5 - 36.5 g/dL    RDW 17.4 (H) 10.0 - 15.0 %    Platelet Count 144 (L) 150 - 450 10e3/uL    Glucose by meter   Result Value Ref Range    GLUCOSE BY METER POCT 227 (H) 70 - 99 mg/dL   INR   Result Value Ref Range    INR 1.82 (H) 0.85 - 1.15   Partial thromboplastin time   Result Value Ref Range    aPTT 29 22 - 38 Seconds   Fibrinogen activity   Result Value Ref Range    Fibrinogen Activity 153 (L) 170 - 490 mg/dL   CBC with platelets differential    Narrative    The following orders were created for panel order CBC with platelets differential.  Procedure                               Abnormality         Status                     ---------                               -----------         ------                     CBC with platelets and d...[[id number]]  Abnormal            Final result               Manual Differential[[id number]]          Abnormal            Final result                 Please view results for these tests on the individual orders.   CBC with platelets and differential   Result Value Ref Range    WBC Count 26.7 (H) 4.0 - 11.0 10e3/uL    RBC Count 3.12 (L) 4.40 - 5.90 10e6/uL    Hemoglobin 9.5 (L) 13.3 - 17.7 g/dL    Hematocrit 27.2 (L) 40.0 - 53.0 %    MCV 87 78 - 100 fL    MCH 30.4 26.5 - 33.0 pg    MCHC 34.9 31.5 - 36.5 g/dL    RDW 17.2 (H) 10.0 - 15.0 %    Platelet Count 145 (L) 150 - 450 10e3/uL   Manual Differential   Result Value Ref Range    % Neutrophils 73 %    % Lymphocytes 9 %    % Monocytes 17 %    % Eosinophils 0 %    % Basophils 0 %    % Metamyelocytes 1 %    Absolute Neutrophils 19.5 (H) 1.6 - 8.3 10e3/uL    Absolute Lymphocytes 2.4 0.8 - 5.3 10e3/uL    Absolute Monocytes 4.5 (H) 0.0 - 1.3 10e3/uL    Absolute Eosinophils 0.0 0.0 - 0.7 10e3/uL    Absolute Basophils 0.0 0.0 - 0.2 10e3/uL    Absolute Metamyelocytes 0.3 (H) <=0.0 10e3/uL    RBC Morphology Confirmed RBC Indices     Platelet Assessment  Automated Count Confirmed. Platelet morphology is normal.     Automated Count Confirmed. Platelet morphology is normal.   Glucose by meter   Result Value Ref Range    GLUCOSE BY  METER POCT 257 (H) 70 - 99 mg/dL   Osmolality   Result Value Ref Range    Osmolality Blood 277 (L) 280 - 301 mmol/kg    Narrative    Greater than 385 mmol/kg relates to stupor in hyperglycemia   Greater than 400 mmol/kg can relate to seizures   Greater than 420 mmol/kg can be lethal    Serum Osmalar Gap:   Normal <10   Larger suggest unmeasured substances present in serum (ethanol, methanol, isopropanol, mannitol, ethylene glycol).   Osmolality urine   Result Value Ref Range    Osmolality Urine 744 100 - 1,200 mmol/kg    Narrative    Reference Ranges depend on patient's hydration status and renal function.   Neonates:  mmol/kg   2 years and older, random specimens: 100-1200 mmol/kg; Greater than 850 mmol/kg after 12 hour fluid restriction  Urine/serum osmolality ratio: 2 years and older: 1.0-3.0; 3.0-4.7 after 12 hour fluid restriction   Sodium random urine   Result Value Ref Range    Sodium Urine mmol/L <20 mmol/L       All relevant imaging and laboratory values personally reviewed.

## 2023-08-15 NOTE — PROGRESS NOTES
IP Diabetes Management  Daily Note         HPI: Mr. 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 Seems to have been steroid responsive initially but now apparently went from 20/40 to unable to count fingers in one week despite reasonable high doses of steroids. Recurrent relapsing course noted with negative work up to date.       Assessment:   1) Vision loss and R eye pain of unknown etiology partially responsive to high dose steroids.  2) Type 2 diabetes exacerbated by recurrent high dose steroids in the last 2 months. BG >300 PTA off steroids.   3) Steroid hyperglycemia: currently on dexamethasone 4 mg every 8 hours- weaning to 3 mg every 8 hours  4) Stress hyperglycemia post procedure    Plan:    -Start NPH 9 units this AM (0800)   -PM NPH- start 5 units this PM (2000)   -Novolog dinner and snacks to 1:10 with breakfast and lunch and 1:20 with dinner and snacks.-- change to 1:15 CHO for breakfast and lunch and 1:20 with dinner and snacks   -Novolog  High intensity sliding scale TID AC and HS- decrease to iSF 35 sliding scale TID AC and medium sliding scale HS   -BG monitoring TID AC, HS, 0200   -hypoglycemia protocol   -carb counting protocol    Discharge Planning:    -Tentative diabetic regimen: likely insulin and metformin   -diabetes education needs: insulin education- consult placed 8/14  -Outpatient follow up: [facility]  -Test claim: CGM, nph, and short acting insulin coverage- pharmacy liaison consult placed 8/14    Plan discussed with patient, bedside RN, and primary team.      Interval History and Assessment: interval glucose trend reviewed:   BG slightly above goal last night- started on 4 mg of dexamethasone every 8 hours--  patient given 9 units of regular insulin total around 10PM. No overnight low BG.     Elevated BG this afternoon- no cho novolog documented- patient eating while provider rounding at 1000.             Currently, denies nausea, vomiting, diarrhea or pain. Katie notes he feels tired and did not sleep well last night     Labs:8/14  Bicarb:31  Creatinine: 0.94  eGFR: 90  Anion Gap: 10    Current nutritional intake and type: Orders Placed This Encounter      Regular Diet Adult      Planned Procedures/surgeries: biopsy planned for after 1500  Steroid planning: predniosone 80 mg once daily in AM  D5W-containing solutions/medications: dextrose 5% running at 50ml/hr PRN to keep BG >100--- dextrose correction last night for hypoglycemia    PTA Diabetes Regimen:   Checking BG only.  Fasting BG off steroid now 120 - 160, later in day >200 and 300s at times.  Shares [employer] guera readings with me.   Recently was Metformin 500 mg prescribed, 1 tab times 2 weeks, then 2 tablets daily.  Has not yet taken.             Diabetes History:   Type of Diabetes: Type 2 Diabetes Mellitus  No results found for: A1C           Review of Systems:     The Review of Systems is negative other than noted in the Interval History.           Medications:     Current Facility-Administered Medications   Medication    [START ON 8/17/2023] acetaminophen (TYLENOL) tablet 650 mg    acetaminophen (TYLENOL) tablet 975 mg    bisacodyl (DULCOLAX) suppository 10 mg    carvedilol (COREG) tablet 12.5 mg    ceFAZolin (ANCEF) 2 g in 100 mL D5W intermittent infusion    dexAMETHasone (DECADRON) injection 4 mg    Followed by    [START ON 8/16/2023] dexAMETHasone (DECADRON) injection 3 mg    Followed by    [START ON 8/17/2023] dexAMETHasone (DECADRON) injection 2 mg    Followed by    [START ON 8/18/2023] dexAMETHasone (DECADRON) injection 1 mg    glucose gel 15-30 g    Or    dextrose 50 % injection 25-50 mL    Or    glucagon injection 1 mg    heparin 100 unit/mL  "injection 3 mL    hydrALAZINE (APRESOLINE) injection 10-20 mg    HYDROmorphone (DILAUDID) injection 0.2 mg    Or    HYDROmorphone (DILAUDID) injection 0.4 mg    insulin aspart (NovoLOG) injection (RAPID ACTING)    insulin aspart (NovoLOG) injection (RAPID ACTING)    insulin aspart (NovoLOG) injection (RAPID ACTING)    insulin aspart (NovoLOG) injection (RAPID ACTING)    insulin NPH injection 9 Units    labetalol (NORMODYNE/TRANDATE) injection 10-40 mg    lidocaine (LMX4) cream    lidocaine 1 % 0.1-1 mL    lisinopril (ZESTRIL) tablet 10 mg    magnesium hydroxide (MILK OF MAGNESIA) suspension 30 mL    naloxone (NARCAN) injection 0.2 mg    Or    naloxone (NARCAN) injection 0.4 mg    Or    naloxone (NARCAN) injection 0.2 mg    Or    naloxone (NARCAN) injection 0.4 mg    omeprazole (PriLOSEC) CR capsule 40 mg    ondansetron (ZOFRAN ODT) ODT tab 4 mg    Or    ondansetron (ZOFRAN) injection 4 mg    oxyCODONE (ROXICODONE) tablet 5 mg    Or    oxyCODONE IR (ROXICODONE) tablet 10 mg    polyethylene glycol (MIRALAX) Packet 17 g    prochlorperazine (COMPAZINE) injection 5 mg    Or    prochlorperazine (COMPAZINE) tablet 5 mg    senna-docusate (SENOKOT-S/PERICOLACE) 8.6-50 MG per tablet 2 tablet    sodium chloride (PF) 0.9% PF flush 3 mL    sodium chloride (PF) 0.9% PF flush 3 mL            Physical Exam:    /70   Pulse 87   Temp 98.6  F (37  C) (Oral)   Resp 22   Ht 1.905 m (6' 3\")   Wt 91.6 kg (201 lb 15.1 oz)   SpO2 100%   BMI 25.24 kg/m    General: pleasant, in no distress. Sitting in chair   HEENT: normocephalic, atraumatic. Oral mucous membranes moist.   Lungs: unlabored respiration, no cough  ABD: rounded, nondistended appearing  Skin: warm and dry, 3 dressing on head  MSK:  moves all extremities  Lymp:  no LE edema   Mental status:  alert, oriented to self, place, time  Psych:  flat affect, calm and appropriate interaction             Data:     Recent Labs   Lab 08/15/23  0812 08/15/23  0544 08/15/23  0514 " 08/15/23  0324 08/15/23  0014 08/14/23  2306   * 198* 194* 203* 183* 180*     Lab Results   Component Value Date    WBC 26.7 (H) 08/15/2023    WBC 22.8 (H) 08/15/2023    WBC 16.4 (H) 08/15/2023    HGB 9.5 (L) 08/15/2023    HGB 11.2 (L) 08/15/2023    HGB 10.6 (L) 08/15/2023    HCT 27.2 (L) 08/15/2023    HCT 33.8 (L) 08/15/2023    HCT 31.3 (L) 08/15/2023    MCV 87 08/15/2023    MCV 89 08/15/2023    MCV 88 08/15/2023     (L) 08/15/2023     (L) 08/15/2023     (L) 08/15/2023     Lab Results   Component Value Date     (L) 08/15/2023     (L) 08/14/2023     (L) 08/14/2023    POTASSIUM 3.9 08/15/2023    POTASSIUM 3.3 (L) 08/14/2023    POTASSIUM 3.4 (L) 08/14/2023    CHLORIDE 94 (L) 08/15/2023    CHLORIDE 96 (L) 08/14/2023    CHLORIDE 96 (L) 08/14/2023    CO2 25 08/15/2023    CO2 27 08/14/2023    CO2 31 (H) 08/14/2023     (H) 08/15/2023     (H) 08/15/2023     (H) 08/15/2023     Lab Results   Component Value Date    BUN 16.3 08/15/2023    BUN 14.5 08/14/2023    BUN 19.8 08/14/2023     No results found for: TSH  Lab Results   Component Value Date    AST 35 08/09/2023    AST 24 07/03/2023    ALT 41 08/09/2023    ALT 18 07/03/2023    ALKPHOS 71 08/09/2023    ALKPHOS 64 07/03/2023       I spent a total of 60 minutes face to face or coordinating care of Amado Butler.             Over 50% of my time on the unit was spent counseling the patient and/or coordinating care regarding acute hyperglycemia management.  See note for details.      Contacting the Inpatient Diabetes Team   From 7AM-5PM: page inpatient diabetes provider that is following the patient, or utilize the job code paging system. From 5PM-7AM: page the job code for endocrine fellow on call.     Please use the following job code to reach the Inpatient Diabetes team. Note that you must use an in house phone and that job codes cannot receive text pages.   Dial [phone] (or star-star-star [phone] on the new [employer]  telephones), then [phone] to reach the endocrine-diabetes provider on call.    CARMELO Hernandez, CNP  Inpatient Diabetes Service   Pager: [phone]

## 2023-08-15 NOTE — ANESTHESIA CARE TRANSFER NOTE
Patient: Amado Butler    Procedure: Procedure(s):  stealth assisted Frontotemporal craniotomy for optic mass       Diagnosis: Vision, loss, sudden, right [H53.131]  Diagnosis Additional Information: No value filed.    Anesthesia Type:   General     Note:    Oropharynx: oropharynx clear of all foreign objects and spontaneously breathing  Level of Consciousness: awake  Oxygen Supplementation: room air    Independent Airway: airway patency satisfactory and stable  Dentition: dentition unchanged  Vital Signs Stable: post-procedure vital signs reviewed and stable  Report to RN Given: handoff report given  Patient transferred to: PACU    Handoff Report: Identifed the Patient, Identified the Reponsible Provider, Reviewed the pertinent medical history, Discussed the surgical course, Reviewed Intra-OP anesthesia mangement and issues during anesthesia, Set expectations for post-procedure period and Allowed opportunity for questions and acknowledgement of understanding      Vitals:  Vitals Value Taken Time   BP     Temp     Pulse 61 08/14/23 2023   Resp     SpO2 97 % 08/14/23 2023   Vitals shown include unvalidated device data.    Electronically Signed By: CARMELO Urban CRNA  August 14, 2023  8:25 PM

## 2023-08-16 ENCOUNTER — APPOINTMENT (OUTPATIENT)
Dept: OCCUPATIONAL THERAPY | Facility: CLINIC | Age: 63
DRG: 025 | End: 2023-08-16
Payer: COMMERCIAL

## 2023-08-16 LAB
ANION GAP SERPL CALCULATED.3IONS-SCNC: 7 MMOL/L (ref 7–15)
BUN SERPL-MCNC: 16.5 MG/DL (ref 8–23)
CALCIUM SERPL-MCNC: 8.3 MG/DL (ref 8.8–10.2)
CHLORIDE SERPL-SCNC: 101 MMOL/L (ref 98–107)
COPPER SERPL-MCNC: 56.4 UG/DL
CREAT SERPL-MCNC: 0.92 MG/DL (ref 0.67–1.17)
DEPRECATED HCO3 PLAS-SCNC: 26 MMOL/L (ref 22–29)
ERYTHROCYTE [DISTWIDTH] IN BLOOD BY AUTOMATED COUNT: 17.4 % (ref 10–15)
FIBRINOGEN PPP-MCNC: 405 MG/DL (ref 170–490)
GFR SERPL CREATININE-BSD FRML MDRD: >90 ML/MIN/1.73M2
GLUCOSE BLDC GLUCOMTR-MCNC: 102 MG/DL (ref 70–99)
GLUCOSE BLDC GLUCOMTR-MCNC: 109 MG/DL (ref 70–99)
GLUCOSE BLDC GLUCOMTR-MCNC: 109 MG/DL (ref 70–99)
GLUCOSE BLDC GLUCOMTR-MCNC: 115 MG/DL (ref 70–99)
GLUCOSE BLDC GLUCOMTR-MCNC: 138 MG/DL (ref 70–99)
GLUCOSE BLDC GLUCOMTR-MCNC: 149 MG/DL (ref 70–99)
GLUCOSE BLDC GLUCOMTR-MCNC: 167 MG/DL (ref 70–99)
GLUCOSE BLDC GLUCOMTR-MCNC: 204 MG/DL (ref 70–99)
GLUCOSE BLDC GLUCOMTR-MCNC: 239 MG/DL (ref 70–99)
GLUCOSE BLDC GLUCOMTR-MCNC: 241 MG/DL (ref 70–99)
GLUCOSE BLDC GLUCOMTR-MCNC: 244 MG/DL (ref 70–99)
GLUCOSE BLDC GLUCOMTR-MCNC: 263 MG/DL (ref 70–99)
GLUCOSE BLDC GLUCOMTR-MCNC: 269 MG/DL (ref 70–99)
GLUCOSE BLDC GLUCOMTR-MCNC: 95 MG/DL (ref 70–99)
GLUCOSE SERPL-MCNC: 107 MG/DL (ref 70–99)
HCT VFR BLD AUTO: 27.7 % (ref 40–53)
HGB BLD-MCNC: 9.5 G/DL (ref 13.3–17.7)
MAGNESIUM SERPL-MCNC: 2.1 MG/DL (ref 1.7–2.3)
MCH RBC QN AUTO: 30.4 PG (ref 26.5–33)
MCHC RBC AUTO-ENTMCNC: 34.3 G/DL (ref 31.5–36.5)
MCV RBC AUTO: 89 FL (ref 78–100)
PATH REPORT.COMMENTS IMP SPEC: NORMAL
PATH REPORT.FINAL DX SPEC: NORMAL
PATH REPORT.MICROSCOPIC SPEC OTHER STN: NORMAL
PATH REPORT.RELEVANT HX SPEC: NORMAL
PHOSPHATE SERPL-MCNC: 2 MG/DL (ref 2.5–4.5)
PLATELET # BLD AUTO: 107 10E3/UL (ref 150–450)
POTASSIUM SERPL-SCNC: 3.7 MMOL/L (ref 3.4–5.3)
RBC # BLD AUTO: 3.12 10E6/UL (ref 4.4–5.9)
SCANNED LAB RESULT: NORMAL
SODIUM SERPL-SCNC: 134 MMOL/L (ref 136–145)
WBC # BLD AUTO: 16.7 10E3/UL (ref 4–11)

## 2023-08-16 PROCEDURE — 250N000013 HC RX MED GY IP 250 OP 250 PS 637: Performed by: STUDENT IN AN ORGANIZED HEALTH CARE EDUCATION/TRAINING PROGRAM

## 2023-08-16 PROCEDURE — 85027 COMPLETE CBC AUTOMATED: CPT | Performed by: STUDENT IN AN ORGANIZED HEALTH CARE EDUCATION/TRAINING PROGRAM

## 2023-08-16 PROCEDURE — 250N000013 HC RX MED GY IP 250 OP 250 PS 637: Performed by: PSYCHIATRY & NEUROLOGY

## 2023-08-16 PROCEDURE — 250N000011 HC RX IP 250 OP 636: Performed by: STUDENT IN AN ORGANIZED HEALTH CARE EDUCATION/TRAINING PROGRAM

## 2023-08-16 PROCEDURE — 250N000013 HC RX MED GY IP 250 OP 250 PS 637

## 2023-08-16 PROCEDURE — 80048 BASIC METABOLIC PNL TOTAL CA: CPT | Performed by: STUDENT IN AN ORGANIZED HEALTH CARE EDUCATION/TRAINING PROGRAM

## 2023-08-16 PROCEDURE — 85384 FIBRINOGEN ACTIVITY: CPT

## 2023-08-16 PROCEDURE — 999N000147 HC STATISTIC PT IP EVAL DEFER

## 2023-08-16 PROCEDURE — 97166 OT EVAL MOD COMPLEX 45 MIN: CPT | Mod: GO | Performed by: OCCUPATIONAL THERAPIST

## 2023-08-16 PROCEDURE — 250N000013 HC RX MED GY IP 250 OP 250 PS 637: Performed by: NEUROLOGICAL SURGERY

## 2023-08-16 PROCEDURE — 99233 SBSQ HOSP IP/OBS HIGH 50: CPT

## 2023-08-16 PROCEDURE — 36415 COLL VENOUS BLD VENIPUNCTURE: CPT | Performed by: STUDENT IN AN ORGANIZED HEALTH CARE EDUCATION/TRAINING PROGRAM

## 2023-08-16 PROCEDURE — 97530 THERAPEUTIC ACTIVITIES: CPT | Mod: GO | Performed by: OCCUPATIONAL THERAPIST

## 2023-08-16 PROCEDURE — 84100 ASSAY OF PHOSPHORUS: CPT

## 2023-08-16 PROCEDURE — 83735 ASSAY OF MAGNESIUM: CPT

## 2023-08-16 PROCEDURE — 250N000011 HC RX IP 250 OP 636: Mod: JZ

## 2023-08-16 PROCEDURE — 97535 SELF CARE MNGMENT TRAINING: CPT | Mod: GO | Performed by: OCCUPATIONAL THERAPIST

## 2023-08-16 PROCEDURE — 200N000002 HC R&B ICU UMMC

## 2023-08-16 PROCEDURE — 36415 COLL VENOUS BLD VENIPUNCTURE: CPT

## 2023-08-16 PROCEDURE — 258N000003 HC RX IP 258 OP 636

## 2023-08-16 RX ORDER — POTASSIUM CHLORIDE 750 MG/1
20 TABLET, EXTENDED RELEASE ORAL ONCE
Status: COMPLETED | OUTPATIENT
Start: 2023-08-16 | End: 2023-08-16

## 2023-08-16 RX ADMIN — SENNOSIDES AND DOCUSATE SODIUM 2 TABLET: 50; 8.6 TABLET ORAL at 20:05

## 2023-08-16 RX ADMIN — POLYETHYLENE GLYCOL 3350 17 G: 17 POWDER, FOR SOLUTION ORAL at 08:03

## 2023-08-16 RX ADMIN — ACETAMINOPHEN 975 MG: 325 TABLET, FILM COATED ORAL at 16:34

## 2023-08-16 RX ADMIN — PANTOPRAZOLE SODIUM 40 MG: 40 TABLET, DELAYED RELEASE ORAL at 08:03

## 2023-08-16 RX ADMIN — DEXAMETHASONE SODIUM PHOSPHATE 3 MG: 4 INJECTION, SOLUTION INTRA-ARTICULAR; INTRALESIONAL; INTRAMUSCULAR; INTRAVENOUS; SOFT TISSUE at 17:10

## 2023-08-16 RX ADMIN — SODIUM CHLORIDE: 9 INJECTION, SOLUTION INTRAVENOUS at 00:53

## 2023-08-16 RX ADMIN — CARVEDILOL 12.5 MG: 12.5 TABLET, FILM COATED ORAL at 08:03

## 2023-08-16 RX ADMIN — PANTOPRAZOLE SODIUM 40 MG: 40 TABLET, DELAYED RELEASE ORAL at 16:34

## 2023-08-16 RX ADMIN — ACETAMINOPHEN 975 MG: 325 TABLET, FILM COATED ORAL at 08:02

## 2023-08-16 RX ADMIN — POTASSIUM & SODIUM PHOSPHATES POWDER PACK 280-160-250 MG 1 PACKET: 280-160-250 PACK at 12:21

## 2023-08-16 RX ADMIN — DEXAMETHASONE SODIUM PHOSPHATE 3 MG: 4 INJECTION, SOLUTION INTRA-ARTICULAR; INTRALESIONAL; INTRAMUSCULAR; INTRAVENOUS; SOFT TISSUE at 00:45

## 2023-08-16 RX ADMIN — POTASSIUM & SODIUM PHOSPHATES POWDER PACK 280-160-250 MG 1 PACKET: 280-160-250 PACK at 16:34

## 2023-08-16 RX ADMIN — POTASSIUM CHLORIDE 20 MEQ: 750 TABLET, EXTENDED RELEASE ORAL at 08:03

## 2023-08-16 RX ADMIN — LABETALOL HYDROCHLORIDE 20 MG: 5 INJECTION, SOLUTION INTRAVENOUS at 08:54

## 2023-08-16 RX ADMIN — CALCIUM GLUCONATE 2 G: 20 INJECTION, SOLUTION INTRAVENOUS at 00:44

## 2023-08-16 RX ADMIN — SODIUM CHLORIDE: 9 INJECTION, SOLUTION INTRAVENOUS at 14:29

## 2023-08-16 RX ADMIN — SENNOSIDES AND DOCUSATE SODIUM 2 TABLET: 50; 8.6 TABLET ORAL at 08:03

## 2023-08-16 RX ADMIN — LEVETIRACETAM 1000 MG: 500 TABLET, FILM COATED ORAL at 08:02

## 2023-08-16 RX ADMIN — LEVETIRACETAM 1000 MG: 500 TABLET, FILM COATED ORAL at 20:05

## 2023-08-16 RX ADMIN — DEXAMETHASONE SODIUM PHOSPHATE 3 MG: 4 INJECTION, SOLUTION INTRA-ARTICULAR; INTRALESIONAL; INTRAMUSCULAR; INTRAVENOUS; SOFT TISSUE at 08:34

## 2023-08-16 RX ADMIN — POTASSIUM & SODIUM PHOSPHATES POWDER PACK 280-160-250 MG 1 PACKET: 280-160-250 PACK at 08:03

## 2023-08-16 RX ADMIN — CARVEDILOL 12.5 MG: 12.5 TABLET, FILM COATED ORAL at 17:10

## 2023-08-16 ASSESSMENT — ACTIVITIES OF DAILY LIVING (ADL)
ADLS_ACUITY_SCORE: 20

## 2023-08-16 NOTE — PLAN OF CARE
Physical Therapy: Orders received. Chart reviewed and discussed with care team.? Physical Therapy not indicated due to patient mobilizing well, primarily limited by visual deficits but anticipate safe d/c home.? Defer discharge recommendations to OT who will continue to follow while inpatient, PT will complete orders.

## 2023-08-16 NOTE — PROGRESS NOTES
IP Diabetes Management  Daily Note         HPI: Mr. 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 Seems to have been steroid responsive initially but now apparently went from 20/40 to unable to count fingers in one week despite reasonable high doses of steroids. Recurrent relapsing course noted with negative work up to date.       Assessment:   1) Vision loss and R eye pain of unknown etiology partially responsive to high dose steroids.  2) Type 2 diabetes exacerbated by recurrent high dose steroids in the last 2 months. BG >300 PTA off steroids.   3) Steroid hyperglycemia: currently on dexamethasone 4 mg every 8 hours- weaning to 3 mg every 8 hours  4) Stress hyperglycemia post procedure    Plan:    -AM NPH- held since on IV insulin- Will be dosed tomorrow   -PM NPH- 5 units this PM (2000)   -Novolog 1:15 CHO for breakfast and lunch and 1:20 with dinner and snacks-- increase to 1:10 for all meals and snacks   -start Novolog  High intensity sliding scale TID AC and HS-   -hypoglycemia protocol   -carb counting protocol    Discharge Planning:    -Tentative diabetic regimen: likely insulin and metformin   -diabetes education needs: insulin education- consult placed 8/14  -Outpatient follow up: [facility]  -Test claim: CGM, nph, and short acting insulin coverage- pharmacy liaison consult placed 8/14    Plan discussed with patient, bedside RN, and primary team.      Interval History and Assessment: interval glucose trend reviewed:   BG elevated last night at 400. Patient denies snacking. BG trending downward prior to IV insulin. Patient declines IV insulin this AM. BG elevated this evening.             Currently, denies nausea, vomiting, diarrhea  or pain. Katie notes he feels tired and did not sleep well last night     Labs:8/14  Bicarb:31  Creatinine: 0.94  eGFR: 90  Anion Gap: 10    Current nutritional intake and type: Orders Placed This Encounter      Regular Diet Adult      Planned Procedures/surgeries:none  Steroid planning: dex 3 mg every 8 hours- taper to 2mg every 8 hours tomorrow  D5W-containing solutions/medications: none    PTA Diabetes Regimen:   Checking BG only.  Fasting BG off steroid now 120 - 160, later in day >200 and 300s at times.  Shares [employer] readings with me.   Recently was Metformin 500 mg prescribed, 1 tab times 2 weeks, then 2 tablets daily.  Has not yet taken.             Diabetes History:   Type of Diabetes: Type 2 Diabetes Mellitus  No results found for: A1C           Review of Systems:     The Review of Systems is negative other than noted in the Interval History.           Medications:     Current Facility-Administered Medications   Medication    [START ON 8/17/2023] acetaminophen (TYLENOL) tablet 650 mg    acetaminophen (TYLENOL) tablet 975 mg    bisacodyl (DULCOLAX) suppository 10 mg    carvedilol (COREG) tablet 12.5 mg    dexAMETHasone (DECADRON) injection 3 mg    Followed by    [START ON 8/17/2023] dexAMETHasone (DECADRON) injection 2 mg    Followed by    [START ON 8/18/2023] dexAMETHasone (DECADRON) injection 1 mg    dextrose 10% infusion    glucose gel 15-30 g    Or    dextrose 50 % injection 25-50 mL    Or    glucagon injection 1 mg    heparin 100 unit/mL injection 3 mL    hydrALAZINE (APRESOLINE) injection 10-20 mg    HYDROmorphone (DILAUDID) injection 0.2 mg    Or    HYDROmorphone (DILAUDID) injection 0.4 mg    insulin aspart (NovoLOG) injection (RAPID ACTING)    insulin aspart (NovoLOG) injection (RAPID ACTING)    insulin aspart (NovoLOG) injection (RAPID ACTING)    insulin aspart (NovoLOG) injection (RAPID ACTING)    insulin NPH injection 5 Units    [Held by provider] insulin NPH injection 9 Units    insulin  "regular (MYXREDLIN) infusion    labetalol (NORMODYNE/TRANDATE) injection 10-40 mg    levETIRAcetam (KEPPRA) tablet 1,000 mg    lidocaine (LMX4) cream    lidocaine 1 % 0.1-1 mL    lisinopril (ZESTRIL) tablet 10 mg    magnesium hydroxide (MILK OF MAGNESIA) suspension 30 mL    melatonin tablet 3 mg    methocarbamol (ROBAXIN) tablet 500 mg    naloxone (NARCAN) injection 0.2 mg    Or    naloxone (NARCAN) injection 0.4 mg    Or    naloxone (NARCAN) injection 0.2 mg    Or    naloxone (NARCAN) injection 0.4 mg    ondansetron (ZOFRAN ODT) ODT tab 4 mg    Or    ondansetron (ZOFRAN) injection 4 mg    oxyCODONE (ROXICODONE) tablet 5 mg    Or    oxyCODONE IR (ROXICODONE) tablet 10 mg    pantoprazole (PROTONIX) EC tablet 40 mg    polyethylene glycol (MIRALAX) Packet 17 g    potassium & sodium phosphates (NEUTRA-PHOS) Packet 1 packet    prochlorperazine (COMPAZINE) injection 5 mg    Or    prochlorperazine (COMPAZINE) tablet 5 mg    senna-docusate (SENOKOT-S/PERICOLACE) 8.6-50 MG per tablet 2 tablet    sodium chloride (PF) 0.9% PF flush 3 mL    sodium chloride (PF) 0.9% PF flush 3 mL    sodium chloride 0.9% infusion            Physical Exam:    BP (!) 140/65 (BP Location: Right arm, Cuff Size: Adult Regular)   Pulse 63   Temp 98.1  F (36.7  C) (Oral)   Resp 22   Ht 1.905 m (6' 3\")   Wt 91.6 kg (201 lb 15.1 oz)   SpO2 100%   BMI 25.24 kg/m    General: pleasant, in no distress. Sitting in chair   HEENT: normocephalic, atraumatic. Oral mucous membranes moist.   Lungs: unlabored respiration, no cough  ABD: rounded, nondistended appearing  Skin: warm and dry, 3 dressing on head  MSK:  moves all extremities  Lymp:  no LE edema   Mental status:  alert, oriented to self, place, time  Psych:  flat affect, calm and appropriate interaction             Data:     Recent Labs   Lab 08/16/23  0812 08/16/23  0652 08/16/23  0606 08/16/23  0510 08/16/23  0501 08/16/23  0407   * 102* 109* 95 107* 109*     Lab Results   Component Value " Date    WBC 16.7 (H) 08/16/2023    WBC 26.7 (H) 08/15/2023    WBC 22.8 (H) 08/15/2023    HGB 9.5 (L) 08/16/2023    HGB 9.5 (L) 08/15/2023    HGB 11.2 (L) 08/15/2023    HCT 27.7 (L) 08/16/2023    HCT 27.2 (L) 08/15/2023    HCT 33.8 (L) 08/15/2023    MCV 89 08/16/2023    MCV 87 08/15/2023    MCV 89 08/15/2023     (L) 08/16/2023     (L) 08/15/2023     (L) 08/15/2023     Lab Results   Component Value Date     (L) 08/16/2023     (L) 08/15/2023     (L) 08/15/2023    POTASSIUM 3.7 08/16/2023    POTASSIUM 4.0 08/15/2023    POTASSIUM 3.9 08/15/2023    CHLORIDE 101 08/16/2023    CHLORIDE 96 (L) 08/15/2023    CHLORIDE 94 (L) 08/15/2023    CO2 26 08/16/2023    CO2 25 08/15/2023    CO2 25 08/15/2023     (H) 08/16/2023     (H) 08/16/2023     (H) 08/16/2023     Lab Results   Component Value Date    BUN 16.5 08/16/2023    BUN 20.5 08/15/2023    BUN 16.3 08/15/2023     No results found for: TSH  Lab Results   Component Value Date    AST 35 08/09/2023    AST 24 07/03/2023    ALT 41 08/09/2023    ALT 18 07/03/2023    ALKPHOS 71 08/09/2023    ALKPHOS 64 07/03/2023       I spent a total of 60 minutes face to face or coordinating care of Amado Butler.             Over 50% of my time on the unit was spent counseling the patient and/or coordinating care regarding acute hyperglycemia management.  See note for details.      Contacting the Inpatient Diabetes Team   From 7AM-5PM: page inpatient diabetes provider that is following the patient, or utilize the job code paging system. From 5PM-7AM: page the job code for endocrine fellow on call.     Please use the following job code to reach the Inpatient Diabetes team. Note that you must use an in house phone and that job codes cannot receive text pages.   Dial [phone] (or star-star-star 777 on the new [email or web address] telephones), then [phone] to reach the endocrine-diabetes provider on call.    CARMELO Hernandez, CNP  Inpatient Diabetes Service    Pager: [phone]

## 2023-08-16 NOTE — PLAN OF CARE
Major Shift Events: Neuros unchanged. Ophthalmology dilated pupils earlier today. Up SBA. BG elevated - endocrine adjusted insulin regimen to include sliding scale. Voiding in bathroom, BM today.   Plan: Transfer to floor pending bed availability   For vital signs and complete assessments, please see documentation flowsheets.

## 2023-08-16 NOTE — PLAN OF CARE
Neuro: Intact ex R eye vision loss. Patient has RAPD, R eye appears sluggish/dilated to light.   Cardiac: Sinus jessica. Afebrile. SBPs <140 without intervention.   Resp: CPAP at baseline, wearing 2L O2 overnight. LS clear.   GI: Regular diet. Passing gas, LBM 8/12.   : Voiding spont   Skin: R crani incision with primapore, changed by NSG this shift - CDI. Periorbital swelling/bruising to R eye.   Muscle/Mobility: Up SBA for line management. Bed alarm on.   Pain: Mild HA managed with scheduled tylenol + prn robaxin x1   Lines/Drains: R tunnel CVC. PIV x2. NS at 75 mL/hour. Insulin gtt on algorithm 2    PLAN: Continue with insulin gtt. Per endocrine continue NPH + carb coverage while on gtt. Transfer to  when bed available.

## 2023-08-16 NOTE — PROGRESS NOTES
"   08/16/23 1000   Living Environment   People in Home spouse   Current Living Arrangements house   Home Accessibility stairs to enter home;stairs within home   Number of Stairs, Main Entrance 5   Stair Railings, Main Entrance railings safe and in good condition  (one side)   Number of Stairs, Within Home, Primary   (one flight to upper level where bedrooms are located, one flight to basement, pt uses at baseline but does not need to use)   Stair Railings, Within Home, Primary railings safe and in good condition  (one side each)   Self-Care   Usual Activity Tolerance good   Current Activity Tolerance good   Regular Exercise No   Equipment Currently Used at Home none   Fall history within last six months no   Activity/Exercise/Self-Care Comment pt retired teacher   General Information   Onset of Illness/Injury or Date of Surgery 08/14/23   Referring Physician Maxwell Castillo MD   Additional Occupational Profile Info/Pertinent History of Current Problem 63 year old male, ambidextrous, not on blood thinners, with past medical history suggestive of chronic thrombocytopenia, SARA, HTN, DM2, paroxysmal SVT p/w waxing and waning R-eye pain and visual loss since 6/1. Has been on high dose steroids intermittently with off/on improvement in vision in right eye. 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   Existing Precautions/Restrictions fall  (crani)   Left Upper Extremity (Weight-bearing Status) partial weight-bearing (PWB)   Right Upper Extremity (Weight-bearing Status) partial weight-bearing (PWB)   General Observations and Info 10# lifting restriction   Visual Perception   Impact of Vision Impairment on Function (Vision) pt reports can only see lights in right eye, pt able to scan when cued \"look left, look right\" scanning in right eye, unable to see throughout, pt reporte L eye WNL   Range of Motion Comprehensive   Comment, " General Range of Motion BUE WFL w/in precautions   Strength Comprehensive (MMT)   Comment, General Manual Muscle Testing (MMT) Assessment NT formally 2/2 precautions, overall deconditioned   Bed Mobility   Comment (Bed Mobility) per clinical judgement anticipate min A   Transfers   Transfer Comments SBA w/ education required for precautions   Bathing Assessment/Intervention   Comment, (Bathing) per clinical judgement anticipate overall min A   Lower Body Dressing Assessment/Training   Comment, (Lower Body Dressing) per clinical judgement anticipate overall min A   Clinical Impression   Criteria for Skilled Therapeutic Interventions Met (OT) Yes, treatment indicated   OT Diagnosis decreased ind in I/ADLS   Influenced by the following impairments generalized weakness and precautions   OT Problem List-Impairments impacting ADL problems related to;activity tolerance impaired;balance;mobility;strength;pain;post-surgical precautions   ADL comments/analysis decreased ind in I/ADLS   Assessment of Occupational Performance 3-5 Performance Deficits   Planned Therapy Interventions (OT) ADL retraining;IADL retraining;balance training;bed mobility training;strengthening;transfer training;visual perception;home program guidelines;progressive activity/exercise   Clinical Decision Making Complexity (OT) moderate complexity   Risk & Benefits of therapy have been explained evaluation/treatment results reviewed;care plan/treatment goals reviewed;patient   OT Total Evaluation Time   OT Eval, Moderate Complexity Minutes (36264) 6

## 2023-08-16 NOTE — PROGRESS NOTES
Diabetes management recommendation note:    Was contacted by the primary team BG at 8:25 PM was 409.      In summary:  Amado Butler is a 63 year old male with background history of DM type II admitted with vision loss h with a concern of unilateral optic perinuritis of unclear etiology s/p frontotemporal craniotomy for optic mass on 8/14/2023.  He was on prednisone 80 mg daily switched to dexamethasone 4 mg 3 times daily today.  He is currently on NPH 9 units a.m. and 5 units p.m. NovoLog carb coverage of 1: 15 g CHO with breakfast and lunch and 1: 20 g CHO with dinner and snacks.  BG during the day was in the 200s however it increased at 8 PM to 409.      Recommendations:  -To start on non-DKA insulin drip.  -To get BMP and beta hydroxybutyrate if there is evidence of DKA to switch to DKA protocol.  -Continue NPH and NovoLog carb coverage while on non-DKA insulin drip.  -Hold SSI while on insulin drip.  -Follow further recommendation by our team tomorrow.    Zacarias Rosenbaum     Endocrinology diabetes and metabolism  fellow   Pager number: [phone]

## 2023-08-16 NOTE — PROGRESS NOTES
Ophthalmology Progress Note 08/16/2023    Assessment & Plan:    #Optic Neuropathy, Right Eye  63 year old male presenting for recurrent R eye vision loss. Has been occurring since 06/01 of this year. TAB negative. No orbital signs. Hospitalized at [facility] at the end of July, given IV methylpred and received negative workup including LP and extensive imaging. See H&P for full summary of findings. Some stability at 20/40 vision until he noticed it again begin to worsen at the beginning of August. Seen by Dr. Serrano and admitted for PLEX on 08/09/23. Since then has not seen any improvement on increased dose of steroids.     He had PLEX x2 and IV methylprednisone 1g x2 with minimal improvement. 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 Cultures are currently NGTD and pathology is pending. Also underwent bone marrow biopsy 8/15 per heme/onc recommendations to rule out oncologic ddx such as CMML. Currently on day 2 of a 4 day decadron taper.    Patient continues to have vision loss, with VA of NLP and RAPD present despite PLEX, steroids, and now s/p craniotomy. Fundus exam reveals no optic disc edema or pallor. Based on operative note from NSGY, it appears like abnormal tissue was noted which has been sent to pathology. Given presence of abnormal tissue and lack of improvement with PLEX and methylprednisolone, low suspicion for inflammatory etiology at this time. Will wait for pathology results.    Plan:   - Stop PLEX treatment, but do not remove central line  - No need for further steroid treatment once decadron taper completed per NSGY  - Will follow up bone marrow and craniotomy pathology and culture results   - Ophthalmology will continue to follow. Please message on call ophthalmologist with any questions or concerns.    Discussed with neuro-ophthalmology fellow,   Alberto Goel, who agreed with this assessment and plan.     Segun Hong MD  PGY-2 Ophthalmology Resident    Subjective:   Since being hospitalized, patient has only felt his vision get more and more opaque. He states his vision is essentially fully opaque at this point, with maybe some light shining through at the edge of his vision. He denies any pain, pain with EOMs, redness, flashes, or floaters. His post-op pain from his craniotomy is manageable. He has no complaints in his left eye and is seeing well.     Objective:      Base Eye Exam       Visual Acuity (Snellen - Linear)         Right Left    Near cc NLP 20/20              Tonometry (Tonopen, 10:51 AM)         Right Left    Pressure 13 13              Pupils         Dark Light Shape React APD    Right 4 4 Round Minimal 4+    Left 4 2 Round Brisk None              Visual Fields         Left Right    Restrictions  Total superior temporal, inferior temporal, superior nasal, inferior nasal deficiencies              Extraocular Movement         Right Left     Full, Ortho Full, Ortho              Neuro/Psych       Oriented x3: Yes    Mood/Affect: Normal              Dilation       Both eyes: 1.0% Mydriacyl, 2.5% Sriram Synephrine @ 11:07 AM                  Additional Tests       Color         Right Left    Ishihara 0/11 11/11                  Slit Lamp and Fundus Exam       External Exam         Right Left    External periorbital ecchymosis Normal              Slit Lamp Exam         Right Left    Lids/Lashes upper lid edema Normal    Conjunctiva/Sclera White and quiet White and quiet    Cornea PEE inferior PEE    Anterior Chamber Deep and quiet Deep and quiet    Iris Dilated Dilated    Lens 1+ NS with trace PSC 1+ NS    Anterior Vitreous Normal Normal              Fundus Exam         Right Left    Disc Peripapillary atrophy, no pallor or edema Normal    C/D Ratio 0.1 0.1    Macula Choroidal nevus temporal Normal    Vessels Normal Normal    Periphery Nevus  at 9 o'clock Normal

## 2023-08-16 NOTE — PROGRESS NOTES
"[facility], [place]   Neurosurgery Progress Note:  08/16/2023    Interval History: Bone marrow biopsy completed. Transferred to floor. Started on insulin gtt for blood glucose > 400, endocrinology team notified. Persistently hyponatremic, improved slightly with fluid bolus.    Assessment:  63 year old male, ambidextrous, not on blood thinners, with past medical history suggestive of chronic thrombocytopenia, SARA, HTN, DM2, paroxysmal SVT p/w waxing and waning R-eye pain and visual loss since 6/1. Has been on high dose steroids intermittently with off/on improvement in vision in right eye. 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    Clinically Significant Risk Factors        # Hypokalemia: Lowest K = 2.7 mmol/L in last 2 days, will replace as needed  # Hyponatremia: Lowest Na = 129 mmol/L in last 2 days, will monitor as appropriate  # Hypocalcemia: Lowest iCa = 4.1 mg/dL in last 2 days, will monitor and replace as appropriate        # Coagulation Defect: INR = 1.82 (Ref range: 0.85 - 1.15) and/or PTT = 29 Seconds (Ref range: 22 - 38 Seconds), will monitor for bleeding    # Thrombocytopenia: Lowest platelets = 94 in last 2 days, will monitor for bleeding          # Overweight: Estimated body mass index is 25.24 kg/m  as calculated from the following:    Height as of this encounter: 1.905 m (6' 3\").    Weight as of this encounter: 91.6 kg (201 lb 15.1 oz).              Plan:    Serial neurologic exams  NSGY primary while in ICU  Blood glucose management per endocrinology team, appreciate assistance  Decadron 4 day taper, steroids per neurology team thereafter  Ancef x 3  Follow-up surgical pathology  Replace electrolytes prn  ADAT  SCDs for DVT ppx, hold lovenox for now    -----------------------------------  Pamela Saeed MD, PhD  PGY-2 Neurosurgery    Please contact neurosurgery resident on call " with questions.    Dial * * [phone], enter [phone] when prompted.   -----------------------------------    General: awake and alert  HEENT: Bruising around right eye, incision C/D/I with minimal strike through  PULM: breathing comfortably on room air  NEUROLOGIC:  -- Awake; Alert; oriented x 3  -- Follows commands briskly  -- +repetition, calculation, and naming  -- Speech fluent, spontaneous. No aphasia or dysarthria.  -- no gaze preference. No apparent hemineglect.  Cranial Nerves:  -- Minimal light perception on the right eye, not able to count fingers, VFF on the left eye  -- RAPD on right, L pupil briskly reacts to light, extraocular movements intact  -- face symmetrical, tongue midline  -- sensory V1-V3 intact bilaterally  -- palate elevates symmetrically, uvula midline  -- hearing grossly intact bilat  -- Trapezii 5/5 strength bilat symmetric     Vision:  Right eye: can only see minimal light, no finger counting  Left eye: 20/40     Motor:  Normal bulk / tone; no tremor, rigidity, or bradykinesia.  No muscle wasting or fasciculations  No Pronator Drift       Delt Bi Tri Hand Flexion/  Extension Iliopsoas Quadriceps Hamstrings Tibialis Anterior Gastroc     C5 C6 C7 C8/T1 L2 L3 L4-S1 L4 S1   R 5 5 5 5 5 5 5 5 5   L 5 5 5 5 5 5 5 5 5   Sensory:  intact to LT x 4 extremities       Reflexes:       Bi Tri BR Merissa Pat Ach Bab     C5-6 C7-8 C6 UMN L2-4 S1 UMN   R 2+ 2+ 2+ Norm 2+ 2+ Norm   L 2+ 2+ 2+ Norm 2+ 2+ Norm      Gait: Deferred    Objective:   Temp:  [97.8  F (36.6  C)-98.6  F (37  C)] 97.8  F (36.6  C)  Pulse:  [] 68  Resp:  [0-22] 18  BP: ()/(56-72) 137/62  MAP:  [53 mmHg-128 mmHg] 88 mmHg  Arterial Line BP: ()/() 131/72  SpO2:  [97 %-100 %] 100 %  I/O last 3 completed shifts:  In: 2521.17 [P.O.:1120; I.V.:1401.17]  Out: 1025 [Urine:1025]      LABS:  Recent Labs   Lab 08/15/23  2326 08/15/23  2228 08/15/23  2025 08/15/23  1715 08/15/23  0812 08/15/23  0544 08/15/23  0014 08/14/23  2306    NA  --  129*  --  134*  --  132*  --  133*   POTASSIUM  --  4.0  --   --   --  3.9  --  3.3*   CHLORIDE  --  96*  --   --   --  94*  --  96*   CO2  --  25  --   --   --  25  --  27   ANIONGAP  --  8  --   --   --  13  --  10   * 311* 409*  --    < > 198*   < > 180*   BUN  --  20.5  --   --   --  16.3  --  14.5   CR  --  1.02  --   --   --  1.02  --  0.86   FELIX  --  7.7*  --   --   --  8.5*  --  7.6*    < > = values in this interval not displayed.       Recent Labs   Lab 08/15/23  1002   WBC 26.7*   RBC 3.12*   HGB 9.5*   HCT 27.2*   MCV 87   MCH 30.4   MCHC 34.9   RDW 17.2*   *       IMAGING:  No results found for this or any previous visit (from the past 24 hour(s)).

## 2023-08-17 ENCOUNTER — APPOINTMENT (OUTPATIENT)
Dept: OCCUPATIONAL THERAPY | Facility: CLINIC | Age: 63
DRG: 025 | End: 2023-08-17
Payer: COMMERCIAL

## 2023-08-17 LAB
ANION GAP SERPL CALCULATED.3IONS-SCNC: 8 MMOL/L (ref 7–15)
BUN SERPL-MCNC: 15.3 MG/DL (ref 8–23)
CALCIUM SERPL-MCNC: 7.6 MG/DL (ref 8.8–10.2)
CHLORIDE SERPL-SCNC: 100 MMOL/L (ref 98–107)
CREAT SERPL-MCNC: 0.83 MG/DL (ref 0.67–1.17)
DEPRECATED HCO3 PLAS-SCNC: 24 MMOL/L (ref 22–29)
ERYTHROCYTE [DISTWIDTH] IN BLOOD BY AUTOMATED COUNT: 18.1 % (ref 10–15)
FIBRINOGEN PPP-MCNC: 454 MG/DL (ref 170–490)
GFR SERPL CREATININE-BSD FRML MDRD: >90 ML/MIN/1.73M2
GLUCOSE BLDC GLUCOMTR-MCNC: 185 MG/DL (ref 70–99)
GLUCOSE BLDC GLUCOMTR-MCNC: 212 MG/DL (ref 70–99)
GLUCOSE BLDC GLUCOMTR-MCNC: 220 MG/DL (ref 70–99)
GLUCOSE BLDC GLUCOMTR-MCNC: 226 MG/DL (ref 70–99)
GLUCOSE BLDC GLUCOMTR-MCNC: 235 MG/DL (ref 70–99)
GLUCOSE BLDC GLUCOMTR-MCNC: 238 MG/DL (ref 70–99)
GLUCOSE SERPL-MCNC: 225 MG/DL (ref 70–99)
HCT VFR BLD AUTO: 24 % (ref 40–53)
HGB BLD-MCNC: 8.1 G/DL (ref 13.3–17.7)
MAGNESIUM SERPL-MCNC: 1.8 MG/DL (ref 1.7–2.3)
MCH RBC QN AUTO: 30.5 PG (ref 26.5–33)
MCHC RBC AUTO-ENTMCNC: 33.8 G/DL (ref 31.5–36.5)
MCV RBC AUTO: 90 FL (ref 78–100)
PHOSPHATE SERPL-MCNC: 1.7 MG/DL (ref 2.5–4.5)
PHOSPHATE SERPL-MCNC: 2.4 MG/DL (ref 2.5–4.5)
PLATELET # BLD AUTO: 98 10E3/UL (ref 150–450)
POTASSIUM SERPL-SCNC: 4.1 MMOL/L (ref 3.4–5.3)
RBC # BLD AUTO: 2.66 10E6/UL (ref 4.4–5.9)
SODIUM SERPL-SCNC: 132 MMOL/L (ref 136–145)
WBC # BLD AUTO: 11.4 10E3/UL (ref 4–11)

## 2023-08-17 PROCEDURE — 250N000013 HC RX MED GY IP 250 OP 250 PS 637: Performed by: NEUROLOGICAL SURGERY

## 2023-08-17 PROCEDURE — 250N000009 HC RX 250: Performed by: STUDENT IN AN ORGANIZED HEALTH CARE EDUCATION/TRAINING PROGRAM

## 2023-08-17 PROCEDURE — 250N000013 HC RX MED GY IP 250 OP 250 PS 637

## 2023-08-17 PROCEDURE — 258N000003 HC RX IP 258 OP 636

## 2023-08-17 PROCEDURE — 99233 SBSQ HOSP IP/OBS HIGH 50: CPT | Performed by: STUDENT IN AN ORGANIZED HEALTH CARE EDUCATION/TRAINING PROGRAM

## 2023-08-17 PROCEDURE — 36415 COLL VENOUS BLD VENIPUNCTURE: CPT | Performed by: STUDENT IN AN ORGANIZED HEALTH CARE EDUCATION/TRAINING PROGRAM

## 2023-08-17 PROCEDURE — 99232 SBSQ HOSP IP/OBS MODERATE 35: CPT | Mod: GC | Performed by: INTERNAL MEDICINE

## 2023-08-17 PROCEDURE — 83735 ASSAY OF MAGNESIUM: CPT | Performed by: PSYCHIATRY & NEUROLOGY

## 2023-08-17 PROCEDURE — 250N000011 HC RX IP 250 OP 636: Mod: JZ | Performed by: STUDENT IN AN ORGANIZED HEALTH CARE EDUCATION/TRAINING PROGRAM

## 2023-08-17 PROCEDURE — 258N000003 HC RX IP 258 OP 636: Performed by: STUDENT IN AN ORGANIZED HEALTH CARE EDUCATION/TRAINING PROGRAM

## 2023-08-17 PROCEDURE — 250N000013 HC RX MED GY IP 250 OP 250 PS 637: Performed by: STUDENT IN AN ORGANIZED HEALTH CARE EDUCATION/TRAINING PROGRAM

## 2023-08-17 PROCEDURE — 84100 ASSAY OF PHOSPHORUS: CPT | Performed by: STUDENT IN AN ORGANIZED HEALTH CARE EDUCATION/TRAINING PROGRAM

## 2023-08-17 PROCEDURE — 85027 COMPLETE CBC AUTOMATED: CPT | Performed by: STUDENT IN AN ORGANIZED HEALTH CARE EDUCATION/TRAINING PROGRAM

## 2023-08-17 PROCEDURE — 84100 ASSAY OF PHOSPHORUS: CPT | Performed by: PSYCHIATRY & NEUROLOGY

## 2023-08-17 PROCEDURE — 80048 BASIC METABOLIC PNL TOTAL CA: CPT | Performed by: STUDENT IN AN ORGANIZED HEALTH CARE EDUCATION/TRAINING PROGRAM

## 2023-08-17 PROCEDURE — 120N000011 HC R&B TRANSPLANT UMMC

## 2023-08-17 PROCEDURE — 97110 THERAPEUTIC EXERCISES: CPT | Mod: GO | Performed by: OCCUPATIONAL THERAPIST

## 2023-08-17 PROCEDURE — 97535 SELF CARE MNGMENT TRAINING: CPT | Mod: GO | Performed by: OCCUPATIONAL THERAPIST

## 2023-08-17 PROCEDURE — 85384 FIBRINOGEN ACTIVITY: CPT

## 2023-08-17 PROCEDURE — 99233 SBSQ HOSP IP/OBS HIGH 50: CPT | Performed by: CLINICAL NURSE SPECIALIST

## 2023-08-17 RX ORDER — MAGNESIUM OXIDE 400 MG/1
400 TABLET ORAL EVERY 4 HOURS
Status: COMPLETED | OUTPATIENT
Start: 2023-08-17 | End: 2023-08-17

## 2023-08-17 RX ORDER — POTASSIUM PHOS IN 0.9 % NACL 15MMOL/250
15 PLASTIC BAG, INJECTION (ML) INTRAVENOUS
Status: COMPLETED | OUTPATIENT
Start: 2023-08-17 | End: 2023-08-17

## 2023-08-17 RX ADMIN — LEVETIRACETAM 1000 MG: 500 TABLET, FILM COATED ORAL at 07:46

## 2023-08-17 RX ADMIN — PANTOPRAZOLE SODIUM 40 MG: 40 TABLET, DELAYED RELEASE ORAL at 07:46

## 2023-08-17 RX ADMIN — HYDRALAZINE HYDROCHLORIDE 20 MG: 20 INJECTION INTRAMUSCULAR; INTRAVENOUS at 00:41

## 2023-08-17 RX ADMIN — ACETAMINOPHEN 975 MG: 325 TABLET, FILM COATED ORAL at 16:16

## 2023-08-17 RX ADMIN — DEXAMETHASONE SODIUM PHOSPHATE 2 MG: 4 INJECTION, SOLUTION INTRA-ARTICULAR; INTRALESIONAL; INTRAMUSCULAR; INTRAVENOUS; SOFT TISSUE at 16:21

## 2023-08-17 RX ADMIN — POTASSIUM PHOSPHATE, MONOBASIC POTASSIUM PHOSPHATE, DIBASIC 15 MMOL: 224; 236 INJECTION, SOLUTION, CONCENTRATE INTRAVENOUS at 07:47

## 2023-08-17 RX ADMIN — DEXAMETHASONE SODIUM PHOSPHATE 2 MG: 4 INJECTION, SOLUTION INTRA-ARTICULAR; INTRALESIONAL; INTRAMUSCULAR; INTRAVENOUS; SOFT TISSUE at 00:18

## 2023-08-17 RX ADMIN — SENNOSIDES AND DOCUSATE SODIUM 2 TABLET: 50; 8.6 TABLET ORAL at 07:45

## 2023-08-17 RX ADMIN — CARVEDILOL 12.5 MG: 12.5 TABLET, FILM COATED ORAL at 19:04

## 2023-08-17 RX ADMIN — POTASSIUM PHOSPHATE, MONOBASIC POTASSIUM PHOSPHATE, DIBASIC 15 MMOL: 224; 236 INJECTION, SOLUTION, CONCENTRATE INTRAVENOUS at 09:30

## 2023-08-17 RX ADMIN — SENNOSIDES AND DOCUSATE SODIUM 2 TABLET: 50; 8.6 TABLET ORAL at 19:04

## 2023-08-17 RX ADMIN — POLYETHYLENE GLYCOL 3350 17 G: 17 POWDER, FOR SOLUTION ORAL at 07:46

## 2023-08-17 RX ADMIN — DEXAMETHASONE SODIUM PHOSPHATE 2 MG: 4 INJECTION, SOLUTION INTRA-ARTICULAR; INTRALESIONAL; INTRAMUSCULAR; INTRAVENOUS; SOFT TISSUE at 07:47

## 2023-08-17 RX ADMIN — Medication 3 MG: at 00:59

## 2023-08-17 RX ADMIN — ACETAMINOPHEN 975 MG: 325 TABLET, FILM COATED ORAL at 00:21

## 2023-08-17 RX ADMIN — SODIUM CHLORIDE: 9 INJECTION, SOLUTION INTRAVENOUS at 03:19

## 2023-08-17 RX ADMIN — POTASSIUM & SODIUM PHOSPHATES POWDER PACK 280-160-250 MG 1 PACKET: 280-160-250 PACK at 16:17

## 2023-08-17 RX ADMIN — PANTOPRAZOLE SODIUM 40 MG: 40 TABLET, DELAYED RELEASE ORAL at 16:16

## 2023-08-17 RX ADMIN — LEVETIRACETAM 1000 MG: 500 TABLET, FILM COATED ORAL at 19:03

## 2023-08-17 RX ADMIN — CARVEDILOL 12.5 MG: 12.5 TABLET, FILM COATED ORAL at 07:46

## 2023-08-17 RX ADMIN — HYDRALAZINE HYDROCHLORIDE 20 MG: 20 INJECTION INTRAMUSCULAR; INTRAVENOUS at 08:26

## 2023-08-17 RX ADMIN — MAGNESIUM OXIDE TAB 400 MG (241.3 MG ELEMENTAL MG) 400 MG: 400 (241.3 MG) TAB at 07:46

## 2023-08-17 RX ADMIN — ACETAMINOPHEN 975 MG: 325 TABLET, FILM COATED ORAL at 07:46

## 2023-08-17 RX ADMIN — POTASSIUM & SODIUM PHOSPHATES POWDER PACK 280-160-250 MG 1 PACKET: 280-160-250 PACK at 19:07

## 2023-08-17 RX ADMIN — MAGNESIUM OXIDE TAB 400 MG (241.3 MG ELEMENTAL MG) 400 MG: 400 (241.3 MG) TAB at 12:27

## 2023-08-17 ASSESSMENT — ACTIVITIES OF DAILY LIVING (ADL)
ADLS_ACUITY_SCORE: 20
DEPENDENT_IADLS:: INDEPENDENT
ADLS_ACUITY_SCORE: 20

## 2023-08-17 NOTE — PLAN OF CARE
"Major Shift Events: Right eye vision remains \"opaque,\" otherwise no deficits on neuro exam. PRN hydralazine given x1 for SBP>140. Pt asking about PTA lisinopril and hydrochlorothiazide which have been DC'd. OVSS. Denies pain. Eating and drinking well. Up to bathroom q 2-3 hours voiding w/out difficulty, small BM, passing flatus. NS infusion continues at 75 mL/hr.     Plan: Transfer to  when bed available. For vital signs and complete assessments, please see documentation flowsheets.    Goal Outcome Evaluation:  Plan of Care Reviewed With: patient  Overall Patient Progress: no change       "

## 2023-08-17 NOTE — PROGRESS NOTES
"[facility], [place]   Neurosurgery Progress Note:  08/17/2023    Interval History: CHAYO. Stable exam. Decision made per ophthalmology to discontinue PLEX treatments. Awaiting transfer to floor.    Assessment:  63 year old male, ambidextrous, not on blood thinners, with past medical history suggestive of chronic thrombocytopenia, SARA, HTN, DM2, paroxysmal SVT p/w waxing and waning R-eye pain and visual loss since 6/1. Has been on high dose steroids intermittently with off/on improvement in vision in right eye. 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    Clinically Significant Risk Factors         # Hyponatremia: Lowest Na = 129 mmol/L in last 2 days, will monitor as appropriate         # Coagulation Defect: INR = 1.82 (Ref range: 0.85 - 1.15) and/or PTT = 29 Seconds (Ref range: 22 - 38 Seconds), will monitor for bleeding    # Thrombocytopenia: Lowest platelets = 107 in last 2 days, will monitor for bleeding          # Overweight: Estimated body mass index is 25.24 kg/m  as calculated from the following:    Height as of this encounter: 1.905 m (6' 3\").    Weight as of this encounter: 91.6 kg (201 lb 15.1 oz).              Plan:    Serial neurologic exams  NSGY primary while in ICU  Blood glucose management per endocrinology team, appreciate assistance  Decadron 4 day taper, no steroids needed per neurology team thereafter  Follow-up surgical pathology  Replace electrolytes prn  ADAT  SCDs for DVT ppx, will consider restarting DVT ppx today    -----------------------------------  Pamela Saeed MD, PhD  PGY-2 Neurosurgery    Please contact neurosurgery resident on call with questions.    Dial * * [phone], enter [phone] when prompted.   -----------------------------------    General: awake and alert  HEENT: Bruising around right eye, incision C/D/I with minimal strike through  PULM: breathing comfortably on " room air  NEUROLOGIC:  -- Awake; Alert; oriented x 3  -- Follows commands briskly  -- +repetition, calculation, and naming  -- Speech fluent, spontaneous. No aphasia or dysarthria.  -- no gaze preference. No apparent hemineglect.  Cranial Nerves:  -- Minimal light perception on the right eye, not able to count fingers, VFF on the left eye  -- RAPD on right, L pupil briskly reacts to light, extraocular movements intact  -- face symmetrical, tongue midline  -- sensory V1-V3 intact bilaterally  -- palate elevates symmetrically, uvula midline  -- hearing grossly intact bilat  -- Trapezii 5/5 strength bilat symmetric     Vision:  Right eye: can only see minimal light, no finger counting  Left eye: 20/40     Motor:  Normal bulk / tone; no tremor, rigidity, or bradykinesia.  No muscle wasting or fasciculations  No Pronator Drift       Delt Bi Tri Hand Flexion/  Extension Iliopsoas Quadriceps Hamstrings Tibialis Anterior Gastroc     C5 C6 C7 C8/T1 L2 L3 L4-S1 L4 S1   R 5 5 5 5 5 5 5 5 5   L 5 5 5 5 5 5 5 5 5   Sensory:  intact to LT x 4 extremities       Reflexes:       Bi Tri BR Merissa Pat Ach Bab     C5-6 C7-8 C6 UMN L2-4 S1 UMN   R 2+ 2+ 2+ Norm 2+ 2+ Norm   L 2+ 2+ 2+ Norm 2+ 2+ Norm      Gait: Deferred    Objective:   Temp:  [97.8  F (36.6  C)-98.1  F (36.7  C)] 97.8  F (36.6  C)  Pulse:  [62-72] 72  Resp:  [9-22] 13  BP: (120-150)/(59-78) 128/71  SpO2:  [98 %-100 %] 99 %  I/O last 3 completed shifts:  In: 2975.95 [P.O.:1040; I.V.:1935.95]  Out: 1400 [Urine:1400]      LABS:  Recent Labs   Lab 08/17/23  0321 08/17/23  0016 08/16/23  2204 08/16/23  0510 08/16/23  0501 08/15/23  2326 08/15/23  2228 08/15/23  2025 08/15/23  1715 08/15/23  0812 08/15/23  0544   NA  --   --   --   --  134*  --  129*  --  134*  --  132*   POTASSIUM  --   --   --   --  3.7  --  4.0  --   --   --  3.9   CHLORIDE  --   --   --   --  101  --  96*  --   --   --  94*   CO2  --   --   --   --  26  --  25  --   --   --  25   ANIONGAP  --   --   --   --   7  --  8  --   --   --  13   * 238* 244*   < > 107*   < > 311*   < >  --    < > 198*   BUN  --   --   --   --  16.5  --  20.5  --   --   --  16.3   CR  --   --   --   --  0.92  --  1.02  --   --   --  1.02   FELIX  --   --   --   --  8.3*  --  7.7*  --   --   --  8.5*    < > = values in this interval not displayed.       Recent Labs   Lab 08/16/23  0501   WBC 16.7*   RBC 3.12*   HGB 9.5*   HCT 27.7*   MCV 89   MCH 30.4   MCHC 34.3   RDW 17.4*   *       IMAGING:  No results found for this or any previous visit (from the past 24 hour(s)).

## 2023-08-17 NOTE — PROGRESS NOTES
"Hematology Consult Progress Note    Date of Service 08/16/2023  Admit Date 8/9/2023   Initial Consult 08/12/23   Hospital Day: 8     Amado Butler is a 63 year old man with a history of SARA, HTN, DM2, proxysmal SVT who was admitted 8/9/2023 for waxxing and waning R eye pain and vision loss for 2 months found to have concern for unilateral optic perineuritis of unclear etiology. Hematology was consulted due to thrombocytopenia and low fibrinogen.       Interval Events:   Pt was sleeping during our visit today.     Meds reviewed in Epic.  Allergies reviewed in Epic    Physical Exam  /72   Pulse 69   Temp 97.9  F (36.6  C) (Oral)   Resp 16   Ht 1.905 m (6' 3\")   Wt 91.6 kg (201 lb 15.1 oz)   SpO2 98%   BMI 25.24 kg/m       Constitutional: sleeping  Eyes: sleeping, eyes closes  ENT: Normocephalic  Respiratory: Non-labored breathing  Skin: No concerning lesions or rash on exposed areas.  Musculoskeletal: No edema joseph LEs.  Neurologic: sleeping      Labs reviewed, pertinent labs as follows:     Recent Labs   Lab 08/16/23  0501 08/15/23  1002   WBC 16.7* 26.7*   HGB 9.5* 9.5*   * 145*   MCV 89 87   RDW 17.4* 17.2*   ANEU  --  19.5*   ALYM  --  2.4   CARLY  --  4.5*   AEOS  --  0.0     Recent Labs   Lab 08/16/23  0501   *   POTASSIUM 3.7   CHLORIDE 101   CO2 26   BUN 16.5   CR 0.92   FELIX 8.3*   MAG 2.1   PHOS 2.0*     Recent Labs   Lab 08/13/23  1133   IRON 101   IRONSAT 53*   GRABIEL 555*   *   B12 628   FOLIC 12.7     Recent Labs   Lab 08/16/23  1041 08/15/23  0916 08/14/23  0704   INR  --  1.82* 1.22*   PTT  --  29 25   FIBR 405 153* 183     Impression:    #Acute on chronic thrombocytopenia  #Low fibrinogen - improving  #Monocytosis  #Normocytic anemia  #Bordeline splenomegaly  #Unilateral vision loss of unclear etiology  #T2DM/Hyperglycemia     1. We performed BMBx today, pending final result. We reviewed his prelim BMBx path with the pathologist and it does not seem that the pt has CMML. He " also went to craniotomy and NSG resected his optic canal/intradural mass on 8/14, which path is pending.     2. The pt has a baseline thrombocytopenia of 100-110s since 2012, currently at his baseline (see initial heme consult note from 8/12/23).     Recommendations:  <<no change from yesterday>>  -await BMBx result  -await NSG surgically resected optic canal/intradural mass pathology result  -monitor CBC/diff, coags    We will continue to follow.    The above plan was discussed with Dr. Molina, who agrees with the assessment and plan.     Thank you for allowing me to participate in the care of this patient. Please do not hesitate to contact me if there are any concerns or questions.     Jah Echeverria MD  Hem/onc fellow  Division of Hematology, Oncology, and Transplantation  [facility]  Attending  The patient was seen and examined by me separate from the resident/fellow provider.The note above reflects my assessment and plan. I have personally reviewed today's labs,vital and radiology results. The points of care that were added by me are:    I reviewed bone marrow which did not definitively show MDS or CMML Will need to see NGS for possible mutations Bx of optic canal may be revealing   Donavan Molina M.D.  [phone]

## 2023-08-17 NOTE — CONSULTS
Care Management Initial Consult    General Information  Assessment completed with: Patient,    Type of CM/SW Visit: Initial Assessment    Primary Care Provider verified and updated as needed: Yes   Readmission within the last 30 days: no previous admission in last 30 days         Advance Care Planning: Advance Care Planning Reviewed: no concerns identified       Communication Assessment  Patient's communication style: spoken language (English or Bilingual)    Hearing Difficulty or Deaf: no   Wear Glasses or Blind: yes    Cognitive  Cognitive/Neuro/Behavioral: WDL  Level of Consciousness: alert  Arousal Level: opens eyes spontaneously  Orientation: oriented x 4  Mood/Behavior: calm, cooperative  Best Language: 0 - No aphasia  Speech: clear, spontaneous, logical    Living Environment:   People in home: spouse  Mary Lou  Current living Arrangements: house      Able to return to prior arrangements: yes    Family/Social Support:  Care provided by: self  Provides care for: no one  Marital Status:   Wife, Children          Description of Support System: Supportive, Involved       Current Resources:   Patient receiving home care services: No     Community Resources: None  Equipment currently used at home: none  Supplies currently used at home: None    Employment/Financial:  Employment Status: retired        Financial Concerns: No concerns identified      Lifestyle & Psychosocial Needs:  Social Determinants of Health     Tobacco Use: Low Risk  (8/15/2023)    Patient History     Smoking Tobacco Use: Never     Smokeless Tobacco Use: Never     Passive Exposure: Not on file   Alcohol Use: Not on file   Financial Resource Strain: Not on file   Food Insecurity: Not on file   Transportation Needs: Not on file   Physical Activity: Not on file   Stress: Not on file   Social Connections: Not on file   Intimate Partner Violence: Not on file   Depression: Not at risk (8/9/2023)    PHQ-2     PHQ-2 Score: 0   Housing Stability: Not on  file     Functional Status:  Prior to admission patient needed assistance:   Dependent ADLs:: Independent  Dependent IADLs:: Independent    Mental Health Status:  Mental Health Status: No Current Concerns       Chemical Dependency Status:  Chemical Dependency Status: No Current Concerns     Additional Information:  Pt had frontotemporal craniotomy for optic mass done on 8/14/23 and transferred to ICU.  RNCC visited pt to introduce RNCC role and compete initial assessment.  Pt lives with spouse.  Pt stated he has 3 adult Childrens and they all live out of state.  Pt was ind. with all cares mobility and driving prior hospitalization.  Pt was not receiving any service prior hospitalization.  PT/OT evaluated pt and home with OP rehab is rec at this time.  RNCC informed pt that RNCC/SW will cont to follow the plan of care and assist with any care coordination assistance need.  Pt agreed.      Carol Ann Cohen RN, PHN, BSN  4A and 4E/ ICU  Care Coordinator  Phone: [phone]  Pager: [phone]    To contact the weekend RNCC  [place] (0800 - 1630) Saturday and Sunday  Units: 4A, 4C, 4E and 6A Pager [phone]  Units: 5A, 5B, 5C- Pager [phone]  Units: 6B, 6C, 6D- Pager [phone]  Units: 7A, 7B, 7C, 7D, and 5C- Pager [phone]

## 2023-08-17 NOTE — PROGRESS NOTES
CLINICAL NUTRITION SERVICES - REASSESSMENT NOTE     Nutrition Prescription    RECOMMENDATIONS FOR MDs/PROVIDERS TO ORDER:  - Continue liberal diet as tolerated; encourage ongoing adequate intake. Pt has CHO counting menu/information in room.     Malnutrition Status:    - Patient does not meet two of the established criteria necessary for diagnosing malnutrition    Recommendations already ordered by Registered Dietitian (RD):  - None currently     Future/Additional Recommendations:  - Sign off -- pt eating well and without current nutrition concerns      EVALUATION OF THE PROGRESS TOWARD GOALS   Diet: Regular  Intake: 100% PO with good appetite. Pt reports satisfaction with hospital foods, and has enjoyed most of his meals. Ordering 3 meals daily.      NEW FINDINGS   Nutrition/GI:  tolerating regular diet with reported good appetite. Pt reports weight loss upon initiation of steroid -- lost ~ 15-18 lbs. Pt has been monitoring CHO intakes and following a lower CHO diet. At baseline, eats heathy, balanced meals and enjoys proteins, fruits, vegetables.     Neuro: R-eye vision loss, concern for optic perineuritis of unclear etiology; PLEX, planned 5 sessions; Pseudohyponatremia vs Euvolemic Hyponatremia; Hypochloremia; b/l Hypokalemia    Endo: Pre-diabetes; Steroid induced hyperglycemia. High resistance sliding scale; Endocrine diabetes consulted; Holding pta metformin and glipizide while inpatient    Labs/meds:  Na+ 132 (L); phos: 1.7 (L); copper: 56.4 (L) 8/13/23; B12: 628 (WNL; 8/13/23). Meds: Keppra; Insulin; Miralax; Senokot; K-phos; Mag-ox; IVF @ 75 ml/hr     Weights:  Overall up from admit: 91.1 kg --> 96 kg       MALNUTRITION  % Intake: No decreased intake noted  % Weight Loss: > 5% in 1 month (severe)  Subcutaneous Fat Loss: None observed  Muscle Loss: None observed  Fluid Accumulation/Edema: None noted  Malnutrition Diagnosis: Patient does not meet two of the established criteria necessary for diagnosing  malnutrition    Previous Goals   Patient to consume % of nutritionally adequate meal trays TID, or the equivalent with supplements/snacks   Evaluation: Met    Previous Nutrition Diagnosis  Predicted inadequate nutrient intake related to suspected decreased appetite vs neur status as evidenced by pt report of decreased PO and weight loss PTA      Evaluation: Improving    CURRENT NUTRITION DIAGNOSIS  No nutrition diagnosis at this time     INTERVENTIONS  Implementation  Encouraged adequate PO. Provided brief DM/CHO education. Pt aware of CHO-containing foods and concept of balanced meals. Encouraged adequate intake with protein intakes post-op     Goals  Patient to consume % of nutritionally adequate meal trays TID, or the equivalent with supplements/snacks.    Monitoring/Evaluation  Progress toward goals will be monitored and evaluated per protocol.      Blaze Mancilla RD, LD, [facility]  Neuro ICU  Pager: [phone]

## 2023-08-17 NOTE — PROGRESS NOTES
IP Diabetes Management  Daily Note         HPI: Mr. 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 Seems to have been steroid responsive initially but now apparently went from 20/40 to unable to count fingers in one week despite reasonable high doses of steroids. Recurrent relapsing course noted with negative work up to date.       Assessment:   1) Vision loss and R eye pain of unknown etiology partially responsive to high dose steroids.  2) Type 2 diabetes exacerbated by recurrent high dose steroids in the last 2 months. BG >300 PTA off steroids.   3) Steroid hyperglycemia: tapering dex to off  4) Stress hyperglycemia post procedure    Plan:    -AM NPH- start 10 units today   -PM NPH- 5 units this PM (2000)--> might tolerate more, but pt main priority to avoid hypoglycemia so maintain conservative dosing   -Novolog 1:10 CHO    -high resistance  correction AC, HS    -hypoglycemia protocol   -carb counting protocol    Discharge Planning:    -Tentative diabetic regimen: metformin alone or metformin plus glipizide depending on timing of discharge relative to clearance of steroid effect   -diabetes education needs: insulin education- consult placed 8/14  -Outpatient follow up: [facility]  -Test claim: CGM, nph, and short acting insulin coverage- pharmacy liaison consult placed 8/14    Plan discussed with patient,  and primary team       Interval History and Assessment: interval glucose trend reviewed:     Management has been a bit conservative given pt's previous hypoglycemic episodes and patient declining IV insulin yesterday.  He states again how important sleep is for him-- and it was better last night.  And he reiterates he'd rather run higher  than have another low glcose.  Reviewed targets, symptoms, expectations for glucose management with waning steroid effect.   Primary team expects discharge by the wknd hopefully (patient notes his hope for discharge tomorrow, which is his anniversary).  No steroids after the dexamethasone finishes.    Pt confirms has a working glucometer at home.  He is happy can manage hyperglycemia with pills for the time steroid effect remains after discharge      Current nutritional intake and type: Orders Placed This Encounter      Regular Diet Adult      Planned Procedures/surgeries:none  Steroid planning: tapering    D5W-containing solutions/medications: none    PTA Diabetes Regimen:   Checking BG only.  Fasting BG off steroid now 120 - 160, later in day >200 and 300s at times.  Shares My Merlinr guera readings with me.   Recently was Metformin 500 mg prescribed, 1 tab times 2 weeks, then 2 tablets daily.  Has not yet taken.             Diabetes History:   Type of Diabetes: Type 2 Diabetes Mellitus  No results found for: A1C             Review of Systems:     The Review of Systems is negative other than noted in the Interval History.           Medications:     Current Facility-Administered Medications   Medication    acetaminophen (TYLENOL) tablet 650 mg    acetaminophen (TYLENOL) tablet 975 mg    bisacodyl (DULCOLAX) suppository 10 mg    carvedilol (COREG) tablet 12.5 mg    dexAMETHasone (DECADRON) injection 2 mg    Followed by    [START ON 8/18/2023] dexAMETHasone (DECADRON) injection 1 mg    dextrose 10% infusion    glucose gel 15-30 g    Or    dextrose 50 % injection 25-50 mL    Or    glucagon injection 1 mg    heparin 100 unit/mL injection 3 mL    hydrALAZINE (APRESOLINE) injection 10-20 mg    HYDROmorphone (DILAUDID) injection 0.2 mg    Or    HYDROmorphone (DILAUDID) injection 0.4 mg    insulin aspart (NovoLOG) injection (RAPID ACTING)    insulin aspart (NovoLOG) injection (RAPID ACTING)    insulin aspart (NovoLOG) injection  "(RAPID ACTING)    insulin aspart (NovoLOG) injection (RAPID ACTING)    insulin aspart (NovoLOG) injection (RAPID ACTING)    insulin aspart (NovoLOG) injection (RAPID ACTING)    insulin NPH injection 10 Units    insulin NPH injection 5 Units    insulin regular (MYXREDLIN) infusion    labetalol (NORMODYNE/TRANDATE) injection 10-40 mg    levETIRAcetam (KEPPRA) tablet 1,000 mg    lidocaine (LMX4) cream    lidocaine 1 % 0.1-1 mL    magnesium hydroxide (MILK OF MAGNESIA) suspension 30 mL    magnesium oxide (MAG-OX) tablet 400 mg    melatonin tablet 3 mg    methocarbamol (ROBAXIN) tablet 500 mg    naloxone (NARCAN) injection 0.2 mg    Or    naloxone (NARCAN) injection 0.4 mg    Or    naloxone (NARCAN) injection 0.2 mg    Or    naloxone (NARCAN) injection 0.4 mg    ondansetron (ZOFRAN ODT) ODT tab 4 mg    Or    ondansetron (ZOFRAN) injection 4 mg    oxyCODONE (ROXICODONE) tablet 5 mg    Or    oxyCODONE IR (ROXICODONE) tablet 10 mg    pantoprazole (PROTONIX) EC tablet 40 mg    polyethylene glycol (MIRALAX) Packet 17 g    Potassium Phosphate 15 mmol in NS intermittent infusion 15 mmol    prochlorperazine (COMPAZINE) injection 5 mg    Or    prochlorperazine (COMPAZINE) tablet 5 mg    senna-docusate (SENOKOT-S/PERICOLACE) 8.6-50 MG per tablet 2 tablet    sodium chloride (PF) 0.9% PF flush 3 mL    sodium chloride (PF) 0.9% PF flush 3 mL    sodium chloride 0.9% infusion            Physical Exam:    BP (!) 151/90 (BP Location: Right arm)   Pulse 60   Temp 97.8  F (36.6  C) (Oral)   Resp (!) 9   Ht 1.905 m (6' 3\")   Wt 96 kg (211 lb 10.3 oz)   SpO2 98%   BMI 26.45 kg/m    General: pleasant, in no distress. Sitting in chair   HEENT: dry dressing right side of head  Lungs: unlabored respiration, no cough  ABD: rounded, nondistended appearing  MSK:  moves all extremities  Lymp:  no LE edema   Mental status:  alert, oriented to self, place, time  Psych:  flat affect, calm and appropriate interaction             Data:     Recent " Labs   Lab 08/17/23  0753 08/17/23  0454 08/17/23  0321 08/17/23  0016 08/16/23  2204 08/16/23  1957   * 225* 226* 238* 244* 239*       Lab Results   Component Value Date    WBC 11.4 (H) 08/17/2023    WBC 16.7 (H) 08/16/2023    WBC 26.7 (H) 08/15/2023    HGB 8.1 (L) 08/17/2023    HGB 9.5 (L) 08/16/2023    HGB 9.5 (L) 08/15/2023    HCT 24.0 (L) 08/17/2023    HCT 27.7 (L) 08/16/2023    HCT 27.2 (L) 08/15/2023    MCV 90 08/17/2023    MCV 89 08/16/2023    MCV 87 08/15/2023    PLT 98 (L) 08/17/2023     (L) 08/16/2023     (L) 08/15/2023     Lab Results   Component Value Date     (L) 08/17/2023     (L) 08/16/2023     (L) 08/15/2023    POTASSIUM 4.1 08/17/2023    POTASSIUM 3.7 08/16/2023    POTASSIUM 4.0 08/15/2023    CHLORIDE 100 08/17/2023    CHLORIDE 101 08/16/2023    CHLORIDE 96 (L) 08/15/2023    CO2 24 08/17/2023    CO2 26 08/16/2023    CO2 25 08/15/2023     (H) 08/17/2023     (H) 08/17/2023     (H) 08/17/2023     Lab Results   Component Value Date    BUN 15.3 08/17/2023    BUN 16.5 08/16/2023    BUN 20.5 08/15/2023     No results found for: TSH  Lab Results   Component Value Date    AST 35 08/09/2023    AST 24 07/03/2023    ALT 41 08/09/2023    ALT 18 07/03/2023    ALKPHOS 71 08/09/2023    ALKPHOS 64 07/03/2023       50 minutes spent on the date of the encounter doing chart review, history and exam, coordinating care/communication, documentation and further activities per the note.    Eula Mcfarlane APRN -[phone]      Contacting the Inpatient Diabetes Team   From 7AM-5PM: page inpatient diabetes provider that is following the patient, or utilize the job code paging system. From 5PM-7AM: page the job code for endocrine fellow on call.     Please use the following job code to reach the Inpatient Diabetes team. Note that you must use an in house phone and that job codes cannot receive text pages.   Dial [phone] (or star-star-star [phone] on the new [email or web address]  telephones), then [phone] to reach the endocrine-diabetes provider on call.

## 2023-08-17 NOTE — PROGRESS NOTES
"/74 (BP Location: Left arm, Patient Position: Sitting)   Pulse 64   Temp 98  F (36.7  C) (Oral)   Resp 16   Ht 1.905 m (6' 3\")   Wt 96 kg (211 lb 10.3 oz)   SpO2 98%   BMI 26.45 kg/m      Shift: 1700H-2330H  VS: Stable, afebrile  Neuro: Aox4; R eye seeing opaque  BG: ACHS; on carb coverage  Labs: Phos: 2.4; oral replacement ongoing as scheduled  Respiratory: on RA; has personal CPAP at night with 2lpm of 02 via nasal cannula.  Cardiac: WDL  Pain/Nausea: Denies  Diet: Regular  IV Access: R and L PIV SL, R 2Lumen internal jugular  GI/: Voids freely; Last BM this afternoon 8/17/23  Skin: Dressing in place on R head and forehead, CDI; 2 RN full skin assessment done and documented  Mobility: UAL  Events/Education: Seen by Neuro this afternoon  Plan: Conitnue with POC and notify team with any changes    "

## 2023-08-17 NOTE — PROGRESS NOTES
Admitted/transferred from: 4E  Time of arrival on unit 1700H  2 RN full  skin assessment completed byAMRIT LEIJA and Judith OLIVA  Skin assessment finding:  Small pointed scab on bilateral foot  2. L lower back primapore dressing from Bone marrow biopsy site. CDI.  3. R and L PIV SL  4. R 2L internal jugular SL  5. R periorbital bruising, R head and forehead dressing from surgery, CDI.   Interventions/actions: other No current interventions needed at this time; Standard monitoring .

## 2023-08-17 NOTE — PROGRESS NOTES
"Hematology Consult Progress Note    Date of Service 08/17/2023  Admit Date 8/9/2023   Initial Consult 08/12/23   Hospital Day: 9     Amado Butler is a 63 year old man with a history of SARA, HTN, DM2, proxysmal SVT who was admitted 8/9/2023 for waxxing and waning R eye pain and vision loss for 2 months found to have concern for unilateral optic perineuritis of unclear etiology. Hematology was consulted due to thrombocytopenia and low fibrinogen. Also given the monocytosis and R vision loss, underwent BMBx.         Interval Events:   Doing better, less tired.     Meds reviewed in Epic.  Allergies reviewed in Epic    Physical Exam  /75 (BP Location: Right arm)   Pulse 60   Temp 98.6  F (37  C) (Oral)   Resp (!) 9   Ht 1.905 m (6' 3\")   Wt 96 kg (211 lb 10.3 oz)   SpO2 98%   BMI 26.45 kg/m       Constitutional: Awake, alert, cooperative, in NAD.  Eyes: PERRL  ENT: Normocephalic  Respiratory: Non-labored breathing  Skin: No concerning lesions or rash on exposed areas. BMBx site in his R lower back looks clear, no hematoma or bruises.   Musculoskeletal: No edema joseph LEs.  Neurologic: Awake, alert & oriented   Psych: appropriate affect      Labs reviewed, pertinent labs as follows:     Recent Labs   Lab 08/17/23  0454 08/16/23  0501 08/15/23  1002   WBC 11.4*   < > 26.7*   HGB 8.1*   < > 9.5*   PLT 98*   < > 145*   MCV 90   < > 87   RDW 18.1*   < > 17.2*   ANEU  --   --  19.5*   ALYM  --   --  2.4   CARLY  --   --  4.5*   AEOS  --   --  0.0    < > = values in this interval not displayed.     Recent Labs   Lab 08/17/23  1404 08/17/23  0454   NA  --  132*   POTASSIUM  --  4.1   CHLORIDE  --  100   CO2  --  24   BUN  --  15.3   CR  --  0.83   FELIX  --  7.6*   MAG  --  1.8   PHOS 2.4* 1.7*     Recent Labs   Lab 08/13/23  1133   IRON 101   IRONSAT 53*   GRABIEL 555*   *   B12 628   FOLIC 12.7     Recent Labs   Lab 08/13/23  1133   HCVAB Nonreactive     Recent Labs   Lab 08/17/23  0454 08/16/23  1041 " 08/15/23  0916   INR  --   --  1.82*   PTT  --   --  29   FIBR 454 405 153*        Impression:    #Acute on chronic thrombocytopenia  #Low fibrinogen - improving  #Monocytosis  #Normocytic anemia  #Bordeline splenomegaly  #Unilateral vision loss of unclear etiology  #T2DM/Hyperglycemia    1. Awaiting BMBx result (prelim BMBx path with the pathologist and it does not seem that the pt has CMML). Awaiting path result from his craniotomy and optic canal/intradural mass.      2. The pt has a baseline thrombocytopenia of 100-110s since 2012, currently at his baseline (see initial heme consult note from 8/12/23).    Recommendations:  <<no change from yesterday>>  -await BMBx result  -await NSG surgically resected optic canal/intradural mass pathology result  -monitor CBC/diff, coags    We will continue to follow.    The above plan was discussed with Dr. Molina, who agrees with the assessment and plan.     Thank you for allowing me to participate in the care of this patient. Please do not hesitate to contact me if there are any concerns or questions.     Jah Echeverria MD  Hem/onc fellow  Division of Hematology, Oncology, and Transplantation  [facility]    Attending  The patient was seen and examined by me separate from the resident/fellow provider.The note above reflects my assessment and plan. I have personally reviewed today's labs,vital and radiology results. The points of care that were added by me are:    Still waiting ffor biopsy results and NGS from bone marrow.Vision still basically absent in right eye  Donavan Molina M.D.  [phone]

## 2023-08-17 NOTE — PROGRESS NOTES
"Major Shift Events: Right eye vision remains \"opaque,\" otherwise no deficits on neuro exam. PRN hydralazine given x1 for SBP>140. Other wise VSS Denies pain. Eating and drinking well. Up to bathroom q 2-3 hours voiding w/out difficulty, small BM, passing flatus.     Transferred to  @ 1645.Belonging sent with him.    "

## 2023-08-18 ENCOUNTER — APPOINTMENT (OUTPATIENT)
Dept: OCCUPATIONAL THERAPY | Facility: CLINIC | Age: 63
DRG: 025 | End: 2023-08-18
Payer: COMMERCIAL

## 2023-08-18 VITALS
RESPIRATION RATE: 18 BRPM | HEIGHT: 75 IN | DIASTOLIC BLOOD PRESSURE: 95 MMHG | BODY MASS INDEX: 26.67 KG/M2 | OXYGEN SATURATION: 98 % | WEIGHT: 214.5 LBS | TEMPERATURE: 99 F | SYSTOLIC BLOOD PRESSURE: 156 MMHG | HEART RATE: 74 BPM

## 2023-08-18 PROBLEM — T38.0X5A STEROID-INDUCED HYPERGLYCEMIA: Status: ACTIVE | Noted: 2023-08-18

## 2023-08-18 PROBLEM — R73.9 STEROID-INDUCED HYPERGLYCEMIA: Status: ACTIVE | Noted: 2023-08-18

## 2023-08-18 LAB
ANION GAP SERPL CALCULATED.3IONS-SCNC: 8 MMOL/L (ref 7–15)
ANION GAP SERPL CALCULATED.3IONS-SCNC: 9 MMOL/L (ref 7–15)
BUN SERPL-MCNC: 14.7 MG/DL (ref 8–23)
BUN SERPL-MCNC: 16.2 MG/DL (ref 8–23)
CALCIUM SERPL-MCNC: 7.9 MG/DL (ref 8.8–10.2)
CALCIUM SERPL-MCNC: 8.1 MG/DL (ref 8.8–10.2)
CHLORIDE SERPL-SCNC: 102 MMOL/L (ref 98–107)
CHLORIDE SERPL-SCNC: 99 MMOL/L (ref 98–107)
CREAT SERPL-MCNC: 0.8 MG/DL (ref 0.67–1.17)
CREAT SERPL-MCNC: 0.84 MG/DL (ref 0.67–1.17)
DEPRECATED HCO3 PLAS-SCNC: 24 MMOL/L (ref 22–29)
DEPRECATED HCO3 PLAS-SCNC: 25 MMOL/L (ref 22–29)
ERYTHROCYTE [DISTWIDTH] IN BLOOD BY AUTOMATED COUNT: 18.4 % (ref 10–15)
FIBRINOGEN PPP-MCNC: 414 MG/DL (ref 170–490)
GFR SERPL CREATININE-BSD FRML MDRD: >90 ML/MIN/1.73M2
GFR SERPL CREATININE-BSD FRML MDRD: >90 ML/MIN/1.73M2
GLUCOSE BLDC GLUCOMTR-MCNC: 141 MG/DL (ref 70–99)
GLUCOSE BLDC GLUCOMTR-MCNC: 172 MG/DL (ref 70–99)
GLUCOSE BLDC GLUCOMTR-MCNC: 174 MG/DL (ref 70–99)
GLUCOSE BLDC GLUCOMTR-MCNC: 183 MG/DL (ref 70–99)
GLUCOSE BLDC GLUCOMTR-MCNC: 202 MG/DL (ref 70–99)
GLUCOSE SERPL-MCNC: 176 MG/DL (ref 70–99)
GLUCOSE SERPL-MCNC: 210 MG/DL (ref 70–99)
HCT VFR BLD AUTO: 24.5 % (ref 40–53)
HGB BLD-MCNC: 8 G/DL (ref 13.3–17.7)
MAGNESIUM SERPL-MCNC: 1.8 MG/DL (ref 1.7–2.3)
MCH RBC QN AUTO: 29.7 PG (ref 26.5–33)
MCHC RBC AUTO-ENTMCNC: 32.7 G/DL (ref 31.5–36.5)
MCV RBC AUTO: 91 FL (ref 78–100)
OSMOLALITY SERPL: 280 MMOL/KG (ref 280–301)
OSMOLALITY UR: 408 MMOL/KG (ref 100–1200)
PHOSPHATE SERPL-MCNC: 2.4 MG/DL (ref 2.5–4.5)
PLATELET # BLD AUTO: 102 10E3/UL (ref 150–450)
POTASSIUM SERPL-SCNC: 4 MMOL/L (ref 3.4–5.3)
POTASSIUM SERPL-SCNC: 4.2 MMOL/L (ref 3.4–5.3)
RBC # BLD AUTO: 2.69 10E6/UL (ref 4.4–5.9)
SODIUM SERPL-SCNC: 133 MMOL/L (ref 136–145)
SODIUM SERPL-SCNC: 133 MMOL/L (ref 136–145)
SODIUM SERPL-SCNC: 134 MMOL/L (ref 136–145)
SODIUM UR-SCNC: 80 MMOL/L
WBC # BLD AUTO: 6.7 10E3/UL (ref 4–11)

## 2023-08-18 PROCEDURE — 99239 HOSP IP/OBS DSCHRG MGMT >30: CPT | Mod: GC | Performed by: STUDENT IN AN ORGANIZED HEALTH CARE EDUCATION/TRAINING PROGRAM

## 2023-08-18 PROCEDURE — 84300 ASSAY OF URINE SODIUM: CPT

## 2023-08-18 PROCEDURE — 84100 ASSAY OF PHOSPHORUS: CPT | Performed by: STUDENT IN AN ORGANIZED HEALTH CARE EDUCATION/TRAINING PROGRAM

## 2023-08-18 PROCEDURE — 250N000013 HC RX MED GY IP 250 OP 250 PS 637

## 2023-08-18 PROCEDURE — 99233 SBSQ HOSP IP/OBS HIGH 50: CPT | Performed by: CLINICAL NURSE SPECIALIST

## 2023-08-18 PROCEDURE — 83930 ASSAY OF BLOOD OSMOLALITY: CPT

## 2023-08-18 PROCEDURE — 84295 ASSAY OF SERUM SODIUM: CPT | Performed by: STUDENT IN AN ORGANIZED HEALTH CARE EDUCATION/TRAINING PROGRAM

## 2023-08-18 PROCEDURE — 83735 ASSAY OF MAGNESIUM: CPT | Performed by: STUDENT IN AN ORGANIZED HEALTH CARE EDUCATION/TRAINING PROGRAM

## 2023-08-18 PROCEDURE — 36415 COLL VENOUS BLD VENIPUNCTURE: CPT

## 2023-08-18 PROCEDURE — 83935 ASSAY OF URINE OSMOLALITY: CPT

## 2023-08-18 PROCEDURE — 84295 ASSAY OF SERUM SODIUM: CPT

## 2023-08-18 PROCEDURE — 36415 COLL VENOUS BLD VENIPUNCTURE: CPT | Performed by: STUDENT IN AN ORGANIZED HEALTH CARE EDUCATION/TRAINING PROGRAM

## 2023-08-18 PROCEDURE — 97535 SELF CARE MNGMENT TRAINING: CPT | Mod: GO

## 2023-08-18 PROCEDURE — 250N000013 HC RX MED GY IP 250 OP 250 PS 637: Performed by: NEUROLOGICAL SURGERY

## 2023-08-18 PROCEDURE — 85027 COMPLETE CBC AUTOMATED: CPT | Performed by: STUDENT IN AN ORGANIZED HEALTH CARE EDUCATION/TRAINING PROGRAM

## 2023-08-18 PROCEDURE — 250N000011 HC RX IP 250 OP 636: Mod: JZ | Performed by: STUDENT IN AN ORGANIZED HEALTH CARE EDUCATION/TRAINING PROGRAM

## 2023-08-18 PROCEDURE — 250N000013 HC RX MED GY IP 250 OP 250 PS 637: Performed by: STUDENT IN AN ORGANIZED HEALTH CARE EDUCATION/TRAINING PROGRAM

## 2023-08-18 PROCEDURE — 80048 BASIC METABOLIC PNL TOTAL CA: CPT | Performed by: STUDENT IN AN ORGANIZED HEALTH CARE EDUCATION/TRAINING PROGRAM

## 2023-08-18 PROCEDURE — 85384 FIBRINOGEN ACTIVITY: CPT

## 2023-08-18 PROCEDURE — 99232 SBSQ HOSP IP/OBS MODERATE 35: CPT | Performed by: INTERNAL MEDICINE

## 2023-08-18 RX ORDER — CARVEDILOL 12.5 MG/1
12.5 TABLET ORAL 2 TIMES DAILY WITH MEALS
Qty: 180 TABLET | Refills: 3 | Status: SHIPPED | OUTPATIENT
Start: 2023-08-18

## 2023-08-18 RX ORDER — METFORMIN HCL 500 MG
1000 TABLET, EXTENDED RELEASE 24 HR ORAL ONCE
Status: COMPLETED | OUTPATIENT
Start: 2023-08-18 | End: 2023-08-18

## 2023-08-18 RX ORDER — METFORMIN HCL 500 MG
1000 TABLET, EXTENDED RELEASE 24 HR ORAL ONCE
Qty: 1 TABLET | Refills: 0 | Status: SHIPPED | OUTPATIENT
Start: 2023-08-19 | End: 2023-09-05

## 2023-08-18 RX ORDER — MAGNESIUM OXIDE 400 MG/1
400 TABLET ORAL EVERY 4 HOURS
Status: COMPLETED | OUTPATIENT
Start: 2023-08-18 | End: 2023-08-18

## 2023-08-18 RX ORDER — LEVETIRACETAM 1000 MG/1
1000 TABLET ORAL 2 TIMES DAILY
Qty: 8 TABLET | Refills: 0 | Status: SHIPPED | OUTPATIENT
Start: 2023-08-18 | End: 2023-09-05

## 2023-08-18 RX ADMIN — POLYETHYLENE GLYCOL 3350 17 G: 17 POWDER, FOR SOLUTION ORAL at 08:24

## 2023-08-18 RX ADMIN — HYDRALAZINE HYDROCHLORIDE 10 MG: 20 INJECTION INTRAMUSCULAR; INTRAVENOUS at 06:12

## 2023-08-18 RX ADMIN — POTASSIUM & SODIUM PHOSPHATES POWDER PACK 280-160-250 MG 1 PACKET: 280-160-250 PACK at 00:44

## 2023-08-18 RX ADMIN — MAGNESIUM OXIDE TAB 400 MG (241.3 MG ELEMENTAL MG) 400 MG: 400 (241.3 MG) TAB at 14:57

## 2023-08-18 RX ADMIN — POTASSIUM & SODIUM PHOSPHATES POWDER PACK 280-160-250 MG 1 PACKET: 280-160-250 PACK at 10:28

## 2023-08-18 RX ADMIN — POTASSIUM & SODIUM PHOSPHATES POWDER PACK 280-160-250 MG 1 PACKET: 280-160-250 PACK at 14:57

## 2023-08-18 RX ADMIN — LEVETIRACETAM 1000 MG: 500 TABLET, FILM COATED ORAL at 20:30

## 2023-08-18 RX ADMIN — PANTOPRAZOLE SODIUM 40 MG: 40 TABLET, DELAYED RELEASE ORAL at 17:12

## 2023-08-18 RX ADMIN — LEVETIRACETAM 1000 MG: 500 TABLET, FILM COATED ORAL at 08:22

## 2023-08-18 RX ADMIN — CARVEDILOL 12.5 MG: 12.5 TABLET, FILM COATED ORAL at 08:21

## 2023-08-18 RX ADMIN — MAGNESIUM OXIDE TAB 400 MG (241.3 MG ELEMENTAL MG) 400 MG: 400 (241.3 MG) TAB at 10:28

## 2023-08-18 RX ADMIN — ACETAMINOPHEN 650 MG: 325 TABLET, FILM COATED ORAL at 17:17

## 2023-08-18 RX ADMIN — LABETALOL HYDROCHLORIDE 10 MG: 5 INJECTION, SOLUTION INTRAVENOUS at 08:04

## 2023-08-18 RX ADMIN — METFORMIN ER 500 MG 1000 MG: 500 TABLET ORAL at 21:17

## 2023-08-18 RX ADMIN — DEXAMETHASONE SODIUM PHOSPHATE 1 MG: 4 INJECTION, SOLUTION INTRA-ARTICULAR; INTRALESIONAL; INTRAMUSCULAR; INTRAVENOUS; SOFT TISSUE at 00:44

## 2023-08-18 RX ADMIN — POTASSIUM & SODIUM PHOSPHATES POWDER PACK 280-160-250 MG 1 PACKET: 280-160-250 PACK at 18:28

## 2023-08-18 RX ADMIN — DEXAMETHASONE SODIUM PHOSPHATE 1 MG: 4 INJECTION, SOLUTION INTRA-ARTICULAR; INTRALESIONAL; INTRAMUSCULAR; INTRAVENOUS; SOFT TISSUE at 08:24

## 2023-08-18 RX ADMIN — ACETAMINOPHEN 650 MG: 325 TABLET, FILM COATED ORAL at 00:44

## 2023-08-18 RX ADMIN — DEXAMETHASONE SODIUM PHOSPHATE 1 MG: 4 INJECTION, SOLUTION INTRA-ARTICULAR; INTRALESIONAL; INTRAMUSCULAR; INTRAVENOUS; SOFT TISSUE at 17:12

## 2023-08-18 RX ADMIN — CARVEDILOL 12.5 MG: 12.5 TABLET, FILM COATED ORAL at 18:28

## 2023-08-18 RX ADMIN — PANTOPRAZOLE SODIUM 40 MG: 40 TABLET, DELAYED RELEASE ORAL at 08:22

## 2023-08-18 ASSESSMENT — ACTIVITIES OF DAILY LIVING (ADL)
ADLS_ACUITY_SCORE: 20

## 2023-08-18 NOTE — PLAN OF CARE
"VS: BP (!) 151/87   Pulse 62   Temp 97.5  F (36.4  C) (Oral)   Resp 18   Ht 1.905 m (6' 3\")   Wt 97.3 kg (214 lb 8 oz)   SpO2 99%   BMI 26.81 kg/m      Cares: 1900 - 0730     Neuro: Aox4 (neuro checks Q4H)  Cardiac: hypertensive this /80s (PRN hydralazine given, see MAR)  Respiratory: WDL on RA   GI/: voiding adequately w/out issues, LBM 8-17  Skin: right cranial incision sutured AMANDA, small incision above left eye   Diet: Regular (carb count)  Labs: phos replaced last night, pending AM labs   BG: ACHS (172)  LDA: right and left PIV SL   Mobility: UAL   PRN medications: tylenol x1, hydralazine x1  Plan of Care: continue with current POC and update MD with any changes            "

## 2023-08-18 NOTE — PROGRESS NOTES
"Hematology Consult Progress Note    Date of Service 08/18/2023  Admit Date 8/9/2023   Initial Consult 08/12/23   Hospital Day: 10     mAado Butler is a 63 year old man with a history of SARA, HTN, DM2, proxysmal SVT who was admitted 8/9/2023 for waxxing and waning R eye pain and vision loss for 2 months found to have concern for unilateral optic perineuritis of unclear etiology. Hematology was consulted due to thrombocytopenia and low fibrinogen. Also given the monocytosis and R vision loss, underwent BMBx.         Interval Events:   Doing better ready to go home  Biopsy from optic canal showed meningioma    Meds reviewed in Epic.  Allergies reviewed in Epic    Physical Exam  BP (!) 145/88   Pulse 59   Temp 97.1  F (36.2  C) (Oral)   Resp 18   Ht 1.905 m (6' 3\")   Wt 97.3 kg (214 lb 8 oz)   SpO2 99%   BMI 26.81 kg/m       Constitutional: Awake, alert, cooperative, in NAD.  Scar Right frontal and orbital area clean   Eyes: PERRL  ENT: Normocephalic  Respiratory: Non-labored breathing  Skin: No concerning lesions or rash on exposed areas. BMBx site in his R lower back looks clear, no hematoma or bruises.   Musculoskeletal: No edema joseph LEs.  Neurologic: Awake, alert & oriented   Psych: appropriate affect      Labs reviewed, pertinent labs as follows:     Recent Labs   Lab 08/18/23  0558 08/16/23  0501 08/15/23  1002   WBC 6.7   < > 26.7*   HGB 8.0*   < > 9.5*   *   < > 145*   MCV 91   < > 87   RDW 18.4*   < > 17.2*   ANEU  --   --  19.5*   ALYM  --   --  2.4   CARLY  --   --  4.5*   AEOS  --   --  0.0    < > = values in this interval not displayed.       Recent Labs   Lab 08/18/23  0741 08/18/23  0558   * 134*   POTASSIUM  --  4.2   CHLORIDE  --  102   CO2  --  24   BUN  --  16.2   CR  --  0.84   FLEIX  --  7.9*   MAG  --  1.8   PHOS  --  2.4*       Recent Labs   Lab 08/13/23  1133   IRON 101   IRONSAT 53*   GRABIEL 555*   *   B12 628   FOLIC 12.7       Recent Labs   Lab 08/13/23  1133   HCVAB " Nonreactive       Recent Labs   Lab 08/18/23  0558 08/17/23  0454 08/16/23  1041 08/15/23  0916   INR  --   --   --  1.82*   PTT  --   --   --  29   FIBR 414 454 405 153*          Impression:    #Acute on chronic thrombocytopenia  #Low fibrinogen - improving  #Monocytosis  #Normocytic anemia  #Bordeline splenomegaly  #Unilateral vision loss of unclear etiology  #T2DM/Hyperglycemia    1. The orbital biopsy showed a menigioma.Will get repeat scan in 2 weeks and follow with NSurg.  The NGS on bone marrow is still pending.Will plan for patient to see Dr Angelica Llamas in one month at the [facility] We will send  him a notice for scheduling He should have CBC plat diff CMP and DRP at that visit lab draw  Donavan Molina M.D.  [phone]

## 2023-08-18 NOTE — PLAN OF CARE
"Goal Outcome Evaluation:    9960-1710    BP (!) 145/75 (BP Location: Right arm)   Pulse 57   Temp 97.5  F (36.4  C) (Oral)   Resp 18   Ht 1.905 m (6' 3\")   Wt 97.3 kg (214 lb 8 oz)   SpO2 99%   BMI 26.81 kg/m      Status: s/p crani  Neuro: A&Ox4, see flow sheet for more information.  Cardiac: SR. VSS.   Respiratory: Sating 99% on RA.  GI/: Adequate urine output. BM X1  Diet/appetite: Tolerating regular diet. Eating well.  Activity: Independent up to chair and in halls.  Pain: At acceptable level on current regimen.   Skin: No new deficits noted.  LDA's:PIV saline locked.  Plan: Continue with POC. Notify primary team with changes.  "

## 2023-08-18 NOTE — PROGRESS NOTES
"[facility]  General Neurology - Progress Note    Patient Name:  Amado Butler  Date of Service:  August 18, 2023    Subjective:    NAEO. Mr. Butler continues to feel well.  He is disappointed he may have to remain in-hospital on his 39th wedding anniversary to work-up hyponatremia, but is agreeable to remaining.      Objective:    Vitals: BP (!) 145/75 (BP Location: Right arm)   Pulse 57   Temp 97.5  F (36.4  C) (Oral)   Resp 18   Ht 1.905 m (6' 3\")   Wt 97.3 kg (214 lb 8 oz)   SpO2 99%   BMI 26.81 kg/m    General: Sitting up in chair, NAD  Head: Right-sided surgical scalp incision, healing well without any sign of infection  Cardiac: no lower extremity edema  Neurologic:  Mental Status: Fully alert, attentive and oriented. Speech clear and fluent.   Cranial Nerves: Right-sided RAPD, no perception of light of the right eye.  EOM intact, without nystagmus.  No pain reported with extraocular movements.  Facial movements symmetric at rest and with activation.  No dysarthria.  Motor: No abnormal movements.  Moving all 4 extremities antigravity.   Sensory: Intact to light touch x 4 extremities   Station/Gait: Normal casual gait.     Pertinent Investigations:    I have personally reviewed most recent and pertinent labs, tests, and radiological images.     MRI brain and orbits w/o contrast 6/28/23  IMPRESSION:  HEAD MRI:   1.  Right frontal lobe encephalomalacia and surrounding gliotic   change without acute intracranial abnormality.   ORBIT MRI:   1.  Unremarkable orbits.      MRI brain and orbits with and w/o contrast 7/3/23  IMPRESSION:  HEAD MRI:  1.  No acute infarct.  2.  Age-related changes described above.  3.  Right frontal lobe small area tissue loss and gliosis.  ORBIT MRI:  1.  Unremarkable MRI of the orbits.   2.  No evidence for optic neuritis.     MRI BRAIN WITH AND WITHOUT CONTRAST, MRI FACE (ORBITS) WITH AND WITHOUT CONTRAST 7/28/23  1.  No acute intracranial " hemorrhage, mass lesion, or acute infarction.   2.  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   3.  Minimal T2 hyperintensity and enhancement in the right optic nerve sheath, which may represent mild perineuritis. No clear evidence of optic neuritis.      18F-FDG PET Scan With Attenuation Correction CT Only 8/3/23  1. No specific evidence of an FDG-avid malignancy.   2. Non-avid left adrenal nodule, which may be too small to be accurately characterized by FDG PET. Reported right adrenal nodules not visualized on the low dose CT. Adrenal nodules are not well characterized by FDG-PET, and would be more sensitively/specifically assessed with an adrenal protocolled CT or MRI.   3. Apparent abrupt termination of the left maxilla posteriorly without abnormal tracer uptake, suspected to be related to prior dental extraction or post-surgical changes.      MRI CERVICAL SPINE WITH AND WITHOUT CONTRAST, MRI THORACIC SPINE WITH AND WITHOUT CONTRAST 23  No evidence of demyelinating disease or transverse myelitis within the cervical or thoracic spine.      MR ORBIT W/O & W CONTRAST 2023 11:35 AM  Impression:    1. Regarding the orbits and globes, there is stable slight asymmetric  hyperintensity and perhaps slight enhancement of the right optic nerve  sheath, suggesting optic perineuritis. No underlying mass lesion.  2. Regarding the remainder of the brain, chronic right frontal infarct  and mild small vessel ischemic disease.      Temporal artery, right, biopsy 23  No evidence of giant cell arteritis.  Moderate arteriosclerosis.  Mild medial calcific sclerosis.      CRP < 3.0 on multiple occasions  ESR < 2 on multiple occasions     CNS demyelinating disease (serum): negative  Encephalopathy/autoimmune panel (serum): negative  ACE: < 10  BUDDY: negative  ANCA Ig:20 (high)  IgG subclass: negative  [place], [place] light chain: negative  SPEP: negative  Lysozyme (serum): > 10.00 (high)     HBV: negative  Treponema  juany: negative  Lyme juany: negative  Anaplamsa IgG IgM: negative  [place] IgG IgM: negative  Erlichia IgG IgM: negative  Quantiferon TB: negative     CSF 8/1/23  Nucleated cells: 0  RBC: 2,967  Protein: 20  Glucose: 133  Kappa Free light chain: negative  ACE: 4  Lyme: non-reactive      Assessment:  Mr. Butler is a 63-year-old man who developed right-sided visual loss and periorbital pain that was initially responsive to steroids, now s/p craniotomy and biopsy with pathology consistent with meningioma.  Post-operative course complicated by low-grade hyponatremia vs pseudohyponatremia in c/o hyperglycemia, remaining in hospital to further assess given risk of bethel-procedural SIADH.  Otherwise ready for discharge.    Plan:  #Visual loss right eye, total  #Meningioma:   -Plan for outpatient MRI in 6 weeks to monitor any progression of meningioma, as grade could not be determined due to the small sample size.  We will therefore err on the side of caution and repeat imaging early.  - No current indications for plasma exchange or steroids based on biopsy results  - Outpatient follow-up with neurosurgery  - Outpatient follow-up with ophthalmology    #S/p craniotomy  - Continue Keppra 1000 twice daily for 1 week per neurosurgery    #Steroid-induced hyperglycemia  - Appreciate comanagement of diabetes with endocrine team  - Dexamethasone to end tonight    #Thrombocytopenia  #Normocytic anemia  #Low fibrinogen  - Appreciate coordination of care with heme-onc  - DVT prophylaxis contraindicated  - Follow-up w/ Dr. Angelica [facility]lamas at [facility]    #SARA  - CPAP at night    FEN:  Malnutrition: Patient does not meet two of the above criteria necessary for diagnosing malnutrition  PPx: Not indicated due to thrombocytopenia  Code: full    Patient discussed w/ Dr. Ary Joseph MD, MS  PGY-2, Neurology  Please page GenNeuro pass pager at [phone] ([facility])

## 2023-08-18 NOTE — PROGRESS NOTES
"[facility], [place]   Neurosurgery Progress Note:  08/18/2023    Interval History: CHAYO. Stable exam.   Assessment:  63 year old male, ambidextrous, not on blood thinners, with past medical history suggestive of chronic thrombocytopenia, SARA, HTN, DM2, paroxysmal SVT p/w waxing and waning R-eye pain and visual loss since 6/1. Has been on high dose steroids intermittently with off/on improvement in vision in right eye. He is now POD-4 s/p R FT craniotomy for optic canal and orbital apex decompression and resection of optic canal and intradural mass. Surgical pathology showed meningioma.     Clinically Significant Risk Factors                    # Thrombocytopenia: Lowest platelets = 98 in last 2 days, will monitor for bleeding          # Overweight: Estimated body mass index is 26.81 kg/m  as calculated from the following:    Height as of this encounter: 1.905 m (6' 3\").    Weight as of this encounter: 97.3 kg (214 lb 8 oz).            Plan:  Serial neurologic exams  Blood glucose management per endocrinology team, appreciate assistance  Decadron 4 day taper, no steroids needed per neurology team thereafter  Replace electrolytes prn  Hyponatremia work-up per Neurology  ADAT  SCDs  for DVT ppx, ok for SQH from neurosurgery standpoint  Ok to discharge from surgical standpoint   -----------------------------------  Apryl Stephens MD   PGY-1 Neurosurgery    Please contact neurosurgery resident on call with questions.    Dial * * [phone], enter [phone] when prompted.   -----------------------------------    General: awake and alert  HEENT: Bruising around right eye, incision C/D/I with minimal strike through  PULM: breathing comfortably on room air  NEUROLOGIC:  -- Awake; Alert; oriented x 3  -- Follows commands briskly  -- +repetition, calculation, and naming  -- Speech fluent, spontaneous. No aphasia or dysarthria.  -- no gaze preference. No apparent hemineglect.  Cranial Nerves:  -- Minimal light " perception on the right eye, not able to count fingers, VFF on the left eye  -- RAPD on right, L pupil briskly reacts to light, extraocular movements intact  -- face symmetrical, tongue midline  -- sensory V1-V3 intact bilaterally  -- palate elevates symmetrically, uvula midline  -- hearing grossly intact bilat  -- Trapezii 5/5 strength bilat symmetric     Vision:  Right eye: can only see minimal light, no finger counting  Left eye: 20/40     Motor:  Normal bulk / tone; no tremor, rigidity, or bradykinesia.  No muscle wasting or fasciculations  No Pronator Drift       Delt Bi Tri Hand Flexion/  Extension Iliopsoas Quadriceps Hamstrings Tibialis Anterior Gastroc     C5 C6 C7 C8/T1 L2 L3 L4-S1 L4 S1   R 5 5 5 5 5 5 5 5 5   L 5 5 5 5 5 5 5 5 5   Sensory:  intact to LT x 4 extremities       Reflexes:       Bi Tri BR Merissa Pat Ach Bab     C5-6 C7-8 C6 UMN L2-4 S1 UMN   R 2+ 2+ 2+ Norm 2+ 2+ Norm   L 2+ 2+ 2+ Norm 2+ 2+ Norm      Gait: Deferred    Objective:   Temp:  [97.1  F (36.2  C)-98.3  F (36.8  C)] 97.5  F (36.4  C)  Pulse:  [57-98] 57  Resp:  [16-18] 18  BP: (127-157)/(72-88) 145/75  SpO2:  [98 %-99 %] 99 %  I/O last 3 completed shifts:  In: 1325 [P.O.:1315; I.V.:10]  Out: -     LABS:  Recent Labs   Lab 08/18/23  1304 08/18/23  1159 08/18/23  0751 08/18/23  0741 08/18/23  0558 08/17/23  0753 08/17/23  0454 08/16/23  0510 08/16/23  0501   NA  --   --   --  133* 134*  --  132*  --  134*   POTASSIUM  --   --   --   --  4.2  --  4.1  --  3.7   CHLORIDE  --   --   --   --  102  --  100  --  101   CO2  --   --   --   --  24  --  24  --  26   ANIONGAP  --   --   --   --  8  --  8  --  7   * 202* 174*  --  176*   < > 225*   < > 107*   BUN  --   --   --   --  16.2  --  15.3  --  16.5   CR  --   --   --   --  0.84  --  0.83  --  0.92   FELIX  --   --   --   --  7.9*  --  7.6*  --  8.3*    < > = values in this interval not displayed.       Recent Labs   Lab 08/18/23  0558   WBC 6.7   RBC 2.69*   HGB 8.0*   HCT 24.5*   MCV  91   MCH 29.7   MCHC 32.7   RDW 18.4*   *       IMAGING:  No results found for this or any previous visit (from the past 24 hour(s)).

## 2023-08-18 NOTE — PROGRESS NOTES
IP Diabetes Management  Daily Note         HPI: Mr. 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 Seems to have been steroid responsive initially but now apparently went from 20/40 to unable to count fingers in one week despite reasonable high doses of steroids (came in on 80 mg pred daily). Recurrent relapsing course noted with negative work up to date.       Assessment:   1) Vision loss and R eye pain of unknown etiology partially responsive to high dose steroids.  2) Type 2 diabetes exacerbated by recurrent high dose steroids in the last 2 months. BG >300 PTA off steroids.   3) Steroid hyperglycemia: tapering dex to off  4) Stress hyperglycemia post procedure    Plan:    -AM NPH- 10 untis this morning, then stop   -discontinue PM NPH- 5 units this PM (2000)--> in anticipation of discharge   -increased Novolog to 1:7CHO    -high resistance  correction AC, HS    -hypoglycemia protocol   -carb counting protocol    Discharge Plan reviewed with pt at bedside and pasted into the AVS for reference      Diabetes Management Plan :  Check glucose before breakfast and before evening meal.  Report to provider if you have two consecutive values greater than 300 (you may be instructed to increase your metformin).  Glucose target during transition off the steroid is .  Later, your previous lower target of  will be again appropriate.  Your last dose of dexamethasone is at 4 pm on 8/18 (or they may decide to skip this-- either way is okay because metformin doesn't cause low blood glucose)    Tonight, take metformin XR 1000 mg with supper.  Tomorrow 8/19, take metformin XR 1000 mg with breakfast.  From 8/20 onward, take metformin HCl 500 mg with a meal (this  is the formulation you already have).  Review your blood glucose data with Dr. Sandra after 7 days of taking metformin 500 mg per day.  You will decide together to stop it, maintain it, or increase it.    Today and tomorrow (Saturday), aim for your most modest carbohydrate meals (try to keep the carbohydrates under 60 grams).  Starting Sunday, more usual intake of carbohydrates should be tolerated with less hyperglycemia as a result. Increasing water intake will help improve a higher glucose.  Also, some activity after a meal will help.    If you have questions regarding your diabetes discharge treatment plan within the first week following discharge, call the [facility]  [phone], who will connect you with the on-call endocrinologist      Discussed w/ pt, rn, primary team.  Prescriptions needed for diabetes: metformin XR 1000 mg x 2 doses    Interval History and Assessment: interval glucose trend reviewed:     Chencho is feeling good and motivated to get home .  Asking about his PLEX central line removal.  Asking about diabetes plan.  He has home meds in room and able to confirm the metformin is HCl.  Counseled on metformin side effects and effects. He has a meter and strips at home (plus a new kit to start later)  Counseled on options for reduced carb intake particularly today and tomorrow.  Per primary, they may skip giving the last 1 mg dose dexameth this afternoon.  Pt expected to discharge this afternoon.        Current nutritional intake and type: Orders Placed This Encounter      Regular Diet Adult      Planned Procedures/surgeries:none  Steroid planning: tapering    D5W-containing solutions/medications: none    PTA Diabetes Regimen:   Checking BG only.  Fasting BG off steroid now 120 - 160, later in day >200 and 300s at times.  Shares [employer] guera readings with me.   Recently was Metformin HCl 500 mg prescribed, 1 tab times 2 weeks, then 2 tablets daily.  Took ONE dose the day prior to admission             Diabetes History:   Type of Diabetes: Type 2 Diabetes Mellitus  No results found for: A1C             Review of Systems:     The Review of Systems is negative other than noted in the Interval History.           Medications:     Current Facility-Administered Medications   Medication    acetaminophen (TYLENOL) tablet 650 mg    bisacodyl (DULCOLAX) suppository 10 mg    carvedilol (COREG) tablet 12.5 mg    dexAMETHasone (DECADRON) injection 1 mg    dextrose 10% infusion    glucose gel 15-30 g    Or    dextrose 50 % injection 25-50 mL    Or    glucagon injection 1 mg    heparin 100 unit/mL injection 3 mL    hydrALAZINE (APRESOLINE) injection 10-20 mg    HYDROmorphone (DILAUDID) injection 0.2 mg    Or    HYDROmorphone (DILAUDID) injection 0.4 mg    insulin aspart (NovoLOG) injection (RAPID ACTING)    insulin aspart (NovoLOG) injection (RAPID ACTING)    insulin aspart (NovoLOG) injection (RAPID ACTING)    insulin aspart (NovoLOG) injection (RAPID ACTING)    insulin aspart (NovoLOG) injection (RAPID ACTING)    insulin aspart (NovoLOG) injection (RAPID ACTING)    insulin NPH injection 10 Units    insulin NPH injection 5 Units    insulin regular (MYXREDLIN) infusion    labetalol (NORMODYNE/TRANDATE) injection 10-40 mg    levETIRAcetam (KEPPRA) tablet 1,000 mg    lidocaine (LMX4) cream    lidocaine 1 % 0.1-1 mL    magnesium hydroxide (MILK OF MAGNESIA) suspension 30 mL    melatonin tablet 3 mg    methocarbamol (ROBAXIN) tablet 500 mg    naloxone (NARCAN) injection 0.2 mg    Or    naloxone (NARCAN) injection 0.4 mg    Or    naloxone (NARCAN) injection 0.2 mg    Or    naloxone (NARCAN) injection 0.4 mg    ondansetron (ZOFRAN ODT) ODT tab 4 mg    Or    ondansetron (ZOFRAN) injection 4 mg    oxyCODONE (ROXICODONE) tablet 5 mg    Or    oxyCODONE IR (ROXICODONE) tablet 10 mg    pantoprazole (PROTONIX) EC tablet 40 mg    polyethylene glycol (MIRALAX) Packet 17 g    prochlorperazine (COMPAZINE) injection 5 mg    Or     "prochlorperazine (COMPAZINE) tablet 5 mg    senna-docusate (SENOKOT-S/PERICOLACE) 8.6-50 MG per tablet 2 tablet    sodium chloride (PF) 0.9% PF flush 3 mL    sodium chloride (PF) 0.9% PF flush 3 mL            Physical Exam:    /72   Pulse 71   Temp 97.5  F (36.4  C) (Oral)   Resp 18   Ht 1.905 m (6' 3\")   Wt 97.3 kg (214 lb 8 oz)   SpO2 99%   BMI 26.81 kg/m    General: pleasant, in no distress. Sitting in chair , up indep  HEENT: sutures c/d, able to read printed handout  Lungs: unlabored respiration, no cough  ABD: rounded, nondistended appearing  MSK:  moves all extremities  Mental status:  alert, oriented to self, place, time  Psych: easily engaged calm and appropriate interaction             Data:     Recent Labs   Lab 08/18/23  0751 08/18/23  0558 08/18/23  0055 08/17/23  2216 08/17/23  1630 08/17/23  1230   * 176* 172* 220* 212* 235*       Lab Results   Component Value Date    WBC 6.7 08/18/2023    WBC 11.4 (H) 08/17/2023    WBC 16.7 (H) 08/16/2023    HGB 8.0 (L) 08/18/2023    HGB 8.1 (L) 08/17/2023    HGB 9.5 (L) 08/16/2023    HCT 24.5 (L) 08/18/2023    HCT 24.0 (L) 08/17/2023    HCT 27.7 (L) 08/16/2023    MCV 91 08/18/2023    MCV 90 08/17/2023    MCV 89 08/16/2023     (L) 08/18/2023    PLT 98 (L) 08/17/2023     (L) 08/16/2023     Lab Results   Component Value Date     (L) 08/18/2023     (L) 08/18/2023     (L) 08/17/2023    POTASSIUM 4.2 08/18/2023    POTASSIUM 4.1 08/17/2023    POTASSIUM 3.7 08/16/2023    CHLORIDE 102 08/18/2023    CHLORIDE 100 08/17/2023    CHLORIDE 101 08/16/2023    CO2 24 08/18/2023    CO2 24 08/17/2023    CO2 26 08/16/2023     (H) 08/18/2023     (H) 08/18/2023     (H) 08/18/2023     Lab Results   Component Value Date    BUN 16.2 08/18/2023    BUN 15.3 08/17/2023    BUN 16.5 08/16/2023     No results found for: TSH  Lab Results   Component Value Date    AST 35 08/09/2023    AST 24 07/03/2023    ALT 41 08/09/2023    " ALT 18 07/03/2023    ALKPHOS 71 08/09/2023    ALKPHOS 64 07/03/2023     GFR Estimate   Date Value Ref Range Status   08/18/2023 >90 >60 mL/min/1.73m2 Final   08/17/2023 >90 >60 mL/min/1.73m2 Final   08/16/2023 >90 >60 mL/min/1.73m2 Final     No results found for: GFRESTBLACK    60 minutes spent on the date of the encounter doing chart review, history and exam, coordinating care/communication, documentation and further activities per the note.    Eula Mcfarlane APRN -[phone]      Contacting the Inpatient Diabetes Team   From 7AM-5PM: page inpatient diabetes provider that is following the patient, or utilize the job code paging system. From 5PM-7AM: page the job code for endocrine fellow on call.     Please use the following job code to reach the Inpatient Diabetes team. Note that you must use an in house phone and that job codes cannot receive text pages.   Dial [phone] (or star-star-star [phone] on the new "[employer] telephones), then [phone] to reach the endocrine-diabetes provider on call.

## 2023-08-18 NOTE — DISCHARGE SUMMARY
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   #Hyponatremia   #Hypochloremia  #Thrombocytopenia   #Hypertension   #Pre-diabetes   #SARA     Discharge Diagnoses:    #Meningioma  #Visual loss right eye, total  #Normocytic Anemia, ongoing   >requires near-term follow-up  #Hyponatremia, low-grade, stable   >requires near-term follow-up  #s/p L frontotemporal craniotomy 23  #Thrombocytopenia, chronic, stable  #Steroid-induced hyperglycemia, ongoing  #Prediabetes  #SARA       Recommendations and Follow-up:  -Repeat CBC and BMP this Monday   >Attn hyponatremia, anemia  -Follow-up with primary care   >Mr Butler endorses he will schedule for early next week   >Please follow-up on hyponatremia and anemia  -Follow-up with Neuro-oncology  -Follow-up with hematology/oncology  -Follow-up with Neurosurgery  -Follow-up with opthalmology    Brief History of Illness:   From H&P Dated 23:    Amado Butler is a 63 year old male PMHx of SARA, HTN, DM2, paroxysmal SVT p/w waxing and waning R-eye pain and visual loss since .      Per chart review, he has had a complicated course since Sx first developed , when he first started experiencing pain superior and lateral to his R-eye, w/ hazy monocular vision, no pain on eye movements.  He was seen in urgent care, subsequently an ED, and ultimately general ophthalmology and finally a retinal specialist Dr. Huan Conde.  Evaluation at the time n/f R 20/50 L 20/20, IOP 21/22 (R/L), R disc edema w/ c/f non-arteritic anterior ischemic optic neuropathy  (NAION) vs much less-likely giant cell arteritis in c/o reportedly normal ESR/CRP.     Pain and visual Sx persisted and he saw his PCP 6/26 who ordered an MRI Brain/Orbits wwo.  ROS per PCP Dr. Sandra at the time n/f pan-negativity: no scalp tenderness, no fevers, no jaw claudication, no problems with chewing, no recent medication changes.  Per read (image not avail) MRI returned s/f R-frontal encephalomalacia, presumed microvascular changes but was otherwise unremarkable -- MR orbits read as normal.   He was referred to Dr. Serrano of neuro-ophthalmology, but was unable to get in before August.       However, pain/vision loss persisted and patient re-presented to the [facility] ED 7/3.  He was seen by ophthalmology who noted decreasing visual acuity (down to 20/200 R), with repeat MRI brain/orbits showing subtle perineural enhancement per ophthalmology overread, most prominent at the apex.  Acute phase reactants remained normal. However, given R-shoulder pain noted by ophthalmology, progressing vision loss, and perineural enhancement, concern for GCA was high enough that, in addition to a number of labs (see EMR), and high-dose steroids (1g methylpred IV x1 to 96 mg PO for 1 week) biopsy per Dr. Serrano of neuro-ophthalmology was pursued on an outpatient basis (per pt preference).       Biopsy was negative for arteritis, given this and persistently normal acute phase reactants steroids were tapered to 60 mg qday.  However, he represented to a [place] ED 7/22 (was on vacation) with worsening vision.  He reported to the ED physician that vision and pain had significantly improved on methylprednisolone, but had progressively worsened on 60 mg prednisone.  He was again given 1g IV methylpred w/ improvement in pain and vision.  After a couple additional ED visits in [place], he was ultimately admitted to the [facility] Neurology service 7/31.     During his course at [facility], he was restarted on high-dose steroids  "(1g IV methylpred x5) w/ improvement and received an extensive work-up including LP w/ broad laboratory w/u, as well as extensive imaging (MRI Brain, Face, C/T spine, Abdomen, and a PET-CT ).  Per Located within [facility] Neurology notes, LP was unremarkable, PET-CT was without evidence of abnormal metabolic activity, and no specific diagnosis was reached.  Vision at the time of discharge 8/5 was 20/40 R 20/20 L.       This morning (8/9), he presented to Dr. Serrano of neuro-ophthalmology.  Dr. Serrano's exam was n/f severe R vision loss (hand motion) w/ APD, still 20/20 on L.  Dr. Serrano's read of Mr Butler's prior imaging is notable for a lesion lateral to his R optic nerve near the R orbital apex which has been stable over time.  Again, no clear diagnosis was reached, and Dr. Serrano feels the differential remains broad: \"infectious, inflammatory, neoplastic.\"   In the interest of preserving vision Mr. Butler was instructed to present to the [facility] ED to initiate plasmapheresis, with plan for possible biopsy and cultures of his orbit.       On discussion with Mr. Butler, he reports that he presents emergency department due to worsening vision right eye since discharge from [facility].  He reports that he grades his vision by having different levels of loss being \" hazy,\" \"foggy,\" and \" opaque,\" with opaque being the worst and that what he is reporting today.  He says that he can see movement but cannot discern objects or faces.  He also cannot see color out of his right eye.  Denies any left eye involvement.  Also without any painful eye movements.  He said he previously did have pain behind his right eye that radiated down into his right shoulder though this is not present today.  Denies any changes in sensation on the face.  No facial drooping or slurring speech.  No involvement of the extremities.    Hospital Course:  As in the HPI, Mr Butler is a 63 male who presented with R eye vision loss which was initially highly steroid " responsive, but became progressively refractory to steroid therapy with imaging initially raising concern for optic perineuritis.     Repeat MRI at presentation re-demonstrated perinural hyperintensity and enhancement c/f optic perineuritis.  Ophthalmology was consulted from the ED, and followed throughout Mr. Ames course.  Given concern for autoimmune process, per ophthalmology recommendations IR was consulted for CVC placement and patient received 2 sessions of plasmapheresis per transfusion medicine, which was well tolerated but was without clear clinical change.  He subsequently underwent a biopsy that was remarkable for a meningioma of unclear grade (not enoug tissue) and was transferred to the floor. The patient remained vitally stable and was discharged after removal of his catheter. He did have consistent hyponatremia throughout his hospital course that was likely secondary to hyperglycemia (corrected was normal). Ultimately the etiology of the vision changes is thought to be due to meningioma compression. The steroid responsiveness is thought to be due to reduced edema surrounding the tumor. We will get a repeat MRI and have the patient follow-up with Dr. Davison in neuro-oncology for further follow-up of the meningioma.      Pertinent Investigations  #CT head w/o  IMPRESSION: Interval right frontotemporal craniotomy for underlying  optic canal/intradural mass resection with trace hemorrhage along the  right frontotemporal craniotomy and pneumocephalus along bifrontal  convexity.    #CTA head   IMPRESSION:  1. Head CTA demonstrates no aneurysm or stenosis of the major  intracranial arteries. Trifurcated PERNELL, a normal variant.  2. Neck CTA demonstrates no stenosis of the major cervical arteries.    #CT-head 8/14/23  Impression: Limited imaging performed primarily for the purposes of  stereotactic localization.      1. No acute intracranial pathology.  2. Stable chronic right frontal encephalomalacia.  3. Left  posterior maxillary alveolar ridge bony dehiscence/sclerosis,  could be secondary to chronic process, including dental extraction,  surgery or infectious/inflammatory process. Non-masslike signal  changes on MRI. Comparison with 1-2 years prior CTs and questioning  the prior surgery would be helpful.    #MR Brain and orbits w/ and w/o 8/12/23  -Stereotactic protocol for operative planning  Impression:    1. Similar findings suggestive of right optic perineuritis.  2. Chronic right frontal infarct.    #MR Brain and orbits 8/9/23  Impression:    1. Regarding the orbits and globes, there is stable slight asymmetric  hyperintensity and perhaps slight enhancement of the right optic nerve  sheath, suggesting optic perineuritis. No underlying mass lesion.     2. Regarding the remainder of the brain, chronic right frontal infarct  and mild small vessel ischemic disease.      ~Summary of studies available PTA~  MRI brain and orbits w/o contrast 6/28/23  IMPRESSION:  HEAD MRI:   1.  Right frontal lobe encephalomalacia and surrounding gliotic   change without acute intracranial abnormality.   ORBIT MRI:   1.  Unremarkable orbits.      MRI brain and orbits with and w/o contrast 7/3/23  IMPRESSION:  HEAD MRI:  1.  No acute infarct.  2.  Age-related changes described above.  3.  Right frontal lobe small area tissue loss and gliosis.  ORBIT MRI:  1.  Unremarkable MRI of the orbits.   2.  No evidence for optic neuritis.     MRI BRAIN WITH AND WITHOUT CONTRAST, MRI FACE (ORBITS) WITH AND WITHOUT CONTRAST 7/28/23  1.  No acute intracranial hemorrhage, mass lesion, or acute infarction.   2.  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   3.  Minimal T2 hyperintensity and enhancement in the right optic nerve sheath, which may represent mild perineuritis. No clear evidence of optic neuritis.      18F-FDG PET Scan With Attenuation Correction CT Only 8/3/23  1. No specific evidence of an FDG-avid malignancy.   2. Non-avid left adrenal  "nodule, which may be too small to be accurately characterized by FDG PET. Reported right adrenal nodules not visualized on the low dose CT. Adrenal nodules are not well characterized by FDG-PET, and would be more sensitively/specifically assessed with an adrenal protocolled CT or MRI.   3. Apparent abrupt termination of the left maxilla posteriorly without abnormal tracer uptake, suspected to be related to prior dental extraction or post-surgical changes.      MRI CERVICAL SPINE WITH AND WITHOUT CONTRAST, MRI THORACIC SPINE WITH AND WITHOUT CONTRAST 23  No evidence of demyelinating disease or transverse myelitis within the cervical or thoracic spine.      Temporal artery, right, biopsy 23  No evidence of giant cell arteritis.  Moderate arteriosclerosis.  Mild medial calcific sclerosis.      CRP < 3.0 on multiple occasions  ESR < 2 on multiple occasions     CNS demyelinating disease (serum): negative  Encephalopathy/autoimmune panel (serum): negative  ACE: < 10  BUDDY: negative  ANCA Ig:20 (high)  IgG subclass: negative  [place], [place] light chain: negative  SPEP: negative  Lysozyme (serum): > 10.00 (high)     HBV: negative  Treponema juany: negative  Lyme juany: negative  Anaplamsa IgG IgM: negative  Babseia IgG IgM: negative  Erlichia IgG IgM: negative  Quantiferon TB: negative     CSF 23  Nucleated cells: 0  RBC: 2,967  Protein: 20  Glucose: 133  Kappa Free light chain: negative  ACE: 4  Lyme: non-reactive    Consultations:    -Ophthalmology  -Hematology / oncology  -IR for CVC placement  -Transfusion medicine for PLEX  -Neurosurgery for L-frontal craniotomy  -Neurocritical care for perioperative general management  -Endocrinology for steroid induced hyperglycemia management   -Medicine    Discharge physical examination:   BP (!) 145/75 (BP Location: Right arm)   Pulse 57   Temp 97.5  F (36.4  C) (Oral)   Resp 18   Ht 1.905 m (6' 3\")   Wt 97.3 kg (214 lb 8 oz)   SpO2 99%   BMI 26.81 kg/m    General: " Sitting up in chair, NAD  Head: Right-sided surgical scalp incision, healing well without any sign of infection  Cardiac: no lower extremity edema  Neurologic:  Mental Status: Fully alert, attentive and oriented. Speech clear and fluent.   Cranial Nerves: Right-sided RAPD, no perception of light of the right eye.  EOM intact, without nystagmus.  No pain reported with extraocular movements.  Facial movements symmetric at rest and with activation.  No dysarthria.  Motor: No abnormal movements.  Moving all 4 extremities antigravity.   Sensory: Intact to light touch x 4 extremities   Station/Gait: Normal casual gait.     Discharge Medications:  Current Discharge Medication List        CONTINUE these medications which have CHANGED    Details   carvedilol (COREG) 12.5 MG tablet Take 1 tablet (12.5 mg) by mouth 2 times daily (with meals)  Qty: 180 tablet, Refills: 3    Associated Diagnoses: Benign essential hypertension           CONTINUE these medications which have NOT CHANGED    Details   lisinopril-hydrochlorothiazide (ZESTORETIC) 20-25 MG tablet Take 1 tablet by mouth daily      omeprazole (PRILOSEC) 40 MG DR capsule Take 1 capsule (40 mg) by mouth daily for 70 days while on prednisone  Qty: 70 capsule, Refills: 3    Associated Diagnoses: Optic neuropathy           STOP taking these medications       lisinopril (ZESTRIL) 10 MG tablet Comments:   Reason for Stopping:         predniSONE (DELTASONE) 20 MG tablet Comments:   Reason for Stopping:               Discharge follow up and instructions:     CT Head w/o contrast*     MR Brain and Orbits w Contrast     Basic metabolic panel     CBC with platelets     Occupational Therapy Referral      Adult Neurology  Referral      Brief Discharge Instructions    You underwent surgery to have a brain tumor removed by Dr. Camacho   - If you have not received the results of the pathology (diagnosis) at the time of discharge, our office will call you with these results as  soon as they become available, or we will discuss them with you during your clinic visit, whichever is first.       - You will have follow up scheduled with the physician assistants and/or nurse practitioners in our clinic 2 weeks after your surgery.  If you live far away, you may see your primary care doctor for a wound check at 2 weeks.     - If you have not heard from our clinic about your follow up visit by 3-4 days following your discharge, please call our clinic at [phone] to schedule an appointment with the Neurosurgery teams.     After discharge, your activity restrictions are:   -We encourage short frequent walks, increasing as tolerated.  - No driving until you are seen in clinic and cleared by your neurosurgeon.  If you have had a seizure, you may not drive for at least 3 months according to [place] law.    - No strenuous activity.  - No lifting more than 10 pounds until you are seen in clinic (a gallon of milk weighs approximately 8 pounds)    Wound cares after surgery  - You are ok to shower, but do not soak your incisions. Pat them dry if they get damp.   - Avoid coloring your hair or permanent styling until cleared by your surgeon  - No baths, hot tubs or pools for 4-6 weeks after surgery.   - No strenuous activity.      Call if you have any of the followin. Temperature greater than 101.5 F.   2. Any redness, swelling or discharge from the wound.   3. Any new weakness, numbness or altered mental status.  4. Worsening pain that is not improving with the pain medications you were prescribed.     Call [phone] or after 5:00 pm or on weekends call [phone] and ask for the neurosurgery resident on call. Thank You.     Reason for your hospital stay    You were hospitalized for workup of your vision changes and were found to have a meningioma     Activity    Your activity upon discharge: activity as tolerated     Adult [facility] Follow-up and recommended labs and tests    - Follow up  with primary care provider, CLAIRE WYNN, within 7 days for hospital follow- up and for repeat labs.  The following labs/tests are recommended: BMP and CBC.    - Please follow-up with neurology in 4-8 weeks  - Please follow-up with neurosurgery, hematology/oncology, and ophthalmology as scheduled    Appointments on [place] and/or [facility] (with [facility] or [facility] provider or service). Call [phone] if you haven't heard regarding these appointments within 7 days of discharge.     Diet    Follow this diet upon discharge: Regular       Patient discussed with Dr. Ary Joseph MD, MS  PGY-2 Neurology     Sony Dalton MD  PGY-4 Neurology Resident

## 2023-08-18 NOTE — PROGRESS NOTES
"[facility]  General Neurology - Progress Note    Patient Name:  Amado Butler  Date of Service:  August 17, 2023    Subjective:    Mr. Butler reports he is feeling very well after surgery, reports no significant pain or new deficits.  He is eager to get home, ideally tomorrow so that he is able to celebrate his 39th anniversary at home.    Objective:    Vitals: BP (!) 140/84 (BP Location: Right arm)   Pulse 78   Temp 98.3  F (36.8  C) (Oral)   Resp 16   Ht 1.905 m (6' 3\")   Wt 96 kg (211 lb 10.3 oz)   SpO2 100%   BMI 26.45 kg/m    General: Sitting up in chair, NAD  Head: Right-sided surgical scalp incision, healing well without any sign of infection  Cardiac: no lower extremity edema  Neurologic:  Mental Status: Fully alert, attentive and oriented. Speech clear and fluent.   Cranial Nerves: Right-sided RAPD, no perception of light of the right eye.  EOM intact, without nystagmus.  No pain reported with extraocular movements.  Facial movements symmetric at rest and with activation.  Tongue movements normal, no dysarthria, normal head turn bilaterally.  Motor: No abnormal movements.  Moving all 4 extremities antigravity.   Sensory: Intact to light touch x 4 extremities   Station/Gait: Normal casual gait.     Pertinent Investigations:    I have personally reviewed most recent and pertinent labs, tests, and radiological images.     MRI brain and orbits w/o contrast 6/28/23  IMPRESSION:  HEAD MRI:   1.  Right frontal lobe encephalomalacia and surrounding gliotic   change without acute intracranial abnormality.   ORBIT MRI:   1.  Unremarkable orbits.      MRI brain and orbits with and w/o contrast 7/3/23  IMPRESSION:  HEAD MRI:  1.  No acute infarct.  2.  Age-related changes described above.  3.  Right frontal lobe small area tissue loss and gliosis.  ORBIT MRI:  1.  Unremarkable MRI of the orbits.   2.  No evidence for optic neuritis.     MRI BRAIN WITH AND WITHOUT CONTRAST, MRI " FACE (ORBITS) WITH AND WITHOUT CONTRAST 23  1.  No acute intracranial hemorrhage, mass lesion, or acute infarction.   2.  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   3.  Minimal T2 hyperintensity and enhancement in the right optic nerve sheath, which may represent mild perineuritis. No clear evidence of optic neuritis.      18F-FDG PET Scan With Attenuation Correction CT Only 8/3/23  1. No specific evidence of an FDG-avid malignancy.   2. Non-avid left adrenal nodule, which may be too small to be accurately characterized by FDG PET. Reported right adrenal nodules not visualized on the low dose CT. Adrenal nodules are not well characterized by FDG-PET, and would be more sensitively/specifically assessed with an adrenal protocolled CT or MRI.   3. Apparent abrupt termination of the left maxilla posteriorly without abnormal tracer uptake, suspected to be related to prior dental extraction or post-surgical changes.      MRI CERVICAL SPINE WITH AND WITHOUT CONTRAST, MRI THORACIC SPINE WITH AND WITHOUT CONTRAST 23  No evidence of demyelinating disease or transverse myelitis within the cervical or thoracic spine.      MR ORBIT W/O & W CONTRAST 2023 11:35 AM  Impression:    1. Regarding the orbits and globes, there is stable slight asymmetric  hyperintensity and perhaps slight enhancement of the right optic nerve  sheath, suggesting optic perineuritis. No underlying mass lesion.  2. Regarding the remainder of the brain, chronic right frontal infarct  and mild small vessel ischemic disease.      Temporal artery, right, biopsy 23  1. No evidence of giant cell arteritis.  2. Moderate arteriosclerosis.  3. Mild medial calcific sclerosis.      CRP < 3.0 on multiple occasions  ESR < 2 on multiple occasions     CNS demyelinating disease (serum): negative  Encephalopathy/autoimmune panel (serum): negative  ACE: < 10  BUDDY: negative  ANCA Ig:20 (high)  IgG subclass: negative  [place], Kenan light chain:  negative  SPEP: negative  Lysozyme (serum): > 10.00 (high)     HBV: negative  Treponema juany: negative  Lyme juany: negative  Anaplamsa IgG IgM: negative  Babseia IgG IgM: negative  Erlichia IgG IgM: negative  Quantiferon TB: negative     CSF 8/1/23  Nucleated cells: 0  RBC: 2,967  Protein: 20  Glucose: 133  Kappa Free light chain: negative  ACE: 4  Lyme: non-reactive      Assessment:  Mr. Butler is a 63-year-old man who developed right-sided visual loss and periorbital pain that was initially responsive to steroids, now s/p craniotomy and biopsy with pathology consistent with meningioma.  On my examination today, the patient had no light perception in the affected eye.  Based on the conversations Mr. Butler has had with his other providers, including neurosurgery, he is contemplating that he may not recover vision of the right eye.  We discussed this possibility, including that if there has been a compression for a significant amount of time, it is possible to develop irreversible nerve damage. We discussed that his follow up appointments with ophthalmology will be helpful in further answering this question of prognosis.  We are currently awaiting final recommendations from the hematology/oncology team regarding bone marrow biopsy results.  As long as the patient and the ophthalmology, oncology, and neurosurgical teams are in agreement with an outpatient plan he will be ready for discharge to home with Occupational Therapy.    Hyponatremia to 132, hypokalemia, hypophosphatemia, elevated ferritin, leukocytosis to 11 K, normocytic anemia to 8.1, thrombocytopenia to 98, INR 1.82,      Plan:  #Visual loss right eye, total  #Meningioma:   -Plan for outpatient MRI in 6 weeks to monitor any progression of meningioma, as grade could not be determined due to the small sample size.  We will therefore err on the side of caution and repeat imaging early.  - No current indications for plasma exchange or steroids based on biopsy  results  - Outpatient follow-up with neurosurgery  - Outpatient follow-up with ophthalmology    #S/p craniotomy  - Continue Keppra 1000 twice daily for 1 week per neurosurgery    #Steroid-induced hyperglycemia  - Appreciate comanagement of diabetes with endocrine team  - Continuing on dexamethasone taper    #Thrombocytopenia  #Normocytic anemia  #Low fibrinogen  - Appreciate coordination of care with heme-onc  - Awaiting bone marrow biopsy results, follow-up plan per heme-onc  - DVT prophylaxis contraindicated    #SARA  - CPAP at night    FEN:  Malnutrition: Patient does not meet two of the above criteria necessary for diagnosing malnutrition  PPx: Not indicated due to thrombocytopenia  Code: full      Niels Mcallister MD

## 2023-08-19 ENCOUNTER — PATIENT OUTREACH (OUTPATIENT)
Dept: CARE COORDINATION | Facility: CLINIC | Age: 63
End: 2023-08-19
Payer: COMMERCIAL

## 2023-08-19 LAB — BACTERIA TISS BX CULT: NO GROWTH

## 2023-08-19 NOTE — PROCEDURES
"[facility]    Central venous catheter removal procedure    The patient's identity was confirmed, and it was confirmed with there was no longer an indication for the right internal jugular venous catheter.  Labs were reviewed confirming that INR was less than 3.0 (it is 1.8) and platelets are greater than 50,000 (platelets are over 100 K).  Patient was informed of the procedure including that he would be placed in the Trendelenburg position, and we would ask him to \"bear down\" as part of the Valsalva maneuver.  He was placed in Trendelenburg position, his head was below the level of his heart, a gauze was inserted under the right shoulder/neck, and the dressing covering the IJ was gently removed to reveal to sutures holding the catheter in position.  These 2 sutures were removed.  The catheter insertion was visualized and there was no indication of any infection or other discoloration.  Sterile gauze was applied to the site and he was asked to push his stomach against pressure to create the Valsalva maneuver.  As he held his breath and bore down, the catheter was removed and I did not encounter any resistance.  There was no blood loss.  A compression dressing was placed over the sterile gauze and hand pressure was held on the site for 10 minutes.  Following 10 minutes, the dressing was inspected visually, and the gauze beneath the pressure dressing did not show any sign of blood.  The patient remained laying in the bed for 25 minutes before standing.  The patient was observed in this period for at least 30 minutes and he did not have any change in his motor or cognitive status, did not have any change in his respiratory or cardiac status, was able to ambulate without difficulty, complained of no pain, and was smiling appropriately.  He was instructed to keep the pressure dressing in place for 24 hours and to keep it dry.     Niels Mcallister MD    "

## 2023-08-19 NOTE — PLAN OF CARE
Occupational Therapy Discharge Summary    Reason for therapy discharge:    Discharged to home with outpatient therapy.    Progress towards therapy goal(s). See goals on Care Plan in [facility] electronic health record for goal details.  Goals partially met.  Barriers to achieving goals:   discharge from facility.    Therapy recommendation(s):    Continued therapy is recommended.  Rationale/Recommendations:  pt would benefit from OP OT for continued work toward compensatory vision strategies.

## 2023-08-19 NOTE — PROGRESS NOTES
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   #Hyponatremia   #Hypochloremia  #Thrombocytopenia   #Hypertension   #Pre-diabetes   #SARA  Discharge:   Discharge Date: 08/18/23  Discharge Diagnosis: Meningioma  #Visual loss right eye, total  #Normocytic Anemia, ongoing              >requires near-term follow-up  #Hyponatremia, low-grade, stable              >requires near-term follow-up  #s/p L frontotemporal craniotomy 8/14/23  #Thrombocytopenia, chronic, stable  #Steroid-induced hyperglycemia, ongoing  #Prediabetes  #SARA    BACKGROUND                                                      Per hospital discharge summary and inpatient provider notes:    Amado Butler is a 63 year old male PMHx of SARA, HTN, DM2, paroxysmal SVT p/w waxing and waning R-eye pain and visual loss since 6/1.     ASSESSMENT           Discharge Assessment  How are you doing now that you are home?: doing OK  How are your symptoms? (Red Flag symptoms escalate to triage hotline per guidelines): Improved  Do you feel your condition is stable enough to be safe at home until your provider visit?: Yes  Does the patient have their discharge instructions? : Yes  Does the patient have questions regarding their discharge instructions? : No  Were you started on any new medications or were there changes to any of your previous medications? : Yes  Does the patient have all of their medications?: Yes  Do you have questions regarding any of your medications? : Yes (see comment) (has sent inquiries to provider via [facility] message)  Do you have all of your needed medical supplies or equipment (DME)?  (i.e. oxygen tank, CPAP, cane, etc.): Yes  Discharge follow-up appointment scheduled within 14 calendar days? : No  Is patient agreeable to  "assistance with scheduling? : No         Post-op (Clinicians Only)  Did the patient have surgery or a procedure: Yes  Incision: closed;healing  Drainage: No  Bleeding:  (small amount dried blood per son but no active bleeding)  Fever: No  Chills: No  Redness: No  Warmth: No  Swelling: No  Incision site pain: No  Closure: suture  Eating & Drinking: eating and drinking without complaints/concerns  PO Intake: regular diet  Bowel Function: normal  Date of last BM: 08/19/23  Urinary Status: voiding without complaint/concerns    Patient glad to be home. Has sent blood sugar reading (131 this AM fasting) to provider for review and plan via [employer] message. Glipizide was started day before admission but is not on medication list so hospital providers may not know he was taking this. Was hypoglycemic 2 consecutive evenings prior to discharge and wants to make sure it doesn't happen again. Taking senna \"just in case\" as he was cautioned not to strain with BM's and wants to keep stools soft. No current questions or concerns per patient. Feels he has a good understanding of his care and multiple specialty appointments to be accomplished. 24/7 [facility] nurse triage phone number provided to patient.       PLAN                                                      Outpatient Plan:  Follow up with primary care provider, CLAIRE WYNN, within 7 days for hospital follow- up and for repeat labs.  The following labs/tests are recommended: BMP and CBC.    - Please follow-up with neurology in 4-8 weeks  - Please follow-up with neurosurgery, hematology/oncology, and ophthalmology as scheduled       Future Appointments   Date Time Provider Department [place]   9/20/2023 10:30 AM Angelica Llamas MD [facility]   9/29/2023 11:30 AM [facility]         For any urgent concerns, please contact our 24 hour nurse triage line: [phone] ([phone])         Allison Yao RN              "

## 2023-08-21 ENCOUNTER — DOCUMENTATION ONLY (OUTPATIENT)
Dept: INFECTIOUS DISEASES | Facility: CLINIC | Age: 63
End: 2023-08-21
Payer: COMMERCIAL

## 2023-08-21 ENCOUNTER — TELEPHONE (OUTPATIENT)
Dept: NEUROLOGY | Facility: CLINIC | Age: 63
End: 2023-08-21
Payer: COMMERCIAL

## 2023-08-21 DIAGNOSIS — D32.0 BENIGN NEOPLASM OF CEREBRAL MENINGES (H): Primary | ICD-10-CM

## 2023-08-21 DIAGNOSIS — E87.1 HYPONATREMIA: ICD-10-CM

## 2023-08-21 DIAGNOSIS — R89.5 POSITIVE CULTURE FINDING: Primary | ICD-10-CM

## 2023-08-21 LAB
BACTERIA TISS BX CULT: NO GROWTH
BACTERIA TISS BX CULT: NO GROWTH

## 2023-08-21 NOTE — PROGRESS NOTES
"Infectious Disease Curbside Note  I was called by neurolog on 8/21/2023 at 11:21 AM to provide input for Amado Butler MRN [id number]. The patient is located at [facility].. The nature of this request for a curbside consultation does not permit me to perform a comprehensive review of health care records, patient/family interview, nor an examination of the patient. I obtained limited patient information from the provider on the phone call and a limited chart review.    Based on only the information I was provided today, I make the following recommendations to the treating provider/team for their review and consideration:   The patient's neurosurgical samples are growing GPB in broth. However, the clinical presentation, the operative report and the pathological findings are all c/w meningioma.   Upon reviewing the operative report the mass pushing on the optic nerve was \"mass\" not abscess. The pathology of this particular sample was meningioma. These findings makes me think that the patient diagnosis is meningioma not abscess pushing on the optic nerve.     There is a good chance that the growth of GPB represents contamination and I do not feel there is urgency to start therapy. However, given that more than one sample is growing GPB, I am recommending a referral to ID as OP for a full evaluation and monitoring.       These recommendations are not intended to take the place of the care team's clinical judgement, which should always be utilized to provide the most appropriate care to meet the unique needs of each patient. The recommendations offered were based on the limited scope of information provided as today's date. Should additional guidance be needed or required a formal consultation with infectious diseases is recommended.    *Primary team: If a patient is discharged on IV antibiotics it is not the responsibility of the ID curbside provider to monitor labs or sign for antibiotic orders unless it is " otherwise written by the [facility] provider. If further outpatient management is required then place an outpatient ID consult and call [phone] to facilitate making an ID appointment before discharge.

## 2023-08-22 ENCOUNTER — TELEPHONE (OUTPATIENT)
Dept: OPHTHALMOLOGY | Facility: CLINIC | Age: 63
End: 2023-08-22

## 2023-08-22 ENCOUNTER — LAB (OUTPATIENT)
Dept: LAB | Facility: CLINIC | Age: 63
End: 2023-08-22
Payer: COMMERCIAL

## 2023-08-22 DIAGNOSIS — D64.9 ANEMIA, UNSPECIFIED TYPE: ICD-10-CM

## 2023-08-22 DIAGNOSIS — E87.1 HYPONATREMIA: ICD-10-CM

## 2023-08-22 DIAGNOSIS — D32.0 BENIGN NEOPLASM OF CEREBRAL MENINGES (H): ICD-10-CM

## 2023-08-22 LAB
BACTERIA TISS BX CULT: ABNORMAL
CULTURE HARVEST COMPLETE DATE: NORMAL
ERYTHROCYTE [DISTWIDTH] IN BLOOD BY AUTOMATED COUNT: 17.9 % (ref 10–15)
HCT VFR BLD AUTO: 26.7 % (ref 40–53)
HGB BLD-MCNC: 9.2 G/DL (ref 13.3–17.7)
INTERPRETATION: NORMAL
MCH RBC QN AUTO: 30.8 PG (ref 26.5–33)
MCHC RBC AUTO-ENTMCNC: 34.5 G/DL (ref 31.5–36.5)
MCV RBC AUTO: 89 FL (ref 78–100)
PLATELET # BLD AUTO: 130 10E3/UL (ref 150–450)
RBC # BLD AUTO: 2.99 10E6/UL (ref 4.4–5.9)
WBC # BLD AUTO: 6.3 10E3/UL (ref 4–11)

## 2023-08-22 PROCEDURE — 80048 BASIC METABOLIC PNL TOTAL CA: CPT

## 2023-08-22 PROCEDURE — 36415 COLL VENOUS BLD VENIPUNCTURE: CPT

## 2023-08-22 PROCEDURE — 85027 COMPLETE CBC AUTOMATED: CPT

## 2023-08-22 NOTE — TELEPHONE ENCOUNTER
After the positive results in 3 different samples have grown diphtheroids in the broth after day 6 of incubation, I called infectious disease for their recommendation of neck steps.  I very much appreciate the care and thoughtfullness of Dr. Huang who enquired about any sign of infection during the surgery.  I reread the surgical note, and discussed with one of the neurosurgical residents who participated in the surgery that there was no sign of infection-no abnormal erythema, purulence, etc.  There is also no foreign material that was implanted as part of this surgery.  Given that information, Dr. Huang's perspective was that contamination was more likely in this case.  However, he has recommended an infectious disease outpatient referral so that Mr. Butler can be assessed for any sign of infection and treated appropriately.  I have placed this referral.    I have also instructed Mr. Butler that it is safe to remove the pressure bandage covering the right internal jugular catheter site (the catheter itself was removed 72 hours ago).    Niels Mcallister MD

## 2023-08-22 NOTE — TELEPHONE ENCOUNTER
Spoke with patient regarding referral and scheduling with  for: New referral-PresumedIschemic Optic Neurophy OD, Nuclear Sclerosis OU.-Per Huan Conde MD. Patient had a bad connection and will be called tomorrow for scheduling.-Per Patient

## 2023-08-23 ENCOUNTER — TELEPHONE (OUTPATIENT)
Dept: OPHTHALMOLOGY | Facility: CLINIC | Age: 63
End: 2023-08-23
Payer: COMMERCIAL

## 2023-08-23 DIAGNOSIS — E87.1 HYPONATREMIA: ICD-10-CM

## 2023-08-23 DIAGNOSIS — D32.0 BENIGN NEOPLASM OF CEREBRAL MENINGES (H): Primary | ICD-10-CM

## 2023-08-23 LAB
ANION GAP SERPL CALCULATED.3IONS-SCNC: 11 MMOL/L (ref 7–15)
BUN SERPL-MCNC: 10.6 MG/DL (ref 8–23)
CALCIUM SERPL-MCNC: 8.8 MG/DL (ref 8.8–10.2)
CHLORIDE SERPL-SCNC: 93 MMOL/L (ref 98–107)
CREAT SERPL-MCNC: 0.8 MG/DL (ref 0.67–1.17)
DEPRECATED HCO3 PLAS-SCNC: 27 MMOL/L (ref 22–29)
GFR SERPL CREATININE-BSD FRML MDRD: >90 ML/MIN/1.73M2
GLUCOSE SERPL-MCNC: 146 MG/DL (ref 70–99)
PATH REPORT.ADDENDUM SPEC: NORMAL
PATH REPORT.COMMENTS IMP SPEC: NORMAL
PATH REPORT.COMMENTS IMP SPEC: NORMAL
PATH REPORT.FINAL DX SPEC: NORMAL
PATH REPORT.GROSS SPEC: NORMAL
PATH REPORT.MICROSCOPIC SPEC OTHER STN: NORMAL
PATH REPORT.MICROSCOPIC SPEC OTHER STN: NORMAL
PATH REPORT.RELEVANT HX SPEC: NORMAL
POTASSIUM SERPL-SCNC: 3.9 MMOL/L (ref 3.4–5.3)
SODIUM SERPL-SCNC: 131 MMOL/L (ref 136–145)

## 2023-08-23 NOTE — TELEPHONE ENCOUNTER
Spoke with patient regarding scheduling with  for: New referral-PresumedIschemic Optic Neurophy OD, Nuclear Sclerosis OU.-Per Huan Conde MD. Rescheduled patient as offered for 9/11/23 at 1:45pm at [facility] Location. Provided appointment details over the phone.-Per Patient

## 2023-08-23 NOTE — TELEPHONE ENCOUNTER
DIAGNOSIS: Positive culture finding. Ref by Dr Mcallister. Sched per pt     DATE RECEIVED: 8.29.23   NOTES (Gather within 2 years) STATUS DETAILS   OFFICE NOTE from referring provider       OFFICE NOTE from other specialist     DISCHARGE SUMMARY from hospital Internal 8.9-8.18.23  Ary  [facility]   DISCHARGE REPORT from the ER     LABS (any labs) Internal    MEDICATION LIST Internal    IMAGING  (NEED IMAGES AND REPORTS)     Osteomyelitis: Foot imaging      Liver Abscess: Abdominal imaging     Other (anything related to diagnoses

## 2023-08-25 ENCOUNTER — LAB (OUTPATIENT)
Dept: LAB | Facility: CLINIC | Age: 63
End: 2023-08-25
Payer: COMMERCIAL

## 2023-08-25 DIAGNOSIS — D32.0 BENIGN NEOPLASM OF CEREBRAL MENINGES (H): ICD-10-CM

## 2023-08-25 DIAGNOSIS — E87.1 HYPONATREMIA: ICD-10-CM

## 2023-08-25 LAB
ANION GAP SERPL CALCULATED.3IONS-SCNC: 12 MMOL/L (ref 7–15)
BUN SERPL-MCNC: 13 MG/DL (ref 8–23)
CALCIUM SERPL-MCNC: 9 MG/DL (ref 8.8–10.2)
CHLORIDE SERPL-SCNC: 94 MMOL/L (ref 98–107)
CREAT SERPL-MCNC: 0.82 MG/DL (ref 0.67–1.17)
DEPRECATED HCO3 PLAS-SCNC: 26 MMOL/L (ref 22–29)
GFR SERPL CREATININE-BSD FRML MDRD: >90 ML/MIN/1.73M2
GLUCOSE SERPL-MCNC: 165 MG/DL (ref 70–99)
POTASSIUM SERPL-SCNC: 3.8 MMOL/L (ref 3.4–5.3)
SODIUM SERPL-SCNC: 132 MMOL/L (ref 136–145)
SODIUM SERPL-SCNC: 132 MMOL/L (ref 136–145)

## 2023-08-25 PROCEDURE — 84295 ASSAY OF SERUM SODIUM: CPT | Mod: 59

## 2023-08-25 PROCEDURE — 36415 COLL VENOUS BLD VENIPUNCTURE: CPT

## 2023-08-25 PROCEDURE — 80048 BASIC METABOLIC PNL TOTAL CA: CPT

## 2023-08-25 NOTE — PROGRESS NOTES
"   GENERAL ID CLINIC           Assessment and Recommendations:   Problem List:    # Late growth of Cutibacterium in neurosurgical cultures from 8/14/24 -> No clinical, radiologic or histopathologic signs of infection    # Meningioma (in proximity to R optic nerve) s/p resenction on 8/14/23    Discussion:    Mr. Amado Butler is a 62 yo male with PMHX including meningioma who comes in to ID clinic (video visit) referred by Dr. Mcallister given op cultures positive for Cutibacterium (8/14/23). Patient has history of  with right-sided vision loss since June 2023.  This was initially steroid responsive however his vision loss recurred.  Initial MRI scans were negative however the most recent scan (8/12/23) showed enhancement around the right optic canal and orbital apex suggesting right optic perineuritis.  He was admitted to neurology on 8/9/23 and steroids have been of limited use and he has received plasma exchange with limited effect.  Neurosurgery was consulted at that time for management.  Neurosurgery discussed options including medical management, optic decompression via transcranial or endonasal routes, for optic decompression with attempted resection of mass.  Neurosurgery favored a transcranial over endonasal route if surgery was performed.  Neurosurgery discussed the risk and benefits of each approach.  Given his  plasma exchange at that time and chronic thrombocytopenia, they discussed supporting his coagulopathy through surgery with hematology service.  Given his vision loss, he requested neurosurgery proceed with surgery via transcranial route. Neurosurgery procedure happened on 8/14/23. Per report: \"Bur holes were placed and a pterional craniotomy was performed.  The frontal sinus was not entered.  The dura was largely intact.  The dura was off-white and appeared abnormally thickened. We then worked extradurally to perform an optic canal decompression. We then worked intradurally to perform exploration " "and resection of the mass.  A small piece of the frontal dura was saved for microbiology/pathology specimens, given its abnormal thickness.  We then performed a subfrontal dissection and found small little pearls over the dura reminiscent of the meningioma. We identified a red-brown mass on the lateral aspect of the optic nerve as it entered intradurally.  We were able to dissect and separate this from both the optic nerve and carotid artery . Sent to pathology, however there was not enough to send for microbiology or frozen section.  We then used sharp dissection to open the falciform ligament and decompress the optic nerve in its canal.  We then mobilized the optic nerve medially and identified additional small areas of tumor in the optic canal along its midpoint. Sent to pathology.  We then reexplored the area and found no evidence of residual tumor that could be removed.  A durarepair graft was used as an onlay (for dura repair).  We then placed a small portion of temporalis muscle over the drilled anterior clinoid over the area of pneumatization to help prevent CSF leak.  The bone flap was then replaced using the Synthes plating system\".     Culture from the surgical specimens was positive for Cutibacterium acnes in 3 specimens (late growth on day 6 of incubation). Surgical pathology showed meningioma. No evidence of acute inflammation or histopathology findings of infection. Patient remained afebrile during his hospital stay (8/9-8/18). CRP upon admission (8/9) was normal. WBC was normal from admission (8/9) until 8/14 prior to the surgery. It elevated after the surgery on 8/14, but it came back to normal in 3 days without any antibiotics treatment  Most likely inflammation related to surgery). CT head from 8/14 showed post surgical findings, but no signs of CNS infection. He was dismissed on 8/18/23. ZULEYMA steele (Dr. Huang) was contacted on 8/21 and per ID: \"There is a good chance that the growth of GPB " "represents contamination and I do not feel there is urgency to start therapy. However, given that more than one sample is growing GPB, I am recommending a referral to ID as OP for a full evaluation and monitoring\". Today's visit: Patient denies fever, chills or night sweats. He feels more tired, but his Hb is 9.2 which is down from his baseline of 13. He is followed by Oncology/hematology and they will plan to perform bone marrow biopsy. I also instructed him to reach out to his PCP to check iron levels (if not checked yet). He is not able to ear his CPAP machine because the surgical site still has sutures. Therefore he wakes up often at night which might be contributing to the tiredness. He will have his sutures removed on 9/1. Surgical site is healing well. He feels tenderness on the right rsode of his head and neck, but he states that these were symptoms he was having before surgery. Mental status normal and he is oriented.     Recommendations:    1. In light of all the information detailed above (discussion) it seems very unlikely that the late growth of Cutibacterium in op cultures from 8/14/23 translate true infection. This most likely translates contamination. There is not clinical justification to start antibiotics at this moment. I instructed the patient about the symptoms of infection to look for and instructed to contact our clinic if they appear.     2. Patient will have a follow up CT head on 9/5/23. I will follow the result for completeness    3. No need for additional ID follow up at this moment unless new signs or symptoms appear    4. Continued follow up by PCP, Oncology/Hematology and Neurosurgery        Recommendations discussed with the patient and his wife      Leida De La Rosa MD  Date of Service: 08/29/23    On 08/29/23, I spent 60 min with chart review, patient visit, documentation and coordination of care.          History of Present Illness:   Mr. Amado Butler is a 62 yo male with PMHx " "including meningioma who comes in to ID clinic (video visit) referred by Dr. Mcallister given op cultures positive for Cutibacterium (8/14/23)    Neurosurgery op note from 8/14/23:  Patient has history of  with right-sided vision loss since June 2023.  This was initially steroid responsive however his vision loss recurred.  Initial MRI scans were negative however the most recent scan (8/12/23) showed enhancement around the right optic canal and orbital apex suggesting right optic perineuritis.  He was admitted to neurology on 8/9/23 and steroids have been of limited use and he has received plasma exchange with limited effect.  Neurosurgery was consulted at that time for management.  Neurosurgery discussed options including medical management, optic decompression via transcranial or endonasal routes, for optic decompression with attempted resection of mass.  Neurosurgery favored a transcranial over endonasal route if surgery was performed.  Neurosurgery discussed the risk and benefits of each approach.  Given his  plasma exchange at that time and chronic thrombocytopenia, they discussed supporting his coagulopathy through surgery with hematology service.  Given his vision loss, he requested neurosurgery proceed with surgery via transcranial route. Neurosurgery procedure happened on 8/14/23. Per report: \"Bur holes were placed and a pterional craniotomy was performed.  The frontal sinus was not entered.  The dura was largely intact.  The dura was off-white and appeared abnormally thickened. We then worked extradurally to perform an optic canal decompression. We then worked intradurally to perform exploration and resection of the mass.  A small piece of the frontal dura was saved for microbiology/pathology specimens, given its abnormal thickness.  We then performed a subfrontal dissection and found small little pearls over the dura reminiscent of the meningioma. We identified a red-brown mass on the lateral aspect of " "the optic nerve as it entered intradurally.  We were able to dissect and separate this from both the optic nerve and carotid artery . Sent to pathology, however there was not enough to send for microbiology or frozen section.  We then used sharp dissection to open the falciform ligament and decompress the optic nerve in its canal.  We then mobilized the optic nerve medially and identified additional small areas of tumor in the optic canal along its midpoint. Sent to pathology.  We then reexplored the area and found no evidence of residual tumor that could be removed.  A durarepair graft was used as an onlay (for dura repair).  We then placed a small portion of temporalis muscle over the drilled anterior clinoid over the area of pneumatization to help prevent CSF leak.  The bone flap was then replaced using the Synthe[employer] plating system\".     Culture from the surgical specimens was positive for Cutibacterium acnes in 3 specimens (late growth on day 6 of incubation). Surgical pathology showed meningioma. No evidence of acute inflammation or histopathology findings of infection. Patient remained afebrile during his hospital stay (8/9-8/18). CRP upon admission (8/9) was normal. WBC was normal from admission (8/9) until 8/14 prior to the surgery. It elevated after the surgery on 8/14, but it came back to normal in 3 days without any antibiotics treatment  Most likely inflammation related to surgery). CT head from 8/14 showed post surgical findings, but no signs of CNS infection. He was dismissed on 8/18/23. ZULEYMA steele (Dr. Huang) was contacted on 8/21 and per ID: \"There is a good chance that the growth of GPB represents contamination and I do not feel there is urgency to start therapy. However, given that more than one sample is growing GPB, I am recommending a referral to ID as OP for a full evaluation and monitoring\".        Today's visit:    Patient denies fever, chills or night sweats. He feels more tired, but his Hb " is 9.2 which is down from his baseline of 13. He is followed by Oncology/hematology and they will plan to perform bone marrow biopsy. I also instructed him to reach out to his PCP to check iron levels (if not checked yet). He is not able to ear his CPAP machine because the surgical site still has sutures. Therefore he wakes up often at night which might be contributing to the tiredness. He will have his sutures removed on 9/1. Surgical site is healing well. He feels tenderness on the right rsode of his head and neck, but he states that these were symptoms he was having before surgery. Mental status normal and he is oriented.            Review of Systems:     CONSTITUTIONAL:  No fevers or chills  INTEGUMENTARY/SKIN: NEGATIVE for worrisome rashes, moles or lesions  EYES: Negative for icterus, vision changes or irritation  ENT/MOUTH:  Negative for oral lesions and sore throat  RESPIRATORY:  Negative for cough and dyspnea  CARDIOVASCULAR:  Negative for chest pain, palpitations and  shortness of breath  GASTROINTESTINAL:  Negative for abdominal pain, nausea, vomiting, diarrhea and constipation  GENITOURINARY:  Negative for dysuria, hematuria, frequency and urgency  MUSCULOSKELETAL: Negative for joint pain, swelling, motion restriction, negative for musculoskeletal pain  NEURO:  Negative for headache, altered mental status, numbness or weakness  PSYCHIATRIC: Negative for changes in mood or affect  HEMATOLOGIC/LYMPHATIC: negative for lymphadenopathy or bleeding  ALLERGIC/IMMUNOLOGIC: Negative for allergic reaction   ENDOCRINE: Negative for temperature intolerance, skin/hair changes         Past Medical History:     No past medical history on file.         Allergies:       No Known Allergies          Family History:     No family history on file.          Social History:     Social History     Socioeconomic History     Marital status:      Spouse name: Not on file     Number of children: Not on file     Years of  education: Not on file     Highest education level: Not on file   Occupational History     Not on file   Tobacco Use     Smoking status: Never     Smokeless tobacco: Never   Substance and Sexual Activity     Alcohol use: Yes     Comment: social     Drug use: Not Currently     Sexual activity: Not on file   Other Topics Concern     Not on file   Social History Narrative     Not on file     Social Determinants of Health     Financial Resource Strain: Not on file   Food Insecurity: Not on file   Transportation Needs: Not on file   Physical Activity: Not on file   Stress: Not on file   Social Connections: Not on file   Intimate Partner Violence: Not on file   Housing Stability: Not on file             Physical Exam:       Exam:  GENERAL:  Well-developed, well-nourished, not in acute distress  Eye: right vision loss  HEAD: Normocephalic and atraumatic  ENT:  No hearing impairment, oral mucous membranes moist  LUNGS:  Breathing normally on room air  NEUROLOGIC:  Mentation intact and speech normal  PSYCHIATRIC: Mood stable, mentation appears normal, affect normal

## 2023-08-28 LAB
BACTERIA TISS BX CULT: ABNORMAL
BACTERIA TISS BX CULT: ABNORMAL
CULTURE HARVEST COMPLETE DATE: NORMAL
CULTURE HARVEST COMPLETE DATE: NORMAL
INTERPRETATION: NORMAL
ISCN: NORMAL
METHODS: NORMAL

## 2023-08-29 ENCOUNTER — PRE VISIT (OUTPATIENT)
Dept: INFECTIOUS DISEASES | Facility: CLINIC | Age: 63
End: 2023-08-29
Payer: COMMERCIAL

## 2023-08-29 ENCOUNTER — VIRTUAL VISIT (OUTPATIENT)
Dept: INFECTIOUS DISEASES | Facility: CLINIC | Age: 63
End: 2023-08-29
Attending: STUDENT IN AN ORGANIZED HEALTH CARE EDUCATION/TRAINING PROGRAM
Payer: COMMERCIAL

## 2023-08-29 ENCOUNTER — TELEPHONE (OUTPATIENT)
Dept: NEUROSURGERY | Facility: CLINIC | Age: 63
End: 2023-08-29

## 2023-08-29 DIAGNOSIS — D32.9 MENINGIOMA (H): Primary | ICD-10-CM

## 2023-08-29 DIAGNOSIS — E87.1 HYPONATREMIA: Primary | ICD-10-CM

## 2023-08-29 DIAGNOSIS — D64.9 ANEMIA, UNSPECIFIED TYPE: ICD-10-CM

## 2023-08-29 DIAGNOSIS — R89.5 POSITIVE CULTURE FINDING: ICD-10-CM

## 2023-08-29 LAB
INTERPRETATION: NORMAL
SIGNIFICANT RESULTS: NORMAL
TEST DETAILS, MDL: NORMAL

## 2023-08-29 PROCEDURE — 99204 OFFICE O/P NEW MOD 45 MIN: CPT | Mod: 24 | Performed by: INTERNAL MEDICINE

## 2023-08-29 NOTE — LETTER
8/29/2023       RE: Amado Butler  [address] [facility] [address]     Dear Colleague,    Thank you for referring your patient, Amado Butler, to the [facility] INFECTIOUS DISEASE CLINIC [place] at [facility]. Please see a copy of my visit note below.       GENERAL ID CLINIC           Assessment and Recommendations:   Problem List:    # Late growth of Cutibacterium in neurosurgical cultures from 8/14/24 -> No clinical, radiologic or histopathologic signs of infection    # Meningioma (in proximity to R optic nerve) s/p resenction on 8/14/23    Discussion:    Mr. Amado Butler is a 64 yo male with PMHX including meningioma who comes in to ID clinic (video visit) referred by Dr. Mcallister given op cultures positive for Cutibacterium (8/14/23). Patient has history of  with right-sided vision loss since June 2023.  This was initially steroid responsive however his vision loss recurred.  Initial MRI scans were negative however the most recent scan (8/12/23) showed enhancement around the right optic canal and orbital apex suggesting right optic perineuritis.  He was admitted to neurology on 8/9/23 and steroids have been of limited use and he has received plasma exchange with limited effect.  Neurosurgery was consulted at that time for management.  Neurosurgery discussed options including medical management, optic decompression via transcranial or endonasal routes, for optic decompression with attempted resection of mass.  Neurosurgery favored a transcranial over endonasal route if surgery was performed.  Neurosurgery discussed the risk and benefits of each approach.  Given his  plasma exchange at that time and chronic thrombocytopenia, they discussed supporting his coagulopathy through surgery with hematology service.  Given his vision loss, he requested neurosurgery proceed with surgery via transcranial route. Neurosurgery procedure happened on 8/14/23. Per  "report: \"Bur holes were placed and a pterional craniotomy was performed.  The frontal sinus was not entered.  The dura was largely intact.  The dura was off-white and appeared abnormally thickened. We then worked extradurally to perform an optic canal decompression. We then worked intradurally to perform exploration and resection of the mass.  A small piece of the frontal dura was saved for microbiology/pathology specimens, given its abnormal thickness.  We then performed a subfrontal dissection and found small little pearls over the dura reminiscent of the meningioma. We identified a red-brown mass on the lateral aspect of the optic nerve as it entered intradurally.  We were able to dissect and separate this from both the optic nerve and carotid artery . Sent to pathology, however there was not enough to send for microbiology or frozen section.  We then used sharp dissection to open the falciform ligament and decompress the optic nerve in its canal.  We then mobilized the optic nerve medially and identified additional small areas of tumor in the optic canal along its midpoint. Sent to pathology.  We then reexplored the area and found no evidence of residual tumor that could be removed.  A durarepair graft was used as an onlay (for dura repair).  We then placed a small portion of temporalis muscle over the drilled anterior clinoid over the area of pneumatization to help prevent CSF leak.  The bone flap was then replaced using the Synthes plating system\".     Culture from the surgical specimens was positive for Cutibacterium acnes in 3 specimens (late growth on day 6 of incubation). Surgical pathology showed meningioma. No evidence of acute inflammation or histopathology findings of infection. Patient remained afebrile during his hospital stay (8/9-8/18). CRP upon admission (8/9) was normal. WBC was normal from admission (8/9) until 8/14 prior to the surgery. It elevated after the surgery on 8/14, but it came back to " "normal in 3 days without any antibiotics treatment  Most likely inflammation related to surgery). CT head from 8/14 showed post surgical findings, but no signs of CNS infection. He was dismissed on 8/18/23. ID ivette (Dr. Huang) was contacted on 8/21 and per ID: \"There is a good chance that the growth of GPB represents contamination and I do not feel there is urgency to start therapy. However, given that more than one sample is growing GPB, I am recommending a referral to ID as OP for a full evaluation and monitoring\". Today's visit: Patient denies fever, chills or night sweats. He feels more tired, but his Hb is 9.2 which is down from his baseline of 13. He is followed by Oncology/hematology and they will plan to perform bone marrow biopsy. I also instructed him to reach out to his PCP to check iron levels (if not checked yet). He is not able to ear his CPAP machine because the surgical site still has sutures. Therefore he wakes up often at night which might be contributing to the tiredness. He will have his sutures removed on 9/1. Surgical site is healing well. He feels tenderness on the right rsode of his head and neck, but he states that these were symptoms he was having before surgery. Mental status normal and he is oriented.     Recommendations:    1. In light of all the information detailed above (discussion) it seems very unlikely that the late growth of Cutibacterium in op cultures from 8/14/23 translate true infection. This most likely translates contamination. There is not clinical justification to start antibiotics at this moment. I instructed the patient about the symptoms of infection to look for and instructed to contact our clinic if they appear.     2. Patient will have a follow up CT head on 9/5/23. I will follow the result for completeness    3. No need for additional ID follow up at this moment unless new signs or symptoms appear    4. Continued follow up by PCP, Oncology/Hematology and " "Neurosurgery        Recommendations discussed with the patient and his wife      Leida De La Rosa MD  Date of Service: 08/29/23    On 08/29/23, I spent 60 min with chart review, patient visit, documentation and coordination of care.          History of Present Illness:   Mr. Amado Butler is a 64 yo male with PMHx including meningioma who comes in to ID clinic (video visit) referred by Dr. Mcallister given op cultures positive for Cutibacterium (8/14/23)    Neurosurgery op note from 8/14/23:  Patient has history of  with right-sided vision loss since June 2023.  This was initially steroid responsive however his vision loss recurred.  Initial MRI scans were negative however the most recent scan (8/12/23) showed enhancement around the right optic canal and orbital apex suggesting right optic perineuritis.  He was admitted to neurology on 8/9/23 and steroids have been of limited use and he has received plasma exchange with limited effect.  Neurosurgery was consulted at that time for management.  Neurosurgery discussed options including medical management, optic decompression via transcranial or endonasal routes, for optic decompression with attempted resection of mass.  Neurosurgery favored a transcranial over endonasal route if surgery was performed.  Neurosurgery discussed the risk and benefits of each approach.  Given his  plasma exchange at that time and chronic thrombocytopenia, they discussed supporting his coagulopathy through surgery with hematology service.  Given his vision loss, he requested neurosurgery proceed with surgery via transcranial route. Neurosurgery procedure happened on 8/14/23. Per report: \"Bur holes were placed and a pterional craniotomy was performed.  The frontal sinus was not entered.  The dura was largely intact.  The dura was off-white and appeared abnormally thickened. We then worked extradurally to perform an optic canal decompression. We then worked intradurally to perform " "exploration and resection of the mass.  A small piece of the frontal dura was saved for microbiology/pathology specimens, given its abnormal thickness.  We then performed a subfrontal dissection and found small little pearls over the dura reminiscent of the meningioma. We identified a red-brown mass on the lateral aspect of the optic nerve as it entered intradurally.  We were able to dissect and separate this from both the optic nerve and carotid artery . Sent to pathology, however there was not enough to send for microbiology or frozen section.  We then used sharp dissection to open the falciform ligament and decompress the optic nerve in its canal.  We then mobilized the optic nerve medially and identified additional small areas of tumor in the optic canal along its midpoint. Sent to pathology.  We then reexplored the area and found no evidence of residual tumor that could be removed.  A durarepair graft was used as an onlay (for dura repair).  We then placed a small portion of temporalis muscle over the drilled anterior clinoid over the area of pneumatization to help prevent CSF leak.  The bone flap was then replaced using the Synthes plating system\".     Culture from the surgical specimens was positive for Cutibacterium acnes in 3 specimens (late growth on day 6 of incubation). Surgical pathology showed meningioma. No evidence of acute inflammation or histopathology findings of infection. Patient remained afebrile during his hospital stay (8/9-8/18). CRP upon admission (8/9) was normal. WBC was normal from admission (8/9) until 8/14 prior to the surgery. It elevated after the surgery on 8/14, but it came back to normal in 3 days without any antibiotics treatment  Most likely inflammation related to surgery). CT head from 8/14 showed post surgical findings, but no signs of CNS infection. He was dismissed on 8/18/23. ZULEYMA steele (Dr. Huang) was contacted on 8/21 and per ID: \"There is a good chance that the growth " "of GPB represents contamination and I do not feel there is urgency to start therapy. However, given that more than one sample is growing GPB, I am recommending a referral to ID as OP for a full evaluation and monitoring\".        Today's visit:    Patient denies fever, chills or night sweats. He feels more tired, but his Hb is 9.2 which is down from his baseline of 13. He is followed by Oncology/hematology and they will plan to perform bone marrow biopsy. I also instructed him to reach out to his PCP to check iron levels (if not checked yet). He is not able to ear his CPAP machine because the surgical site still has sutures. Therefore he wakes up often at night which might be contributing to the tiredness. He will have his sutures removed on 9/1. Surgical site is healing well. He feels tenderness on the right rsode of his head and neck, but he states that these were symptoms he was having before surgery. Mental status normal and he is oriented.            Review of Systems:     CONSTITUTIONAL:  No fevers or chills  INTEGUMENTARY/SKIN: NEGATIVE for worrisome rashes, moles or lesions  EYES: Negative for icterus, vision changes or irritation  ENT/MOUTH:  Negative for oral lesions and sore throat  RESPIRATORY:  Negative for cough and dyspnea  CARDIOVASCULAR:  Negative for chest pain, palpitations and  shortness of breath  GASTROINTESTINAL:  Negative for abdominal pain, nausea, vomiting, diarrhea and constipation  GENITOURINARY:  Negative for dysuria, hematuria, frequency and urgency  MUSCULOSKELETAL: Negative for joint pain, swelling, motion restriction, negative for musculoskeletal pain  NEURO:  Negative for headache, altered mental status, numbness or weakness  PSYCHIATRIC: Negative for changes in mood or affect  HEMATOLOGIC/LYMPHATIC: negative for lymphadenopathy or bleeding  ALLERGIC/IMMUNOLOGIC: Negative for allergic reaction   ENDOCRINE: Negative for temperature intolerance, skin/hair changes         Past Medical " History:     No past medical history on file.         Allergies:       No Known Allergies          Family History:     No family history on file.          Social History:     Social History     Socioeconomic History    Marital status:      Spouse name: Not on file    Number of children: Not on file    Years of education: Not on file    Highest education level: Not on file   Occupational History    Not on file   Tobacco Use    Smoking status: Never    Smokeless tobacco: Never   Substance and Sexual Activity    Alcohol use: Yes     Comment: social    Drug use: Not Currently    Sexual activity: Not on file   Other Topics Concern    Not on file   Social History Narrative    Not on file     Social Determinants of Health     Financial Resource Strain: Not on file   Food Insecurity: Not on file   Transportation Needs: Not on file   Physical Activity: Not on file   Stress: Not on file   Social Connections: Not on file   Intimate Partner Violence: Not on file   Housing Stability: Not on file             Physical Exam:       Exam:  GENERAL:  Well-developed, well-nourished, not in acute distress  Eye: right vision loss  HEAD: Normocephalic and atraumatic  ENT:  No hearing impairment, oral mucous membranes moist  LUNGS:  Breathing normally on room air  NEUROLOGIC:  Mentation intact and speech normal  PSYCHIATRIC: Mood stable, mentation appears normal, affect normal        Virtual Visit Details    Type of service:  Video Visit   Video Start Time: 9:30 am  Video End Time: 10:30 am    Originating Location (pt. Location): Home    Distant Location (provider location):  Off-site  Platform used for Video Visit: Privatext

## 2023-08-29 NOTE — TELEPHONE ENCOUNTER
Desiree Trammell PA-C Paisar, Kelly, RN  Kim Mccullough, would you be willing to call Amado to check in today or tomorrow? I've been watching his sodium, this has been hanging out on the low end. I asked him to watch his ins/outs when I spoke with him last. If he's doing ok clinically we could recheck sodium on Friday before his appointment with me. It might be worth having him fluid restrict to 1.5L daily.    Thanks!  Desiree

## 2023-08-29 NOTE — PROGRESS NOTES
Virtual Visit Details    Type of service:  Video Visit   Video Start Time: 9:30 am  Video End Time: 10:30 am    Originating Location (pt. Location): Home    Distant Location (provider location):  Off-site  Platform used for Video Visit: Well

## 2023-08-29 NOTE — TELEPHONE ENCOUNTER
"Pt reports things are going okay. Feels \"deeply fatigued.\" Doesn't have energy to do things he would like to do. He moves around for about an hour and then needs to rest. Resting doesn't seem to \"restore\" the energy. Not getting more than 2 hour stretches of sleep at a time, d/t not using CPAP. Feels recovery is slower than he imagined.     Incision site feels okay. Has a general ache on right side of head, at times a sharper pain. Has some neck pain, feels like \"muscles are popping,\" just at base of skull on right side. Reports there was similar pain prior to surgery. Taking Tylenol (1 tab every 6 hours) which is helping.     Feels he is urinating less than first week at home, feels less bloated and that his weight has stabilized the last 4-5 days. Has had some dry mouth and began feeling thirsty over the weekend, has increased fluid intake some.     Bowel movements are loose. Has backed off stool softeners.     Discussed lab appt at 10 am prior to visit with Desiree on Friday. Pt verbalized understanding. He will call prior to Friday with any changes in symptoms or questions.  "

## 2023-08-29 NOTE — NURSING NOTE
Is the patient currently in the state of [place]? YES    Visit mode:VIDEO    If the visit is dropped, the patient can be reconnected by: VIDEO VISIT: Send to e-mail at: [email or web address]    Will anyone else be joining the visit? Wife, Mary Lou, is present  (If patient encounters technical issues they should call [phone])    How would you like to obtain your AVS? MyChart    Are changes needed to the allergy or medication list? Pt stated no changes to allergies and Pt stated no med changes    Reason for visit: Consult    Kelly ESTRADA

## 2023-08-31 ENCOUNTER — MYC MEDICAL ADVICE (OUTPATIENT)
Dept: OPHTHALMOLOGY | Facility: CLINIC | Age: 63
End: 2023-08-31
Payer: COMMERCIAL

## 2023-09-01 ENCOUNTER — OFFICE VISIT (OUTPATIENT)
Dept: NEUROSURGERY | Facility: CLINIC | Age: 63
End: 2023-09-01
Payer: COMMERCIAL

## 2023-09-01 ENCOUNTER — PATIENT OUTREACH (OUTPATIENT)
Dept: ONCOLOGY | Facility: CLINIC | Age: 63
End: 2023-09-01

## 2023-09-01 ENCOUNTER — LAB (OUTPATIENT)
Dept: LAB | Facility: CLINIC | Age: 63
End: 2023-09-01
Payer: COMMERCIAL

## 2023-09-01 VITALS
DIASTOLIC BLOOD PRESSURE: 78 MMHG | HEART RATE: 76 BPM | WEIGHT: 190 LBS | HEIGHT: 75 IN | SYSTOLIC BLOOD PRESSURE: 137 MMHG | OXYGEN SATURATION: 98 % | BODY MASS INDEX: 23.62 KG/M2 | RESPIRATION RATE: 16 BRPM

## 2023-09-01 DIAGNOSIS — E87.1 HYPONATREMIA: ICD-10-CM

## 2023-09-01 DIAGNOSIS — D69.6 THROMBOCYTOPENIA (H): Primary | ICD-10-CM

## 2023-09-01 DIAGNOSIS — D32.0 BENIGN NEOPLASM OF CEREBRAL MENINGES (H): Primary | ICD-10-CM

## 2023-09-01 DIAGNOSIS — D64.9 ANEMIA, UNSPECIFIED TYPE: ICD-10-CM

## 2023-09-01 DIAGNOSIS — Z98.890 S/P CRANIOTOMY: ICD-10-CM

## 2023-09-01 LAB
ERYTHROCYTE [DISTWIDTH] IN BLOOD BY AUTOMATED COUNT: 17.9 % (ref 10–15)
HCT VFR BLD AUTO: 29.8 % (ref 40–53)
HGB BLD-MCNC: 10 G/DL (ref 13.3–17.7)
MCH RBC QN AUTO: 29.5 PG (ref 26.5–33)
MCHC RBC AUTO-ENTMCNC: 33.6 G/DL (ref 31.5–36.5)
MCV RBC AUTO: 88 FL (ref 78–100)
PLATELET # BLD AUTO: 172 10E3/UL (ref 150–450)
RBC # BLD AUTO: 3.39 10E6/UL (ref 4.4–5.9)
SODIUM SERPL-SCNC: 133 MMOL/L (ref 136–145)
WBC # BLD AUTO: 6.7 10E3/UL (ref 4–11)

## 2023-09-01 PROCEDURE — 84295 ASSAY OF SERUM SODIUM: CPT | Performed by: PATHOLOGY

## 2023-09-01 PROCEDURE — 99024 POSTOP FOLLOW-UP VISIT: CPT | Performed by: PHYSICIAN ASSISTANT

## 2023-09-01 PROCEDURE — 36415 COLL VENOUS BLD VENIPUNCTURE: CPT | Performed by: PATHOLOGY

## 2023-09-01 PROCEDURE — 85027 COMPLETE CBC AUTOMATED: CPT | Performed by: PATHOLOGY

## 2023-09-01 RX ORDER — METHOCARBAMOL 500 MG/1
500 TABLET, FILM COATED ORAL 4 TIMES DAILY PRN
Qty: 15 TABLET | Refills: 0 | Status: SHIPPED | OUTPATIENT
Start: 2023-09-01 | End: 2023-09-18

## 2023-09-01 ASSESSMENT — PAIN SCALES - GENERAL: PAINLEVEL: MILD PAIN (2)

## 2023-09-01 NOTE — TELEPHONE ENCOUNTER
RECORDS STATUS - ALL OTHER DIAGNOSIS      RECORDS RECEIVED FROM: [facility] - Internal Records   DATE RECEIVED: 9/1   NOTES STATUS DETAILS   OFFICE NOTE from referring provider Epic Dr. Donavan Molina   OFFICE NOTE from medical oncologist     DISCHARGE SUMMARY from hospital [facility]/CE - [facility] 8/9/23: [facility]    7/31/23: [facility]   DISCHARGE REPORT from the ER [facility]/CE - [facility], [facility], [facility]  7/24/23: [facility]    7/22/23: [facility]    7/3/23: [facility]    6/17/23: [facility]   OPERATIVE REPORT Epic 8/14/23: Craniotomy   MEDICATION LIST [facility] 9/1/23   LABS     PATHOLOGY REPORTS [facility] 8/15/23: [facility]   ANYTHING RELATED TO DIAGNOSIS Epic 9/1/23

## 2023-09-01 NOTE — PROGRESS NOTES
"  [facility]  Department of Neurosurgery  [facility]    Name: Amado Butler  MRN: [id number]  Age: 63 year old  : 2023      Chief Complaint:   Right optic canal mass s/p right frontotemporal craniotomy and decompression of the right optic canal and orbital apex for resection of right optic canal and intradural mass 2023, 2 week postoperative visit    History of Present Illness:   Amado Butler is a 63 year old ambidextrous male who is seen today for a postoperative visit. He underwent the above mentioned procedure after presenting with right sided vision loss which was initially steroid responsive but then recurred. Follow up MRI showed enhancement around the right optic canal and orbital apex. He has a history of longstanding thrombocytopenia and is being worked up by Hematology/Oncology, bone marrow biopsy was done in hospital. He has follow up scheduled 2023 with Dr. Llamas. Today he's here with his wife, Mary Lou. He reports ongoing fatigue since surgery which significantly impacts his day to day life. He requires a nap mid day, is also not sleeping well at night as he's had to hold CPAP use because of his incision/recent surgery. He has no vision in his right eye. He denies new paresthesias or weakness since hospitalization.     Pathology was consistent with meningioma. Specimen was too small/crushed for grading.       Review of Systems:   Pertinent items are noted in HPI or as in patient entered ROS below, remainder of complete ROS is negative.     Physical Exam:   /78 (BP Location: Left arm)   Pulse 76   Resp 16   Ht 1.905 m (6' 3\")   Wt 86.2 kg (190 lb)   SpO2 98%   BMI 23.75 kg/m     General: No acute distress.    Eyes: Conjunctivae are normal.  MSK: Moves all extremities.  No obvious deformity.  Neuro: The patient is fully oriented. Speech is normal. Extraocular movements are intact without nystagmus. Facial sensation is " intact in V1, V2, V3 distributions. Facial nerve function is normal, rated as a House Brackmann 1.  Shoulders are full strength. There is no pronator drift. Full strength in all extremities. Sensation intact throughout. No dysmetria with finger-nose-finger testing. Gait is normal, with normal heel-toe walking.   Psych: Normal mood and affect. Behavior is normal.    Incision: His left frontotemporal incision is clean, dry, and healing well. Sutures were removed today without complication.      Assessment:  Right optic canal mass s/p right frontotemporal craniotomy and decompression of the right optic canal and orbital apex for resection of right optic canal and intradural mass 8/14/2023, 2 week postoperative visit    Plan:  We reviewed the pathology, restrictions postoperatively, and plan moving forward with Mr. Butler today. After discussion with NeuroOphthalmology and Dr. Camacho, we'll plan to wait until his follow up MRI is completed 10/2/2023 for Radiation Oncology referral/discussion. I'll call him once imaging is complete to discuss next steps. He was encouraged to reach out sooner with any new concerns.        Desiree Trammell PA-C  Department of Neurosurgery

## 2023-09-01 NOTE — NURSING NOTE
Chief Complaint   Patient presents with    Follow Up     Follow-up. Potential suture removal.   Deidre House, NREMT

## 2023-09-01 NOTE — PROGRESS NOTES
[facility]: Cancer Care                                                                   Hem/Onc  Referral received via staff message and transcribed     Under dept review see September 1, 2023 NN encounter    Eula Delgado, RN, BSN, OCN  Hematology/Oncology New Patient Nurse Navigator   [facility]  [phone]  [phone]

## 2023-09-01 NOTE — PROGRESS NOTES
[facility]: Cancer Care                                                                   Hem/Onc  Referral reviewed     ASSESSMENT      Clinical History (per Nurse review of records provided):    63 year old male  recently discharged from [facility] recently  Lives in [facility]   Records Location: [facility]   Pertinent pathology report(s) [facility]-- BOOKMARKED  Pertinent imaging -- BOOKMARKED  Inpatient hematology consult (Dr Molina) provider note(s)-- BOOKMARKED    INTERVENTION(S)                                                      -OUTGOING CALL to pt:   Introduced my role as nurse navigator with [facility] Hematology/Oncology dept   Identity verified. Pt confirms they are aware of the referral and ready to schedule  We reviewed the [employer] message from Dr Molina re: the [facility] NGS results which pt has reviewed in [employer] already and I explained that a malignant or cancerous hematology process might be the case , thus the recommendation.  Pt was travelelling and unable to schedule appt during our call. Explained to him  he will receive a call from our scheduling intake team this afternoon after he is back home to set up consult with hematologist next week as per Dr Molina's recommendation and [employer] message that pt has not been able to read yet.  He usually has to nap mid morning and mid afternoon due to extreme fatigue and his wife drives him, the appts are coordinated based on her obligations as well and he requests afternoon appts if possible.   Explained that records team will contact pt directly if ROIs needed for records, imaging, slides  Pt voiced understanding and appreciation of above instructions and information and denied further questions and denied further questions.    -Referral Triage Scheduling Instructions added to Hem/Onc  referral for Patient Access rep    PLAN                                                      Hem/Onc consult at [facility] next  week    See records team's Pre-Visit encounter documentation for additional records retrieval information.    Eula Delgado, RN, BSN, OCN  Hematology/Oncology New Patient Nurse Navigator   [facility]  [phone]

## 2023-09-05 ENCOUNTER — ANCILLARY PROCEDURE (OUTPATIENT)
Dept: CT IMAGING | Facility: CLINIC | Age: 63
End: 2023-09-05
Attending: NURSE PRACTITIONER
Payer: COMMERCIAL

## 2023-09-05 DIAGNOSIS — Z98.890 S/P CRANIOTOMY: ICD-10-CM

## 2023-09-05 DIAGNOSIS — D72.821 MONOCYTOSIS: Primary | ICD-10-CM

## 2023-09-05 PROCEDURE — 70450 CT HEAD/BRAIN W/O DYE: CPT | Mod: TC | Performed by: RADIOLOGY

## 2023-09-05 NOTE — PATIENT INSTRUCTIONS
Jaime Fischer,    We will plan to wait until your MRI 10/2/2023 is complete to refer you to Radiation Oncology for discussion of the role of radiation therapy. Please let me know if you have any questions in the meantime. I'll call you once we have MRI results to discuss.    Take care,  Desiree

## 2023-09-05 NOTE — PROGRESS NOTES
[facility]    HEMATOLOGY & ONCOLOGY  NEW PATIENT VISIT    PATIENT NAME: Amado Butler          MRN # [id number]  YOB: 1960  DATE OF VISIT: September 5, 2023            REFERRING PROVIDER:   Mr. Amado Butler was seen at the request of Dr. Solomon Reza for further evaluation and/or management.     History of Presenting Illness  Mr. Amado Butler is a pleasant 63 year old male with a past medical history significant for DM2- Last A1c, HTN, ?thrombocytopenia since 2012 s/p recent frontotemporal craniotomy for right optic nerve mass now pathology proven meningioma c/b complete vision loss in right eye and was also found to have significant cytopenias during admission leading to a bone marrow biopsy on 8/15/2023.  His bone marrow biopsy shows 50% cellularity [mildly hypercellular for his age] without significant dysplasia in all 3 cell lines and without increase in blasts.  He had normal cytogenetics and BCR-ABL FISH was negative.  However his [employer] ration sequencing testing showed that there was a clonal process with mutations detected in SRS F2, TET2 x 2 and ASXL1.  Given prior history of monocytosis dating back many years he was referred to our clinic.      Subjective  Here with wife Mary Lou who helped answer many questions.  As per patient and his wife patient did start experiencing some increased fatigue over the last for his presentation for subacute right eye vision loss starting June 2023.  Patient denies any significant history of bleeding but does note that he generally gets bruised easily.  Denies any significant infections over the last many months to years.  He did have a Tooth abscess from an impacted wisdom tooth January 2023. Denies any night sweats, BRBPR or melena.    Past Medical History  Diabetes type 2  Hypertension  Paroxysmal SVT  Thrombocytopenia dating back since 2012    Family History  Any family history of cancers or blood or bleeding disorders.    Social  History  Smoking: Never  Alcohol use: drinks approximately 2drinks/week  Status:  39 years.   Children/grandchildren: 3 children. 26 years old girl, 31 boy, 28 girl  Occupation: Retired, .     Current Outpatient Medications  Current Outpatient Medications   Medication Sig Dispense Refill    carvedilol (COREG) 12.5 MG tablet Take 1 tablet (12.5 mg) by mouth 2 times daily (with meals) 180 tablet 3    lisinopril-hydrochlorothiazide (ZESTORETIC) 20-25 MG tablet Take 1 tablet by mouth daily      metFORMIN (GLUCOPHAGE) 500 MG tablet Take 1 tablet (500 mg) by mouth daily (with breakfast) 7 tablet 0    methocarbamol (ROBAXIN) 500 MG tablet Take 1 tablet (500 mg) by mouth 4 times daily as needed for muscle spasms 15 tablet 0       Allergies  No Known Allergies    Review of systems  A complete ROS was performed and was negative except as mentioned in HPI    Physical Exam  /77   Pulse 73   Temp 98.1  F (36.7  C) (Oral)   Wt 89.4 kg (197 lb)   SpO2 98%   BMI 24.62 kg/m    Wt Readings from Last 3 Encounters:   23 89.4 kg (197 lb)   23 86.2 kg (190 lb)   23 97.3 kg (214 lb 8 oz)       ECO   male sitting in chair in NAD  HEET: NC/AT, EOMI w/ PERRL, anicteric sclera. MMM. No mouth sores.   Neck: supple no JVP  Lymph: No cervical, supraclavicular, axillary or inguinal LAD  CV: normal S1,S2 with RRR no m/r/g  Resp: lungs CTA bilaterally with adequate air movement. No wheezes or crackles  Abd: soft, NTND, no organomegaly or masses. BS normoactive.   Ext: WWP no edema or cyanosis  Skin: no concerning lesions or rashes or petechiae  Neuro: A&Ox4, no lateralizing sx. Grossly nonfocal. Strength appears preserved.       Labs and Imaging  I reviewed patient's labs and imaging  WBC 4.3, Hgb 10.1, , MCV 87, ANC 1300    BMBx 8/15/23  Final Diagnosis   Bone marrow, posterior iliac crest, left decalcified trephine biopsy and touch imprint; left aspirate clot,  particle crush, direct aspirate smear, concentrate aspirate smear, and peripheral blood smear:     - Cellular marrow (overall 50% in intact areas) with granulocytic hyperplasia with slight left shift, relative erythroid hypoplasia, unremarkable megakaryopoiesis, no overt dysplasia, and overall less than 2% blasts  - Overall normal reticulin fibrosis (MF-0)  - Peripheral blood showing moderate normochromic, normocytic anemia; moderate leukocytosis due to neutrophilia and monocytosis; slight thrombocytopenia  - See comment   No clonal chromosomal abnormality was detected among the 20 metaphase cells analyzed. These findings confirm and expand those of the previously reported interphase FISH anlaysis ([id number]) that showed no evidence of a BCR::ABL1 fusion.   SRSF2 VAF 46.2%  TET 2 VAF 51.6%  TET 2 VAF 46.5%  ASXL1 VAF 11.3%      CMML WHO Diagnostic Criteria:    Persistent (?3 months) peripheral blood monocytosis; absolute monocyte count >500/microL and greater than 10 percent of the entire white blood cell differential   Yes   Not meeting WHO criteria for BCR-ABL1 positive chronic myeloid leukemia, primary myelofibrosis, polycythemia vera, or essential thrombocytosis  Yes   <20 percent myeloblasts + monoblasts + promonocytes in peripheral blood and bone marrow  Yes   Dysplastic changes in one or more myeloid lineages. If myelodysplastic changes are absent or minimal, the diagnosis of CMML can be made if the above three criteria are met and:  No   An acquired clonal cytogenetic (or mutational abnormality) is present in bone marrow cells or  Yes   Persistent monocytosis for ?3 months and all other causes of monocytosis have been excluded  Yes   Total Score Meets WHO Criteria (4 major or 3 major and 2 minor)  Yes     CMML WHO Staging in 2016 now discontinued:    CMML-0: <2 % blasts in PB and <5% blasts in BM  Yes   CMML-1: 2 - 4%  blasts in PB and/or 5 - 9% blasts in BM  No   CMML-2: 5 -19 % blasts in PB, 10 - 19%  blasts in BM, OR presence of Maurilio rods  No     Prognostic Scoring System: CPSS-Mol      Criteria Score     Cytogenetic Risk Low: normal, isolated -Y  Intermediate: all else  High: Tri 8, complex, chrom 7   0    ASXL1 WT/Mut  1    NRAS WT/Mut  0    RUNX1 WT/Mut  0    SETBP1 WT/Mut  0    Total Genetic Score Low = 0  Intermediate-1 = 1  Intermediate-2 = 2  High = >3 (Max 3 points) 1 Genetic risk points for CPSS-mol   (Max 3):  1   BM Blasts <5% or >5%  0    WBC count <13 or > 13  0    Transfusion dependent No/Yes  0    CPSS-mol   Risk group Low = 0  Intermediate-1 = 1  Intermediate-2 = 2-3  High = >4    1     The CPSS-Mol was able to identify 4 risk groups having a signi?cantly different OS, with median survival time >144, and 68, 30, and 17 months in the low, intermediate-1, intermediate-2, and high-risk group, respectively. Mr. Amado Butler has a Intermediate-1 risk disease with an estimated median survival of 68 months. The 4 risk groups also showed a signi?cantly different cumulative incidence of leukemic evolution, with a 48-month cumulative incidence of AML evolution of 0%, 8%, 24%, and 52% for the low, intermediate-1, intermediate-2, and high-risk group respectively. Mr. Amado Butler has a Intermediate-1 risk disease with an estimated 4 year cumulative AML evolution risk of 8 %.     Will need to obtain BCR-ABL1 testing and PDGFRA, PDGFRB, FGFR1 and PCM-JAK2 to rule out other disorders if eosinophilia is present.     A number of conditions can also cause monocytosis such as asplenic state, inflamma AAS XL 1 tory conditions and autoimmune disorders, major depression, steroids or colony stimulating factors.        Assessment and Plan  Mr. Amado Butler is a 63 year old male who is here for further evaluation and/or management of chronic myelomonocytic leukemia.    Oncology:  Chronic myelomonocytic leukemia  WHO Classification: Dysplastic-CMML  Date of diagnosis: 8/14/2023  CPSS Mol score: 1-intermediate 1  Bone  Marrow Blast %: 2   Cytogenetics: normal karyotype, 46 XY  Molecular: SRSF2, TET 2, TET 2, ASXL1  Past Treatment: None  Current Treatment: TBD    I discussed the pathophysiology and natural history of CMML with Mr. Amado Butler today. We went over the current prognostic models and scoring systems that are used in clinical practice as well as the potential for disease evolution into acute myeloid leukemia. We went over the current FDA approved treatments for CMML and covered that the only curative option is through an allogeneic hematopoietic cell transplantation. His disease is intermediate 1 risk and we will revisit this as needed.    We discussed about supportive care for lower risk disease and CMML including erythropoietin stimulating agents as well as thrombopoietin receptor agonist for thrombocytopenia.  At this point of time we will take a wait and watch approach and determine where his baseline labs are when his inflammation postsurgery has subsided.    Plan:  he has had a molecular panel.  labs checked today - see above results.  His hemoglobin has continued to improve since surgery and I believe as his inflammation subsides numbers might come back to the normal levels where he was prior to surgery.  His platelets counts have also fluctuated and have been closer to the normal range lately.  Continue with labs in 6 weeks  Follow up in 6 weeks with labs prior including ferritin, EPO level.    Hematology:  Anemia/Thrombocytopenia:   Transfuse leukocyte reduced and irradiated blood products: 1 unit pRBC if hgb is 7.0-7.9, 2 units pRBCs if Hgb 6.0-6.9 g/dl, 1 unit platelets if PLT count is <10,000 or <50,000 with clinical bleeding.    Immunocompromised:  As a result of his disease and/or previous treatment. Mr. Amado Butler is immunocompromised. his last ANC is >500 and there is no need for prophylactic antibiotics at this time.     Cardiac:  Mr. Amado Butler has no history of heart disease.      Renal:  Creatinine results reviewed today. Mr. Amado Butler does not have any renal disease.    Hepatic:  Liver function tests reviewed today.    Psychosocial:  Mr. Amado Butler is coping well with his diagnosis.     All other questions and concerns were addressed and answered to Mr. Amado Butler's satisfaction.    Today, I spent a total of 85 minutes of face-to-face and non face-to-face time with Mr. Amado Butler. Time spent included review and discussion of diagnostic test results, patient counseling, and coordination of care.    Jamal Rodney MD    Division of Hematology, Oncology and Transplantation  [facility]  P: [phone]

## 2023-09-06 ENCOUNTER — PATIENT OUTREACH (OUTPATIENT)
Dept: ONCOLOGY | Facility: CLINIC | Age: 63
End: 2023-09-06

## 2023-09-06 ENCOUNTER — ONCOLOGY VISIT (OUTPATIENT)
Dept: ONCOLOGY | Facility: CLINIC | Age: 63
End: 2023-09-06
Attending: INTERNAL MEDICINE
Payer: COMMERCIAL

## 2023-09-06 ENCOUNTER — LAB (OUTPATIENT)
Dept: LAB | Facility: CLINIC | Age: 63
End: 2023-09-06
Attending: STUDENT IN AN ORGANIZED HEALTH CARE EDUCATION/TRAINING PROGRAM
Payer: COMMERCIAL

## 2023-09-06 ENCOUNTER — PRE VISIT (OUTPATIENT)
Dept: ONCOLOGY | Facility: CLINIC | Age: 63
End: 2023-09-06
Payer: COMMERCIAL

## 2023-09-06 VITALS
TEMPERATURE: 98.1 F | DIASTOLIC BLOOD PRESSURE: 77 MMHG | WEIGHT: 197 LBS | SYSTOLIC BLOOD PRESSURE: 125 MMHG | BODY MASS INDEX: 24.62 KG/M2 | HEART RATE: 73 BPM | OXYGEN SATURATION: 98 %

## 2023-09-06 DIAGNOSIS — D69.6 THROMBOCYTOPENIA (H): ICD-10-CM

## 2023-09-06 DIAGNOSIS — C93.10 CHRONIC MYELOMONOCYTIC LEUKEMIA NOT HAVING ACHIEVED REMISSION (H): Primary | ICD-10-CM

## 2023-09-06 DIAGNOSIS — D72.821 MONOCYTOSIS: ICD-10-CM

## 2023-09-06 LAB
ALBUMIN SERPL BCG-MCNC: 4.4 G/DL (ref 3.5–5.2)
ALP SERPL-CCNC: 76 U/L (ref 40–129)
ALT SERPL W P-5'-P-CCNC: 11 U/L (ref 0–70)
ANION GAP SERPL CALCULATED.3IONS-SCNC: 10 MMOL/L (ref 7–15)
AST SERPL W P-5'-P-CCNC: 16 U/L (ref 0–45)
BASOPHILS # BLD MANUAL: 0 10E3/UL (ref 0–0.2)
BASOPHILS NFR BLD MANUAL: 1 %
BILIRUB SERPL-MCNC: 0.5 MG/DL
BUN SERPL-MCNC: 9.5 MG/DL (ref 8–23)
CALCIUM SERPL-MCNC: 9.5 MG/DL (ref 8.8–10.2)
CHLORIDE SERPL-SCNC: 95 MMOL/L (ref 98–107)
CREAT SERPL-MCNC: 0.8 MG/DL (ref 0.67–1.17)
DEPRECATED HCO3 PLAS-SCNC: 28 MMOL/L (ref 22–29)
EGFRCR SERPLBLD CKD-EPI 2021: >90 ML/MIN/1.73M2
EOSINOPHIL # BLD MANUAL: 0 10E3/UL (ref 0–0.7)
EOSINOPHIL NFR BLD MANUAL: 0 %
ERYTHROCYTE [DISTWIDTH] IN BLOOD BY AUTOMATED COUNT: 17.1 % (ref 10–15)
GLUCOSE SERPL-MCNC: 186 MG/DL (ref 70–99)
HCT VFR BLD AUTO: 29.8 % (ref 40–53)
HGB BLD-MCNC: 10.1 G/DL (ref 13.3–17.7)
LDH SERPL L TO P-CCNC: 222 U/L (ref 0–250)
LYMPHOCYTES # BLD MANUAL: 1.5 10E3/UL (ref 0.8–5.3)
LYMPHOCYTES NFR BLD MANUAL: 36 %
MCH RBC QN AUTO: 29.6 PG (ref 26.5–33)
MCHC RBC AUTO-ENTMCNC: 33.9 G/DL (ref 31.5–36.5)
MCV RBC AUTO: 87 FL (ref 78–100)
METAMYELOCYTES # BLD MANUAL: 0 10E3/UL
METAMYELOCYTES NFR BLD MANUAL: 1 %
MONOCYTES # BLD MANUAL: 1.3 10E3/UL (ref 0–1.3)
MONOCYTES NFR BLD MANUAL: 30 %
MYELOCYTES # BLD MANUAL: 0 10E3/UL
MYELOCYTES NFR BLD MANUAL: 1 %
NEUTROPHILS # BLD MANUAL: 1.3 10E3/UL (ref 1.6–8.3)
NEUTROPHILS NFR BLD MANUAL: 31 %
PLAT MORPH BLD: ABNORMAL
PLATELET # BLD AUTO: 149 10E3/UL (ref 150–450)
POTASSIUM SERPL-SCNC: 3.7 MMOL/L (ref 3.4–5.3)
PROT SERPL-MCNC: 6.6 G/DL (ref 6.4–8.3)
RBC # BLD AUTO: 3.41 10E6/UL (ref 4.4–5.9)
RBC MORPH BLD: ABNORMAL
SODIUM SERPL-SCNC: 133 MMOL/L (ref 136–145)
URATE SERPL-MCNC: 3.2 MG/DL (ref 3.4–7)
WBC # BLD AUTO: 4.3 10E3/UL (ref 4–11)

## 2023-09-06 PROCEDURE — 85027 COMPLETE CBC AUTOMATED: CPT | Performed by: PATHOLOGY

## 2023-09-06 PROCEDURE — G0463 HOSPITAL OUTPT CLINIC VISIT: HCPCS | Performed by: STUDENT IN AN ORGANIZED HEALTH CARE EDUCATION/TRAINING PROGRAM

## 2023-09-06 PROCEDURE — 80053 COMPREHEN METABOLIC PANEL: CPT | Performed by: PATHOLOGY

## 2023-09-06 PROCEDURE — 83615 LACTATE (LD) (LDH) ENZYME: CPT | Performed by: PATHOLOGY

## 2023-09-06 PROCEDURE — 84550 ASSAY OF BLOOD/URIC ACID: CPT | Performed by: PATHOLOGY

## 2023-09-06 PROCEDURE — 99215 OFFICE O/P EST HI 40 MIN: CPT | Performed by: STUDENT IN AN ORGANIZED HEALTH CARE EDUCATION/TRAINING PROGRAM

## 2023-09-06 PROCEDURE — 85007 BL SMEAR W/DIFF WBC COUNT: CPT | Performed by: PATHOLOGY

## 2023-09-06 PROCEDURE — 36415 COLL VENOUS BLD VENIPUNCTURE: CPT | Performed by: PATHOLOGY

## 2023-09-06 PROCEDURE — 99417 PROLNG OP E/M EACH 15 MIN: CPT | Performed by: STUDENT IN AN ORGANIZED HEALTH CARE EDUCATION/TRAINING PROGRAM

## 2023-09-06 ASSESSMENT — PAIN SCALES - GENERAL: PAINLEVEL: MODERATE PAIN (4)

## 2023-09-06 NOTE — PROGRESS NOTES
[facility]: Cancer Care Initial Note                                    Discussion with Patient:                                                      Met with Don and spouse Mary Lou after office visit/consult with Dr. Rodney. Introduced self as RN Care Coordinator. Went over contact information for [facility]. Discussed roles of RNCC, MD, MYAH, nurse triage line, and clinic coordinators. Discussed how to get a hold of different team members via [employer] and at [phone]. Authorization to discuss information form signed.         Assessment:                                                      Initial  Current living arrangement:: I live in a private home with spouse  Informal Support system:: Children;Spouse  Bed or wheelchair confined:: No  Mobility Status: Independent  Transportation means:: Regular car  Medication adherence problem (GOAL):: No  Knowledgeable about how to use meds:: Yes  Medication side effects suspected:: No      Intervention/Education provided during outreach:                                                         Patient filled out consent to communicate with spouse Mary Lou during visit  Patient to follow up as scheduled at next appt  Patient to call/[employer] message with updates  Confirmed patient has clinic and triage numbers     Eula Patricio, RN, BSN  RN Care Coordinator   [address] [place] [id number]   [phone]

## 2023-09-06 NOTE — LETTER
9/6/2023         RE: Amado Butler  [address] [facility] [address]        Dear Colleague,    Thank you for referring your patient, Amado Butler, to the [facility]. Please see a copy of my visit note below.    [facility]    HEMATOLOGY & ONCOLOGY  NEW PATIENT VISIT    PATIENT NAME: Amado Butler          MRN # [id number]  YOB: 1960  DATE OF VISIT: September 5, 2023            REFERRING PROVIDER:   Mr. Amado Butler was seen at the request of Dr. Solomon Reza for further evaluation and/or management.     History of Presenting Illness  Mr. Amado Butler is a pleasant 63 year old male with a past medical history significant for DM2- Last A1c, HTN, ?thrombocytopenia since 2012 s/p recent frontotemporal craniotomy for right optic nerve mass now pathology proven meningioma c/b complete vision loss in right eye and was also found to have significant cytopenias during admission leading to a bone marrow biopsy on 8/15/2023.  His bone marrow biopsy shows 50% cellularity [mildly hypercellular for his age] without significant dysplasia in all 3 cell lines and without increase in blasts.  He had normal cytogenetics and BCR-ABL FISH was negative.  However his [employer] ration sequencing testing showed that there was a clonal process with mutations detected in SRS F2, TET2 x 2 and ASXL1.  Given prior history of monocytosis dating back many years he was referred to our clinic.      Subjective  Here with wife Mary Lou who helped answer many questions.  As per patient and his wife patient did start experiencing some increased fatigue over the last for his presentation for subacute right eye vision loss starting June 2023.  Patient denies any significant history of bleeding but does note that he generally gets bruised easily.  Denies any significant infections over the last many months to years.  He did have a Tooth abscess from an impacted wisdom tooth January 2023. Denies  any night sweats, BRBPR or melena.    Past Medical History  Diabetes type 2  Hypertension  Paroxysmal SVT  Thrombocytopenia dating back since     Family History  Any family history of cancers or blood or bleeding disorders.    Social History  Smoking: Never  Alcohol use: drinks approximately 2drinks/week  Status:  39 years.   Children/grandchildren: 3 children. 26 years old girl, 31 boy, 28 girl  Occupation: Retired, .     Current Outpatient Medications  Current Outpatient Medications   Medication Sig Dispense Refill    carvedilol (COREG) 12.5 MG tablet Take 1 tablet (12.5 mg) by mouth 2 times daily (with meals) 180 tablet 3    lisinopril-hydrochlorothiazide (ZESTORETIC) 20-25 MG tablet Take 1 tablet by mouth daily      metFORMIN (GLUCOPHAGE) 500 MG tablet Take 1 tablet (500 mg) by mouth daily (with breakfast) 7 tablet 0    methocarbamol (ROBAXIN) 500 MG tablet Take 1 tablet (500 mg) by mouth 4 times daily as needed for muscle spasms 15 tablet 0       Allergies  No Known Allergies    Review of systems  A complete ROS was performed and was negative except as mentioned in HPI    Physical Exam  /77   Pulse 73   Temp 98.1  F (36.7  C) (Oral)   Wt 89.4 kg (197 lb)   SpO2 98%   BMI 24.62 kg/m    Wt Readings from Last 3 Encounters:   23 89.4 kg (197 lb)   23 86.2 kg (190 lb)   23 97.3 kg (214 lb 8 oz)       ECO   male sitting in chair in NAD  HEET: NC/AT, EOMI w/ PERRL, anicteric sclera. MMM. No mouth sores.   Neck: supple no JVP  Lymph: No cervical, supraclavicular, axillary or inguinal LAD  CV: normal S1,S2 with RRR no m/r/g  Resp: lungs CTA bilaterally with adequate air movement. No wheezes or crackles  Abd: soft, NTND, no organomegaly or masses. BS normoactive.   Ext: WWP no edema or cyanosis  Skin: no concerning lesions or rashes or petechiae  Neuro: A&Ox4, no lateralizing sx. Grossly nonfocal. Strength appears preserved.       Labs and  Imaging  I reviewed patient's labs and imaging  WBC 4.3, Hgb 10.1, , MCV 87, ANC 1300    BMBx 8/15/23  Final Diagnosis   Bone marrow, posterior iliac crest, left decalcified trephine biopsy and touch imprint; left aspirate clot, particle crush, direct aspirate smear, concentrate aspirate smear, and peripheral blood smear:     - Cellular marrow (overall 50% in intact areas) with granulocytic hyperplasia with slight left shift, relative erythroid hypoplasia, unremarkable megakaryopoiesis, no overt dysplasia, and overall less than 2% blasts  - Overall normal reticulin fibrosis (MF-0)  - Peripheral blood showing moderate normochromic, normocytic anemia; moderate leukocytosis due to neutrophilia and monocytosis; slight thrombocytopenia  - See comment   No clonal chromosomal abnormality was detected among the 20 metaphase cells analyzed. These findings confirm and expand those of the previously reported interphase FISH anlaysis ([id number]) that showed no evidence of a BCR::ABL1 fusion.   SRSF2 VAF 46.2%  TET 2 VAF 51.6%  TET 2 VAF 46.5%  ASXL1 VAF 11.3%      CMML WHO Diagnostic Criteria:    Persistent (?3 months) peripheral blood monocytosis; absolute monocyte count >500/microL and greater than 10 percent of the entire white blood cell differential   Yes   Not meeting WHO criteria for BCR-ABL1 positive chronic myeloid leukemia, primary myelofibrosis, polycythemia vera, or essential thrombocytosis  Yes   <20 percent myeloblasts + monoblasts + promonocytes in peripheral blood and bone marrow  Yes   Dysplastic changes in one or more myeloid lineages. If myelodysplastic changes are absent or minimal, the diagnosis of CMML can be made if the above three criteria are met and:  No   An acquired clonal cytogenetic (or mutational abnormality) is present in bone marrow cells or  Yes   Persistent monocytosis for ?3 months and all other causes of monocytosis have been excluded  Yes   Total Score Meets WHO Criteria (4 major  or 3 major and 2 minor)  Yes     CMML WHO Staging in 2016 now discontinued:    CMML-0: <2 % blasts in PB and <5% blasts in BM  Yes   CMML-1: 2 - 4%  blasts in PB and/or 5 - 9% blasts in BM  No   CMML-2: 5 -19 % blasts in PB, 10 - 19% blasts in BM, OR presence of Maurilio rods  No     Prognostic Scoring System: CPSS-Mol      Criteria Score     Cytogenetic Risk Low: normal, isolated -Y  Intermediate: all else  High: Tri 8, complex, chrom 7   0    ASXL1 WT/Mut  1    NRAS WT/Mut  0    RUNX1 WT/Mut  0    SETBP1 WT/Mut  0    Total Genetic Score Low = 0  Intermediate-1 = 1  Intermediate-2 = 2  High = >3 (Max 3 points) 1 Genetic risk points for CPSS-mol   (Max 3):  1   BM Blasts <5% or >5%  0    WBC count <13 or > 13  0    Transfusion dependent No/Yes  0    CPSS-mol   Risk group Low = 0  Intermediate-1 = 1  Intermediate-2 = 2-3  High = >4    1     The CPSS-Mol was able to identify 4 risk groups having a signi?cantly different OS, with median survival time >144, and 68, 30, and 17 months in the low, intermediate-1, intermediate-2, and high-risk group, respectively. Mr. Amado Butler has a Intermediate-1 risk disease with an estimated median survival of 68 months. The 4 risk groups also showed a signi?cantly different cumulative incidence of leukemic evolution, with a 48-month cumulative incidence of AML evolution of 0%, 8%, 24%, and 52% for the low, intermediate-1, intermediate-2, and high-risk group respectively. Mr. Amado Butler has a Intermediate-1 risk disease with an estimated 4 year cumulative AML evolution risk of 8 %.     Will need to obtain BCR-ABL1 testing and PDGFRA, PDGFRB, FGFR1 and PCM-JAK2 to rule out other disorders if eosinophilia is present.     A number of conditions can also cause monocytosis such as asplenic state, inflamma AAS XL 1 tory conditions and autoimmune disorders, major depression, steroids or colony stimulating factors.        Assessment and Plan  Mr. Amado Butler is a 63 year old male who is  here for further evaluation and/or management of chronic myelomonocytic leukemia.    Oncology:  Chronic myelomonocytic leukemia  WHO Classification: Dysplastic-CMML  Date of diagnosis: 8/14/2023  CPSS Mol score: 1-intermediate 1  Bone Marrow Blast %: 2   Cytogenetics: normal karyotype, 46 XY  Molecular: SRSF2, TET 2, TET 2, ASXL1  Past Treatment: None  Current Treatment: TBD    I discussed the pathophysiology and natural history of CMML with Mr. Amado Butler today. We went over the current prognostic models and scoring systems that are used in clinical practice as well as the potential for disease evolution into acute myeloid leukemia. We went over the current FDA approved treatments for CMML and covered that the only curative option is through an allogeneic hematopoietic cell transplantation. His disease is intermediate 1 risk and we will revisit this as needed.    We discussed about supportive care for lower risk disease and CMML including erythropoietin stimulating agents as well as thrombopoietin receptor agonist for thrombocytopenia.  At this point of time we will take a wait and watch approach and determine where his baseline labs are when his inflammation postsurgery has subsided.    Plan:  he has had a molecular panel.  labs checked today - see above results.  His hemoglobin has continued to improve since surgery and I believe as his inflammation subsides numbers might come back to the normal levels where he was prior to surgery.  His platelets counts have also fluctuated and have been closer to the normal range lately.  Continue with labs in 6 weeks  Follow up in 6 weeks with labs prior including ferritin, EPO level.    Hematology:  Anemia/Thrombocytopenia:   Transfuse leukocyte reduced and irradiated blood products: 1 unit pRBC if hgb is 7.0-7.9, 2 units pRBCs if Hgb 6.0-6.9 g/dl, 1 unit platelets if PLT count is <10,000 or <50,000 with clinical bleeding.    Immunocompromised:  As a result of his disease  and/or previous treatment. Mr. Amado Butler is immunocompromised. his last ANC is >500 and there is no need for prophylactic antibiotics at this time.     Cardiac:  Mr. Amado Butler has no history of heart disease.     Renal:  Creatinine results reviewed today. Mr. Amado Butler does not have any renal disease.    Hepatic:  Liver function tests reviewed today.    Psychosocial:  Mr. Amado Butler is coping well with his diagnosis.     All other questions and concerns were addressed and answered to Mr. Aamdo Butler's satisfaction.    Today, I spent a total of 85 minutes of face-to-face and non face-to-face time with Mr. Amado Butler. Time spent included review and discussion of diagnostic test results, patient counseling, and coordination of care.    Jamal Rodney MD    Division of Hematology, Oncology and Transplantation  [facility]  P: [phone]

## 2023-09-06 NOTE — NURSING NOTE
"Oncology Rooming Note    September 6, 2023 11:55 AM   Amado Butler is a 63 year old male who presents for:    Chief Complaint   Patient presents with    Oncology Clinic Visit     Initial Vitals: /77   Pulse 73   Temp 98.1  F (36.7  C) (Oral)   Wt 89.4 kg (197 lb)   SpO2 98%   BMI 24.62 kg/m   Estimated body mass index is 24.62 kg/m  as calculated from the following:    Height as of 9/1/23: 1.905 m (6' 3\").    Weight as of this encounter: 89.4 kg (197 lb). Body surface area is 2.18 meters squared.  Moderate Pain (4) Comment: Data Unavailable   No LMP for male patient.  Allergies reviewed: Yes  Medications reviewed: Yes    Medications: Medication refills not needed today.  Pharmacy name entered into [facility]: [id number] - [place], [place] - [address] [facility]    Clinical concerns:        Letty Morelos              "

## 2023-09-08 ENCOUNTER — DOCUMENTATION ONLY (OUTPATIENT)
Dept: MULTI SPECIALTY CLINIC | Facility: CLINIC | Age: 63
End: 2023-09-08

## 2023-09-08 NOTE — PROGRESS NOTES
Miscellaneous note:    CT head from 9/5/23 showed post operative findings. Report does not have any obvious evidence of infection. See report below:    1.  Evolving postoperative changes related to right frontotemporal  craniotomy.  2.  Diminishing, nearly resolved pneumocephalus. Extra-axial  collection along the deep aspect of the craniotomy noted. No  hyperdense material to indicate acute hemorrhage.  3.  No hydrocephalus, worsening edema or infarct.      The extra axial collection was not described as inflammatory and seems to be post-operative (hematoma?). I sent a message to neurosurgery to obtain their opinion. Patient not having symptoms of infection. White count from 9/6 is normal. Incision healing well.     Patient had a visit with neurosurgery and they ordered MRI brain (10/2/23) to determine next steps in terms Radiation Oncology referral discussion.

## 2023-09-11 ENCOUNTER — OFFICE VISIT (OUTPATIENT)
Dept: OPHTHALMOLOGY | Facility: CLINIC | Age: 63
End: 2023-09-11
Attending: OPHTHALMOLOGY
Payer: COMMERCIAL

## 2023-09-11 DIAGNOSIS — H46.9 OPTIC NEUROPATHY: ICD-10-CM

## 2023-09-11 DIAGNOSIS — D32.0 PRIMARY MENINGIOMA OF OPTIC NERVE SHEATH (H): Primary | ICD-10-CM

## 2023-09-11 DIAGNOSIS — H53.40 VISUAL FIELD DEFECT: Primary | ICD-10-CM

## 2023-09-11 LAB
BACTERIA TISS BX CULT: NO GROWTH

## 2023-09-11 PROCEDURE — G0463 HOSPITAL OUTPT CLINIC VISIT: HCPCS | Performed by: OPHTHALMOLOGY

## 2023-09-11 PROCEDURE — 92133 CPTRZD OPH DX IMG PST SGM ON: CPT | Performed by: OPHTHALMOLOGY

## 2023-09-11 PROCEDURE — 99214 OFFICE O/P EST MOD 30 MIN: CPT | Performed by: OPHTHALMOLOGY

## 2023-09-11 PROCEDURE — 92083 EXTENDED VISUAL FIELD XM: CPT | Performed by: OPHTHALMOLOGY

## 2023-09-11 ASSESSMENT — CONF VISUAL FIELD
METHOD: COUNTING FINGERS
OS_INFERIOR_NASAL_RESTRICTION: 0
OD_INFERIOR_NASAL_RESTRICTION: 1
OS_SUPERIOR_NASAL_RESTRICTION: 0
OD_INFERIOR_TEMPORAL_RESTRICTION: 1
OS_INFERIOR_TEMPORAL_RESTRICTION: 0
OS_SUPERIOR_TEMPORAL_RESTRICTION: 0
OD_SUPERIOR_NASAL_RESTRICTION: 1
OS_NORMAL: 1
OD_SUPERIOR_TEMPORAL_RESTRICTION: 1

## 2023-09-11 ASSESSMENT — REFRACTION_WEARINGRX
OS_AXIS: 009
OS_SPHERE: -8.75
OS_CYLINDER: +1.00
OS_ADD: +2.50
SPECS_TYPE: TRIFOCAL
OD_CYLINDER: +1.00
OD_ADD: +2.50
OD_SPHERE: -8.50
OD_AXIS: 009

## 2023-09-11 ASSESSMENT — VISUAL ACUITY
OD_SC: NLP
METHOD: SNELLEN - LINEAR
OS_CC: 20/20
CORRECTION_TYPE: CONTACTS
OS_CC+: +2

## 2023-09-11 ASSESSMENT — CUP TO DISC RATIO
OS_RATIO: 0.1
OD_RATIO: 0.2

## 2023-09-11 ASSESSMENT — EXTERNAL EXAM - LEFT EYE: OS_EXAM: NORMAL

## 2023-09-11 ASSESSMENT — EXTERNAL EXAM - RIGHT EYE: OD_EXAM: NORMAL

## 2023-09-11 ASSESSMENT — TONOMETRY
IOP_METHOD: ICARE
OS_IOP_MMHG: 20
OD_IOP_MMHG: 20

## 2023-09-11 ASSESSMENT — SLIT LAMP EXAM - LIDS
COMMENTS: NORMAL
COMMENTS: NORMAL

## 2023-09-11 NOTE — LETTER
2023         RE:  :  MRN: Amado Butler  1960  [id number]     Dear Dr. Conde,    Your patient, Amado Butler, returned for neuro-ophthalmic follow up. My assessment and plan are below.  For further details, please see my attached clinic note.        Assessment & Plan     Amado Butler is a 63 year old male with the following diagnoses:   1. Primary meningioma of optic nerve sheath (H)    2. Optic neuropathy       Follow up steroid responsive optic neuropathy RIGHT eye. He was experiencing pain and vision loss that improved with steroids. MRI had shown a lesion in the posterior orbit. He received intravenous steroids and PLEX without improvement and underwent a biopsy that showed a meningioma.  His inpatient work-up also showed that he CMML.  He continues to have right sided headache. His vision has stayed about the same.      His visual acuity is no light perception RIGHT eye and 20/20 LEFT eye. His optical coherence tomography shows increase retinal nerve fiber layer thinning RIGHT eye and stable left eye.      Optic neuropathy RIGHT eye in setting of right posterior orbital lesion with biopsy showing meningioma.  His vision is stable with increased retinal nerve fiber layer thinning RIGHT eye.  I discussed with Dr. Camacho about possible treatment options in the future due to atypical nature of the meningioma, and agreed that potentially radiation is an option to consider depending on MRI findings on 10/2/23.  We discussed monocular safety precautions and advised glasses for safety.  Follow up in 1 year or sooner as needed for worsening symptoms.      He will discuss with Dr. Camacho regarding right sided head pain.  If not felt to be consistent with postoperative pain, then could consider headache neurology consultation.        Again, thank you for allowing me to participate in the care of your patient.      Sincerely,    Darin Serrano MD  Professor  Ophthalmology Residency Program  Director  Director of Neuro-Ophthalmology  [employer] [facility] [employer] Endowed Chair  Departments of Ophthalmology, Neurology, and Neurosurgery  [facility] [address] [place]  [address]  T - [phone]  F - [phone]  [email or web address]      CC: Jose Guadalupe Camacho MD  [address] [facility] [address]  Via In Basket     Huan Conde MD  Vitreoretinal Surgery  [address] [facility] [address]  [facility] [address]  Via Fax: [phone]    DX = meningioma, steroid responsive optic neuropathy

## 2023-09-11 NOTE — NURSING NOTE
Chief Complaints and History of Present Illnesses   Patient presents with    Ischemic Optic Neuropathy Follow Up     1 month follow up      Chief Complaint(s) and History of Present Illness(es)       Ischemic Optic Neuropathy Follow Up              Comments: 1 month follow up               Comments    Pt states vision is about the same as last visit. No eye pain today, but pt notes some headache like pain in his head since th biopsy.  Pt also experiencing neck pain on the right side. No new flashes or floaters. No new redness or dryness.  Pt prediabetic, does not check BS: 127 this morning per pt.  A1C: 9.6 taken a few months ago per pt.  No results found for: A1C    SUDHA Subramanian September 11, 2023 2:08 PM

## 2023-09-11 NOTE — PROGRESS NOTES
Assessment & Plan     Amado Butler is a 63 year old male with the following diagnoses:   1. Primary meningioma of optic nerve sheath (H)    2. Optic neuropathy       Follow up steroid responsive optic neuropathy RIGHT eye. He was experiencing pain and vision loss that improved with steroids. MRI had shown a lesion in the posterior orbit. He received intravenous steroids and PLEX without improvement and underwent a biopsy that showed a meningioma.  His inpatient work-up also showed that he CMML.  He continues to have right sided headache. His vision has stayed about the same.      His visual acuity is no light perception RIGHT eye and 20/20 LEFT eye. His optical coherence tomography shows increase retinal nerve fiber layer thinning RIGHT eye and stable left eye.      Optic neuropathy RIGHT eye in setting of right posterior orbital lesion with biopsy showing meningioma.  His vision is stable with increased retinal nerve fiber layer thinning RIGHT eye.  I discussed with Dr. Camacho about possible treatment options in the future due to atypical nature of the meningioma, and agreed that potentially radiation is an option to consider depending on MRI findings on 10/2/23.  We discussed monocular safety precautions and advised glasses for safety.  Follow up in 1 year or sooner as needed for worsening symptoms.      He will discuss with Dr. Camacho regarding right sided head pain.  If not felt to be consistent with postoperative pain, then could consider headache neurology consultation.           Attending Physician Attestation:  Complete documentation of historical and exam elements from today's encounter can be found in the full encounter summary report (not reduplicated in this progress note).  I personally obtained the chief complaint(s) and history of present illness.  I confirmed and edited as necessary the review of systems, past medical/surgical history, family history, social history, and examination  findings as documented by others; and I examined the patient myself.  I personally reviewed the relevant tests, images, and reports as documented above.  I formulated and edited as necessary the assessment and plan and discussed the findings and management plan with the patient and family. - Darin Serrano MD

## 2023-09-14 ENCOUNTER — MYC MEDICAL ADVICE (OUTPATIENT)
Dept: NEUROSURGERY | Facility: CLINIC | Age: 63
End: 2023-09-14
Payer: COMMERCIAL

## 2023-09-14 DIAGNOSIS — Z98.890 S/P CRANIOTOMY: ICD-10-CM

## 2023-09-14 DIAGNOSIS — D32.0 BENIGN NEOPLASM OF CEREBRAL MENINGES (H): Primary | ICD-10-CM

## 2023-09-14 DIAGNOSIS — D32.0 BENIGN NEOPLASM OF CEREBRAL MENINGES (H): ICD-10-CM

## 2023-09-15 NOTE — TELEPHONE ENCOUNTER
Pt returned call. Discussed his symptoms and concerns in greater detail. Since surgery on August 14, he feels pain has become more persistent and more intense. Incision healed well with very little discomfort.    Pain located on right side of head/face near temple, down the right side of his neck, at the base of his skull, and across the top of his right shoulder. Some pain around his jaw, worse when opening his mouth, which he understands is likely r/t surgery. Feels the pain is consistent with pain he had prior to surgery, when meningioma was presumably growing and causing his loss of vision. Does occasionally have complete relief of pain. Did not have pain most of the day yesterday, returned in the evening. Pain tends to be worse in evenings. Varies from dull ache to sharp and intense pain. He's not sure if it feels more muscular in nature, or nerve-related. Someone mentioned gabapentin to him and he's wondering about trialing this for pain. Denies numbness or tingling. Taking max dose of Tylenol, not feeling as much relief now.     Frustrated by slow recovery. Between pain and fatigue, feels it's hard to accomplish anything.     Had ophthalmology exam on Wednesday, was suggested to ask about carotid ultrasound which his PCP has now ordered. He is scheduled for this next Tue at [facility].    Pt hoping to better understand where pain is coming from, and how to get some relief.

## 2023-09-18 RX ORDER — METHOCARBAMOL 500 MG/1
500 TABLET, FILM COATED ORAL 4 TIMES DAILY PRN
Qty: 15 TABLET | Refills: 0 | Status: SHIPPED | OUTPATIENT
Start: 2023-09-18 | End: 2023-10-26

## 2023-09-18 NOTE — TELEPHONE ENCOUNTER
Called pt to check in after weekend. Pt reports increase in ibuprofen helped on Saturday during the day, pain came back in the evening and lasted through the night. [place] pain most of Sunday, despite taking ibuprofen and acetaminophen. Pain well managed today, not as sharp. Usually taking 400mg ibuprofen at a time, Sat night took 600mg. He's trying not to take too much.     He is out of methocarbamol now. Last dose was on Sat night. He feels this had been helping him to sleep. Requesting refill.     Overall, patient pleased to have a little relief. Encouraged him to keep us posted on how he's doing over next couple of weeks. MRI scheduled first week of October.

## 2023-09-20 LAB — INTERPRETATION: NORMAL

## 2023-10-02 ENCOUNTER — ANCILLARY PROCEDURE (OUTPATIENT)
Dept: MRI IMAGING | Facility: CLINIC | Age: 63
End: 2023-10-02
Attending: STUDENT IN AN ORGANIZED HEALTH CARE EDUCATION/TRAINING PROGRAM
Payer: COMMERCIAL

## 2023-10-02 DIAGNOSIS — H54.61 VISION LOSS OF RIGHT EYE: ICD-10-CM

## 2023-10-02 PROCEDURE — 70553 MRI BRAIN STEM W/O & W/DYE: CPT | Mod: GC | Performed by: RADIOLOGY

## 2023-10-02 PROCEDURE — A9585 GADOBUTROL INJECTION: HCPCS | Mod: JZ | Performed by: RADIOLOGY

## 2023-10-02 PROCEDURE — 70543 MRI ORBT/FAC/NCK W/O &W/DYE: CPT | Mod: GC | Performed by: RADIOLOGY

## 2023-10-02 RX ORDER — GADOBUTROL 604.72 MG/ML
10 INJECTION INTRAVENOUS ONCE
Status: COMPLETED | OUTPATIENT
Start: 2023-10-02 | End: 2023-10-02

## 2023-10-02 RX ADMIN — GADOBUTROL 9 ML: 604.72 INJECTION INTRAVENOUS at 16:05

## 2023-10-09 ENCOUNTER — THERAPY VISIT (OUTPATIENT)
Dept: OCCUPATIONAL THERAPY | Facility: CLINIC | Age: 63
End: 2023-10-09
Attending: STUDENT IN AN ORGANIZED HEALTH CARE EDUCATION/TRAINING PROGRAM
Payer: COMMERCIAL

## 2023-10-09 DIAGNOSIS — H54.61 VISION LOSS OF RIGHT EYE: Primary | ICD-10-CM

## 2023-10-09 PROCEDURE — 97165 OT EVAL LOW COMPLEX 30 MIN: CPT | Mod: GO

## 2023-10-09 PROCEDURE — 97535 SELF CARE MNGMENT TRAINING: CPT | Mod: GO

## 2023-10-09 NOTE — PROGRESS NOTES
OCCUPATIONAL THERAPY EVALUATION  Type of Visit: Evaluation    See electronic medical record for Abuse and Falls Screening details.    Subjective      Presenting condition or subjective complaint: loss of vision in right eye  Date of onset: 08/14/23    Relevant medical history: High blood pressure   Dates & types of surgery: august 2023-- optic nerve biopsy    Prior diagnostic imaging/testing results: MRI; CT scan     Prior therapy history for the same diagnosis, illness or injury: No      Prior Level of Function  Transfers: Independent  Ambulation: Independent  ADL: Independent  IADL: Driving, Laundry, Meal preparation    Living Environment  Social support: With a significant other or spouse   Type of home: House; Multi-level   Stairs to enter the home: Yes 4 Is there a railing: No   Ramp: No   Stairs inside the home: Yes 14 Is there a railing: Yes   Help at home: Home management tasks (cooking, cleaning); Home and Yard maintenance tasks; Assist for driving and community activities  Equipment owned:       Employment: No    Hobbies/Interests: golf, tennis, biking, outdoor walks    Patient goals for therapy: not sure, need to try out some things    Pain assessment:  none reported      Objective   LOW VISION EVALUATION  ADDITIONAL HISTORY:  Current Responsibilities - IADLS: Driving, Housekeeping, Laundry, Meal preparation, Medication management    Others present at visit: Spouse / significant other    COGNITIVE/BEHAVIORAL:  Communication: Intact  Cognitive Status: Oriented to person, place and time  Behavior: Appropriate  Patient/family aware of diagnosis: Yes  How well do you understand your eye condition? well   Vision related restrictions on visiting with family/friends: Mild  Reported emotion impact of visual impairment: Mild  Reported adjustment to disability: good  Cognitive Screen/Assessment:  Not warranted      Physical Status/Equipment   Physical Status: I  Mobility Equipment Used: None  Bathing ADL Equipment  Used:  None  Toileting ADL Equipment Used:  None     VISUAL REPORT:  Functional complaints: Homemaking, Leisure, driving  Visual Complaints: Constricted visual fields right eye, Light sensitivity  Valerio Bonnet Symptoms? No   Magnifiers and Low Vision AE owned:  None   Reading Glasses: Single lens (readers)  Power per MD report: +2.5  Technology: Smart phone, Laptop    LIGHTING AND GLARE:  Is your lighting adequate? No at home  Is glare a problem? Yes indoors, Yes outdoors  Are you satisfied with your sunglasses? No   Sunglass Details: Department store sunglasses, Gray tint    VISUAL ACUITY:  Distance Acuity Right Eye: Per recent MD report  Distance Acuity Left Eye: Per recent MD report  Distance Acuity Both Eyes Together: Per recent MD report  Near Visual Acuity:  Not tested due to time, able to read 20/50 on Mnread chart    CONTRAST SENSITIVITY:  Contrast Sensitivity (score/25): 25/25, WNL     MN Read  Smallest Print Size Read: 1M  Critical Print Size: 1.6 M  Words per minute at critical print size: 150    *conducted with readers that were not correct for testing difference, however still demonstrating WFL for reading and reading rate with no errors.     Visual Field:  Central Visual Field: Appears normal  Central Visual Field Screen Used:  Reading and filling out paperwork during evaluation  Peripheral Visual Field:  right eye impacted due to biopsy per MD notes  Peripheral Visual Field Screen Used: Per MD notes    Visual Attention: Appears normal      Assessment & Plan   CLINICAL IMPRESSIONS  Medical Diagnosis: ision loss of right eye (H54.61)    Treatment Diagnosis: impaired I/ADL functioning    Impression/Assessment: Pt is a 63 year old male presenting to Occupational Therapy due to optic nerve biopsy in right eye.  The following significant findings have been identified:  Impaired visual fields, over reliance of top/bottom visual scanning .  These identified deficits interfere with their ability to perform  recreational activities, driving , and community mobility as compared to previous level of function.     Clinical Decision Making (Complexity):  Assessment of Occupational Performance: 3-5 Performance Deficits  Occupational Performance Limitations: driving and community mobility, home establishment and management, meal preparation and cleanup, and volunteer activities  Clinical Decision Making (Complexity): Low complexity    PLAN OF CARE  Treatment Interventions:  Interventions: Self-Care/Home Management, Therapeutic Activity, Therapeutic Exercise    Long Term Goals   OT Goal 1  Goal Identifier: Near Vision  Goal Description: Patient will demonstrate 2 pieces of adaptive equipment and/or adaptive techniques in regards to , lighting, contrast and glare for increased ADL/IADL independence with near vision tasks (reading based I/ADLs and functional mobility ).  Rationale: In order to safely and appropriately apply compensatory strategies with ADL/IADL performance  Goal Progress: not addressed  Target Date: 04/06/24  OT Goal 2  Goal Identifier: Visual Field  Goal Description: Patient will demonstrate WFLs visual reaction time and visual scanning skills for extrapersonal space with use of compensatory strategies prn, for safe functional and community mobility tasks (navigating in new areas, crossing the street, driving, grocery shopping) by scoring WNLs on all modes of the Dynavision and Scancourse.  Rationale: In order to safely and appropriately apply compensatory strategies with ADL/IADL performance  Goal Progress: ongoing  Target Date: 04/06/24      Frequency of Treatment: 1x per week increasing or decreasing in frequency as clinically indicated  Duration of Treatment: 90 days     Recommended Referrals to Other Professionals: none at this time  Education Assessment: Learner/Method: Patient;Significant Other;Demonstration  Education Comments: see above     Risks and benefits of evaluation/treatment have been  explained.   Patient/Family/caregiver agrees with Plan of Care.     Evaluation Time:        Signing Clinician: ANTHONY Glaser [facility]                                                                                   OUTPATIENT OCCUPATIONAL THERAPY      PLAN OF TREATMENT FOR OUTPATIENT REHABILITATION   Patient's Last Name, First Name, Amado Marcial YOB: 1960   Provider's Name   [facility]   Medical Record No.  [id number]     Onset Date: 08/14/238/9/23 Start of Care Date: 10/09/23     Medical Diagnosis:  Vision loss of right eye (H54.61)      OT Treatment Diagnosis:  impaired I/ADL functioning Plan of Treatment  Frequency/Duration:1x per week increasing or decreasing in frequency as clinically indicated/90 days    Certification date from 10/09/23   To 01/07/24        See note for plan of treatment details and functional goals     Raymond Hernandez OT                         I CERTIFY THE NEED FOR THESE SERVICES FURNISHED UNDER        THIS PLAN OF TREATMENT AND WHILE UNDER MY CARE     (Physician attestation of this document indicates review and certification of the therapy plan).                Referring Provider:  Maxwell Castillo MD       Initial Assessment  See Epic Evaluation- 10/09/23

## 2023-10-13 ENCOUNTER — NURSE TRIAGE (OUTPATIENT)
Dept: ONCOLOGY | Facility: CLINIC | Age: 63
End: 2023-10-13
Payer: COMMERCIAL

## 2023-10-13 ENCOUNTER — DOCUMENTATION ONLY (OUTPATIENT)
Dept: MULTI SPECIALTY CLINIC | Facility: CLINIC | Age: 63
End: 2023-10-13
Payer: COMMERCIAL

## 2023-10-13 ENCOUNTER — PATIENT OUTREACH (OUTPATIENT)
Dept: ONCOLOGY | Facility: CLINIC | Age: 63
End: 2023-10-13
Payer: COMMERCIAL

## 2023-10-13 NOTE — PROGRESS NOTES
"Miscellaneous note:    MRI brain from 10/2/23 showed: \"Extra-axial fluid collections overlying the right frontal lobe some  of which appear to extend into the right frontal cerebral sulci that do not demonstrate restricted diffusion or enhancement that would suggest infection. These may represent postoperative  subdural/subarachnoid fluid\".    I discussed the findings with Dr. Camacho (neurosurgery) and the conclusion is that the MRI brain findings are suggestive of post operative findings and not infection.     No indication of antibiotics at this moment (see my clinic note from 8/29/23 for details).     No need for additional ID clinic follow up at this moment. Feel free to contact us if any change in clinical status.     Continued follow up with PCP, Oncology/Hematology and Neurosurgery    "

## 2023-10-13 NOTE — PROGRESS NOTES
[facility]: Cancer Care                                                                                          Amado is requesting bone marrow slides be sent to [facility]. Stated that [facility] requested the slides two weeks ago but has not received them. Writer called heme path who still has the bone marrow slides and does not see a request to have them sent to [facility].   Writer sent email to HIM 10/13/23 4:19 PM requesting slides to be sent to [facility]. Provided address, patient info, and bone marrow specimen number.     Eula Patricio, RN, BSN  RN Care Coordinator   [address] [place] [id number] [phone]

## 2023-10-13 NOTE — TELEPHONE ENCOUNTER
Call from pt, who is requesting assistance on getting pathology slides sent to [facility].   Per Secure Chat with VERÓNICA Forde, she talked with path and they do still have the slides, however [facility] has to request the slides to be sent.   Pt states [facility] physician told him he had to request slides, expressed frustration in getting told two different things.   Eula to send email request to HIM. Writer also sent MyC message to pt with ways to request slides and transferred call to HIM at [phone].

## 2023-10-18 ENCOUNTER — ONCOLOGY VISIT (OUTPATIENT)
Dept: ONCOLOGY | Facility: CLINIC | Age: 63
End: 2023-10-18
Attending: STUDENT IN AN ORGANIZED HEALTH CARE EDUCATION/TRAINING PROGRAM
Payer: COMMERCIAL

## 2023-10-18 VITALS
TEMPERATURE: 97.6 F | WEIGHT: 207.1 LBS | RESPIRATION RATE: 14 BRPM | SYSTOLIC BLOOD PRESSURE: 161 MMHG | BODY MASS INDEX: 25.75 KG/M2 | HEIGHT: 75 IN | DIASTOLIC BLOOD PRESSURE: 87 MMHG | OXYGEN SATURATION: 98 % | HEART RATE: 62 BPM

## 2023-10-18 DIAGNOSIS — H54.61 VISION LOSS OF RIGHT EYE: ICD-10-CM

## 2023-10-18 DIAGNOSIS — C93.10 CHRONIC MYELOMONOCYTIC LEUKEMIA NOT HAVING ACHIEVED REMISSION (H): Primary | ICD-10-CM

## 2023-10-18 PROCEDURE — G0463 HOSPITAL OUTPT CLINIC VISIT: HCPCS | Performed by: STUDENT IN AN ORGANIZED HEALTH CARE EDUCATION/TRAINING PROGRAM

## 2023-10-18 PROCEDURE — 99215 OFFICE O/P EST HI 40 MIN: CPT | Performed by: STUDENT IN AN ORGANIZED HEALTH CARE EDUCATION/TRAINING PROGRAM

## 2023-10-18 ASSESSMENT — PAIN SCALES - GENERAL: PAINLEVEL: MODERATE PAIN (4)

## 2023-10-18 NOTE — PROGRESS NOTES
[facility]  HEMATOLOGY & ONCOLOGY  FOLLOW UP VISIT    PATIENT NAME: Amado Butler          MRN # [id number]  YOB: 1960  DATE OF VISIT: October 18, 2023           REFERRING PROVIDER:   Mr. Amado Butler was seen at the request of Dr. Solomon Reza for further evaluation and/or management.     History of Presenting Illness  Mr. Amado Butler is a pleasant 63 year old male with a past medical history significant for DM2- Last A1c, HTN, ?thrombocytopenia since 2012 s/p recent frontotemporal craniotomy for right optic nerve mass now pathology proven meningioma c/b complete vision loss in right eye and was also found to have significant cytopenias during admission leading to a bone marrow biopsy on 8/15/2023.  His bone marrow biopsy shows 50% cellularity [mildly hypercellular for his age] without significant dysplasia in all 3 cell lines and without increase in blasts.  He had normal cytogenetics and BCR-ABL FISH was negative.  However his [employer] ration sequencing testing showed that there was a clonal process with mutations detected in SRSF2, TET2 x 2 and ASXL1.  Given prior history of monocytosis dating back many years he was referred to our clinic.      Subjective  Here with wife Mary Lou.  Patient notes that he still continues to have no light sensation in his right eye.  Otherwise he continues to feel better and has had improvement in his overall wellbeing as well as fatigue symptoms.  His energy levels have significantly improved and he is now started to do yard work.  Patient was able to see provider in [facility].  However the bone marrow slides requested by [facility] has not reached them yet.  Denies any fevers or chills, night sweats, BRBPR or melena.    Past Medical History  Diabetes type 2  Hypertension  Paroxysmal SVT  Thrombocytopenia dating back since 2012    Family History  Any family history of cancers or blood or bleeding disorders.    Social History  Smoking: Never  Alcohol  "use: drinks approximately 2drinks/week  Status:  39 years.   Children/grandchildren: 3 children. 26 years old girl, 31 boy, 28 girl  Occupation: Retired, .     Current Outpatient Medications  Current Outpatient Medications   Medication Sig Dispense Refill    carvedilol (COREG) 12.5 MG tablet Take 1 tablet (12.5 mg) by mouth 2 times daily (with meals) 180 tablet 3    lisinopril-hydrochlorothiazide (ZESTORETIC) 20-25 MG tablet Take 1 tablet by mouth daily      metFORMIN (GLUCOPHAGE) 500 MG tablet Take 1 tablet (500 mg) by mouth daily (with breakfast) 7 tablet 0    methocarbamol (ROBAXIN) 500 MG tablet Take 1 tablet (500 mg) by mouth 4 times daily as needed for muscle spasms (Patient not taking: Reported on 10/18/2023) 15 tablet 0       Allergies  Allergies   Allergen Reactions    Seasonal Allergies        Review of systems  A complete ROS was performed and was negative except as mentioned in HPI    Physical Exam  BP (!) 161/87 (BP Location: Left arm, Patient Position: Sitting, Cuff Size: Adult Regular)   Pulse 62   Temp 97.6  F (36.4  C) (Oral)   Resp 14   Ht 1.915 m (6' 3.39\")   Wt 93.9 kg (207 lb 1.6 oz)   SpO2 98%   BMI 25.62 kg/m    Wt Readings from Last 3 Encounters:   10/18/23 93.9 kg (207 lb 1.6 oz)   23 89.4 kg (197 lb)   23 86.2 kg (190 lb)       ECO   male sitting in chair in NAD  HEET: NC/AT, EOMI w/ PERRL, anicteric sclera. MMM. No mouth sores.   Neck: supple no JVP  Lymph: No cervical, supraclavicular, axillary or inguinal LAD  CV: normal S1,S2 with RRR no m/r/g  Resp: lungs CTA bilaterally with adequate air movement. No wheezes or crackles  Abd: soft, NTND, no organomegaly or masses. BS normoactive.   Ext: WWP no edema or cyanosis  Skin: no concerning lesions or rashes or petechiae  Neuro: A&Ox4, no lateralizing sx. Grossly nonfocal. Strength appears preserved.       Labs and Imaging  I reviewed patient's labs and imaging from care " everywhere and here.    Last labs from 10/12/2023  WBC 4.3, Hgb 12, , MCV 88, ANC 1200    BMBx 8/15/23  Final Diagnosis   Bone marrow, posterior iliac crest, left decalcified trephine biopsy and touch imprint; left aspirate clot, particle crush, direct aspirate smear, concentrate aspirate smear, and peripheral blood smear:     - Cellular marrow (overall 50% in intact areas) with granulocytic hyperplasia with slight left shift, relative erythroid hypoplasia, unremarkable megakaryopoiesis, no overt dysplasia, and overall less than 2% blasts  - Overall normal reticulin fibrosis (MF-0)  - Peripheral blood showing moderate normochromic, normocytic anemia; moderate leukocytosis due to neutrophilia and monocytosis; slight thrombocytopenia  - See comment   No clonal chromosomal abnormality was detected among the 20 metaphase cells analyzed. These findings confirm and expand those of the previously reported interphase FISH anlaysis ([id number]) that showed no evidence of a BCR::ABL1 fusion.   SRSF2 VAF 46.2%  TET 2 VAF 51.6%  TET 2 VAF 46.5%  ASXL1 VAF 11.3%      CMML WHO Diagnostic Criteria:    Persistent (?3 months) peripheral blood monocytosis; absolute monocyte count >500/microL and greater than 10 percent of the entire white blood cell differential   Yes   Not meeting WHO criteria for BCR-ABL1 positive chronic myeloid leukemia, primary myelofibrosis, polycythemia vera, or essential thrombocytosis  Yes   <20 percent myeloblasts + monoblasts + promonocytes in peripheral blood and bone marrow  Yes   Dysplastic changes in one or more myeloid lineages. If myelodysplastic changes are absent or minimal, the diagnosis of CMML can be made if the above three criteria are met and:  No   An acquired clonal cytogenetic (or mutational abnormality) is present in bone marrow cells or  Yes   Persistent monocytosis for ?3 months and all other causes of monocytosis have been excluded  Yes   Total Score Meets WHO Criteria (4 major  or 3 major and 2 minor)  Yes     CMML WHO Staging in 2016 now discontinued:    CMML-0: <2 % blasts in PB and <5% blasts in BM  Yes   CMML-1: 2 - 4%  blasts in PB and/or 5 - 9% blasts in BM  No   CMML-2: 5 -19 % blasts in PB, 10 - 19% blasts in BM, OR presence of Maurilio rods  No     Prognostic Scoring System: CPSS-Mol      Criteria Score     Cytogenetic Risk Low: normal, isolated -Y  Intermediate: all else  High: Tri 8, complex, chrom 7   0    ASXL1 WT/Mut  1    NRAS WT/Mut  0    RUNX1 WT/Mut  0    SETBP1 WT/Mut  0    Total Genetic Score Low = 0  Intermediate-1 = 1  Intermediate-2 = 2  High = >3 (Max 3 points) 1 Genetic risk points for CPSS-mol   (Max 3):  1   BM Blasts <5% or >5%  0    WBC count <13 or > 13  0    Transfusion dependent No/Yes  0    CPSS-mol   Risk group Low = 0  Intermediate-1 = 1  Intermediate-2 = 2-3  High = >4    1     The CPSS-Mol was able to identify 4 risk groups having a signi?cantly different OS, with median survival time >144, and 68, 30, and 17 months in the low, intermediate-1, intermediate-2, and high-risk group, respectively. Mr. Amado Butler has a Intermediate-1 risk disease with an estimated median survival of 68 months. The 4 risk groups also showed a signi?cantly different cumulative incidence of leukemic evolution, with a 48-month cumulative incidence of AML evolution of 0%, 8%, 24%, and 52% for the low, intermediate-1, intermediate-2, and high-risk group respectively. Mr. Amado Butler has a Intermediate-1 risk disease with an estimated 4 year cumulative AML evolution risk of 8 %.     Will need to obtain BCR-ABL1 testing and PDGFRA, PDGFRB, FGFR1 and PCM-JAK2 to rule out other disorders if eosinophilia is present.     A number of conditions can also cause monocytosis such as asplenic state, inflamma AAS XL 1 tory conditions and autoimmune disorders, major depression, steroids or colony stimulating factors.        Assessment and Plan  Mr. Amado Butler is a 63 year old male who is  here for further evaluation and/or management of chronic myelomonocytic leukemia.    Oncology:  Chronic myelomonocytic leukemia  WHO Classification: Dysplastic-CMML  Date of diagnosis: 8/14/2023  CPSS Mol score: 1-intermediate 1  Bone Marrow Blast %: 2   Cytogenetics: normal karyotype, 46 XY  Molecular: SRSF2, TET 2, TET 2, ASXL1  Past Treatment: None  Current Treatment: TBD    I discussed the pathophysiology and natural history of CMML with Mr. Amado Butler today. We went over the current prognostic models and scoring systems that are used in clinical practice as well as the potential for disease evolution into acute myeloid leukemia. We went over the current FDA approved treatments for CMML and covered that the only curative option is through an allogeneic hematopoietic cell transplantation. His disease is intermediate 1 risk and we will revisit this as needed.    We again discussed the new WHO classification of 2022 and the modified criterion for CMML requiring only persistent monocytosis along with the somatic mutations and not satisfying criteria for other myeloid malignancies and dysplasia now being a supportive criteria.  Because he has persistent monocytosis which has been more than  ? 1?×?109/?L: one or more supporting criteria need to be met.  That said the lower classification would be clonal cytopenias of uncertain significance but in either case his disease seems to be low risk and he is continuing to improve his cytopenias as he is recovering from his surgery and the inflammation in his body is subsiding.    We discussed about supportive care for lower risk disease and CMML including erythropoietin stimulating agents as well as thrombopoietin receptor agonist for thrombocytopenia.  At this point of time we will take a wait and watch approach.    We spoke with pathology while he was in clinic who confirmed that his bone marrow slides have been sent as an urgent [employer] on Monday, 10/16/2023.    Plan:  he  has had a molecular panel.  labs checked today - see above results.  His hemoglobin has continued to improve since surgery and is now at 12 g.  Continue with labs in 6 weeks  Follow up with MD in 3 months with labs prior.    Hematology:  Anemia/Thrombocytopenia:   Transfuse leukocyte reduced and irradiated blood products: 1 unit pRBC if hgb is 7.0-7.9, 2 units pRBCs if Hgb 6.0-6.9 g/dl, 1 unit platelets if PLT count is <10,000 or <50,000 with clinical bleeding.    Immunocompromised:  As a result of his disease and/or previous treatment. Mr. Amado Butler is immunocompromised. his last ANC is >500 and there is no need for prophylactic antibiotics at this time.     Cardiac:  Mr. Amado Butler has no history of heart disease.     Renal:  Creatinine results reviewed today. Mr. Amado Butler does not have any renal disease.    Hepatic:  Liver function tests reviewed today.    Psychosocial:  Mr. Amado Butler is coping well with his diagnosis.     All other questions and concerns were addressed and answered to Mr. Amado Butler's satisfaction.    Today, I spent a total of 55 minutes of face-to-face and non face-to-face time with Mr. Amado Butler. Time spent included review and discussion of diagnostic test results, patient counseling, and coordination of care.    Jamal Rodney MD    Division of Hematology, Oncology and Transplantation  [facility]  P: [phone]

## 2023-10-18 NOTE — NURSING NOTE
"Oncology Rooming Note    October 18, 2023 11:08 AM   Amado Butler is a 63 year old male who presents for:    Chief Complaint   Patient presents with    Oncology Clinic Visit     Chronic myelomonocytic leukemia      Initial Vitals: BP (!) 161/87 (BP Location: Left arm, Patient Position: Sitting, Cuff Size: Adult Regular)   Pulse 62   Temp 97.6  F (36.4  C) (Oral)   Resp 14   Ht 1.915 m (6' 3.39\")   Wt 93.9 kg (207 lb 1.6 oz)   SpO2 98%   BMI 25.62 kg/m   Estimated body mass index is 25.62 kg/m  as calculated from the following:    Height as of this encounter: 1.915 m (6' 3.39\").    Weight as of this encounter: 93.9 kg (207 lb 1.6 oz). Body surface area is 2.23 meters squared.  Moderate Pain (4) Comment: Data Unavailable   No LMP for male patient.  Allergies reviewed: Yes  Medications reviewed: Yes    Medications: MEDICATION REFILLS NEEDED TODAY. Provider was notified.  Pharmacy name entered into [facility]: [id number] - [place], [place] - [address]    Clinical concerns: If possible to get more robaxin for pain that would be great but understands might not be able to from this clinic       Ledy Blandon              "

## 2023-10-18 NOTE — LETTER
10/18/2023         RE: Amado Butler  [address] [address] [place] [address]        Dear Colleague,    Thank you for referring your patient, Amado Butler, to the [facility]. Please see a copy of my visit note below.    [facility]  HEMATOLOGY & ONCOLOGY  FOLLOW UP VISIT    PATIENT NAME: Amado Butler          MRN # [id number]  YOB: 1960  DATE OF VISIT: October 18, 2023           REFERRING PROVIDER:   Mr. Amado Butler was seen at the request of Dr. Solomon Reza for further evaluation and/or management.     History of Presenting Illness  Mr. Amado Butler is a pleasant 63 year old male with a past medical history significant for DM2- Last A1c, HTN, ?thrombocytopenia since 2012 s/p recent frontotemporal craniotomy for right optic nerve mass now pathology proven meningioma c/b complete vision loss in right eye and was also found to have significant cytopenias during admission leading to a bone marrow biopsy on 8/15/2023.  His bone marrow biopsy shows 50% cellularity [mildly hypercellular for his age] without significant dysplasia in all 3 cell lines and without increase in blasts.  He had normal cytogenetics and BCR-ABL FISH was negative.  However his [employer] ration sequencing testing showed that there was a clonal process with mutations detected in SRSF2, TET2 x 2 and ASXL1.  Given prior history of monocytosis dating back many years he was referred to our clinic.      Subjective  Here with wife Mary Lou.  Patient notes that he still continues to have no light sensation in his right eye.  Otherwise he continues to feel better and has had improvement in his overall wellbeing as well as fatigue symptoms.  His energy levels have significantly improved and he is now started to do yard work.  Patient was able to see provider in [facility].  However the bone marrow slides requested by [facility] has not reached them yet.  Denies any fevers or chills, night sweats, BRBPR  "or melena.    Past Medical History  Diabetes type 2  Hypertension  Paroxysmal SVT  Thrombocytopenia dating back since     Family History  Any family history of cancers or blood or bleeding disorders.    Social History  Smoking: Never  Alcohol use: drinks approximately 2drinks/week  Status:  39 years.   Children/grandchildren: 3 children. 26 years old girl, 31 boy, 28 girl  Occupation: Retired, .     Current Outpatient Medications  Current Outpatient Medications   Medication Sig Dispense Refill    carvedilol (COREG) 12.5 MG tablet Take 1 tablet (12.5 mg) by mouth 2 times daily (with meals) 180 tablet 3    lisinopril-hydrochlorothiazide (ZESTORETIC) 20-25 MG tablet Take 1 tablet by mouth daily      metFORMIN (GLUCOPHAGE) 500 MG tablet Take 1 tablet (500 mg) by mouth daily (with breakfast) 7 tablet 0    methocarbamol (ROBAXIN) 500 MG tablet Take 1 tablet (500 mg) by mouth 4 times daily as needed for muscle spasms (Patient not taking: Reported on 10/18/2023) 15 tablet 0       Allergies  Allergies   Allergen Reactions    Seasonal Allergies        Review of systems  A complete ROS was performed and was negative except as mentioned in HPI    Physical Exam  BP (!) 161/87 (BP Location: Left arm, Patient Position: Sitting, Cuff Size: Adult Regular)   Pulse 62   Temp 97.6  F (36.4  C) (Oral)   Resp 14   Ht 1.915 m (6' 3.39\")   Wt 93.9 kg (207 lb 1.6 oz)   SpO2 98%   BMI 25.62 kg/m    Wt Readings from Last 3 Encounters:   10/18/23 93.9 kg (207 lb 1.6 oz)   23 89.4 kg (197 lb)   23 86.2 kg (190 lb)       ECO   male sitting in chair in NAD  HEET: NC/AT, EOMI w/ PERRL, anicteric sclera. MMM. No mouth sores.   Neck: supple no JVP  Lymph: No cervical, supraclavicular, axillary or inguinal LAD  CV: normal S1,S2 with RRR no m/r/g  Resp: lungs CTA bilaterally with adequate air movement. No wheezes or crackles  Abd: soft, NTND, no organomegaly or masses. BS " normoactive.   Ext: WWP no edema or cyanosis  Skin: no concerning lesions or rashes or petechiae  Neuro: A&Ox4, no lateralizing sx. Grossly nonfocal. Strength appears preserved.       Labs and Imaging  I reviewed patient's labs and imaging from care everywhere and here.    Last labs from 10/12/2023  WBC 4.3, Hgb 12, , MCV 88, ANC 1200    BMBx 8/15/23  Final Diagnosis   Bone marrow, posterior iliac crest, left decalcified trephine biopsy and touch imprint; left aspirate clot, particle crush, direct aspirate smear, concentrate aspirate smear, and peripheral blood smear:     - Cellular marrow (overall 50% in intact areas) with granulocytic hyperplasia with slight left shift, relative erythroid hypoplasia, unremarkable megakaryopoiesis, no overt dysplasia, and overall less than 2% blasts  - Overall normal reticulin fibrosis (MF-0)  - Peripheral blood showing moderate normochromic, normocytic anemia; moderate leukocytosis due to neutrophilia and monocytosis; slight thrombocytopenia  - See comment   No clonal chromosomal abnormality was detected among the 20 metaphase cells analyzed. These findings confirm and expand those of the previously reported interphase FISH anlaysis ([id number]) that showed no evidence of a BCR::ABL1 fusion.   SRSF2 VAF 46.2%  TET 2 VAF 51.6%  TET 2 VAF 46.5%  ASXL1 VAF 11.3%      CMML WHO Diagnostic Criteria:    Persistent (?3 months) peripheral blood monocytosis; absolute monocyte count >500/microL and greater than 10 percent of the entire white blood cell differential   Yes   Not meeting WHO criteria for BCR-ABL1 positive chronic myeloid leukemia, primary myelofibrosis, polycythemia vera, or essential thrombocytosis  Yes   <20 percent myeloblasts + monoblasts + promonocytes in peripheral blood and bone marrow  Yes   Dysplastic changes in one or more myeloid lineages. If myelodysplastic changes are absent or minimal, the diagnosis of CMML can be made if the above three criteria are met  and:  No   An acquired clonal cytogenetic (or mutational abnormality) is present in bone marrow cells or  Yes   Persistent monocytosis for ?3 months and all other causes of monocytosis have been excluded  Yes   Total Score Meets WHO Criteria (4 major or 3 major and 2 minor)  Yes     CMML WHO Staging in 2016 now discontinued:    CMML-0: <2 % blasts in PB and <5% blasts in BM  Yes   CMML-1: 2 - 4%  blasts in PB and/or 5 - 9% blasts in BM  No   CMML-2: 5 -19 % blasts in PB, 10 - 19% blasts in BM, OR presence of Maurilio rods  No     Prognostic Scoring System: CPSS-Mol      Criteria Score     Cytogenetic Risk Low: normal, isolated -Y  Intermediate: all else  High: Tri 8, complex, chrom 7   0    ASXL1 WT/Mut  1    NRAS WT/Mut  0    RUNX1 WT/Mut  0    SETBP1 WT/Mut  0    Total Genetic Score Low = 0  Intermediate-1 = 1  Intermediate-2 = 2  High = >3 (Max 3 points) 1 Genetic risk points for CPSS-mol   (Max 3):  1   BM Blasts <5% or >5%  0    WBC count <13 or > 13  0    Transfusion dependent No/Yes  0    CPSS-mol   Risk group Low = 0  Intermediate-1 = 1  Intermediate-2 = 2-3  High = >4    1     The CPSS-Mol was able to identify 4 risk groups having a signi?cantly different OS, with median survival time >144, and 68, 30, and 17 months in the low, intermediate-1, intermediate-2, and high-risk group, respectively. Mr. Amado Butler has a Intermediate-1 risk disease with an estimated median survival of 68 months. The 4 risk groups also showed a signi?cantly different cumulative incidence of leukemic evolution, with a 48-month cumulative incidence of AML evolution of 0%, 8%, 24%, and 52% for the low, intermediate-1, intermediate-2, and high-risk group respectively. Mr. Amado Butler has a Intermediate-1 risk disease with an estimated 4 year cumulative AML evolution risk of 8 %.     Will need to obtain BCR-ABL1 testing and PDGFRA, PDGFRB, FGFR1 and PCM-JAK2 to rule out other disorders if eosinophilia is present.     A number of  conditions can also cause monocytosis such as asplenic state, inflamma AAS XL 1 tory conditions and autoimmune disorders, major depression, steroids or colony stimulating factors.        Assessment and Plan  Mr. Amado Butler is a 63 year old male who is here for further evaluation and/or management of chronic myelomonocytic leukemia.    Oncology:  Chronic myelomonocytic leukemia  WHO Classification: Dysplastic-CMML  Date of diagnosis: 8/14/2023  CPSS Mol score: 1-intermediate 1  Bone Marrow Blast %: 2   Cytogenetics: normal karyotype, 46 XY  Molecular: SRSF2, TET 2, TET 2, ASXL1  Past Treatment: None  Current Treatment: TBD    I discussed the pathophysiology and natural history of CMML with Mr. Amado Butler today. We went over the current prognostic models and scoring systems that are used in clinical practice as well as the potential for disease evolution into acute myeloid leukemia. We went over the current FDA approved treatments for CMML and covered that the only curative option is through an allogeneic hematopoietic cell transplantation. His disease is intermediate 1 risk and we will revisit this as needed.    We again discussed the new WHO classification of 2022 and the modified criterion for CMML requiring only persistent monocytosis along with the somatic mutations and not satisfying criteria for other myeloid malignancies and dysplasia now being a supportive criteria.  Because he has persistent monocytosis which has been more than  ? 1?×?109/?L: one or more supporting criteria need to be met.  That said the lower classification would be clonal cytopenias of uncertain significance but in either case his disease seems to be low risk and he is continuing to improve his cytopenias as he is recovering from his surgery and the inflammation in his body is subsiding.    We discussed about supportive care for lower risk disease and CMML including erythropoietin stimulating agents as well as thrombopoietin receptor  agonist for thrombocytopenia.  At this point of time we will take a wait and watch approach.    We spoke with pathology while he was in clinic who confirmed that his bone marrow slides have been sent as an urgent [employer] on Monday, 10/16/2023.    Plan:  he has had a molecular panel.  labs checked today - see above results.  His hemoglobin has continued to improve since surgery and is now at 12 g.  Continue with labs in 6 weeks  Follow up with MD in 3 months with labs prior.    Hematology:  Anemia/Thrombocytopenia:   Transfuse leukocyte reduced and irradiated blood products: 1 unit pRBC if hgb is 7.0-7.9, 2 units pRBCs if Hgb 6.0-6.9 g/dl, 1 unit platelets if PLT count is <10,000 or <50,000 with clinical bleeding.    Immunocompromised:  As a result of his disease and/or previous treatment. Mr. Amado Butler is immunocompromised. his last ANC is >500 and there is no need for prophylactic antibiotics at this time.     Cardiac:  Mr. Amado Butler has no history of heart disease.     Renal:  Creatinine results reviewed today. Mr. Amado Butler does not have any renal disease.    Hepatic:  Liver function tests reviewed today.    Psychosocial:  Mr. Amado Butler is coping well with his diagnosis.     All other questions and concerns were addressed and answered to Mr. Amado Butler's satisfaction.    Today, I spent a total of 55 minutes of face-to-face and non face-to-face time with Mr. Amado Butler. Time spent included review and discussion of diagnostic test results, patient counseling, and coordination of care.    Jamal Rodney MD    Division of Hematology, Oncology and Transplantation  [facility]  P: [phone]

## 2023-10-22 ENCOUNTER — HEALTH MAINTENANCE LETTER (OUTPATIENT)
Age: 63
End: 2023-10-22

## 2023-10-24 NOTE — PROGRESS NOTES
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  REFERRING PROVIDER: Jose Guadalupe Camacho MD PhD (Neurosurgery)    HISTORY OF PRESENT ILLNESS: Amado Butler is a 63 year old from [place], [place] with past medical history significant for DM, HTN, recent diagnosis of CMML, who initially presented with subacute right-sided periorbital pain and vision loss, now s/p frontotemporal craniotomy for decompression and resection of right optic nerve mass (23), confirmed to be a meningioma on pathology (grade unable to be determined due to crush artifact though presumed Grade 1), who was referred to Radiation Oncology for consideration of postoperative radiation therapy.     Amdao initially presented to the ED at [facility] on 2023 with a one week history of right-sided periorbital pain, watering of the right eye, and new onset gray hazy vision in the right eye. He described it at the time as a dirty windshield that couldn't be cleaned.     A head CT was obtained in the ED that did not demonstrate any acute findings, but did note right lateral frontal lobe encephalomalacia. He was discharged home and recommended to follow up with Ophthalmology the next week. He saw [facility], seeing Dr. Bosch, who noted a microinfarction of the optic nerve and recommended Neuro-ophthalmology consultation.     MRI on 23 again showed encephalomalacia with surrounding gliotic change and scattered microvascular changes without evidence of acute or subacute ischemia, fluid collection,  mass, or hemorrhage.     He then presented to the ED at [facility] on 7/3/23 with increasing right eye pain and worsening vision. He now noted increasing pain over the right temporal region as well. Ophthalmology did not note any changes, and repeat MRI did not note any changes either. Patient was given a one time dose of IV methylprednisolone and oral steroid per ophtho recommendations (working up possible giant cell arteritis). By the next day, the pain had disappeared and vision was improving. His ESR/CRP were not elevated (ESR 3, CRP <3), WBC 5.6. Right temporal artery biopsy (TAB) was performed on 7/5/23 which was negative. 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 He was placed on 2 weeks prednisone 60mg daily, with a planned taper afterward. He was pain free over the next week.     He was hospitalized at the [facility] from 8/9-8/18 for subacute vision loss, which was initially thought to be steroid responsive so he was treated with plasma exchange, without any improvement in symptoms. Giant cell arteritis and infectious workups were negative. Given worsening symptoms and demonstration of T2 hyperintensity and enhancement in the right optic nerve sheath on MRI, he underwent right frontotemporal craniotomy for decompression on 8/14/23 by Dr. Jose Guadalupe Pollard of Neurosurgery, and resection with biopsy of small extra-axial mass found to be a small meningioma that was compressing the right optic nerve. Intra-operatively, Dr. Camacho noted after identifying the optic nerve and the optic canal, he performed a limited lateral decompression stopping at the optic strut to prevent a CSF leak into the sphenoid sinus. He also noted that the dura was abnormally thickened upon entering. The optic nerve appeared healthy. The arachnoid around the optic nerve and carotid artery were noted to be abnormally thickened as well, and then identified a red-brown mass on the lateral aspect of the nerve as it  entered intradurally.     Pathology ([id number]) demonstrated meningioma from the biopsy of the optic canal lesion. The frontal dura was negative for dura and the clinoid dura was dura with crush artifact. Grade was not able to be determined due to crush artifact.     He was seen in consultation by Dr. Alessandro Soriano of Oncology at [facility] who discussed review of the biopsy confirming meningioma, and recommended repeat MRI for evaluation of possible postoperative changes and enhancement along the optic nerve/operative bed.     Given findings of enhancement in the right optic nerve on follow-up MRI 10/2/23, he was referred to Radiation Oncology for consideration of post-operative radiation therapy. At the same time, the patient has recently been diagnosed with chronic myelomonocytic leukemia, under the care of Dr. Rodney, who is recommending wait and watch approach, per patient.     On interview today, Mr. Butler stated that the right periorbital pain he has been feeling is more focused along the right temporalis muscle and radiates down his neck to his right shoulder. He notes that the pain is less frequent, occurring a few times a week and ranging from a 4-5/10 in severity. This pain tends to be worse in the evenings, and is not improved with over the counter analgesics. He notes that this pain appears to be very similar in quality and location/radiation to the pain he was having prior to surgery. He also reports that he no longer has any vision in the right eye, and that it is now also not sensitive to changes in light sensation, which has been the case since a few days prior to his surgery in August.     PMH:     Pre-diabetes    HTN    Chronic myelomonocytic leukemia    PSH:  Past Surgical History:   Procedure Laterality Date     IR CVC NON TUNNEL PLACEMENT > 5 YRS  8/10/2023     OPTICAL TRACKING SYSTEM CRANIOTOMY, EXCISE TUMOR, COMBINED Right 8/14/2023    Procedure: stealth assisted Frontotemporal  craniotomy for optic mass;  Surgeon: Jose Guadalupe Camacho MD;  Location:  OR       MEDICATIONS:  Current Outpatient Medications   Medication Sig Dispense Refill     carvedilol (COREG) 12.5 MG tablet Take 1 tablet (12.5 mg) by mouth 2 times daily (with meals) 180 tablet 3     lisinopril-hydrochlorothiazide (ZESTORETIC) 20-25 MG tablet Take 1 tablet by mouth daily       metFORMIN (GLUCOPHAGE) 500 MG tablet Take 1 tablet (500 mg) by mouth daily (with breakfast) 7 tablet 0     methocarbamol (ROBAXIN) 500 MG tablet Take 1 tablet (500 mg) by mouth 4 times daily as needed for muscle spasms (Patient not taking: Reported on 10/18/2023) 15 tablet 0       ALLERGY:  Allergies   Allergen Reactions     Seasonal Allergies        FAMILY HISTORY:    Father - prostate cancer    Mother - breast cancer, leukemia    Maternal grandmother - multiple cancers    Brother - acoustic neuroma    SOCIAL HISTORY    Resides in [place], [place] with wife of 39 years, Mary Lou    Retired     Never smoker    Alcohol: approximately 2 drinks/week    3 children: 26, 28, 31    ECOG PERFORMANCE STATUS: 1    REVIEW OF SYMPTOMS:  Denies additional symptoms related to current diagnosis.     PHYSICAL EXAMINATION:    Vital Signs: BP (!) 150/62   Pulse 61   Resp 16   SpO2 99%     GENERAL: Healthy, alert and no distress  HEAD: Normocephalic, atraumatic  EYES: Eyes grossly normal to inspection.  No discharge or erythema, or obvious scleral/conjunctival abnormalities.  NOSE: No discharge, normal appearance   RESP: No audible wheeze, cough, or visible cyanosis.  No visible retractions or increased work of breathing.    SKIN: Visible skin clear. No significant rash, abnormal pigmentation or lesions.  NEURO: Cranial nerves grossly intact.  Mentation and speech appropriate for age  EXTREMITIES: No obvious edema, normal appearing range of motion   PSYCH: Mentation appears normal, affect normal/bright, judgement and insight intact, normal  speech and appearance well-groomed.      RADIOLOGY/IMAGING:  MR BRAIN AND ORBITS W CONTRAST 10/2/2023   Impression:    1. Enhancement in the right optic nerve which could suggest optic  neuritis or active demyelination.  2. Extra-axial fluid collections overlying the right frontal lobe some  of which appear to extend into the right frontal cerebral sulci that  do not demonstrate restricted diffusion or enhancement that would  suggest infection. These may represent postoperative  subdural/subarachnoid fluid. There is mild T2 hyperintensity in some  of the subcortical white matter of the right frontal lobe in this  region which also could be postoperative in nature. Consider follow-up  as clinically indicated.    CT HEAD W/O CONTRAST  8/14/2023  IMPRESSION: Interval right frontotemporal craniotomy for underlying  optic canal/intradural mass resection with trace hemorrhage along the  right frontotemporal craniotomy and pneumocephalus along bifrontal convexity.    MRI BRAIN WITH AND WITHOUT CONTRAST, MRI FACE (ORBITS) WITH AND WITHOUT CONTRAST 7/28/2023  Impression:  1.  No acute intracranial hemorrhage, mass lesion, or acute infarction.   2.  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   3.  Minimal T2 hyperintensity and enhancement in the right optic nerve sheath, which may represent mild perineuritis. No clear evidence of optic neuritis.     MRI BRAIN AND ORBITS W/O CONTRAST 6/28/2023  HEAD MRI:   1.  Right frontal lobe encephalomalacia and surrounding gliotic   change without acute intracranial abnormality.  ORBIT MRI:   1.  Unremarkable orbits.    Radiation Oncology Pre-Treatment Evaluation:   Prior RT: No  Pacemaker: No  Child-bearing potential (Yes/No): No  Pain: 4-5/10  Pain plan:  over the counter medication as needed   Intent of treatment: (currative/palliative): Curative  Side Effects of Radiation: We discussed in detail the management of treatment side effects    ASSESSMENT AND PLAN:   Amado Butler is a  63 year old male with recently diagnosed right optic nerve sheath meningioma who is referred to Radiation Oncology for consideration for postoperative radiation therapy. Amado would like to wait until after Thanksgiving to initiate treatment. We discussed doing the simulation following the holiday, and then proceeding with 5-6 weeks of IMRT. We also discussed placing a referral to Genetic Counseling, as he has had a number of cancers/tumors within his immediate family, including his recent diagnosis of CMML and this meningioma, and Mr. Butler and his wife were agreeable to this.    We discussed the logistics, timing, risks, benefits, and side effects associated with radiation therapy. Amado would like to proceed with radiotherapy at [facility].  We will schedule him for CT-based simulation and MRI in the treatment position for radiation. The patient was encouraged to contact us with questions or concerns should they arise in the interim. All questions were answered to the patients's satisfaction, and they were provided with our contact information should any questions or concerns arise.    The patient was seen and assessed with staff, Dr. Mccullough, who agrees with the above assessment and plan.       Danelle Schaffer MD PGY5  Department of Radiation Oncology  [phone] Clinic  [phone] Pager       A medical resident participated in the care of this patient and in the preparation of this note. I have verified and edited this note. I personally performed key elements of the physical exam and medical decision making for this patient.  I agree with the assessment and plan of care as documented in the note above.    The overall time I spent on direct patient care including self review of records, labs, and radiographic studies, as well as, direct face-to-face interaction with the patient and coordination of care with other providers was 90 minutes.    Kaylee Mccullough MD, PhD     Department of Radiation  Oncology  [facility]

## 2023-10-24 NOTE — TELEPHONE ENCOUNTER
RECORDS STATUS - ALL OTHER DIAGNOSIS      RECORDS RECEIVED FROM: [facility], [facility], [facility], [facility]   DATE RECEIVED: 10/26   NOTES STATUS DETAILS   OFFICE NOTE from referring provider Desiree Salinas PA-C [place] [facility]: 23   OFFICE NOTE from medical oncologist [facility]  Dr. Jamal Rodney: 10/18/23    [place]  Dr. Osmany Smith: 10/13/23   OFFICE NOTE from Neuro Oncologist [facility] Dr. Alessandro Soriano: 23   DISCHARGE SUMMARY from hospital [facility]/Critical access hospital [facility] 23: [facility]    23: [facility]   DISCHARGE REPORT from the ER [facility]/Critical access hospital [facility], [facility], [facility] 23: [facility]    23: [facility]    7/3/23: [facility]    23: [facility]   OPERATIVE REPORT [facility]  [facility]  23: Craniotomy    [facility]  22: Hernia Repair w/ Mesh  21: Colonoscopy   MEDICATION LIST [facility]    LABS     PATHOLOGY REPORTS [facility] 8/15/23: [facility]  23, 23: Surg Path   ANYTHING RELATED TO DIAGNOSIS [facility]/ 10/12/23   IMAGING (NEED IMAGES & REPORT)  [facility]   CT SCANS PACS [facility]  23   MRI PACS [facility]  23    [facility]  23   PET PACS [facility]  8/3/23

## 2023-10-26 ENCOUNTER — OFFICE VISIT (OUTPATIENT)
Dept: RADIATION ONCOLOGY | Facility: CLINIC | Age: 63
End: 2023-10-26
Attending: PHYSICIAN ASSISTANT
Payer: COMMERCIAL

## 2023-10-26 ENCOUNTER — PRE VISIT (OUTPATIENT)
Dept: RADIATION ONCOLOGY | Facility: CLINIC | Age: 63
End: 2023-10-26
Payer: COMMERCIAL

## 2023-10-26 DIAGNOSIS — D32.9 MENINGIOMA (H): Primary | ICD-10-CM

## 2023-10-26 PROCEDURE — 99417 PROLNG OP E/M EACH 15 MIN: CPT | Performed by: STUDENT IN AN ORGANIZED HEALTH CARE EDUCATION/TRAINING PROGRAM

## 2023-10-26 PROCEDURE — 99205 OFFICE O/P NEW HI 60 MIN: CPT | Mod: GC | Performed by: STUDENT IN AN ORGANIZED HEALTH CARE EDUCATION/TRAINING PROGRAM

## 2023-10-26 PROCEDURE — G0463 HOSPITAL OUTPT CLINIC VISIT: HCPCS | Performed by: STUDENT IN AN ORGANIZED HEALTH CARE EDUCATION/TRAINING PROGRAM

## 2023-10-26 RX ORDER — MULTIPLE VITAMINS W/ MINERALS TAB 9MG-400MCG
1 TAB ORAL DAILY
COMMUNITY

## 2023-10-26 RX ORDER — VITAMIN E (DL,TOCOPHERYL ACET) 180 MG
500 CAPSULE ORAL DAILY
COMMUNITY

## 2023-10-26 ASSESSMENT — PAIN SCALES - GENERAL: PAINLEVEL: NO PAIN (0)

## 2023-10-26 NOTE — LETTER
10/26/2023         RE: Amado Butler  [id number] [facility] [address]        Dear Colleague,    Thank you for referring your patient, Amado Butler, to the [facility] RADIATION ONCOLOGY. Please see a copy of my visit note below.      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  REFERRING PROVIDER: Jose Guadalupe Camacho MD PhD (Neurosurgery)    HISTORY OF PRESENT ILLNESS: Amado Butler is a 63 year old from [place], [place] with past medical history significant for DM, HTN, recent diagnosis of CMML, who initially presented with subacute right-sided periorbital pain and vision loss, now s/p frontotemporal craniotomy for decompression and resection of right optic nerve mass (23), confirmed to be a meningioma on pathology (grade unable to be determined due to crush artifact though presumed Grade 1), who was referred to Radiation Oncology for consideration of postoperative radiation therapy.     mAado initially presented to the ED at [facility] on 2023 with a one week history of right-sided periorbital pain, watering of the right eye, and new onset gray hazy vision in the right eye. He described it at the time as a dirty windshield that couldn't be cleaned.     A head CT was obtained in the ED that did not demonstrate any acute findings, but did note right lateral frontal lobe encephalomalacia. He was discharged home and recommended to follow up with Ophthalmology the next week. He saw [facility] [place], seeing Dr. Bosch, who noted a microinfarction  of the optic nerve and recommended Neuro-ophthalmology consultation.     MRI on 6/27/23 again showed encephalomalacia with surrounding gliotic change and scattered microvascular changes without evidence of acute or subacute ischemia, fluid collection, mass, or hemorrhage.     He then presented to the ED at [facility] on 7/3/23 with increasing right eye pain and worsening vision. He now noted increasing pain over the right temporal region as well. Ophthalmology did not note any changes, and repeat MRI did not note any changes either. Patient was given a one time dose of IV methylprednisolone and oral steroid per ophtho recommendations (working up possible giant cell arteritis). By the next day, the pain had disappeared and vision was improving. His ESR/CRP were not elevated (ESR 3, CRP <3), WBC 5.6. Right temporal artery biopsy (TAB) was performed on 7/5/23 which was negative. 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 He was placed on 2 weeks prednisone 60mg daily, with a planned taper afterward. He was pain free over the next week.     He was hospitalized at the [facility] from 8/9-8/18 for subacute vision loss, which was initially thought to be steroid responsive so he was treated with plasma exchange, without any improvement in symptoms. Giant cell arteritis and infectious workups were negative. Given worsening symptoms and demonstration of T2 hyperintensity and enhancement in the right optic nerve sheath on MRI, he underwent right frontotemporal craniotomy for decompression on 8/14/23 by Dr. Jose Guadalupe Pollard of Neurosurgery, and resection with biopsy of small extra-axial mass found to be a small meningioma that was compressing the right optic nerve. Intra-operatively, Dr. Camacho noted after identifying the optic nerve and the optic canal, he performed a limited lateral decompression stopping at the optic strut to prevent a CSF leak into the sphenoid sinus. He also noted that the dura  was abnormally thickened upon entering. The optic nerve appeared healthy. The arachnoid around the optic nerve and carotid artery were noted to be abnormally thickened as well, and then identified a red-brown mass on the lateral aspect of the nerve as it entered intradurally.     Pathology ([id number]) demonstrated meningioma from the biopsy of the optic canal lesion. The frontal dura was negative for dura and the clinoid dura was dura with crush artifact. Grade was not able to be determined due to crush artifact.     He was seen in consultation by Dr. Alessandro Soriano of Oncology at [facility] who discussed review of the biopsy confirming meningioma, and recommended repeat MRI for evaluation of possible postoperative changes and enhancement along the optic nerve/operative bed.     Given findings of enhancement in the right optic nerve on follow-up MRI 10/2/23, he was referred to Radiation Oncology for consideration of post-operative radiation therapy. At the same time, the patient has recently been diagnosed with chronic myelomonocytic leukemia, under the care of Dr. Rodney, who is recommending wait and watch approach, per patient.     On interview today, Mr. Butler stated that the right periorbital pain he has been feeling is more focused along the right temporalis muscle and radiates down his neck to his right shoulder. He notes that the pain is less frequent, occurring a few times a week and ranging from a 4-5/10 in severity. This pain tends to be worse in the evenings, and is not improved with over the counter analgesics. He notes that this pain appears to be very similar in quality and location/radiation to the pain he was having prior to surgery. He also reports that he no longer has any vision in the right eye, and that it is now also not sensitive to changes in light sensation, which has been the case since a few days prior to his surgery in August.     PMH:   Pre-diabetes  HTN  Chronic myelomonocytic  leukemia    PSH:  Past Surgical History:   Procedure Laterality Date     IR CVC NON TUNNEL PLACEMENT > 5 YRS  8/10/2023     OPTICAL TRACKING SYSTEM CRANIOTOMY, EXCISE TUMOR, COMBINED Right 8/14/2023    Procedure: stealth assisted Frontotemporal craniotomy for optic mass;  Surgeon: Jose Guadalupe Camacho MD;  Location:  OR       MEDICATIONS:  Current Outpatient Medications   Medication Sig Dispense Refill     carvedilol (COREG) 12.5 MG tablet Take 1 tablet (12.5 mg) by mouth 2 times daily (with meals) 180 tablet 3     lisinopril-hydrochlorothiazide (ZESTORETIC) 20-25 MG tablet Take 1 tablet by mouth daily       metFORMIN (GLUCOPHAGE) 500 MG tablet Take 1 tablet (500 mg) by mouth daily (with breakfast) 7 tablet 0     methocarbamol (ROBAXIN) 500 MG tablet Take 1 tablet (500 mg) by mouth 4 times daily as needed for muscle spasms (Patient not taking: Reported on 10/18/2023) 15 tablet 0       ALLERGY:  Allergies   Allergen Reactions     Seasonal Allergies        FAMILY HISTORY:  Father - prostate cancer  Mother - breast cancer, leukemia  Maternal grandmother - multiple cancers  Brother - acoustic neuroma    SOCIAL HISTORY  Resides in [place], [place] with wife of 39 years, Mary Lou  Retired   Never smoker  Alcohol: approximately 2 drinks/week  3 children: 26, 28, 31    ECOG PERFORMANCE STATUS: 1    REVIEW OF SYMPTOMS:  Denies additional symptoms related to current diagnosis.     PHYSICAL EXAMINATION:    Vital Signs: BP (!) 150/62   Pulse 61   Resp 16   SpO2 99%     GENERAL: Healthy, alert and no distress  HEAD: Normocephalic, atraumatic  EYES: Eyes grossly normal to inspection.  No discharge or erythema, or obvious scleral/conjunctival abnormalities.  NOSE: No discharge, normal appearance   RESP: No audible wheeze, cough, or visible cyanosis.  No visible retractions or increased work of breathing.    SKIN: Visible skin clear. No significant rash, abnormal pigmentation or lesions.  NEURO: Cranial  nerves grossly intact.  Mentation and speech appropriate for age  EXTREMITIES: No obvious edema, normal appearing range of motion   PSYCH: Mentation appears normal, affect normal/bright, judgement and insight intact, normal speech and appearance well-groomed.      RADIOLOGY/IMAGING:  MR BRAIN AND ORBITS W CONTRAST 10/2/2023   Impression:    1. Enhancement in the right optic nerve which could suggest optic  neuritis or active demyelination.  2. Extra-axial fluid collections overlying the right frontal lobe some  of which appear to extend into the right frontal cerebral sulci that  do not demonstrate restricted diffusion or enhancement that would  suggest infection. These may represent postoperative  subdural/subarachnoid fluid. There is mild T2 hyperintensity in some  of the subcortical white matter of the right frontal lobe in this  region which also could be postoperative in nature. Consider follow-up  as clinically indicated.    CT HEAD W/O CONTRAST  8/14/2023  IMPRESSION: Interval right frontotemporal craniotomy for underlying  optic canal/intradural mass resection with trace hemorrhage along the  right frontotemporal craniotomy and pneumocephalus along bifrontal convexity.    MRI BRAIN WITH AND WITHOUT CONTRAST, MRI FACE (ORBITS) WITH AND WITHOUT CONTRAST 7/28/2023  Impression:  1.  No acute intracranial hemorrhage, mass lesion, or acute infarction.   2.  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   3.  Minimal T2 hyperintensity and enhancement in the right optic nerve sheath, which may represent mild perineuritis. No clear evidence of optic neuritis.     MRI BRAIN AND ORBITS W/O CONTRAST 6/28/2023  HEAD MRI:   1.  Right frontal lobe encephalomalacia and surrounding gliotic   change without acute intracranial abnormality.  ORBIT MRI:   1.  Unremarkable orbits.    Radiation Oncology Pre-Treatment Evaluation:   Prior RT: No  Pacemaker: No  Child-bearing potential (Yes/No): No  Pain: 4-5/10  Pain plan:  over the  counter medication as needed   Intent of treatment: (currative/palliative): Curative  Side Effects of Radiation: We discussed in detail the management of treatment side effects    ASSESSMENT AND PLAN:   Amado Butler is a 63 year old male with recently diagnosed right optic nerve sheath meningioma who is referred to Radiation Oncology for consideration for postoperative radiation therapy. Amado would like to wait until after Thanksgiving to initiate treatment. We discussed doing the simulation following the holiday, and then proceeding with 5-6 weeks of IMRT. We also discussed placing a referral to Genetic Counseling, as he has had a number of cancers/tumors within his immediate family, including his recent diagnosis of CMML and this meningioma, and Mr. Butler and his wife were agreeable to this.    We discussed the logistics, timing, risks, benefits, and side effects associated with radiation therapy. Amado would like to proceed with radiotherapy at [facility].  We will schedule him for CT-based simulation and MRI in the treatment position for radiation. The patient was encouraged to contact us with questions or concerns should they arise in the interim. All questions were answered to the patients's satisfaction, and they were provided with our contact information should any questions or concerns arise.    The patient was seen and assessed with staff, Dr. Mccullough, who agrees with the above assessment and plan.       Danelle Schaffer MD PGY5  Department of Radiation Oncology  [phone] Clinic  [phone] Pager       A medical resident participated in the care of this patient and in the preparation of this note. I have verified and edited this note. I personally performed key elements of the physical exam and medical decision making for this patient.  I agree with the assessment and plan of care as documented in the note above.    The overall time I spent on direct patient care including self review of records, labs,  "and radiographic studies, as well as, direct face-to-face interaction with the patient and coordination of care with other providers was 90 minutes.    Kaylee Mccullough MD, PhD     Department of Radiation Oncology  [facility]              HPI    INITIAL PATIENT ASSESSMENT    Diagnosis: Right Optic Nerve Sheath Meningioma  ,\"some what ambidexuous, strengthes and sport right handed, writing/shooting sports left hand/eye\"     Prior radiation therapy: None    Prior chemotherapy: None    Prior hormonal therapy:No    Pain Eval:  Denies    Psychosocial  Living arrangements: with spouse  Fall Risk: independent; visual field    referral needs: Not needed    Advanced Directive: Yes - Location: at home  Implantable Cardiac Device? No      LMP: No LMP for male patient.      Review of Systems   Constitutional:  Positive for malaise/fatigue.                 Again, thank you for allowing me to participate in the care of your patient.        Sincerely,        Kaylee Mccullough MD PhD  "

## 2023-10-26 NOTE — PROGRESS NOTES
"  HPI    INITIAL PATIENT ASSESSMENT    Diagnosis: Right Optic Nerve Sheath Meningioma  ,\"some what ambidexuous, strengthes and sport right handed, writing/shooting sports left hand/eye\"     Prior radiation therapy: None    Prior chemotherapy: None    Prior hormonal therapy:No    Pain Eval:  Denies    Psychosocial  Living arrangements: with spouse  Fall Risk: independent; visual field    referral needs: Not needed    Advanced Directive: Yes - Location: at home  Implantable Cardiac Device? No      LMP: No LMP for male patient.      Review of Systems   Constitutional:  Positive for malaise/fatigue.               "

## 2023-10-26 NOTE — TELEPHONE ENCOUNTER
Imaging DISC Received  October 26, 2023 12:56 PM MI   Action: Imaging disc from [facility] received and taken to ami at [facility] for upload.

## 2023-10-27 ENCOUNTER — ALLIED HEALTH/NURSE VISIT (OUTPATIENT)
Dept: RADIATION ONCOLOGY | Facility: CLINIC | Age: 63
End: 2023-10-27
Payer: COMMERCIAL

## 2023-10-27 VITALS
HEART RATE: 61 BPM | OXYGEN SATURATION: 99 % | RESPIRATION RATE: 16 BRPM | SYSTOLIC BLOOD PRESSURE: 150 MMHG | DIASTOLIC BLOOD PRESSURE: 62 MMHG

## 2023-10-27 DIAGNOSIS — Z00.6 RESEARCH SUBJECT: Primary | ICD-10-CM

## 2023-10-27 NOTE — PROGRESS NOTES
Informed Consent Note: Immune Characterization of Biospecimens    The consent form, including purpose, risks and benefits, was reviewed with Amado Butler, and all questions were answered before signing the consent form.   Present during the discussion was Amado Butler and his wife. A copy of the signed form was provided to the patient. No procedures specific to this study were performed prior to the patient signing the consent form.    Consent + HIPAA Version Date: 29-JUN-2023  Consent obtained by:  Chloe Jones  Date: October 27, 2023

## 2023-10-30 ENCOUNTER — PATIENT OUTREACH (OUTPATIENT)
Dept: ONCOLOGY | Facility: CLINIC | Age: 63
End: 2023-10-30
Payer: COMMERCIAL

## 2023-10-30 NOTE — PROGRESS NOTES
Writer received referral to Cancer Risk Management/Genetic Counseling.    Referred for: Malignant meningioma of optic nerve sheath of right eye (H)      Referral reviewed for appropriate plan, and sent to New Patient Scheduling for completion.    Puja Valencia, RN, BSN  Oncology New Patient Nurse Navigator   [facility]  [phone]

## 2023-11-01 DIAGNOSIS — D32.9 MENINGIOMA (H): Primary | ICD-10-CM

## 2023-11-06 ENCOUNTER — THERAPY VISIT (OUTPATIENT)
Dept: OCCUPATIONAL THERAPY | Facility: CLINIC | Age: 63
End: 2023-11-06
Attending: STUDENT IN AN ORGANIZED HEALTH CARE EDUCATION/TRAINING PROGRAM
Payer: COMMERCIAL

## 2023-11-06 DIAGNOSIS — H54.61 VISION LOSS OF RIGHT EYE: Primary | ICD-10-CM

## 2023-11-06 PROCEDURE — 97535 SELF CARE MNGMENT TRAINING: CPT | Mod: GO

## 2023-11-10 ENCOUNTER — DOCUMENTATION ONLY (OUTPATIENT)
Dept: RADIATION ONCOLOGY | Facility: CLINIC | Age: 63
End: 2023-11-10
Payer: COMMERCIAL

## 2023-11-10 DIAGNOSIS — D32.9 MENINGIOMA (H): Primary | ICD-10-CM

## 2023-11-10 NOTE — PROGRESS NOTES
Radiation Oncology Note     Amado Butler is a 63 year old with past medical history significant for DM, HTN, recent diagnosis of CMML, who initially presented with subacute right-sided periorbital pain and vision loss, now s/p frontotemporal craniotomy for decompression and resection of right optic nerve mass (8/14/23), confirmed to be a meningioma on pathology (grade unable to be determined due to crush artifact though presumed Grade 1).    Amado would like to proceed radiotherapy to preserve his existing vision.     Amado Butler requires an MRI  for treatment planning. This is scheduled on 11/21/23 in conjunction with a CT Sim.       Kaylee Mccullough MD, PhD     Department of Radiation Oncology  [facility]

## 2023-11-20 ENCOUNTER — THERAPY VISIT (OUTPATIENT)
Dept: OCCUPATIONAL THERAPY | Facility: CLINIC | Age: 63
End: 2023-11-20
Attending: STUDENT IN AN ORGANIZED HEALTH CARE EDUCATION/TRAINING PROGRAM
Payer: COMMERCIAL

## 2023-11-20 DIAGNOSIS — H54.61 VISION LOSS OF RIGHT EYE: Primary | ICD-10-CM

## 2023-11-20 DIAGNOSIS — H53.131 VISION, LOSS, SUDDEN, RIGHT: ICD-10-CM

## 2023-11-20 PROCEDURE — 97535 SELF CARE MNGMENT TRAINING: CPT | Mod: GO | Performed by: OCCUPATIONAL THERAPIST

## 2023-11-20 NOTE — PROGRESS NOTES
11/20/23 0500   Appointment Info   Treating Provider Larissa Duron, OTR/L   Total/Authorized Visits 3/5   Medical Diagnosis Vision loss of right eye (H54.61)   OT Tx Diagnosis impaired I/ADL functioning   Precautions/Limitations R eye vision loss   Quick Add  Certification   Progress Note/Certification   Start Of Care Date 10/09/23   Onset of Illness/Injury or Date of Surgery 08/14/23   Therapy Frequency 1x per week increasing or decreasing in frequency as clinically indicated   Predicted Duration 90 days   Certification date from 10/09/23   Certification date to 01/07/24   Progress Note Due Date 01/07/24   Goals   OT Goals 1;2   OT Goal 1   Goal Identifier Near Vision   Goal Description Patient will demonstrate 2 pieces of adaptive equipment and/or adaptive techniques in regards to , lighting, contrast and glare for increased ADL/IADL independence with near vision tasks (reading based I/ADLs and functional mobility ).   Rationale In order to safely and appropriately apply compensatory strategies with ADL/IADL performance   Goal Progress Goal met, see below.   Target Date 04/06/24   Date Met 11/20/23   OT Goal 2   Goal Identifier Visual Field   Goal Description Patient will demonstrate WFLs visual reaction time and visual scanning skills for extrapersonal space with use of compensatory strategies prn, for safe functional and community mobility tasks (navigating in new areas, crossing the street, driving, grocery shopping) by scoring WNLs on all modes of the Dynavision and Scancourse.   Rationale In order to safely and appropriately apply compensatory strategies with ADL/IADL performance   Goal Progress Goal met - see below.   Target Date 04/06/24   Date Met 11/20/23   Subjective Report   Subjective Report Patient reports no pain in the last 2 weeks from his surgery.  Has started to play tennis again and trying to adjust to the change in vision.  Difficult mentally to adjust to misses of the ball, etc.  Patient has  been driving - avoids driving at night if possible. Patient thinks he is L eye dominant.   Objective Measures   Objective Measures Objective Measure 3   Objective Measure 1   Objective Measure Visual field via Bernell Disc with L eye   Details 70 peripheral and 45 degrees nasal   Objective Measure 2   Objective Measure Scancourse   Details On 65 foot scancourse with targets high/medium/low: 1st trial: 7/8 on L and 10/10 targets noted on R in 38 sec.  (WFLS is 90% accuracy and <35 seconds).   Objective Measure 3   Objective Measure Dynavision   Details Mode A (60 seconds): 60 hits (response time per quadrants in seconds: 1.0 upper L, .9 upper R and lower L, 1.1 lower R);  Mode B (60 seconds): 58 hits (accuracy as follows:78% upper L, 76% upper R, 90% lower L and 70% lower R);  Mode B (divided attention): 51 hits with 10/11 numbers; AVERAGE SCORES ARE:  Mode A (60 seconds): 52 hits; Mode B (60 seconds): 42 hits; Mode B (divided attention): 35 hits with at least 9/10 numbers; All are WNLs and quadrants slightly less in lower R quadrant.  Completed extra drills of the Dynavision with central focus/primary gaze to increase awareness of far R and lower R quadrant difficult and the importance of using scanning strategies to compensate for optimal safety and independence   Self Care/Home Management   Self-Care/Home Mgmt/ADL, Compensatory, Meal Prep Minutes ([id number]) 51 Minutes   Self Care 1 visual skills and visual scanning for increased ADL/IADL independence   Skilled Intervention Please see education section below for details of all areas of education completed today including education in AE and adapted techniques to increase visibility and independence for ADL/IADLs.  The most successful techniques and AE are listed below.  Patient demonstrated and/or verbalized use of all of the adapated techniques and AE below via teach back independently after increased time/practice.   Patient Response/Progress see discharge summary    Education   Learner/Method Patient   Education Comments eye dominance and affect on functional ADL/IADLs with loss of vision in one eye; depth perception strategies within reach for increased independence with feeding self, cooking safely, etc.; reading strategies and more lighting; R eye watering while on computer could be greater focus and less blinking, recommend blue light filter on devices for less eye strain; educated at length in scanning strategies and increasing awareness of visual field loss and how to compensate effectively (see above for Dynavision, Scancourse, Bernell Disc); different shades of yellow tint for night driving glasses   Plan   Plan for next session discharge from OT   Total Session Time   Timed Code Treatment Minutes 51   Total Treatment Time (sum of timed and untimed services) 51         DISCHARGE  Reason for Discharge: Patient has met all goals.    Equipment Issued: N/A    Discharge Plan: Patient to continue home program.    Referring Provider:  Dianne Banks

## 2023-11-21 ENCOUNTER — OFFICE VISIT (OUTPATIENT)
Dept: RADIATION ONCOLOGY | Facility: CLINIC | Age: 63
End: 2023-11-21
Attending: STUDENT IN AN ORGANIZED HEALTH CARE EDUCATION/TRAINING PROGRAM
Payer: COMMERCIAL

## 2023-11-21 DIAGNOSIS — D32.9 MENINGIOMA (H): Primary | ICD-10-CM

## 2023-11-21 PROCEDURE — 77334 RADIATION TREATMENT AID(S): CPT | Mod: 26 | Performed by: STUDENT IN AN ORGANIZED HEALTH CARE EDUCATION/TRAINING PROGRAM

## 2023-11-21 PROCEDURE — 77334 RADIATION TREATMENT AID(S): CPT | Performed by: STUDENT IN AN ORGANIZED HEALTH CARE EDUCATION/TRAINING PROGRAM

## 2023-11-21 PROCEDURE — 77263 THER RADIOLOGY TX PLNG CPLX: CPT | Performed by: STUDENT IN AN ORGANIZED HEALTH CARE EDUCATION/TRAINING PROGRAM

## 2023-11-21 NOTE — LETTER
11/21/2023         RE: Amado Butler  [address] [facility] [address]        Dear Colleague,    Thank you for referring your patient, Amado Butler, to the [facility] RADIATION ONCOLOGY. Please see a copy of my visit note below.    Radiation Therapy Patient Education    Person involved with teaching: Patient    Patient educational needs for self management of treatment-related side effects assessment completed.  EPIC Patient Ed tab contains Patient Learning Assessment    Education Materials Given  Simulation Hand out    Educational Topics Discussed  Side effects expected, Pain management, Skin care, Activity, and When to call MD/RN    Response To Teaching  Verbalizes understanding    GYN Only  Vaginal Dilator-given and educated: N/A    Referrals sent: None    Chemotherapy?  No    Will call to reschedule treatment planning MRI when PA received from Insurance.       Again, thank you for allowing me to participate in the care of your patient.        Sincerely,        Kaylee Mccullough MD PhD

## 2023-11-21 NOTE — PROGRESS NOTES
Radiation Therapy Patient Education    Person involved with teaching: Patient    Patient educational needs for self management of treatment-related side effects assessment completed.  [facility] Patient Ed tab contains Patient Learning Assessment    Education Materials Given  Simulation Hand out    Educational Topics Discussed  Side effects expected, Pain management, Skin care, Activity, and When to call MD/RN    Response To Teaching  Verbalizes understanding    GYN Only  Vaginal Dilator-given and educated: N/A    Referrals sent: None    Chemotherapy?  No    Will call to reschedule treatment planning MRI when PA received from Insurance.

## 2023-11-27 DIAGNOSIS — D32.9 MENINGIOMA (H): Primary | ICD-10-CM

## 2023-11-28 ENCOUNTER — HOSPITAL ENCOUNTER (OUTPATIENT)
Dept: MRI IMAGING | Facility: CLINIC | Age: 63
Discharge: HOME | End: 2023-11-28
Attending: STUDENT IN AN ORGANIZED HEALTH CARE EDUCATION/TRAINING PROGRAM | Admitting: STUDENT IN AN ORGANIZED HEALTH CARE EDUCATION/TRAINING PROGRAM
Payer: COMMERCIAL

## 2023-11-28 DIAGNOSIS — D32.9 MENINGIOMA (H): ICD-10-CM

## 2023-11-28 DIAGNOSIS — D32.9 MENINGIOMA (H): Primary | ICD-10-CM

## 2023-11-28 PROCEDURE — A9585 GADOBUTROL INJECTION: HCPCS | Mod: JZ | Performed by: STUDENT IN AN ORGANIZED HEALTH CARE EDUCATION/TRAINING PROGRAM

## 2023-11-28 PROCEDURE — 255N000002 HC RX 255 OP 636: Mod: JZ | Performed by: STUDENT IN AN ORGANIZED HEALTH CARE EDUCATION/TRAINING PROGRAM

## 2023-11-28 PROCEDURE — 70543 MRI ORBT/FAC/NCK W/O &W/DYE: CPT

## 2023-11-28 PROCEDURE — 70543 MRI ORBT/FAC/NCK W/O &W/DYE: CPT | Mod: 26 | Performed by: RADIOLOGY

## 2023-11-28 RX ORDER — GADOBUTROL 604.72 MG/ML
10 INJECTION INTRAVENOUS ONCE
Status: COMPLETED | OUTPATIENT
Start: 2023-11-28 | End: 2023-11-28

## 2023-11-28 RX ADMIN — GADOBUTROL 9.5 ML: 604.72 INJECTION INTRAVENOUS at 12:43

## 2023-11-29 ENCOUNTER — LAB (OUTPATIENT)
Dept: LAB | Facility: CLINIC | Age: 63
End: 2023-11-29
Payer: COMMERCIAL

## 2023-11-29 DIAGNOSIS — D32.9 MENINGIOMA (H): ICD-10-CM

## 2023-11-29 DIAGNOSIS — D32.9 MENINGIOMA (H): Primary | ICD-10-CM

## 2023-11-29 DIAGNOSIS — C93.10 CHRONIC MYELOMONOCYTIC LEUKEMIA NOT HAVING ACHIEVED REMISSION (H): ICD-10-CM

## 2023-11-29 LAB
ALBUMIN SERPL BCG-MCNC: 4.6 G/DL (ref 3.5–5.2)
ALP SERPL-CCNC: 76 U/L (ref 40–150)
ALT SERPL W P-5'-P-CCNC: 13 U/L (ref 0–70)
ANION GAP SERPL CALCULATED.3IONS-SCNC: 13 MMOL/L (ref 7–15)
AST SERPL W P-5'-P-CCNC: 26 U/L (ref 0–45)
BASOPHILS # BLD AUTO: ABNORMAL 10*3/UL
BASOPHILS # BLD MANUAL: 0.1 10E3/UL (ref 0–0.2)
BASOPHILS NFR BLD AUTO: ABNORMAL %
BASOPHILS NFR BLD MANUAL: 3 %
BILIRUB SERPL-MCNC: 0.4 MG/DL
BUN SERPL-MCNC: 13.7 MG/DL (ref 8–23)
CALCIUM SERPL-MCNC: 9.7 MG/DL (ref 8.8–10.2)
CHLORIDE SERPL-SCNC: 97 MMOL/L (ref 98–107)
CREAT SERPL-MCNC: 0.87 MG/DL (ref 0.67–1.17)
DEPRECATED HCO3 PLAS-SCNC: 26 MMOL/L (ref 22–29)
EGFRCR SERPLBLD CKD-EPI 2021: >90 ML/MIN/1.73M2
EOSINOPHIL # BLD AUTO: ABNORMAL 10*3/UL
EOSINOPHIL # BLD MANUAL: 0 10E3/UL (ref 0–0.7)
EOSINOPHIL NFR BLD AUTO: ABNORMAL %
EOSINOPHIL NFR BLD MANUAL: 1 %
ERYTHROCYTE [DISTWIDTH] IN BLOOD BY AUTOMATED COUNT: 13.4 % (ref 10–15)
FERRITIN SERPL-MCNC: 59 NG/ML (ref 31–409)
GLUCOSE SERPL-MCNC: 187 MG/DL (ref 70–99)
HCT VFR BLD AUTO: 41.1 % (ref 40–53)
HGB BLD-MCNC: 13.6 G/DL (ref 13.3–17.7)
IMM GRANULOCYTES # BLD: ABNORMAL 10*3/UL
IMM GRANULOCYTES NFR BLD: ABNORMAL %
LYMPHOCYTES # BLD AUTO: ABNORMAL 10*3/UL
LYMPHOCYTES # BLD MANUAL: 1.7 10E3/UL (ref 0.8–5.3)
LYMPHOCYTES NFR BLD AUTO: ABNORMAL %
LYMPHOCYTES NFR BLD MANUAL: 37 %
MCH RBC QN AUTO: 27 PG (ref 26.5–33)
MCHC RBC AUTO-ENTMCNC: 33.1 G/DL (ref 31.5–36.5)
MCV RBC AUTO: 82 FL (ref 78–100)
MONOCYTES # BLD AUTO: ABNORMAL 10*3/UL
MONOCYTES # BLD MANUAL: 1.2 10E3/UL (ref 0–1.3)
MONOCYTES NFR BLD AUTO: ABNORMAL %
MONOCYTES NFR BLD MANUAL: 26 %
NEUTROPHILS # BLD AUTO: ABNORMAL 10*3/UL
NEUTROPHILS # BLD MANUAL: 1.5 10E3/UL (ref 1.6–8.3)
NEUTROPHILS NFR BLD AUTO: ABNORMAL %
NEUTROPHILS NFR BLD MANUAL: 33 %
NRBC # BLD AUTO: 0 10E3/UL
NRBC BLD AUTO-RTO: 0 /100
PLAT MORPH BLD: ABNORMAL
PLATELET # BLD AUTO: 116 10E3/UL (ref 150–450)
POTASSIUM SERPL-SCNC: 3.9 MMOL/L (ref 3.4–5.3)
PROT SERPL-MCNC: 7.2 G/DL (ref 6.4–8.3)
RBC # BLD AUTO: 5.04 10E6/UL (ref 4.4–5.9)
RBC MORPH BLD: ABNORMAL
SODIUM SERPL-SCNC: 136 MMOL/L (ref 135–145)
URATE SERPL-MCNC: 6.4 MG/DL (ref 3.4–7)
VARIANT LYMPHS BLD QL SMEAR: PRESENT
WBC # BLD AUTO: 4.5 10E3/UL (ref 4–11)

## 2023-11-29 PROCEDURE — 85027 COMPLETE CBC AUTOMATED: CPT

## 2023-11-29 PROCEDURE — 84550 ASSAY OF BLOOD/URIC ACID: CPT

## 2023-11-29 PROCEDURE — 85007 BL SMEAR W/DIFF WBC COUNT: CPT

## 2023-11-29 PROCEDURE — 82728 ASSAY OF FERRITIN: CPT

## 2023-11-29 PROCEDURE — 99000 SPECIMEN HANDLING OFFICE-LAB: CPT

## 2023-11-29 PROCEDURE — 80053 COMPREHEN METABOLIC PANEL: CPT

## 2023-11-29 PROCEDURE — 36415 COLL VENOUS BLD VENIPUNCTURE: CPT

## 2023-11-29 PROCEDURE — 82668 ASSAY OF ERYTHROPOIETIN: CPT | Mod: 90

## 2023-11-30 LAB — EPO SERPL-ACNC: 14 MU/ML

## 2023-12-05 ENCOUNTER — APPOINTMENT (OUTPATIENT)
Dept: RADIATION ONCOLOGY | Facility: CLINIC | Age: 63
End: 2023-12-05
Attending: RADIOLOGY
Payer: COMMERCIAL

## 2023-12-05 ENCOUNTER — HOSPITAL ENCOUNTER (OUTPATIENT)
Dept: RESEARCH | Facility: CLINIC | Age: 63
Discharge: HOME | End: 2023-12-05
Attending: STUDENT IN AN ORGANIZED HEALTH CARE EDUCATION/TRAINING PROGRAM | Admitting: STUDENT IN AN ORGANIZED HEALTH CARE EDUCATION/TRAINING PROGRAM
Payer: COMMERCIAL

## 2023-12-05 DIAGNOSIS — D32.9 MENINGIOMA (H): ICD-10-CM

## 2023-12-05 LAB
ALBUMIN SERPL BCG-MCNC: 4.6 G/DL (ref 3.5–5.2)
ALP SERPL-CCNC: 86 U/L (ref 40–150)
ALT SERPL W P-5'-P-CCNC: 14 U/L (ref 0–70)
ANION GAP SERPL CALCULATED.3IONS-SCNC: 16 MMOL/L (ref 7–15)
AST SERPL W P-5'-P-CCNC: 26 U/L (ref 0–45)
BASOPHILS # BLD AUTO: ABNORMAL 10*3/UL
BASOPHILS # BLD MANUAL: 0.1 10E3/UL (ref 0–0.2)
BASOPHILS NFR BLD AUTO: ABNORMAL %
BASOPHILS NFR BLD MANUAL: 1 %
BILIRUB SERPL-MCNC: 0.4 MG/DL
BUN SERPL-MCNC: 13.1 MG/DL (ref 8–23)
CALCIUM SERPL-MCNC: 9.8 MG/DL (ref 8.8–10.2)
CHLORIDE SERPL-SCNC: 99 MMOL/L (ref 98–107)
CREAT SERPL-MCNC: 0.87 MG/DL (ref 0.67–1.17)
DEPRECATED HCO3 PLAS-SCNC: 24 MMOL/L (ref 22–29)
EGFRCR SERPLBLD CKD-EPI 2021: >90 ML/MIN/1.73M2
EOSINOPHIL # BLD AUTO: ABNORMAL 10*3/UL
EOSINOPHIL # BLD MANUAL: 0.1 10E3/UL (ref 0–0.7)
EOSINOPHIL NFR BLD AUTO: ABNORMAL %
EOSINOPHIL NFR BLD MANUAL: 1 %
ERYTHROCYTE [DISTWIDTH] IN BLOOD BY AUTOMATED COUNT: 13.8 % (ref 10–15)
GLUCOSE SERPL-MCNC: 159 MG/DL (ref 70–99)
HCT VFR BLD AUTO: 40.6 % (ref 40–53)
HGB BLD-MCNC: 13.7 G/DL (ref 13.3–17.7)
IMM GRANULOCYTES # BLD: ABNORMAL 10*3/UL
IMM GRANULOCYTES NFR BLD: ABNORMAL %
LYMPHOCYTES # BLD AUTO: ABNORMAL 10*3/UL
LYMPHOCYTES # BLD MANUAL: 1.2 10E3/UL (ref 0.8–5.3)
LYMPHOCYTES NFR BLD AUTO: ABNORMAL %
LYMPHOCYTES NFR BLD MANUAL: 24 %
MCH RBC QN AUTO: 27 PG (ref 26.5–33)
MCHC RBC AUTO-ENTMCNC: 33.7 G/DL (ref 31.5–36.5)
MCV RBC AUTO: 80 FL (ref 78–100)
MONOCYTES # BLD AUTO: ABNORMAL 10*3/UL
MONOCYTES # BLD MANUAL: 1.5 10E3/UL (ref 0–1.3)
MONOCYTES NFR BLD AUTO: ABNORMAL %
MONOCYTES NFR BLD MANUAL: 28 %
NEUTROPHILS # BLD AUTO: ABNORMAL 10*3/UL
NEUTROPHILS # BLD MANUAL: 2.4 10E3/UL (ref 1.6–8.3)
NEUTROPHILS NFR BLD AUTO: ABNORMAL %
NEUTROPHILS NFR BLD MANUAL: 46 %
NRBC # BLD AUTO: 0 10E3/UL
NRBC BLD AUTO-RTO: 0 /100
PLAT MORPH BLD: ABNORMAL
PLATELET # BLD AUTO: 113 10E3/UL (ref 150–450)
POTASSIUM SERPL-SCNC: 3.5 MMOL/L (ref 3.4–5.3)
PROT SERPL-MCNC: 7.5 G/DL (ref 6.4–8.3)
RBC # BLD AUTO: 5.08 10E6/UL (ref 4.4–5.9)
RBC MORPH BLD: ABNORMAL
SODIUM SERPL-SCNC: 139 MMOL/L (ref 135–145)
WBC # BLD AUTO: 5.2 10E3/UL (ref 4–11)

## 2023-12-05 PROCEDURE — 510N000017 HC CRU PATIENT CARE, PER 15 MIN

## 2023-12-05 PROCEDURE — 77470 SPECIAL RADIATION TREATMENT: CPT | Mod: XU | Performed by: STUDENT IN AN ORGANIZED HEALTH CARE EDUCATION/TRAINING PROGRAM

## 2023-12-05 PROCEDURE — 510N000009 HC RESEARCH FACILITY, PER 15 MIN

## 2023-12-05 PROCEDURE — 85007 BL SMEAR W/DIFF WBC COUNT: CPT

## 2023-12-05 PROCEDURE — 36415 COLL VENOUS BLD VENIPUNCTURE: CPT

## 2023-12-05 PROCEDURE — 77386 HC IMRT TREATMENT DELIVERY, COMPLEX: CPT | Performed by: RADIOLOGY

## 2023-12-05 PROCEDURE — 85027 COMPLETE CBC AUTOMATED: CPT

## 2023-12-05 PROCEDURE — 80053 COMPREHEN METABOLIC PANEL: CPT

## 2023-12-05 PROCEDURE — 77470 SPECIAL RADIATION TREATMENT: CPT | Mod: 26 | Performed by: STUDENT IN AN ORGANIZED HEALTH CARE EDUCATION/TRAINING PROGRAM

## 2023-12-06 ENCOUNTER — APPOINTMENT (OUTPATIENT)
Dept: RADIATION ONCOLOGY | Facility: CLINIC | Age: 63
End: 2023-12-06
Attending: STUDENT IN AN ORGANIZED HEALTH CARE EDUCATION/TRAINING PROGRAM
Payer: COMMERCIAL

## 2023-12-06 PROCEDURE — 77386 HC IMRT TREATMENT DELIVERY, COMPLEX: CPT | Performed by: STUDENT IN AN ORGANIZED HEALTH CARE EDUCATION/TRAINING PROGRAM

## 2023-12-06 PROCEDURE — 77014 PR CT GUIDE FOR PLACEMENT RADIATION THERAPY FIELDS: CPT | Mod: 26 | Performed by: RADIOLOGY

## 2023-12-07 ENCOUNTER — APPOINTMENT (OUTPATIENT)
Dept: RADIATION ONCOLOGY | Facility: CLINIC | Age: 63
End: 2023-12-07
Attending: STUDENT IN AN ORGANIZED HEALTH CARE EDUCATION/TRAINING PROGRAM
Payer: COMMERCIAL

## 2023-12-07 VITALS
DIASTOLIC BLOOD PRESSURE: 68 MMHG | RESPIRATION RATE: 16 BRPM | BODY MASS INDEX: 26.4 KG/M2 | OXYGEN SATURATION: 98 % | HEART RATE: 62 BPM | WEIGHT: 213.4 LBS | SYSTOLIC BLOOD PRESSURE: 126 MMHG

## 2023-12-07 DIAGNOSIS — D32.9 MENINGIOMA (H): Primary | ICD-10-CM

## 2023-12-07 PROCEDURE — 77386 HC IMRT TREATMENT DELIVERY, COMPLEX: CPT | Performed by: STUDENT IN AN ORGANIZED HEALTH CARE EDUCATION/TRAINING PROGRAM

## 2023-12-07 ASSESSMENT — PAIN SCALES - GENERAL: PAINLEVEL: NO PAIN (0)

## 2023-12-07 NOTE — LETTER
2023         RE: Amado Butler  [address] [facility] [address]        Dear Colleague,    Thank you for referring your patient, Amado Butler, to the [facility] RADIATION ONCOLOGY. Please see a copy of my visit note below.    [facility] PHYSICIANS  SPECIALIZING IN BREAKTHROUGHS  Radiation Oncology    On Treatment Visit Note      Amado Butler      Date: Dec 7, 2023   MRN: [id number]   : 1960  Diagnosis: R On Meningioma      Reason for Visit:  On Radiation Treatment Visit     Treatment Summary to Date   RtON Current Dose: 540/5040 cGy Fractions: 3/28         Subjective:   Amado feels well today.     Nursing ROS:   Brain Site: R ON Meningioma  Concurrent Therapy: No  Protocol: RT only  Today's Dose: 540/5040  Today's Fraction/Total Fraction Brain: 3/28  Ocular/Visual - other : 0: Normal  Middle ear/hearin: Normal  Tinnitus: 0: No  Depressed level of consciousness: 0: Normal  Oriented x of spheres: 4  Neuropathy - motor: 0: Normal  Ataxia: 0: Normal  Speech Impairment (e.g. Dysphasia or aphasia): 0: Normal  Seizures: 0: None  Urinary Incontinence: 0: None  Bowel Incontinence: 0: None  Insomnia: 0: Normal       Objective:   /68   Pulse 62   Resp 16   Wt 96.8 kg (213 lb 6.4 oz)   SpO2 98%   BMI 26.40 kg/m    Gen: Appears well, in no acute distress  Skin: No erythema    Labs:  CBC RESULTS: Recent Labs   Lab Test 23  1005   WBC 5.2   RBC 5.08   HGB 13.7   HCT 40.6   MCV 80   MCH 27.0   MCHC 33.7   RDW 13.8   *     ELECTROLYTES:  Recent Labs   Lab Test 23  1005      POTASSIUM 3.5   CHLORIDE 99   FELIX 9.8   CO2 24   BUN 13.1   CR 0.87   *       Assessment:    Tolerating radiation therapy well.  All questions and concerns addressed.    Toxicities:  N/A    Plan:   Continue current therapy.   We reviewed Amado's radiotherapy plan and his imaging.       Mosaiq chart and setup information reviewed  IGRT reviewed       Kaylee Mccullough MD,  PhD     Department of Radiation Oncology  [facility]

## 2023-12-08 ENCOUNTER — APPOINTMENT (OUTPATIENT)
Dept: RADIATION ONCOLOGY | Facility: CLINIC | Age: 63
End: 2023-12-08
Attending: STUDENT IN AN ORGANIZED HEALTH CARE EDUCATION/TRAINING PROGRAM
Payer: COMMERCIAL

## 2023-12-08 PROCEDURE — 77386 HC IMRT TREATMENT DELIVERY, COMPLEX: CPT | Performed by: STUDENT IN AN ORGANIZED HEALTH CARE EDUCATION/TRAINING PROGRAM

## 2023-12-08 PROCEDURE — 77014 PR CT GUIDE FOR PLACEMENT RADIATION THERAPY FIELDS: CPT | Mod: 26 | Performed by: STUDENT IN AN ORGANIZED HEALTH CARE EDUCATION/TRAINING PROGRAM

## 2023-12-11 ENCOUNTER — APPOINTMENT (OUTPATIENT)
Dept: RADIATION ONCOLOGY | Facility: CLINIC | Age: 63
End: 2023-12-11
Attending: STUDENT IN AN ORGANIZED HEALTH CARE EDUCATION/TRAINING PROGRAM
Payer: COMMERCIAL

## 2023-12-11 DIAGNOSIS — D32.9 MENINGIOMA (H): Primary | ICD-10-CM

## 2023-12-11 PROCEDURE — 77427 RADIATION TX MANAGEMENT X5: CPT | Performed by: STUDENT IN AN ORGANIZED HEALTH CARE EDUCATION/TRAINING PROGRAM

## 2023-12-11 PROCEDURE — 77336 RADIATION PHYSICS CONSULT: CPT | Performed by: STUDENT IN AN ORGANIZED HEALTH CARE EDUCATION/TRAINING PROGRAM

## 2023-12-11 PROCEDURE — 77014 PR CT GUIDE FOR PLACEMENT RADIATION THERAPY FIELDS: CPT | Mod: 26 | Performed by: STUDENT IN AN ORGANIZED HEALTH CARE EDUCATION/TRAINING PROGRAM

## 2023-12-11 PROCEDURE — 77386 HC IMRT TREATMENT DELIVERY, COMPLEX: CPT | Performed by: STUDENT IN AN ORGANIZED HEALTH CARE EDUCATION/TRAINING PROGRAM

## 2023-12-12 ENCOUNTER — APPOINTMENT (OUTPATIENT)
Dept: RADIATION ONCOLOGY | Facility: CLINIC | Age: 63
End: 2023-12-12
Attending: STUDENT IN AN ORGANIZED HEALTH CARE EDUCATION/TRAINING PROGRAM
Payer: COMMERCIAL

## 2023-12-12 PROCEDURE — 77014 PR CT GUIDE FOR PLACEMENT RADIATION THERAPY FIELDS: CPT | Mod: 26 | Performed by: RADIOLOGY

## 2023-12-12 PROCEDURE — 77386 HC IMRT TREATMENT DELIVERY, COMPLEX: CPT | Performed by: STUDENT IN AN ORGANIZED HEALTH CARE EDUCATION/TRAINING PROGRAM

## 2023-12-13 ENCOUNTER — APPOINTMENT (OUTPATIENT)
Dept: RADIATION ONCOLOGY | Facility: CLINIC | Age: 63
End: 2023-12-13
Attending: STUDENT IN AN ORGANIZED HEALTH CARE EDUCATION/TRAINING PROGRAM
Payer: COMMERCIAL

## 2023-12-13 PROCEDURE — 77014 PR CT GUIDE FOR PLACEMENT RADIATION THERAPY FIELDS: CPT | Mod: 26 | Performed by: STUDENT IN AN ORGANIZED HEALTH CARE EDUCATION/TRAINING PROGRAM

## 2023-12-13 PROCEDURE — 77386 HC IMRT TREATMENT DELIVERY, COMPLEX: CPT | Performed by: STUDENT IN AN ORGANIZED HEALTH CARE EDUCATION/TRAINING PROGRAM

## 2023-12-14 ENCOUNTER — APPOINTMENT (OUTPATIENT)
Dept: RADIATION ONCOLOGY | Facility: CLINIC | Age: 63
End: 2023-12-14
Attending: STUDENT IN AN ORGANIZED HEALTH CARE EDUCATION/TRAINING PROGRAM
Payer: COMMERCIAL

## 2023-12-14 VITALS
HEART RATE: 68 BPM | DIASTOLIC BLOOD PRESSURE: 75 MMHG | BODY MASS INDEX: 26.41 KG/M2 | SYSTOLIC BLOOD PRESSURE: 135 MMHG | OXYGEN SATURATION: 97 % | WEIGHT: 213.5 LBS | RESPIRATION RATE: 16 BRPM

## 2023-12-14 DIAGNOSIS — D32.9 MENINGIOMA (H): Primary | ICD-10-CM

## 2023-12-14 PROCEDURE — 77386 HC IMRT TREATMENT DELIVERY, COMPLEX: CPT | Performed by: STUDENT IN AN ORGANIZED HEALTH CARE EDUCATION/TRAINING PROGRAM

## 2023-12-14 ASSESSMENT — PAIN SCALES - GENERAL: PAINLEVEL: NO PAIN (0)

## 2023-12-14 NOTE — PROGRESS NOTES
[facility] PHYSICIANS  SPECIALIZING IN BREAKTHROUGHS  Radiation Oncology    On Treatment Visit Note      Amado Butler      Date: Dec 7, 2023   MRN: [id number]   : 1960  Diagnosis: R On Meningioma      Reason for Visit:  On Radiation Treatment Visit     Treatment Summary to Date   RtON Current Dose: 540/5040 cGy Fractions: 3/28         Subjective:   Amado feels well today.     Nursing ROS:   Brain Site: R ON Meningioma  Concurrent Therapy: No  Protocol: RT only  Today's Dose: 540/5040  Today's Fraction/Total Fraction Brain: 3/28  Ocular/Visual - other : 0: Normal  Middle ear/hearin: Normal  Tinnitus: 0: No  Depressed level of consciousness: 0: Normal  Oriented x of spheres: 4  Neuropathy - motor: 0: Normal  Ataxia: 0: Normal  Speech Impairment (e.g. Dysphasia or aphasia): 0: Normal  Seizures: 0: None  Urinary Incontinence: 0: None  Bowel Incontinence: 0: None  Insomnia: 0: Normal       Objective:   /68   Pulse 62   Resp 16   Wt 96.8 kg (213 lb 6.4 oz)   SpO2 98%   BMI 26.40 kg/m    Gen: Appears well, in no acute distress  Skin: No erythema    Labs:  CBC RESULTS: Recent Labs   Lab Test 23  1005   WBC 5.2   RBC 5.08   HGB 13.7   HCT 40.6   MCV 80   MCH 27.0   MCHC 33.7   RDW 13.8   *     ELECTROLYTES:  Recent Labs   Lab Test 23  1005      POTASSIUM 3.5   CHLORIDE 99   FELIX 9.8   CO2 24   BUN 13.1   CR 0.87   *       Assessment:    Tolerating radiation therapy well.  All questions and concerns addressed.    Toxicities:  N/A    Plan:   1. Continue current therapy.   We reviewed Amado's radiotherapy plan and his imaging.       IVFXPERTiq chart and setup information reviewed  IGRT reviewed       Kaylee Mccullough MD, PhD     Department of Radiation Oncology  [facility]

## 2023-12-14 NOTE — LETTER
2023         RE: Amado Butler  [address] [facility] [address]        Dear Colleague,    Thank you for referring your patient, Amado Butler, to the [facility] RADIATION ONCOLOGY. Please see a copy of my visit note below.    [facility] PHYSICIANS  SPECIALIZING IN BREAKTHROUGHS  Radiation Oncology    On Treatment Visit Note      Amado Butler      Date: Dec 14, 2023   MRN: [id number]   : 1960  Diagnosis: R On Meningioma      Reason for Visit:  On Radiation Treatment Visit     Treatment Summary to Date   RtON Current Dose: 1440/5040 cGy Fractions:          Subjective:    Amado is feeling well today.     Nursing ROS:   Brain Site: R ON Meningioma  Concurrent Therapy: No  Protocol: RT only  Today's Dose: 1440/5040  Today's Fraction/Total Fraction Brain:   Ocular/Visual - other : 0: Normal  Middle ear/hearin: Normal  Tinnitus: 0: No  Depressed level of consciousness: 0: Normal  Oriented x of spheres: 4  Neuropathy - motor: 0: Normal  Ataxia: 0: Normal  Speech Impairment (e.g. Dysphasia or aphasia): 0: Normal  Seizures: 0: None  Urinary Incontinence: 0: None  Bowel Incontinence: 0: None  Insomnia: 0: Normal       Objective:   /75   Pulse 68   Resp 16   Wt 96.8 kg (213 lb 8 oz)   SpO2 97%   BMI 26.41 kg/m    Gen: Appears well, in no acute distress  Skin: No erythema    Labs:  CBC RESULTS: Recent Labs   Lab Test 23  1005   WBC 5.2   RBC 5.08   HGB 13.7   HCT 40.6   MCV 80   MCH 27.0   MCHC 33.7   RDW 13.8   *     ELECTROLYTES:  Recent Labs   Lab Test 23  1005      POTASSIUM 3.5   CHLORIDE 99   FELIX 9.8   CO2 24   BUN 13.1   CR 0.87   *       Assessment:    Tolerating radiation therapy well.  All questions and concerns addressed.    Toxicities:  None    Plan:   Continue current therapy.        Mosaiq chart and setup information reviewed  CBCT reviewed          Kaylee Mccullough MD, PhD     Department of  Radiation Oncology  [facility]

## 2023-12-15 ENCOUNTER — APPOINTMENT (OUTPATIENT)
Dept: RADIATION ONCOLOGY | Facility: CLINIC | Age: 63
End: 2023-12-15
Attending: STUDENT IN AN ORGANIZED HEALTH CARE EDUCATION/TRAINING PROGRAM
Payer: COMMERCIAL

## 2023-12-15 PROCEDURE — 77386 HC IMRT TREATMENT DELIVERY, COMPLEX: CPT | Performed by: STUDENT IN AN ORGANIZED HEALTH CARE EDUCATION/TRAINING PROGRAM

## 2023-12-15 PROCEDURE — 77014 PR CT GUIDE FOR PLACEMENT RADIATION THERAPY FIELDS: CPT | Mod: 26 | Performed by: RADIOLOGY

## 2023-12-17 NOTE — PROGRESS NOTES
[facility] PHYSICIANS  SPECIALIZING IN BREAKTHROUGHS  Radiation Oncology    On Treatment Visit Note      Amado Bulter      Date: Dec 14, 2023   MRN: [id number]   : 1960  Diagnosis: R On Meningioma      Reason for Visit:  On Radiation Treatment Visit     Treatment Summary to Date   RtON Current Dose: 1440/5040 cGy Fractions:          Subjective:    Amado is feeling well today.     Nursing ROS:   Brain Site: R ON Meningioma  Concurrent Therapy: No  Protocol: RT only  Today's Dose: 1440/5040  Today's Fraction/Total Fraction Brain:   Ocular/Visual - other : 0: Normal  Middle ear/hearin: Normal  Tinnitus: 0: No  Depressed level of consciousness: 0: Normal  Oriented x of spheres: 4  Neuropathy - motor: 0: Normal  Ataxia: 0: Normal  Speech Impairment (e.g. Dysphasia or aphasia): 0: Normal  Seizures: 0: None  Urinary Incontinence: 0: None  Bowel Incontinence: 0: None  Insomnia: 0: Normal       Objective:   /75   Pulse 68   Resp 16   Wt 96.8 kg (213 lb 8 oz)   SpO2 97%   BMI 26.41 kg/m    Gen: Appears well, in no acute distress  Skin: No erythema    Labs:  CBC RESULTS: Recent Labs   Lab Test 23  1005   WBC 5.2   RBC 5.08   HGB 13.7   HCT 40.6   MCV 80   MCH 27.0   MCHC 33.7   RDW 13.8   *     ELECTROLYTES:  Recent Labs   Lab Test 23  1005      POTASSIUM 3.5   CHLORIDE 99   FELIX 9.8   CO2 24   BUN 13.1   CR 0.87   *       Assessment:    Tolerating radiation therapy well.  All questions and concerns addressed.    Toxicities:  None    Plan:   1. Continue current therapy.        Mosaiq chart and setup information reviewed  CBCT reviewed          Kaylee Mccullough MD, PhD     Department of Radiation Oncology  [facility]

## 2023-12-18 ENCOUNTER — APPOINTMENT (OUTPATIENT)
Dept: RADIATION ONCOLOGY | Facility: CLINIC | Age: 63
End: 2023-12-18
Attending: STUDENT IN AN ORGANIZED HEALTH CARE EDUCATION/TRAINING PROGRAM
Payer: COMMERCIAL

## 2023-12-18 PROCEDURE — 77386 HC IMRT TREATMENT DELIVERY, COMPLEX: CPT | Performed by: STUDENT IN AN ORGANIZED HEALTH CARE EDUCATION/TRAINING PROGRAM

## 2023-12-18 PROCEDURE — 77336 RADIATION PHYSICS CONSULT: CPT | Performed by: STUDENT IN AN ORGANIZED HEALTH CARE EDUCATION/TRAINING PROGRAM

## 2023-12-18 PROCEDURE — 77014 PR CT GUIDE FOR PLACEMENT RADIATION THERAPY FIELDS: CPT | Mod: 26 | Performed by: STUDENT IN AN ORGANIZED HEALTH CARE EDUCATION/TRAINING PROGRAM

## 2023-12-18 PROCEDURE — 77427 RADIATION TX MANAGEMENT X5: CPT | Performed by: STUDENT IN AN ORGANIZED HEALTH CARE EDUCATION/TRAINING PROGRAM

## 2023-12-19 ENCOUNTER — APPOINTMENT (OUTPATIENT)
Dept: RADIATION ONCOLOGY | Facility: CLINIC | Age: 63
End: 2023-12-19
Attending: STUDENT IN AN ORGANIZED HEALTH CARE EDUCATION/TRAINING PROGRAM
Payer: COMMERCIAL

## 2023-12-19 PROCEDURE — 77386 HC IMRT TREATMENT DELIVERY, COMPLEX: CPT | Performed by: STUDENT IN AN ORGANIZED HEALTH CARE EDUCATION/TRAINING PROGRAM

## 2023-12-19 PROCEDURE — 77014 PR CT GUIDE FOR PLACEMENT RADIATION THERAPY FIELDS: CPT | Mod: 26 | Performed by: STUDENT IN AN ORGANIZED HEALTH CARE EDUCATION/TRAINING PROGRAM

## 2023-12-20 ENCOUNTER — APPOINTMENT (OUTPATIENT)
Dept: RADIATION ONCOLOGY | Facility: CLINIC | Age: 63
End: 2023-12-20
Attending: STUDENT IN AN ORGANIZED HEALTH CARE EDUCATION/TRAINING PROGRAM
Payer: COMMERCIAL

## 2023-12-20 PROCEDURE — 77386 HC IMRT TREATMENT DELIVERY, COMPLEX: CPT | Performed by: STUDENT IN AN ORGANIZED HEALTH CARE EDUCATION/TRAINING PROGRAM

## 2023-12-20 PROCEDURE — 77014 PR CT GUIDE FOR PLACEMENT RADIATION THERAPY FIELDS: CPT | Mod: 26 | Performed by: STUDENT IN AN ORGANIZED HEALTH CARE EDUCATION/TRAINING PROGRAM

## 2023-12-21 ENCOUNTER — APPOINTMENT (OUTPATIENT)
Dept: RADIATION ONCOLOGY | Facility: CLINIC | Age: 63
End: 2023-12-21
Attending: STUDENT IN AN ORGANIZED HEALTH CARE EDUCATION/TRAINING PROGRAM
Payer: COMMERCIAL

## 2023-12-21 ENCOUNTER — OFFICE VISIT (OUTPATIENT)
Dept: RADIATION ONCOLOGY | Facility: CLINIC | Age: 63
End: 2023-12-21
Attending: RADIOLOGY
Payer: COMMERCIAL

## 2023-12-21 DIAGNOSIS — D32.9 MENINGIOMA (H): Primary | ICD-10-CM

## 2023-12-21 PROCEDURE — 77386 HC IMRT TREATMENT DELIVERY, COMPLEX: CPT | Performed by: STUDENT IN AN ORGANIZED HEALTH CARE EDUCATION/TRAINING PROGRAM

## 2023-12-21 ASSESSMENT — PAIN SCALES - GENERAL: PAINLEVEL: NO PAIN (0)

## 2023-12-21 NOTE — LETTER
2023         RE: Amado Butler  [address] [address] [address]        Dear Colleague,    Thank you for referring your patient, Amado Butler, to the [facility] RADIATION ONCOLOGY. Please see a copy of my visit note below.    [facility] PHYSICIANS  SPECIALIZING IN BREAKTHROUGHS  Radiation Oncology    On Treatment Visit Note      Amado Butler      Date: Dec 21, 2023   MRN: [id number]   : 1960  Diagnosis: R On Meningioma      Reason for Visit:  On Radiation Treatment Visit     Treatment Summary to Date   R On Meningioma Current Dose: 2340/5040 cGy Fractions:         Subjective:   Chencho feels well today, joined by his daughter. He has no concerns.     Nursing ROS:   Brain Site: R ON Meningioma  Concurrent Therapy: No  Protocol: RT Only  Today's Dose: 2340/5040  Today's Fraction/Total Fraction Brain:   Ocular/Visual - other : 0: Normal  Middle ear/hearin: Normal  Tinnitus: 0: No  Depressed level of consciousness: 0: Normal  Oriented x of spheres: 4  Neuropathy - motor: 0: Normal  Ataxia: 0: Normal  Speech Impairment (e.g. Dysphasia or aphasia): 0: Normal  Seizures: 0: None  Urinary Incontinence: 0: None  Bowel Incontinence: 0: None  Insomnia: 0: Normal       Objective:   /77   Pulse 62   Resp 16   SpO2 97%   Gen: Appears well, in no acute distress  Skin: No erythema    Labs:  CBC RESULTS: Recent Labs   Lab Test 23  0950   WBC 3.7*   RBC 5.02   HGB 13.7   HCT 41.0   MCV 82   MCH 27.3   MCHC 33.4   RDW 14.5   *     ELECTROLYTES:  Recent Labs   Lab Test 23  1033      POTASSIUM 3.6   CHLORIDE 96*   FELIX 9.5   CO2 27   BUN 13.0   CR 0.96   *       Assessment:    Tolerating radiation therapy well.  All questions and concerns addressed.    Toxicities:  None    Plan:   Continue current therapy.        Mosaiq chart and setup information reviewed  CBCT reviewed       Kaylee Mccullough MD, PhD     Department of  Radiation Oncology  [facility]

## 2023-12-22 ENCOUNTER — APPOINTMENT (OUTPATIENT)
Dept: RADIATION ONCOLOGY | Facility: CLINIC | Age: 63
End: 2023-12-22
Attending: STUDENT IN AN ORGANIZED HEALTH CARE EDUCATION/TRAINING PROGRAM
Payer: COMMERCIAL

## 2023-12-22 PROCEDURE — 77014 PR CT GUIDE FOR PLACEMENT RADIATION THERAPY FIELDS: CPT | Mod: 26 | Performed by: RADIOLOGY

## 2023-12-22 PROCEDURE — 77386 HC IMRT TREATMENT DELIVERY, COMPLEX: CPT | Performed by: STUDENT IN AN ORGANIZED HEALTH CARE EDUCATION/TRAINING PROGRAM

## 2023-12-26 ENCOUNTER — APPOINTMENT (OUTPATIENT)
Dept: RADIATION ONCOLOGY | Facility: CLINIC | Age: 63
End: 2023-12-26
Attending: STUDENT IN AN ORGANIZED HEALTH CARE EDUCATION/TRAINING PROGRAM
Payer: COMMERCIAL

## 2023-12-26 PROCEDURE — 77014 PR CT GUIDE FOR PLACEMENT RADIATION THERAPY FIELDS: CPT | Mod: 26 | Performed by: RADIOLOGY

## 2023-12-26 PROCEDURE — 77336 RADIATION PHYSICS CONSULT: CPT | Performed by: STUDENT IN AN ORGANIZED HEALTH CARE EDUCATION/TRAINING PROGRAM

## 2023-12-26 PROCEDURE — 77386 HC IMRT TREATMENT DELIVERY, COMPLEX: CPT | Performed by: STUDENT IN AN ORGANIZED HEALTH CARE EDUCATION/TRAINING PROGRAM

## 2023-12-27 ENCOUNTER — APPOINTMENT (OUTPATIENT)
Dept: RADIATION ONCOLOGY | Facility: CLINIC | Age: 63
End: 2023-12-27
Attending: STUDENT IN AN ORGANIZED HEALTH CARE EDUCATION/TRAINING PROGRAM
Payer: COMMERCIAL

## 2023-12-27 PROCEDURE — 77386 HC IMRT TREATMENT DELIVERY, COMPLEX: CPT | Performed by: STUDENT IN AN ORGANIZED HEALTH CARE EDUCATION/TRAINING PROGRAM

## 2023-12-27 PROCEDURE — 77014 PR CT GUIDE FOR PLACEMENT RADIATION THERAPY FIELDS: CPT | Mod: 26 | Performed by: RADIOLOGY

## 2023-12-28 ENCOUNTER — APPOINTMENT (OUTPATIENT)
Dept: RADIATION ONCOLOGY | Facility: CLINIC | Age: 63
End: 2023-12-28
Attending: STUDENT IN AN ORGANIZED HEALTH CARE EDUCATION/TRAINING PROGRAM
Payer: COMMERCIAL

## 2023-12-28 VITALS
RESPIRATION RATE: 16 BRPM | OXYGEN SATURATION: 97 % | DIASTOLIC BLOOD PRESSURE: 77 MMHG | SYSTOLIC BLOOD PRESSURE: 138 MMHG | HEART RATE: 62 BPM

## 2023-12-28 VITALS
DIASTOLIC BLOOD PRESSURE: 84 MMHG | WEIGHT: 214 LBS | BODY MASS INDEX: 26.47 KG/M2 | HEART RATE: 64 BPM | SYSTOLIC BLOOD PRESSURE: 123 MMHG

## 2023-12-28 DIAGNOSIS — D32.9 MENINGIOMA (H): Primary | ICD-10-CM

## 2023-12-28 PROCEDURE — 77386 HC IMRT TREATMENT DELIVERY, COMPLEX: CPT | Performed by: RADIOLOGY

## 2023-12-28 NOTE — PROGRESS NOTES
[facility] PHYSICIANS  SPECIALIZING IN BREAKTHROUGHS  Radiation Oncology    On Treatment Visit Note      Amado Butler      Date: 2023   MRN: [id number]   : 1960  Diagnosis: R On Meningioma    Reason for Visit:  On Radiation Treatment Visit     Treatment Summary to Date  Site:     R Optic Nerve Current Dose:    Current Dose: 3060/5040 cGy Fractions:    Fractions:       Chemotherapy  Chemo concurrent with radx?: No    ED Visit/Hospital Admission: No    Treatment Breaks: 23    Subjective: Patient is coming today in clinic by his wife.  Overall, he feels quite well, and reports no new symptoms in the last week.  He denies any fatigue, no headaches, no new numbness or weakness, no vision changes, able to do his usual activities of daily living.    Nursing ROS:   Nutrition Alteration  Diet Type: Patient's Preference  Skin  Skin Reaction: 0 - No changes     ENT and Mouth Exam  Mucositis - Current: 0 - None   Cardiovascular  Respiratory effort: 1 - Normal - without distress  Gastrointestinal  Nausea: 0 - None     Pain Assessment  0-10 Pain Scale: 0    Objective:   /84   Pulse 64   Wt 97.1 kg (214 lb)   BMI 26.47 kg/m    Gen: Appears well, in no acute distress  Skin: No erythema    Labs:  CBC RESULTS:   Recent Labs   Lab Test 23  1005   WBC 5.2   RBC 5.08   HGB 13.7   HCT 40.6   MCV 80   MCH 27.0   MCHC 33.7   RDW 13.8   *     ELECTROLYTES:  Recent Labs   Lab Test 23  1005      POTASSIUM 3.5   CHLORIDE 99   FELIX 9.8   CO2 24   BUN 13.1   CR 0.87   *       Assessment:    Tolerating radiation therapy well.  All questions and concerns addressed.    Toxicities:  Anorexia: Grade 0: No toxicity  Fatigue: Grade 0: No toxicity  Headache: Grade 0: No toxicity  Nausea: Grade 0: No toxicity  Mucositis: Grade 0: No toxicity  Dermatitis: Grade 0: No toxicity    Plan:   Continue current therapy.      Mosaiq chart and setup information reviewed. Imaging reviewed.      Medication Review  Med list reviewed with patient?: Yes  Med list printed and given: No    Patient was seen and discussed with my attending physician, Dr. Steele.    Omar Khan MD, MS PGY-2  PGY-2 Radiation Oncology  Department of Radiation Oncology  [facility]  Phone: [phone]    I saw the patient with the resident.  I agree with the resident's note and plan of care.      VIVIANA Steele M.D.  Department of Radiation Oncology  [facility]

## 2023-12-28 NOTE — LETTER
2023         RE: Amado Butler  [address] [address] [address]        Dear Colleague,    Thank you for referring your patient, Amado Butler, to the [facility] RADIATION ONCOLOGY. Please see a copy of my visit note below.    [facility] PHYSICIANS  SPECIALIZING IN BREAKTHROUGHS  Radiation Oncology    On Treatment Visit Note      Amado Butler      Date: 2023   MRN: [id number]   : 1960  Diagnosis: R On Meningioma    Reason for Visit:  On Radiation Treatment Visit     Treatment Summary to Date  Site:     R Optic Nerve Current Dose:    Current Dose: 3060/5040 cGy Fractions:    Fractions:       Chemotherapy  Chemo concurrent with radx?: No    ED Visit/Hospital Admission: No    Treatment Breaks: 23    Subjective: Patient is coming today in clinic by his wife.  Overall, he feels quite well, and reports no new symptoms in the last week.  He denies any fatigue, no headaches, no new numbness or weakness, no vision changes, able to do his usual activities of daily living.    Nursing ROS:   Nutrition Alteration  Diet Type: Patient's Preference  Skin  Skin Reaction: 0 - No changes     ENT and Mouth Exam  Mucositis - Current: 0 - None   Cardiovascular  Respiratory effort: 1 - Normal - without distress  Gastrointestinal  Nausea: 0 - None     Pain Assessment  0-10 Pain Scale: 0    Objective:   /84   Pulse 64   Wt 97.1 kg (214 lb)   BMI 26.47 kg/m    Gen: Appears well, in no acute distress  Skin: No erythema    Labs:  CBC RESULTS:   Recent Labs   Lab Test 23  1005   WBC 5.2   RBC 5.08   HGB 13.7   HCT 40.6   MCV 80   MCH 27.0   MCHC 33.7   RDW 13.8   *     ELECTROLYTES:  Recent Labs   Lab Test 23  1005      POTASSIUM 3.5   CHLORIDE 99   FELIX 9.8   CO2 24   BUN 13.1   CR 0.87   *       Assessment:    Tolerating radiation therapy well.  All questions and concerns addressed.    Toxicities:  Anorexia: Grade 0: No toxicity  Fatigue:  Grade 0: No toxicity  Headache: Grade 0: No toxicity  Nausea: Grade 0: No toxicity  Mucositis: Grade 0: No toxicity  Dermatitis: Grade 0: No toxicity    Plan:   Continue current therapy.      Mosaiq chart and setup information reviewed. Imaging reviewed.     Medication Review  Med list reviewed with patient?: Yes  Med list printed and given: No    Patient was seen and discussed with my attending physician, Dr. Steele.    Omar Khan MD, MS PGY-2  PGY-2 Radiation Oncology  Department of Radiation Oncology  [facility]  Phone: [phone]    I saw the patient with the resident.  I agree with the resident's note and plan of care.      VIVIANA Steele M.D.  Department of Radiation Oncology  [facility]

## 2023-12-29 ENCOUNTER — APPOINTMENT (OUTPATIENT)
Dept: RADIATION ONCOLOGY | Facility: CLINIC | Age: 63
End: 2023-12-29
Attending: STUDENT IN AN ORGANIZED HEALTH CARE EDUCATION/TRAINING PROGRAM
Payer: COMMERCIAL

## 2023-12-29 ENCOUNTER — HOSPITAL ENCOUNTER (OUTPATIENT)
Dept: RESEARCH | Facility: CLINIC | Age: 63
Discharge: HOME | End: 2023-12-29
Attending: STUDENT IN AN ORGANIZED HEALTH CARE EDUCATION/TRAINING PROGRAM | Admitting: STUDENT IN AN ORGANIZED HEALTH CARE EDUCATION/TRAINING PROGRAM
Payer: COMMERCIAL

## 2023-12-29 DIAGNOSIS — D32.9 MENINGIOMA (H): ICD-10-CM

## 2023-12-29 LAB
ALBUMIN SERPL BCG-MCNC: 4.7 G/DL (ref 3.5–5.2)
ALP SERPL-CCNC: 75 U/L (ref 40–150)
ALT SERPL W P-5'-P-CCNC: 13 U/L (ref 0–70)
ANION GAP SERPL CALCULATED.3IONS-SCNC: 12 MMOL/L (ref 7–15)
AST SERPL W P-5'-P-CCNC: 26 U/L (ref 0–45)
BASOPHILS # BLD AUTO: ABNORMAL 10*3/UL
BASOPHILS # BLD MANUAL: 0.1 10E3/UL (ref 0–0.2)
BASOPHILS NFR BLD AUTO: ABNORMAL %
BASOPHILS NFR BLD MANUAL: 2 %
BILIRUB SERPL-MCNC: 0.5 MG/DL
BUN SERPL-MCNC: 13 MG/DL (ref 8–23)
CALCIUM SERPL-MCNC: 9.5 MG/DL (ref 8.8–10.2)
CHLORIDE SERPL-SCNC: 96 MMOL/L (ref 98–107)
CREAT SERPL-MCNC: 0.96 MG/DL (ref 0.67–1.17)
DEPRECATED HCO3 PLAS-SCNC: 27 MMOL/L (ref 22–29)
EGFRCR SERPLBLD CKD-EPI 2021: 88 ML/MIN/1.73M2
EOSINOPHIL # BLD AUTO: ABNORMAL 10*3/UL
EOSINOPHIL # BLD MANUAL: 0 10E3/UL (ref 0–0.7)
EOSINOPHIL NFR BLD AUTO: ABNORMAL %
EOSINOPHIL NFR BLD MANUAL: 1 %
ERYTHROCYTE [DISTWIDTH] IN BLOOD BY AUTOMATED COUNT: 14.5 % (ref 10–15)
GLUCOSE SERPL-MCNC: 172 MG/DL (ref 70–99)
HCT VFR BLD AUTO: 41 % (ref 40–53)
HGB BLD-MCNC: 13.7 G/DL (ref 13.3–17.7)
IMM GRANULOCYTES # BLD: ABNORMAL 10*3/UL
IMM GRANULOCYTES NFR BLD: ABNORMAL %
LYMPHOCYTES # BLD AUTO: ABNORMAL 10*3/UL
LYMPHOCYTES # BLD MANUAL: 1.5 10E3/UL (ref 0.8–5.3)
LYMPHOCYTES NFR BLD AUTO: ABNORMAL %
LYMPHOCYTES NFR BLD MANUAL: 41 %
MCH RBC QN AUTO: 27.3 PG (ref 26.5–33)
MCHC RBC AUTO-ENTMCNC: 33.4 G/DL (ref 31.5–36.5)
MCV RBC AUTO: 82 FL (ref 78–100)
METAMYELOCYTES # BLD MANUAL: 0 10E3/UL
METAMYELOCYTES NFR BLD MANUAL: 1 %
MONOCYTES # BLD AUTO: ABNORMAL 10*3/UL
MONOCYTES # BLD MANUAL: 1 10E3/UL (ref 0–1.3)
MONOCYTES NFR BLD AUTO: ABNORMAL %
MONOCYTES NFR BLD MANUAL: 27 %
NEUTROPHILS # BLD AUTO: ABNORMAL 10*3/UL
NEUTROPHILS # BLD MANUAL: 1 10E3/UL (ref 1.6–8.3)
NEUTROPHILS NFR BLD AUTO: ABNORMAL %
NEUTROPHILS NFR BLD MANUAL: 27 %
NRBC # BLD AUTO: 0 10E3/UL
NRBC BLD AUTO-RTO: 0 /100
PLAT MORPH BLD: ABNORMAL
PLATELET # BLD AUTO: 128 10E3/UL (ref 150–450)
POTASSIUM SERPL-SCNC: 3.6 MMOL/L (ref 3.4–5.3)
PROMYELOCYTES # BLD MANUAL: 0 10E3/UL
PROMYELOCYTES NFR BLD MANUAL: 1 %
PROT SERPL-MCNC: 7.4 G/DL (ref 6.4–8.3)
RBC # BLD AUTO: 5.02 10E6/UL (ref 4.4–5.9)
RBC MORPH BLD: ABNORMAL
SODIUM SERPL-SCNC: 135 MMOL/L (ref 135–145)
WBC # BLD AUTO: 3.7 10E3/UL (ref 4–11)

## 2023-12-29 PROCEDURE — 77427 RADIATION TX MANAGEMENT X5: CPT | Mod: GC | Performed by: RADIOLOGY

## 2023-12-29 PROCEDURE — 36415 COLL VENOUS BLD VENIPUNCTURE: CPT

## 2023-12-29 PROCEDURE — 510N000017 HC CRU PATIENT CARE, PER 15 MIN

## 2023-12-29 PROCEDURE — 85027 COMPLETE CBC AUTOMATED: CPT

## 2023-12-29 PROCEDURE — 85007 BL SMEAR W/DIFF WBC COUNT: CPT

## 2023-12-29 PROCEDURE — 80053 COMPREHEN METABOLIC PANEL: CPT

## 2023-12-29 PROCEDURE — 510N000009 HC RESEARCH FACILITY, PER 15 MIN

## 2023-12-29 PROCEDURE — 77386 HC IMRT TREATMENT DELIVERY, COMPLEX: CPT | Performed by: STUDENT IN AN ORGANIZED HEALTH CARE EDUCATION/TRAINING PROGRAM

## 2024-01-02 ENCOUNTER — APPOINTMENT (OUTPATIENT)
Dept: RADIATION ONCOLOGY | Facility: CLINIC | Age: 64
End: 2024-01-02
Attending: STUDENT IN AN ORGANIZED HEALTH CARE EDUCATION/TRAINING PROGRAM
Payer: COMMERCIAL

## 2024-01-02 PROCEDURE — 77386 HC IMRT TREATMENT DELIVERY, COMPLEX: CPT | Performed by: STUDENT IN AN ORGANIZED HEALTH CARE EDUCATION/TRAINING PROGRAM

## 2024-01-02 PROCEDURE — 77014 PR CT GUIDE FOR PLACEMENT RADIATION THERAPY FIELDS: CPT | Mod: 26 | Performed by: STUDENT IN AN ORGANIZED HEALTH CARE EDUCATION/TRAINING PROGRAM

## 2024-01-03 ENCOUNTER — APPOINTMENT (OUTPATIENT)
Dept: RADIATION ONCOLOGY | Facility: CLINIC | Age: 64
End: 2024-01-03
Attending: STUDENT IN AN ORGANIZED HEALTH CARE EDUCATION/TRAINING PROGRAM
Payer: COMMERCIAL

## 2024-01-03 PROCEDURE — 77336 RADIATION PHYSICS CONSULT: CPT | Performed by: STUDENT IN AN ORGANIZED HEALTH CARE EDUCATION/TRAINING PROGRAM

## 2024-01-03 PROCEDURE — 77014 PR CT GUIDE FOR PLACEMENT RADIATION THERAPY FIELDS: CPT | Mod: 26 | Performed by: RADIOLOGY

## 2024-01-03 PROCEDURE — 77386 HC IMRT TREATMENT DELIVERY, COMPLEX: CPT | Performed by: STUDENT IN AN ORGANIZED HEALTH CARE EDUCATION/TRAINING PROGRAM

## 2024-01-04 ENCOUNTER — OFFICE VISIT (OUTPATIENT)
Dept: RADIATION ONCOLOGY | Facility: CLINIC | Age: 64
End: 2024-01-04
Attending: STUDENT IN AN ORGANIZED HEALTH CARE EDUCATION/TRAINING PROGRAM
Payer: COMMERCIAL

## 2024-01-04 DIAGNOSIS — D32.9 MENINGIOMA (H): Primary | ICD-10-CM

## 2024-01-04 PROCEDURE — 77386 HC IMRT TREATMENT DELIVERY, COMPLEX: CPT | Performed by: STUDENT IN AN ORGANIZED HEALTH CARE EDUCATION/TRAINING PROGRAM

## 2024-01-04 ASSESSMENT — PAIN SCALES - GENERAL: PAINLEVEL: NO PAIN (0)

## 2024-01-04 NOTE — PROGRESS NOTES
[facility] PHYSICIANS  SPECIALIZING IN BREAKTHROUGHS  Radiation Oncology    On Treatment Visit Note      Amado Butler      Date: 2024   MRN: [id number]   : 1960  Diagnosis: R On Meningioma    Reason for Visit:  On Radiation Treatment Visit     Treatment Summary to Date  Site:     R Optic Nerve Current Dose:    Current Dose: 3780/5040 cGy Fractions:    Fractions:       Chemotherapy  Chemo concurrent with radx?: No    ED Visit/Hospital Admission: No    Treatment Breaks: 23 and 24 (planned for holidays)     Subjective: Patient is coming today in clinic by his wife.  Overall, he feels quite well, and the only new symptom he reports that this time his symptoms of nausea (no vomiting) that he had yesterday on 1/3/2024.  The nausea self resolved without intervention.  He has not had nausea or vomiting since.  Reports feeling slightly more tired this week than last week.  He denies any headaches, no new numbness or weakness, no vision changes, able to do his usual activities of daily living.    Objective:   /77   Pulse 73   Resp 16   SpO2 96%   Gen: Appears well, in no acute distress  Skin: No erythema    Labs:  CBC RESULTS:   Recent Labs   Lab Test 23  1005   WBC 5.2   RBC 5.08   HGB 13.7   HCT 40.6   MCV 80   MCH 27.0   MCHC 33.7   RDW 13.8   *     ELECTROLYTES:  Recent Labs   Lab Test 23  1005      POTASSIUM 3.5   CHLORIDE 99   FELIX 9.8   CO2 24   BUN 13.1   CR 0.87   *       Assessment:    Tolerating radiation therapy well.  All questions and concerns addressed.    Toxicities:  Anorexia: Grade 0: No toxicity  Fatigue: Grade 1: Fatigue relieved by rest  Headache: Grade 0: No toxicity  Nausea: Grade 1: Loss of appetite without alteration in eating habits  Mucositis: Grade 0: No toxicity  Dermatitis: Grade 0: No toxicity    Plan:   1. Continue current therapy.      Mosaiq chart and setup information reviewed. Imaging reviewed.     Medication  Review  Med list reviewed with patient?: Yes  Med list printed and given: No    Patient was seen and discussed with my attending physician, Dr. Mcculloguh.    Omar Khan MD, MS PGY-2  PGY-2 Radiation Oncology  Department of Radiation Oncology  [facility]  Phone: [phone]    A medical resident participated in the care of this patient and in the preparation of this note. I have verified and edited this note. I personally performed key elements of the physical exam and medical decision making for this patient.  I agree with the assessment and plan of care as documented in the note above.    Kaylee Mccullough MD, PhD     Department of Radiation Oncology  [facility]

## 2024-01-04 NOTE — LETTER
2024         RE: Amado Butler  [address] [address] [facility] [address]        Dear Colleague,    Thank you for referring your patient, Amado Butler, to the [facility] RADIATION ONCOLOGY. Please see a copy of my visit note below.    [facility] PHYSICIANS  SPECIALIZING IN BREAKTHROUGHS  Radiation Oncology    On Treatment Visit Note      Amado Butler      Date: 2024   MRN: [id number]   : 1960  Diagnosis: R On Meningioma    Reason for Visit:  On Radiation Treatment Visit     Treatment Summary to Date  Site:     R Optic Nerve Current Dose:    Current Dose: 3780/5040 cGy Fractions:    Fractions:       Chemotherapy  Chemo concurrent with radx?: No    ED Visit/Hospital Admission: No    Treatment Breaks: 23 and 24 (planned for holidays)     Subjective: Patient is coming today in clinic by his wife.  Overall, he feels quite well, and the only new symptom he reports that this time his symptoms of nausea (no vomiting) that he had yesterday on 1/3/2024.  The nausea self resolved without intervention.  He has not had nausea or vomiting since.  Reports feeling slightly more tired this week than last week.  He denies any headaches, no new numbness or weakness, no vision changes, able to do his usual activities of daily living.    Objective:   /77   Pulse 73   Resp 16   SpO2 96%   Gen: Appears well, in no acute distress  Skin: No erythema    Labs:  CBC RESULTS:   Recent Labs   Lab Test 23  1005   WBC 5.2   RBC 5.08   HGB 13.7   HCT 40.6   MCV 80   MCH 27.0   MCHC 33.7   RDW 13.8   *     ELECTROLYTES:  Recent Labs   Lab Test 23  1005      POTASSIUM 3.5   CHLORIDE 99   FELIX 9.8   CO2 24   BUN 13.1   CR 0.87   *       Assessment:    Tolerating radiation therapy well.  All questions and concerns addressed.    Toxicities:  Anorexia: Grade 0: No toxicity  Fatigue: Grade 1: Fatigue relieved by rest  Headache: Grade 0: No toxicity  Nausea: Grade  1: Loss of appetite without alteration in eating habits  Mucositis: Grade 0: No toxicity  Dermatitis: Grade 0: No toxicity    Plan:   Continue current therapy.      Mosaiq chart and setup information reviewed. Imaging reviewed.     Medication Review  Med list reviewed with patient?: Yes  Med list printed and given: No    Patient was seen and discussed with my attending physician, Dr. Mccullough.    Omar Khan MD, MS PGY-2  PGY-2 Radiation Oncology  Department of Radiation Oncology  [facility]  Phone: [phone]    A medical resident participated in the care of this patient and in the preparation of this note. I have verified and edited this note. I personally performed key elements of the physical exam and medical decision making for this patient.  I agree with the assessment and plan of care as documented in the note above.    Kaylee Mccullough MD, PhD     Department of Radiation Oncology  [facility]       Again, thank you for allowing me to participate in the care of your patient.        Sincerely,        Kaylee Mccullough MD PhD

## 2024-01-04 NOTE — PROGRESS NOTES
[facility] PHYSICIANS  SPECIALIZING IN BREAKTHROUGHS  Radiation Oncology    On Treatment Visit Note      Amado Butler      Date: Dec 21, 2023   MRN: [id number]   : 1960  Diagnosis: R On Meningioma      Reason for Visit:  On Radiation Treatment Visit     Treatment Summary to Date   R On Meningioma Current Dose: 2340/5040 cGy Fractions:         Subjective:   Chencho feels well today, joined by his daughter. He has no concerns.     Nursing ROS:   Brain Site: R ON Meningioma  Concurrent Therapy: No  Protocol: RT Only  Today's Dose: 2340/5040  Today's Fraction/Total Fraction Brain:   Ocular/Visual - other : 0: Normal  Middle ear/hearin: Normal  Tinnitus: 0: No  Depressed level of consciousness: 0: Normal  Oriented x of spheres: 4  Neuropathy - motor: 0: Normal  Ataxia: 0: Normal  Speech Impairment (e.g. Dysphasia or aphasia): 0: Normal  Seizures: 0: None  Urinary Incontinence: 0: None  Bowel Incontinence: 0: None  Insomnia: 0: Normal       Objective:   /77   Pulse 62   Resp 16   SpO2 97%   Gen: Appears well, in no acute distress  Skin: No erythema    Labs:  CBC RESULTS: Recent Labs   Lab Test 23  0950   WBC 3.7*   RBC 5.02   HGB 13.7   HCT 41.0   MCV 82   MCH 27.3   MCHC 33.4   RDW 14.5   *     ELECTROLYTES:  Recent Labs   Lab Test 23  1033      POTASSIUM 3.6   CHLORIDE 96*   FELIX 9.5   CO2 27   BUN 13.0   CR 0.96   *       Assessment:    Tolerating radiation therapy well.  All questions and concerns addressed.    Toxicities:  None    Plan:   1. Continue current therapy.        Mosaiq chart and setup information reviewed  CBCT reviewed       Kaylee Mccullough MD, PhD     Department of Radiation Oncology  [facility]

## 2024-01-05 ENCOUNTER — APPOINTMENT (OUTPATIENT)
Dept: RADIATION ONCOLOGY | Facility: CLINIC | Age: 64
End: 2024-01-05
Attending: STUDENT IN AN ORGANIZED HEALTH CARE EDUCATION/TRAINING PROGRAM
Payer: COMMERCIAL

## 2024-01-05 PROCEDURE — 77014 PR CT GUIDE FOR PLACEMENT RADIATION THERAPY FIELDS: CPT | Mod: 26 | Performed by: RADIOLOGY

## 2024-01-05 PROCEDURE — 77386 HC IMRT TREATMENT DELIVERY, COMPLEX: CPT | Performed by: STUDENT IN AN ORGANIZED HEALTH CARE EDUCATION/TRAINING PROGRAM

## 2024-01-07 VITALS
SYSTOLIC BLOOD PRESSURE: 128 MMHG | RESPIRATION RATE: 16 BRPM | HEART RATE: 73 BPM | DIASTOLIC BLOOD PRESSURE: 77 MMHG | OXYGEN SATURATION: 96 %

## 2024-01-08 ENCOUNTER — APPOINTMENT (OUTPATIENT)
Dept: RADIATION ONCOLOGY | Facility: CLINIC | Age: 64
End: 2024-01-08
Attending: STUDENT IN AN ORGANIZED HEALTH CARE EDUCATION/TRAINING PROGRAM
Payer: COMMERCIAL

## 2024-01-08 PROCEDURE — 77386 HC IMRT TREATMENT DELIVERY, COMPLEX: CPT | Performed by: STUDENT IN AN ORGANIZED HEALTH CARE EDUCATION/TRAINING PROGRAM

## 2024-01-08 PROCEDURE — 77014 PR CT GUIDE FOR PLACEMENT RADIATION THERAPY FIELDS: CPT | Mod: 26 | Performed by: STUDENT IN AN ORGANIZED HEALTH CARE EDUCATION/TRAINING PROGRAM

## 2024-01-09 ENCOUNTER — APPOINTMENT (OUTPATIENT)
Dept: RADIATION ONCOLOGY | Facility: CLINIC | Age: 64
End: 2024-01-09
Attending: STUDENT IN AN ORGANIZED HEALTH CARE EDUCATION/TRAINING PROGRAM
Payer: COMMERCIAL

## 2024-01-09 PROCEDURE — 77014 PR CT GUIDE FOR PLACEMENT RADIATION THERAPY FIELDS: CPT | Mod: 26 | Performed by: STUDENT IN AN ORGANIZED HEALTH CARE EDUCATION/TRAINING PROGRAM

## 2024-01-09 PROCEDURE — 77386 HC IMRT TREATMENT DELIVERY, COMPLEX: CPT | Performed by: STUDENT IN AN ORGANIZED HEALTH CARE EDUCATION/TRAINING PROGRAM

## 2024-01-10 ENCOUNTER — APPOINTMENT (OUTPATIENT)
Dept: RADIATION ONCOLOGY | Facility: CLINIC | Age: 64
End: 2024-01-10
Attending: STUDENT IN AN ORGANIZED HEALTH CARE EDUCATION/TRAINING PROGRAM
Payer: COMMERCIAL

## 2024-01-10 PROCEDURE — 77386 HC IMRT TREATMENT DELIVERY, COMPLEX: CPT | Performed by: STUDENT IN AN ORGANIZED HEALTH CARE EDUCATION/TRAINING PROGRAM

## 2024-01-10 PROCEDURE — 77427 RADIATION TX MANAGEMENT X5: CPT | Mod: GC | Performed by: STUDENT IN AN ORGANIZED HEALTH CARE EDUCATION/TRAINING PROGRAM

## 2024-01-10 PROCEDURE — 77014 PR CT GUIDE FOR PLACEMENT RADIATION THERAPY FIELDS: CPT | Mod: 26 | Performed by: STUDENT IN AN ORGANIZED HEALTH CARE EDUCATION/TRAINING PROGRAM

## 2024-01-10 PROCEDURE — 77336 RADIATION PHYSICS CONSULT: CPT | Performed by: STUDENT IN AN ORGANIZED HEALTH CARE EDUCATION/TRAINING PROGRAM

## 2024-01-11 ENCOUNTER — APPOINTMENT (OUTPATIENT)
Dept: RADIATION ONCOLOGY | Facility: CLINIC | Age: 64
End: 2024-01-11
Attending: STUDENT IN AN ORGANIZED HEALTH CARE EDUCATION/TRAINING PROGRAM
Payer: COMMERCIAL

## 2024-01-11 DIAGNOSIS — D32.9 MENINGIOMA (H): Primary | ICD-10-CM

## 2024-01-11 PROCEDURE — 77386 HC IMRT TREATMENT DELIVERY, COMPLEX: CPT | Performed by: STUDENT IN AN ORGANIZED HEALTH CARE EDUCATION/TRAINING PROGRAM

## 2024-01-11 NOTE — PROGRESS NOTES
[facility] PHYSICIANS  SPECIALIZING IN BREAKTHROUGHS  Radiation Oncology    On Treatment Visit Note      Amado Butler      Date: 2024   MRN: [id number]   : 1960    Diagnosis: R On Meningioma     Reason for Visit:  On Radiation Treatment Visit      Treatment Summary to Date  Site:      R Optic Nerve Current Dose:     Current Dose: 4680/5040 cGy Fractions:     Fractions:        Chemotherapy  Chemo concurrent with radx?: No     ED Visit/Hospital Admission: No     Treatment Breaks: 23 and 24 (planned for holidays)      Subjective: Patient is coming today in clinic by his wife.  Overall, he feels quite well, and the only new symptom he reports is occasional fatigue, otherwise no other concerning symptoms.     Objective:   There were no vitals taken for this visit.  Gen: Appears well, in no acute distress  Skin: No erythema     Assessment:    Tolerating radiation therapy well.  All questions and concerns addressed.    Toxicities:  Anorexia: Grade 0: No toxicity  Fatigue: Grade 1: Fatigue relieved by rest  Headache: Grade 0: No toxicity  Nausea: Grade 0: No toxicity  Mucositis: Grade 0: No toxicity  Dermatitis: Grade 0: No toxicity    Plan:   1. Continue current therapy.    2. MRI perfusion in 3 months, follow-up with Dr. Mccullough Thursday after scan   3. Follow-up with neuro-ophthalmology Dr. Chivo Tenorio chart and setup information reviewed. Imaging reviewed.     Patient was seen and discussed with my attending physician, Dr. Mccullough.    Omar Khan MD, MS PGY-2  PGY-2 Radiation Oncology  Department of Radiation Oncology  [facility]  Phone: [phone]    A medical resident participated in the care of this patient and in the preparation of this note. I have verified and edited this note. I personally performed key elements of the physical exam and medical decision making for this patient.  I agree with the assessment and plan of care as  documented in the note above.      Kaylee Mccullough MD, PhD     Department of Radiation Oncology  [facility]

## 2024-01-11 NOTE — LETTER
2024         RE: Amado Butler  [address] [facility] [address]        Dear Colleague,    Thank you for referring your patient, Amado Butler, to the [facility] RADIATION ONCOLOGY. Please see a copy of my visit note below.        [facility]YSICIANS  SPECIALIZING IN BREAKTHROUGHS  Radiation Oncology    On Treatment Visit Note      Amado Butler      Date: 2024   MRN: [id number]   : 1960    Diagnosis: R On Meningioma     Reason for Visit:  On Radiation Treatment Visit      Treatment Summary to Date  Site:      R Optic Nerve Current Dose:     Current Dose: 4680/5040 cGy Fractions:     Fractions:        Chemotherapy  Chemo concurrent with radx?: No     ED Visit/Hospital Admission: No     Treatment Breaks: 23 and 24 (planned for holidays)      Subjective: Patient is coming today in clinic by his wife.  Overall, he feels quite well, and the only new symptom he reports is occasional fatigue, otherwise no other concerning symptoms.     Objective:   There were no vitals taken for this visit.  Gen: Appears well, in no acute distress  Skin: No erythema     Assessment:    Tolerating radiation therapy well.  All questions and concerns addressed.    Toxicities:  Anorexia: Grade 0: No toxicity  Fatigue: Grade 1: Fatigue relieved by rest  Headache: Grade 0: No toxicity  Nausea: Grade 0: No toxicity  Mucositis: Grade 0: No toxicity  Dermatitis: Grade 0: No toxicity    Plan:   Continue current therapy.    MRI perfusion in 3 months, follow-up with Dr. Mccullough Thursday after scan   Follow-up with neuro-ophthalmology Dr. Chivo Tenorio chart and setup information reviewed. Imaging reviewed.     Patient was seen and discussed with my attending physician, Dr. Mccullough.    Omar Khan MD, MS PGY-2  PGY-2 Radiation Oncology  Department of Radiation Oncology  [facility]  Phone: [phone]    A medical resident participated in the care of this  patient and in the preparation of this note. I have verified and edited this note. I personally performed key elements of the physical exam and medical decision making for this patient.  I agree with the assessment and plan of care as documented in the note above.      Kaylee Mccullough MD, PhD     Department of Radiation Oncology  [facility]

## 2024-01-12 ENCOUNTER — APPOINTMENT (OUTPATIENT)
Dept: RADIATION ONCOLOGY | Facility: CLINIC | Age: 64
End: 2024-01-12
Attending: STUDENT IN AN ORGANIZED HEALTH CARE EDUCATION/TRAINING PROGRAM
Payer: COMMERCIAL

## 2024-01-12 ENCOUNTER — HOSPITAL ENCOUNTER (OUTPATIENT)
Dept: RESEARCH | Facility: CLINIC | Age: 64
Discharge: HOME | End: 2024-01-12
Attending: STUDENT IN AN ORGANIZED HEALTH CARE EDUCATION/TRAINING PROGRAM | Admitting: STUDENT IN AN ORGANIZED HEALTH CARE EDUCATION/TRAINING PROGRAM
Payer: COMMERCIAL

## 2024-01-12 DIAGNOSIS — C93.10 CHRONIC MYELOMONOCYTIC LEUKEMIA NOT HAVING ACHIEVED REMISSION (H): Primary | ICD-10-CM

## 2024-01-12 LAB
ALBUMIN SERPL BCG-MCNC: 4.5 G/DL (ref 3.5–5.2)
ALP SERPL-CCNC: 66 U/L (ref 40–150)
ALT SERPL W P-5'-P-CCNC: 10 U/L (ref 0–70)
ANION GAP SERPL CALCULATED.3IONS-SCNC: 11 MMOL/L (ref 7–15)
AST SERPL W P-5'-P-CCNC: 25 U/L (ref 0–45)
BASOPHILS # BLD AUTO: ABNORMAL 10*3/UL
BASOPHILS # BLD MANUAL: 0.1 10E3/UL (ref 0–0.2)
BASOPHILS NFR BLD AUTO: ABNORMAL %
BASOPHILS NFR BLD MANUAL: 2 %
BILIRUB SERPL-MCNC: 0.5 MG/DL
BUN SERPL-MCNC: 16.9 MG/DL (ref 8–23)
CALCIUM SERPL-MCNC: 9.6 MG/DL (ref 8.8–10.2)
CHLORIDE SERPL-SCNC: 98 MMOL/L (ref 98–107)
CREAT SERPL-MCNC: 1 MG/DL (ref 0.67–1.17)
DEPRECATED HCO3 PLAS-SCNC: 27 MMOL/L (ref 22–29)
EGFRCR SERPLBLD CKD-EPI 2021: 84 ML/MIN/1.73M2
EOSINOPHIL # BLD AUTO: ABNORMAL 10*3/UL
EOSINOPHIL # BLD MANUAL: 0 10E3/UL (ref 0–0.7)
EOSINOPHIL NFR BLD AUTO: ABNORMAL %
EOSINOPHIL NFR BLD MANUAL: 1 %
ERYTHROCYTE [DISTWIDTH] IN BLOOD BY AUTOMATED COUNT: 14.6 % (ref 10–15)
GLUCOSE SERPL-MCNC: 194 MG/DL (ref 70–99)
HCT VFR BLD AUTO: 39.3 % (ref 40–53)
HGB BLD-MCNC: 13.2 G/DL (ref 13.3–17.7)
IMM GRANULOCYTES # BLD: ABNORMAL 10*3/UL
IMM GRANULOCYTES NFR BLD: ABNORMAL %
LYMPHOCYTES # BLD AUTO: ABNORMAL 10*3/UL
LYMPHOCYTES # BLD MANUAL: 1.7 10E3/UL (ref 0.8–5.3)
LYMPHOCYTES NFR BLD AUTO: ABNORMAL %
LYMPHOCYTES NFR BLD MANUAL: 37 %
MCH RBC QN AUTO: 27.7 PG (ref 26.5–33)
MCHC RBC AUTO-ENTMCNC: 33.6 G/DL (ref 31.5–36.5)
MCV RBC AUTO: 83 FL (ref 78–100)
MONOCYTES # BLD AUTO: ABNORMAL 10*3/UL
MONOCYTES # BLD MANUAL: 1 10E3/UL (ref 0–1.3)
MONOCYTES NFR BLD AUTO: ABNORMAL %
MONOCYTES NFR BLD MANUAL: 22 %
NEUTROPHILS # BLD AUTO: ABNORMAL 10*3/UL
NEUTROPHILS # BLD MANUAL: 1.7 10E3/UL (ref 1.6–8.3)
NEUTROPHILS NFR BLD AUTO: ABNORMAL %
NEUTROPHILS NFR BLD MANUAL: 38 %
NRBC # BLD AUTO: 0 10E3/UL
NRBC BLD AUTO-RTO: 0 /100
PLAT MORPH BLD: NORMAL
PLATELET # BLD AUTO: 137 10E3/UL (ref 150–450)
POTASSIUM SERPL-SCNC: 4 MMOL/L (ref 3.4–5.3)
PROT SERPL-MCNC: 7.1 G/DL (ref 6.4–8.3)
RBC # BLD AUTO: 4.76 10E6/UL (ref 4.4–5.9)
RBC MORPH BLD: NORMAL
SODIUM SERPL-SCNC: 136 MMOL/L (ref 135–145)
WBC # BLD AUTO: 4.5 10E3/UL (ref 4–11)

## 2024-01-12 PROCEDURE — 36415 COLL VENOUS BLD VENIPUNCTURE: CPT | Performed by: STUDENT IN AN ORGANIZED HEALTH CARE EDUCATION/TRAINING PROGRAM

## 2024-01-12 PROCEDURE — 77386 HC IMRT TREATMENT DELIVERY, COMPLEX: CPT | Performed by: STUDENT IN AN ORGANIZED HEALTH CARE EDUCATION/TRAINING PROGRAM

## 2024-01-12 PROCEDURE — 85027 COMPLETE CBC AUTOMATED: CPT | Performed by: STUDENT IN AN ORGANIZED HEALTH CARE EDUCATION/TRAINING PROGRAM

## 2024-01-12 PROCEDURE — 80053 COMPREHEN METABOLIC PANEL: CPT | Performed by: STUDENT IN AN ORGANIZED HEALTH CARE EDUCATION/TRAINING PROGRAM

## 2024-01-12 PROCEDURE — 510N000017 HC CRU PATIENT CARE, PER 15 MIN

## 2024-01-12 PROCEDURE — 85007 BL SMEAR W/DIFF WBC COUNT: CPT | Performed by: STUDENT IN AN ORGANIZED HEALTH CARE EDUCATION/TRAINING PROGRAM

## 2024-01-12 PROCEDURE — 510N000009 HC RESEARCH FACILITY, PER 15 MIN

## 2024-01-15 ENCOUNTER — ONCOLOGY VISIT (OUTPATIENT)
Dept: RADIATION ONCOLOGY | Facility: CLINIC | Age: 64
End: 2024-01-15

## 2024-01-15 ENCOUNTER — APPOINTMENT (OUTPATIENT)
Dept: RADIATION ONCOLOGY | Facility: CLINIC | Age: 64
End: 2024-01-15
Attending: STUDENT IN AN ORGANIZED HEALTH CARE EDUCATION/TRAINING PROGRAM
Payer: COMMERCIAL

## 2024-01-15 DIAGNOSIS — D32.9 MENINGIOMA (H): Primary | ICD-10-CM

## 2024-01-15 PROCEDURE — 77427 RADIATION TX MANAGEMENT X5: CPT | Mod: GC | Performed by: STUDENT IN AN ORGANIZED HEALTH CARE EDUCATION/TRAINING PROGRAM

## 2024-01-15 PROCEDURE — 77014 PR CT GUIDE FOR PLACEMENT RADIATION THERAPY FIELDS: CPT | Mod: 26 | Performed by: RADIOLOGY

## 2024-01-15 PROCEDURE — 77386 HC IMRT TREATMENT DELIVERY, COMPLEX: CPT | Performed by: STUDENT IN AN ORGANIZED HEALTH CARE EDUCATION/TRAINING PROGRAM

## 2024-01-15 PROCEDURE — 77336 RADIATION PHYSICS CONSULT: CPT | Performed by: STUDENT IN AN ORGANIZED HEALTH CARE EDUCATION/TRAINING PROGRAM

## 2024-01-15 NOTE — LETTER
1/15/2024         RE: Amado Butler  [address] [facility] [address]        Dear Colleague,      Thank you for referring your patient, Amado Butler, to the [facility] RADIATION ONCOLOGY. Please see a copy of my visit note below.    No notes on file      Again, thank you for allowing me to participate in the care of your patient.      Sincerely,    Kaylee Mccullough MD PhD

## 2024-01-17 ENCOUNTER — APPOINTMENT (OUTPATIENT)
Dept: LAB | Facility: CLINIC | Age: 64
End: 2024-01-17
Attending: STUDENT IN AN ORGANIZED HEALTH CARE EDUCATION/TRAINING PROGRAM
Payer: COMMERCIAL

## 2024-01-17 ENCOUNTER — ONCOLOGY VISIT (OUTPATIENT)
Dept: ONCOLOGY | Facility: CLINIC | Age: 64
End: 2024-01-17
Attending: STUDENT IN AN ORGANIZED HEALTH CARE EDUCATION/TRAINING PROGRAM
Payer: COMMERCIAL

## 2024-01-17 VITALS
OXYGEN SATURATION: 99 % | RESPIRATION RATE: 16 BRPM | TEMPERATURE: 97.6 F | BODY MASS INDEX: 26.59 KG/M2 | WEIGHT: 215 LBS | SYSTOLIC BLOOD PRESSURE: 142 MMHG | HEART RATE: 58 BPM | DIASTOLIC BLOOD PRESSURE: 78 MMHG

## 2024-01-17 DIAGNOSIS — C93.10 CHRONIC MYELOMONOCYTIC LEUKEMIA NOT HAVING ACHIEVED REMISSION (H): Primary | ICD-10-CM

## 2024-01-17 LAB
ALBUMIN SERPL BCG-MCNC: 4.6 G/DL (ref 3.5–5.2)
ALP SERPL-CCNC: 68 U/L (ref 40–150)
ALT SERPL W P-5'-P-CCNC: 10 U/L (ref 0–70)
ANION GAP SERPL CALCULATED.3IONS-SCNC: 10 MMOL/L (ref 7–15)
AST SERPL W P-5'-P-CCNC: 28 U/L (ref 0–45)
BASOPHILS # BLD AUTO: ABNORMAL 10*3/UL
BASOPHILS # BLD MANUAL: 0.1 10E3/UL (ref 0–0.2)
BASOPHILS NFR BLD AUTO: ABNORMAL %
BASOPHILS NFR BLD MANUAL: 2 %
BILIRUB SERPL-MCNC: 0.7 MG/DL
BUN SERPL-MCNC: 14.3 MG/DL (ref 8–23)
CALCIUM SERPL-MCNC: 9.4 MG/DL (ref 8.8–10.2)
CHLORIDE SERPL-SCNC: 98 MMOL/L (ref 98–107)
CREAT SERPL-MCNC: 0.95 MG/DL (ref 0.67–1.17)
DEPRECATED HCO3 PLAS-SCNC: 27 MMOL/L (ref 22–29)
EGFRCR SERPLBLD CKD-EPI 2021: 89 ML/MIN/1.73M2
EOSINOPHIL # BLD AUTO: ABNORMAL 10*3/UL
EOSINOPHIL # BLD MANUAL: 0 10E3/UL (ref 0–0.7)
EOSINOPHIL NFR BLD AUTO: ABNORMAL %
EOSINOPHIL NFR BLD MANUAL: 0 %
ERYTHROCYTE [DISTWIDTH] IN BLOOD BY AUTOMATED COUNT: 14.5 % (ref 10–15)
GLUCOSE SERPL-MCNC: 140 MG/DL (ref 70–99)
HCT VFR BLD AUTO: 39.7 % (ref 40–53)
HGB BLD-MCNC: 13.5 G/DL (ref 13.3–17.7)
IMM GRANULOCYTES # BLD: ABNORMAL 10*3/UL
IMM GRANULOCYTES NFR BLD: ABNORMAL %
LYMPHOCYTES # BLD AUTO: ABNORMAL 10*3/UL
LYMPHOCYTES # BLD MANUAL: 1.5 10E3/UL (ref 0.8–5.3)
LYMPHOCYTES NFR BLD AUTO: ABNORMAL %
LYMPHOCYTES NFR BLD MANUAL: 24 %
MCH RBC QN AUTO: 27.7 PG (ref 26.5–33)
MCHC RBC AUTO-ENTMCNC: 34 G/DL (ref 31.5–36.5)
MCV RBC AUTO: 82 FL (ref 78–100)
MONOCYTES # BLD AUTO: ABNORMAL 10*3/UL
MONOCYTES # BLD MANUAL: 1.8 10E3/UL (ref 0–1.3)
MONOCYTES NFR BLD AUTO: ABNORMAL %
MONOCYTES NFR BLD MANUAL: 29 %
NEUTROPHILS # BLD AUTO: ABNORMAL 10*3/UL
NEUTROPHILS # BLD MANUAL: 2.7 10E3/UL (ref 1.6–8.3)
NEUTROPHILS NFR BLD AUTO: ABNORMAL %
NEUTROPHILS NFR BLD MANUAL: 45 %
NRBC # BLD AUTO: 0 10E3/UL
NRBC BLD AUTO-RTO: 0 /100
PLAT MORPH BLD: ABNORMAL
PLATELET # BLD AUTO: 143 10E3/UL (ref 150–450)
POTASSIUM SERPL-SCNC: 3.7 MMOL/L (ref 3.4–5.3)
PROT SERPL-MCNC: 7.3 G/DL (ref 6.4–8.3)
RBC # BLD AUTO: 4.87 10E6/UL (ref 4.4–5.9)
RBC MORPH BLD: ABNORMAL
SODIUM SERPL-SCNC: 135 MMOL/L (ref 135–145)
WBC # BLD AUTO: 6.1 10E3/UL (ref 4–11)

## 2024-01-17 PROCEDURE — 82565 ASSAY OF CREATININE: CPT | Performed by: STUDENT IN AN ORGANIZED HEALTH CARE EDUCATION/TRAINING PROGRAM

## 2024-01-17 PROCEDURE — G0463 HOSPITAL OUTPT CLINIC VISIT: HCPCS | Performed by: STUDENT IN AN ORGANIZED HEALTH CARE EDUCATION/TRAINING PROGRAM

## 2024-01-17 PROCEDURE — 99215 OFFICE O/P EST HI 40 MIN: CPT | Performed by: STUDENT IN AN ORGANIZED HEALTH CARE EDUCATION/TRAINING PROGRAM

## 2024-01-17 PROCEDURE — 85007 BL SMEAR W/DIFF WBC COUNT: CPT | Performed by: STUDENT IN AN ORGANIZED HEALTH CARE EDUCATION/TRAINING PROGRAM

## 2024-01-17 PROCEDURE — 36415 COLL VENOUS BLD VENIPUNCTURE: CPT | Performed by: STUDENT IN AN ORGANIZED HEALTH CARE EDUCATION/TRAINING PROGRAM

## 2024-01-17 PROCEDURE — 85027 COMPLETE CBC AUTOMATED: CPT | Performed by: STUDENT IN AN ORGANIZED HEALTH CARE EDUCATION/TRAINING PROGRAM

## 2024-01-17 ASSESSMENT — PAIN SCALES - GENERAL: PAINLEVEL: NO PAIN (0)

## 2024-01-17 NOTE — LETTER
1/17/2024         RE: Amado Butler  [address] [facility] [address]        Dear Colleague,    Thank you for referring your patient, Amado Butler, to the [facility] CANCER CLINIC. Please see a copy of my visit note below.    [facility]  HEMATOLOGY & ONCOLOGY  FOLLOW UP VISIT    PATIENT NAME: Amado Butler          MRN # [id number]  YOB: 1960  DATE OF VISIT: October 18, 2023           REFERRING PROVIDER:   Mr. Amado Butler was seen at the request of Dr. Solomon Reza for further evaluation and/or management.     History of Presenting Illness  Mr. Amado Butler is a pleasant 63 year old male with a past medical history significant for DM2- Last A1c, HTN, ?thrombocytopenia since 2012 s/p recent frontotemporal craniotomy for right optic nerve mass now pathology proven meningioma c/b complete vision loss in right eye and was also found to have significant cytopenias during admission leading to a bone marrow biopsy on 8/15/2023.  His bone marrow biopsy shows 50% cellularity [mildly hypercellular for his age] without significant dysplasia in all 3 cell lines and without increase in blasts.  He had normal cytogenetics and BCR-ABL FISH was negative.  However his [employer] ration sequencing testing showed that there was a clonal process with mutations detected in SRSF2, TET2 x 2 and ASXL1.  Given prior history of monocytosis dating back many years he was referred to our clinic.    Subjective  Here with wife Mary Lou.  He was planning tennis this morning, finished radiation on Monday. No Headaches at this point. Patient notes that he still continues to have no light sensation in his right eye. Otherwise he continues to feel better and has had improvement in his overall wellbeing as well as fatigue symptoms.  His energy levels have significantly improved and he is now started to do yard work. Denies any fevers or chills, night sweats, BRBPR or melena.    Past Medical  History  Diabetes type 2  Hypertension  Paroxysmal SVT  Thrombocytopenia dating back since     Family History  Any family history of cancers or blood or bleeding disorders.    Social History  Smoking: Never  Alcohol use: drinks approximately 2drinks/week  Status:  39 years.   Children/grandchildren: 3 children. 26 years old girl, 31 boy, 28 girl  Occupation: Retired, .     Current Outpatient Medications  Current Outpatient Medications   Medication Sig Dispense Refill    carvedilol (COREG) 12.5 MG tablet Take 1 tablet (12.5 mg) by mouth 2 times daily (with meals) 180 tablet 3    lisinopril-hydrochlorothiazide (ZESTORETIC) 20-25 MG tablet Take 1 tablet by mouth daily      Magnesium Oxide -Mg Supplement 500 MG CAPS       metFORMIN (GLUCOPHAGE) 500 MG tablet Take 1 tablet (500 mg) by mouth daily (with breakfast) (Patient taking differently: Take 500 mg by mouth daily) 7 tablet 0    multivitamin w/minerals (MULTI-VITAMIN) tablet Take 1 tablet by mouth daily Over 50 mens         Allergies  Allergies   Allergen Reactions    Seasonal Allergies        Review of systems  A complete ROS was performed and was negative except as mentioned in HPI    Physical Exam  BP (!) 142/78 (BP Location: Left arm, Patient Position: Sitting, Cuff Size: Adult Regular)   Pulse 58   Temp 97.6  F (36.4  C) (Oral)   Resp 16   Wt 97.5 kg (215 lb)   SpO2 99%   BMI 26.59 kg/m    Wt Readings from Last 3 Encounters:   24 97.5 kg (215 lb)   23 97.1 kg (214 lb)   23 96.8 kg (213 lb 8 oz)       ECO   male sitting in chair in NAD  HEET: NC/AT, EOMI w/ PERRL, anicteric sclera. MMM. No mouth sores.   Neck: supple no JVP  Lymph: No cervical, supraclavicular, axillary or inguinal LAD  CV: normal S1,S2 with RRR no m/r/g  Resp: lungs CTA bilaterally with adequate air movement. No wheezes or crackles  Abd: soft, NTND, no organomegaly or masses. BS normoactive.   Ext: WWP no edema or  cyanosis  Skin: no concerning lesions or rashes or petechiae  Neuro: A&Ox4, no lateralizing sx. Grossly nonfocal. Strength appears preserved.      Labs and Imaging  I reviewed patient's labs and imaging from care everywhere and here.    Last labs from 10/12/2023  WBC 6.1, Hgb 13.5, , MCV 82, ANC 2700    BMBx 8/15/23  Final Diagnosis   Bone marrow, posterior iliac crest, left decalcified trephine biopsy and touch imprint; left aspirate clot, particle crush, direct aspirate smear, concentrate aspirate smear, and peripheral blood smear:     - Cellular marrow (overall 50% in intact areas) with granulocytic hyperplasia with slight left shift, relative erythroid hypoplasia, unremarkable megakaryopoiesis, no overt dysplasia, and overall less than 2% blasts  - Overall normal reticulin fibrosis (MF-0)  - Peripheral blood showing moderate normochromic, normocytic anemia; moderate leukocytosis due to neutrophilia and monocytosis; slight thrombocytopenia  - See comment   No clonal chromosomal abnormality was detected among the 20 metaphase cells analyzed. These findings confirm and expand those of the previously reported interphase FISH anlaysis ([id number]) that showed no evidence of a BCR::ABL1 fusion.   SRSF2 VAF 46.2%  TET 2 VAF 51.6%  TET 2 VAF 46.5%  ASXL1 VAF 11.3%      CMML WHO Diagnostic Criteria:    Persistent (?3 months) peripheral blood monocytosis; absolute monocyte count >500/microL and greater than 10 percent of the entire white blood cell differential   Yes   Not meeting WHO criteria for BCR-ABL1 positive chronic myeloid leukemia, primary myelofibrosis, polycythemia vera, or essential thrombocytosis  Yes   <20 percent myeloblasts + monoblasts + promonocytes in peripheral blood and bone marrow  Yes   Dysplastic changes in one or more myeloid lineages. If myelodysplastic changes are absent or minimal, the diagnosis of CMML can be made if the above three criteria are met and:  No   An acquired clonal  cytogenetic (or mutational abnormality) is present in bone marrow cells or  Yes   Persistent monocytosis for ?3 months and all other causes of monocytosis have been excluded  Yes   Total Score Meets WHO Criteria (4 major or 3 major and 2 minor)  Yes     CMML WHO Staging in 2016 now discontinued:    CMML-0: <2 % blasts in PB and <5% blasts in BM  Yes   CMML-1: 2 - 4%  blasts in PB and/or 5 - 9% blasts in BM  No   CMML-2: 5 -19 % blasts in PB, 10 - 19% blasts in BM, OR presence of Maurilio rods  No     Prognostic Scoring System: CPSS-Mol      Criteria Score     Cytogenetic Risk Low: normal, isolated -Y  Intermediate: all else  High: Tri 8, complex, chrom 7   0    ASXL1 WT/Mut  1    NRAS WT/Mut  0    RUNX1 WT/Mut  0    SETBP1 WT/Mut  0    Total Genetic Score Low = 0  Intermediate-1 = 1  Intermediate-2 = 2  High = >3 (Max 3 points) 1 Genetic risk points for CPSS-mol   (Max 3):  1   BM Blasts <5% or >5%  0    WBC count <13 or > 13  0    Transfusion dependent No/Yes  0    CPSS-mol   Risk group Low = 0  Intermediate-1 = 1  Intermediate-2 = 2-3  High = >4    1     The CPSS-Mol was able to identify 4 risk groups having a signi?cantly different OS, with median survival time >144, and 68, 30, and 17 months in the low, intermediate-1, intermediate-2, and high-risk group, respectively. Mr. Amado Butler has a Intermediate-1 risk disease with an estimated median survival of 68 months. The 4 risk groups also showed a signi?cantly different cumulative incidence of leukemic evolution, with a 48-month cumulative incidence of AML evolution of 0%, 8%, 24%, and 52% for the low, intermediate-1, intermediate-2, and high-risk group respectively. Mr. Amado Butler has a Intermediate-1 risk disease with an estimated 4 year cumulative AML evolution risk of 8 %.     Will need to obtain BCR-ABL1 testing and PDGFRA, PDGFRB, FGFR1 and PCM-JAK2 to rule out other disorders if eosinophilia is present.     A number of conditions can also cause  monocytosis such as asplenic state, inflamma AAS XL 1 tory conditions and autoimmune disorders, major depression, steroids or colony stimulating factors.        Assessment and Plan  Mr. Amado Butler is a 63 year old male who is here for further evaluation and/or management of chronic myelomonocytic leukemia.    Oncology:  Chronic myelomonocytic leukemia  WHO Classification: Dysplastic-CMML  Date of diagnosis: 8/14/2023  CPSS Mol score: 1-intermediate 1  Bone Marrow Blast %: 2   Cytogenetics: normal karyotype, 46 XY  Molecular: SRSF2, TET 2, TET 2, ASXL1  Past Treatment: None  Current Treatment: TBD    I discussed the pathophysiology and natural history of CMML with Mr. Amado Butler today. We went over the current prognostic models and scoring systems that are used in clinical practice as well as the potential for disease evolution into acute myeloid leukemia. We went over the current FDA approved treatments for CMML and covered that the only curative option is through an allogeneic hematopoietic cell transplantation. His disease is intermediate 1 risk and we will revisit this as needed.    We discussed about supportive care for lower risk disease and CMML including erythropoietin stimulating agents as well as thrombopoietin receptor agonist for thrombocytopenia.  At this point of time we will take a wait and watch approach. His counts have recovered.     Plan:  - RTC with MD with labs prior in 3 months.    Hematology:  Anemia/Thrombocytopenia:   Transfuse leukocyte reduced and irradiated blood products: 1 unit pRBC if hgb is 7.0-7.9, 2 units pRBCs if Hgb 6.0-6.9 g/dl, 1 unit platelets if PLT count is <10,000 or <50,000 with clinical bleeding.    Immunocompromised:  As a result of his disease and/or previous treatment. Mr. Amado Butler is immunocompromised. his last ANC is >500 and there is no need for prophylactic antibiotics at this time.     Cardiac:  Mr. Amado Butler has no history of heart disease.      Renal:  Creatinine results reviewed today. Mr. Amado Butler does not have any renal disease.    Hepatic:  Liver function tests reviewed today.    Psychosocial:  Mr. Amado Butler is coping well with his diagnosis.     All other questions and concerns were addressed and answered to Mr. Amado Butler's satisfaction.    Today, I spent a total of 40 minutes of face-to-face and non face-to-face time with Mr. Amado Butler. Time spent included review and discussion of diagnostic test results, patient counseling, and coordination of care.    Jamal Rodney MD    Division of Hematology, Oncology and Transplantation  [facility]  P: [phone]

## 2024-01-17 NOTE — PROGRESS NOTES
[facility]  HEMATOLOGY & ONCOLOGY  FOLLOW UP VISIT    PATIENT NAME: Amado Butler          MRN # [id number]  YOB: 1960  DATE OF VISIT: October 18, 2023           REFERRING PROVIDER:   Mr. Amado Butler was seen at the request of Dr. Solomon Reza for further evaluation and/or management.     History of Presenting Illness  Mr. Amado Butler is a pleasant 63 year old male with a past medical history significant for DM2- Last A1c, HTN, ?thrombocytopenia since 2012 s/p recent frontotemporal craniotomy for right optic nerve mass now pathology proven meningioma c/b complete vision loss in right eye and was also found to have significant cytopenias during admission leading to a bone marrow biopsy on 8/15/2023.  His bone marrow biopsy shows 50% cellularity [mildly hypercellular for his age] without significant dysplasia in all 3 cell lines and without increase in blasts.  He had normal cytogenetics and BCR-ABL FISH was negative.  However his [employer] ration sequencing testing showed that there was a clonal process with mutations detected in SRSF2, TET2 x 2 and ASXL1.  Given prior history of monocytosis dating back many years he was referred to our clinic.    Subjective  Here with wife Mary Lou.  He was planning tennis this morning, finished radiation on Monday. No Headaches at this point. Patient notes that he still continues to have no light sensation in his right eye. Otherwise he continues to feel better and has had improvement in his overall wellbeing as well as fatigue symptoms.  His energy levels have significantly improved and he is now started to do yard work. Denies any fevers or chills, night sweats, BRBPR or melena.    Past Medical History  Diabetes type 2  Hypertension  Paroxysmal SVT  Thrombocytopenia dating back since 2012    Family History  Any family history of cancers or blood or bleeding disorders.    Social History  Smoking: Never  Alcohol use: drinks approximately  2drinks/week  Status:  39 years.   Children/grandchildren: 3 children. 26 years old girl, 31 boy, 28 girl  Occupation: Retired, .     Current Outpatient Medications  Current Outpatient Medications   Medication Sig Dispense Refill    carvedilol (COREG) 12.5 MG tablet Take 1 tablet (12.5 mg) by mouth 2 times daily (with meals) 180 tablet 3    lisinopril-hydrochlorothiazide (ZESTORETIC) 20-25 MG tablet Take 1 tablet by mouth daily      Magnesium Oxide -Mg Supplement 500 MG CAPS       metFORMIN (GLUCOPHAGE) 500 MG tablet Take 1 tablet (500 mg) by mouth daily (with breakfast) (Patient taking differently: Take 500 mg by mouth daily) 7 tablet 0    multivitamin w/minerals (MULTI-VITAMIN) tablet Take 1 tablet by mouth daily Over 50 mens         Allergies  Allergies   Allergen Reactions    Seasonal Allergies        Review of systems  A complete ROS was performed and was negative except as mentioned in HPI    Physical Exam  BP (!) 142/78 (BP Location: Left arm, Patient Position: Sitting, Cuff Size: Adult Regular)   Pulse 58   Temp 97.6  F (36.4  C) (Oral)   Resp 16   Wt 97.5 kg (215 lb)   SpO2 99%   BMI 26.59 kg/m    Wt Readings from Last 3 Encounters:   24 97.5 kg (215 lb)   23 97.1 kg (214 lb)   23 96.8 kg (213 lb 8 oz)       ECO   male sitting in chair in NAD  HEET: NC/AT, EOMI w/ PERRL, anicteric sclera. MMM. No mouth sores.   Neck: supple no JVP  Lymph: No cervical, supraclavicular, axillary or inguinal LAD  CV: normal S1,S2 with RRR no m/r/g  Resp: lungs CTA bilaterally with adequate air movement. No wheezes or crackles  Abd: soft, NTND, no organomegaly or masses. BS normoactive.   Ext: WWP no edema or cyanosis  Skin: no concerning lesions or rashes or petechiae  Neuro: A&Ox4, no lateralizing sx. Grossly nonfocal. Strength appears preserved.      Labs and Imaging  I reviewed patient's labs and imaging from care everywhere and here.    Last labs from  10/12/2023  WBC 6.1, Hgb 13.5, , MCV 82, ANC 2700    BMBx 8/15/23  Final Diagnosis   Bone marrow, posterior iliac crest, left decalcified trephine biopsy and touch imprint; left aspirate clot, particle crush, direct aspirate smear, concentrate aspirate smear, and peripheral blood smear:     - Cellular marrow (overall 50% in intact areas) with granulocytic hyperplasia with slight left shift, relative erythroid hypoplasia, unremarkable megakaryopoiesis, no overt dysplasia, and overall less than 2% blasts  - Overall normal reticulin fibrosis (MF-0)  - Peripheral blood showing moderate normochromic, normocytic anemia; moderate leukocytosis due to neutrophilia and monocytosis; slight thrombocytopenia  - See comment   No clonal chromosomal abnormality was detected among the 20 metaphase cells analyzed. These findings confirm and expand those of the previously reported interphase FISH anlaysis ([id number]) that showed no evidence of a BCR::ABL1 fusion.   SRSF2 VAF 46.2%  TET 2 VAF 51.6%  TET 2 VAF 46.5%  ASXL1 VAF 11.3%      CMML WHO Diagnostic Criteria:    Persistent (?3 months) peripheral blood monocytosis; absolute monocyte count >500/microL and greater than 10 percent of the entire white blood cell differential   Yes   Not meeting WHO criteria for BCR-ABL1 positive chronic myeloid leukemia, primary myelofibrosis, polycythemia vera, or essential thrombocytosis  Yes   <20 percent myeloblasts + monoblasts + promonocytes in peripheral blood and bone marrow  Yes   Dysplastic changes in one or more myeloid lineages. If myelodysplastic changes are absent or minimal, the diagnosis of CMML can be made if the above three criteria are met and:  No   An acquired clonal cytogenetic (or mutational abnormality) is present in bone marrow cells or  Yes   Persistent monocytosis for ?3 months and all other causes of monocytosis have been excluded  Yes   Total Score Meets WHO Criteria (4 major or 3 major and 2 minor)  Yes      CMML WHO Staging in 2016 now discontinued:    CMML-0: <2 % blasts in PB and <5% blasts in BM  Yes   CMML-1: 2 - 4%  blasts in PB and/or 5 - 9% blasts in BM  No   CMML-2: 5 -19 % blasts in PB, 10 - 19% blasts in BM, OR presence of Maurilio rods  No     Prognostic Scoring System: CPSS-Mol      Criteria Score     Cytogenetic Risk Low: normal, isolated -Y  Intermediate: all else  High: Tri 8, complex, chrom 7   0    ASXL1 WT/Mut  1    NRAS WT/Mut  0    RUNX1 WT/Mut  0    SETBP1 WT/Mut  0    Total Genetic Score Low = 0  Intermediate-1 = 1  Intermediate-2 = 2  High = >3 (Max 3 points) 1 Genetic risk points for CPSS-mol   (Max 3):  1   BM Blasts <5% or >5%  0    WBC count <13 or > 13  0    Transfusion dependent No/Yes  0    CPSS-mol   Risk group Low = 0  Intermediate-1 = 1  Intermediate-2 = 2-3  High = >4    1     The CPSS-Mol was able to identify 4 risk groups having a signi?cantly different OS, with median survival time >144, and 68, 30, and 17 months in the low, intermediate-1, intermediate-2, and high-risk group, respectively. Mr. Amado Butler has a Intermediate-1 risk disease with an estimated median survival of 68 months. The 4 risk groups also showed a signi?cantly different cumulative incidence of leukemic evolution, with a 48-month cumulative incidence of AML evolution of 0%, 8%, 24%, and 52% for the low, intermediate-1, intermediate-2, and high-risk group respectively. Mr. Amado Butler has a Intermediate-1 risk disease with an estimated 4 year cumulative AML evolution risk of 8 %.     Will need to obtain BCR-ABL1 testing and PDGFRA, PDGFRB, FGFR1 and PCM-JAK2 to rule out other disorders if eosinophilia is present.     A number of conditions can also cause monocytosis such as asplenic state, inflamma AAS XL 1 tory conditions and autoimmune disorders, major depression, steroids or colony stimulating factors.        Assessment and Plan  Mr. Amado Butler is a 63 year old male who is here for further evaluation  and/or management of chronic myelomonocytic leukemia.    Oncology:  Chronic myelomonocytic leukemia  WHO Classification: Dysplastic-CMML  Date of diagnosis: 8/14/2023  CPSS Mol score: 1-intermediate 1  Bone Marrow Blast %: 2   Cytogenetics: normal karyotype, 46 XY  Molecular: SRSF2, TET 2, TET 2, ASXL1  Past Treatment: None  Current Treatment: TBD    I discussed the pathophysiology and natural history of CMML with Mr. Amado Butler today. We went over the current prognostic models and scoring systems that are used in clinical practice as well as the potential for disease evolution into acute myeloid leukemia. We went over the current FDA approved treatments for CMML and covered that the only curative option is through an allogeneic hematopoietic cell transplantation. His disease is intermediate 1 risk and we will revisit this as needed.    We discussed about supportive care for lower risk disease and CMML including erythropoietin stimulating agents as well as thrombopoietin receptor agonist for thrombocytopenia.  At this point of time we will take a wait and watch approach. His counts have recovered.     Plan:  - RTC with MD with labs prior in 3 months.    Hematology:  Anemia/Thrombocytopenia:   Transfuse leukocyte reduced and irradiated blood products: 1 unit pRBC if hgb is 7.0-7.9, 2 units pRBCs if Hgb 6.0-6.9 g/dl, 1 unit platelets if PLT count is <10,000 or <50,000 with clinical bleeding.    Immunocompromised:  As a result of his disease and/or previous treatment. Mr. Amado Butler is immunocompromised. his last ANC is >500 and there is no need for prophylactic antibiotics at this time.     Cardiac:  Mr. Amado Butler has no history of heart disease.     Renal:  Creatinine results reviewed today. Mr. Amado Butler does not have any renal disease.    Hepatic:  Liver function tests reviewed today.    Psychosocial:  Mr. Amado Butler is coping well with his diagnosis.     All other questions and concerns were  addressed and answered to Mr. Amado Butler's satisfaction.    Today, I spent a total of 40 minutes of face-to-face and non face-to-face time with Mr. Amado Butler. Time spent included review and discussion of diagnostic test results, patient counseling, and coordination of care.    Jamal Rodney MD    Division of Hematology, Oncology and Transplantation  [facility]  P: [phone]

## 2024-01-17 NOTE — NURSING NOTE
"Oncology Rooming Note    January 17, 2024 12:46 PM   Amado Butler is a 63 year old male who presents for:    Chief Complaint   Patient presents with    Labs Only     Labs drawn via VPT by RN, ELSA done    Oncology Clinic Visit     CMML (chronic myelomonocytic leukemia)     Initial Vitals: BP (!) 142/78 (BP Location: Left arm, Patient Position: Sitting, Cuff Size: Adult Regular)   Pulse 58   Temp 97.6  F (36.4  C) (Oral)   Resp 16   Wt 97.5 kg (215 lb)   SpO2 99%   BMI 26.59 kg/m   Estimated body mass index is 26.59 kg/m  as calculated from the following:    Height as of 10/18/23: 1.915 m (6' 3.39\").    Weight as of this encounter: 97.5 kg (215 lb). Body surface area is 2.28 meters squared.  No Pain (0) Comment: Data Unavailable   No LMP for male patient.  Allergies reviewed: Yes  Medications reviewed: Yes    Medications: Medication refills not needed today.  Pharmacy name entered into [employer]: [id number] [address] [facility]    Frailty Screening:   Is the patient here for a new oncology consult visit in cancer care? 2. No      Clinical concerns: none      Sanjana Calderon, EMT  1/17/2024              "

## 2024-02-21 LAB
PATH REPORT.ADDENDUM SPEC: ABNORMAL
PATH REPORT.COMMENTS IMP SPEC: ABNORMAL
PATH REPORT.COMMENTS IMP SPEC: YES
PATH REPORT.FINAL DX SPEC: ABNORMAL
PATH REPORT.GROSS SPEC: ABNORMAL
PATH REPORT.MICROSCOPIC SPEC OTHER STN: ABNORMAL
PATH REPORT.MICROSCOPIC SPEC OTHER STN: ABNORMAL
PATH REPORT.RELEVANT HX SPEC: ABNORMAL
PHOTO IMAGE: ABNORMAL

## 2024-02-28 NOTE — PROCEDURES
Radiotherapy Treatment Summary          Date of Report: January 15, 2024     PATIENT: MARIO BUTLER  MEDICAL RECORD NO: [id number]  : 1960     DIAGNOSIS: D32.0 Benign neoplasm of cerebral meninges  INTENT OF RADIOTHERAPY: Cure  PATHOLOGY: Meningioma, presumed WHO grade 1                                  STAGE: n/a  CONCURRENT SYSTEMIC THERAPY:  no                  Details of the treatments summarized below are found in records kept in the Department of Radiation Oncology at   [facility].     Treatment Summary:  Radiation Oncology - Course: 1 Protocol:   Treatment Site Current Dose Modality From To Elapsed Days Fx.  1 R ON Meningioma  5,040 cGy 06 X 12/05/2023  1/15/2024  41 28          Dose per Fraction: 180 cGy     Total Dose: 5040 cGy            COMMENTS:                      Mario Butler is a 63 year old with a history of CMML, who initially presented with subacute right-sided periorbital pain   and vision loss, s/p frontotemporal craniotomy for decompression and resection of right optic nerve mass (23),   confirmed to be a meningioma on pathology (grade unable to be determined due to crush artifact though presumed Grade   1), who was referred to Radiation Oncology for consideration of postoperative radiation therapy. He proceeded with post-  operative IMRT. During treatment, the patient experienced grade 1 fatigue and grade 1 nausea. Mario otherwise   completed therapy with no further issues.     ED visits/hospitalizations: None     Missed treatments: 23, 24  (planned for holidays)     Acute Toxicity Profile by CTC v5.0: grade 1 fatigue and nausea     PAIN MANAGEMENT: 4-5 pain near scalp surgical site, uses over the counter pain medication as needed       FOLLOW UP PLAN: Return to clinic with Dr. Mccullough after MRI brain with perfusion in 3 months                   Resident Physician: Omar Khan M.D.   Staff Physician: Kaylee Mccullough M.D. PhD     CC: Dianne Banks MD; [facility]. Shi  [facility]                                   Radiation Oncology:  [facility] [address] [place] [address]

## 2024-03-29 NOTE — PROGRESS NOTES
Virtual Visit Details    Type of service:  Video Visit   Video Start Time:  12:06pm  Video End Time: 1:09pm  Originating Location (pt. Location): Home  Distant Location (provider location):  Off-site  Platform used for Video Visit: Rocio    4/1/2024    Referring Provider: Danelle Schaffer MD    Present for Today's Visit: Amado    Presenting Information:   I met with Amado Butler today for genetic counseling (video visit due to covid19) to discuss his personal and family history of cancer.  He is here today to review this history, cancer screening recommendations, and available genetic testing options.    Personal History:  Amado is a 63 year old male. 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 He underwent a bone marrow biopsy on 8/15/2023 due to significant cytopenias that showed chronic myelomonocytic leukemia (CMML); 50% cellularity [mildly hypercellular for his age] without significant dysplasia in all 3 cell lines and without increase in blasts. He had normal cytogenetics and BCR-ABL FISH was negative. He sees Dr. Rodney and is following a wait and watch approach.       His most recent PSA in 2022 was 2.08. Most recent colonoscopy in 2021 results in the removal of two 2-3mm tubular adenomas and follow-up was recommended in 5 years. He does not regularly do any other cancer screening at this time.  Amado reported no tobacco use, and about 2 drinks per week for alcohol use.    Family History: Cancer family history and pertinent information reviewed below (Please see scanned pedigree for detailed family history information)  Brother, age 67, has a history of a unilateral acoustic neuroma at age 38, two basal cell carcinomas at ages 54 and 56 (Amado reports significant sun exposure for his brother), and some colon polyps. This brother has a daughter with a history of a melanoma diagnosed at age 31.    Mother passed at age 89 with a history of breast cancer diagnosed at age 72 and a leukemia (unknown type) diagnosed at age 74.  Maternal aunt passed at age 79 with a history of breast cancer diagnosed at age 68 and skin cancer (unknown type) diagnosed at age 70.   Father passed at age 84 with a history of non-metastatic prostate cancer diagnosed at age 72.  Paternal grandmother passed at age 83 with a history of breast cancer diagnosed at age 58.   There is no known Ashkenazi Restorationism ancestry on either side of his family. There is no reported consanguinity.    Discussion:  We reviewed the features of sporadic, familial, and hereditary cancers. We discussed the natural history and genetics of hereditary cancers. A detailed handout regarding the information we discussed will be sent to Amado via [employer]. Topics included: inheritance pattern, cancer risks, cancer screening recommendations, and also risks, benefits and limitations of testing.  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 Neurofibromatosis type 2 is caused by mutations in the NF2 gene and is associated with non-cancerous tumors in the nervous system, most commonly bilateral acoustic neuromas (vestibular schwannomas). Multiple meningiomas are also common in NF2. Other features of NF2 include ependyomas, non-vestibular schwannomas, and ocular abnormalities including juvenile subcapsular or cortical cataract, retinal hamartoma, and epiretinal membrane. We reviewed that there can be other hereditary causes of meningiomas including but not limited to Nevoid basal cell carcinoma syndrome, Cowden syndrome, Devine syndrome, and BAP1-tumor predisposition syndrome.   We discussed that, while Amado doesn't meet any genetic testing criteria for hereditary cancer syndromes, it wouldn't be unreasonable for him to consider genetic testing for at least NF2  mutations. I did share that I would be more suspicious if his brother had bilateral acoustic neuromas or was diagnosed with his acoustic neuroma before age 30 and/or had a meningioma himself or if Amado himself had history of other NF2-related features. We also reviewed that given Amado's leukemia history, should he wish to pursue genetic testing at any point in the future, his testing would need to be performed via skin biopsy and cannot be completed via blood or saliva sample. He was comfortable declining testing at this time.   Amado is encouraged to reach out with any family history updates as this may change testing/screening recommendations. He should continue to follow his providers' recommendations for the treatment/follow up of his leukemia history and his meningioma history.     Plan:  1) Amado declined genetic testing at this time, which is reasonable.   2) He is encouraged to reach out with any updates to his family history or should he chose to pursue genetic testing at any point in the future.     Candace Reilly MS, [facility] [place]  Licensed, Certified Genetic Counselor  [facility]  [email or web address]

## 2024-04-01 ENCOUNTER — VIRTUAL VISIT (OUTPATIENT)
Dept: ONCOLOGY | Facility: CLINIC | Age: 64
End: 2024-04-01
Attending: STUDENT IN AN ORGANIZED HEALTH CARE EDUCATION/TRAINING PROGRAM
Payer: COMMERCIAL

## 2024-04-01 DIAGNOSIS — D32.9 MENINGIOMA (H): ICD-10-CM

## 2024-04-01 DIAGNOSIS — D32.9 MENINGIOMA (H): Primary | ICD-10-CM

## 2024-04-01 DIAGNOSIS — C93.10 CHRONIC MYELOMONOCYTIC LEUKEMIA NOT HAVING ACHIEVED REMISSION (H): Primary | ICD-10-CM

## 2024-04-01 PROCEDURE — 96040 HC GENETIC COUNSELING, EACH 30 MINUTES: CPT | Mod: GT,95 | Performed by: GENETIC COUNSELOR, MS

## 2024-04-01 NOTE — NURSING NOTE
Is the patient currently in the state of [place]? YES    Visit mode:VIDEO    If the visit is dropped, the patient can be reconnected by: VIDEO VISIT: Send to e-mail at: [email or web address]    Will anyone else be joining the visit? Wife is present  (If patient encounters technical issues they should call [phone]    How would you like to obtain your AVS? MyChart    Are changes needed to the allergy or medication list? N/A    Reason for visit: Consult     Kelly ESTRADA

## 2024-04-01 NOTE — LETTER
4/1/2024         RE: Amado Butler  [address] [address]        Dear Colleague,    Thank you for referring your patient, Amado Butler, to the [facility]. Please see a copy of my visit note below.    Virtual Visit Details    Type of service:  Video Visit   Video Start Time:  12:06pm  Video End Time: 1:09pm  Originating Location (pt. Location): Home  Distant Location (provider location):  Off-site  Platform used for Video Visit: PopJax    4/1/2024    Referring Provider: Danelle Schaffer MD    Present for Today's Visit: Amado    Presenting Information:   I met with Amado Butler today for genetic counseling (video visit due to covid19) to discuss his personal and family history of cancer.  He is here today to review this history, cancer screening recommendations, and available genetic testing options.    Personal History:  Amado is a 63 year old male. 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 He underwent a bone marrow biopsy on 8/15/2023 due to significant cytopenias that showed chronic myelomonocytic leukemia (CMML); 50% cellularity [mildly hypercellular for his age] without significant dysplasia in all 3 cell lines and without increase in blasts. He had normal cytogenetics and BCR-ABL FISH was negative. He sees Dr. Rodney and is following a wait and watch approach.       His most recent PSA in 2022 was 2.08. Most recent colonoscopy in 2021 results in the removal of two 2-3mm tubular adenomas and follow-up was recommended in 5 years. He does not regularly do any other cancer screening at this time.  Amado reported no tobacco use, and about 2 drinks per week for alcohol use.    Family History: Cancer family history and pertinent information reviewed below (Please see scanned pedigree for detailed family history information)  Brother, age 67, has a history of a  unilateral acoustic neuroma at age 38, two basal cell carcinomas at ages 54 and 56 (Amado reports significant sun exposure for his brother), and some colon polyps. This brother has a daughter with a history of a melanoma diagnosed at age 31.   Mother passed at age 89 with a history of breast cancer diagnosed at age 72 and a leukemia (unknown type) diagnosed at age 74.  Maternal aunt passed at age 79 with a history of breast cancer diagnosed at age 68 and skin cancer (unknown type) diagnosed at age 70.   Father passed at age 84 with a history of non-metastatic prostate cancer diagnosed at age 72.  Paternal grandmother passed at age 83 with a history of breast cancer diagnosed at age 58.   There is no known Ashkenazi Evangelical ancestry on either side of his family. There is no reported consanguinity.    Discussion:  We reviewed the features of sporadic, familial, and hereditary cancers. We discussed the natural history and genetics of hereditary cancers. A detailed handout regarding the information we discussed will be sent to Amado via [employer]. Topics included: inheritance pattern, cancer risks, cancer screening recommendations, and also risks, benefits and limitations of testing.  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 Neurofibromatosis type 2 is caused by mutations in the NF2 gene and is associated with non-cancerous tumors in the nervous system, most commonly bilateral acoustic neuromas (vestibular schwannomas). Multiple meningiomas are also common in NF2. Other features of NF2 include ependyomas, non-vestibular schwannomas, and ocular abnormalities including juvenile subcapsular or cortical cataract, retinal hamartoma, and epiretinal membrane. We reviewed that there can be other hereditary causes of meningiomas including but not limited to Nevoid basal cell carcinoma syndrome, Cowden syndrome, Devine  syndrome, and BAP1-tumor predisposition syndrome.   We discussed that, while Amado doesn't meet any genetic testing criteria for hereditary cancer syndromes, it wouldn't be unreasonable for him to consider genetic testing for at least NF2 mutations. I did share that I would be more suspicious if his brother had bilateral acoustic neuromas or was diagnosed with his acoustic neuroma before age 30 and/or had a meningioma himself or if Amado himself had history of other NF2-related features. We also reviewed that given Amado's leukemia history, should he wish to pursue genetic testing at any point in the future, his testing would need to be performed via skin biopsy and cannot be completed via blood or saliva sample. He was comfortable declining testing at this time.   Amado is encouraged to reach out with any family history updates as this may change testing/screening recommendations. He should continue to follow his providers' recommendations for the treatment/follow up of his leukemia history and his meningioma history.     Plan:  1) Amado declined genetic testing at this time, which is reasonable.   2) He is encouraged to reach out with any updates to his family history or should he chose to pursue genetic testing at any point in the future.     Candace Reilly MS, [facility] [place]  Licensed, Certified Genetic Counselor  [facility]  [email or web address]

## 2024-04-05 NOTE — PATIENT INSTRUCTIONS
Conditions discussed today:  Neurofibromatosis type 2    Assessing Cancer Risk  Only about 5-10% of cancers are thought to be due to an inherited cancer susceptibility gene.    These families often have:  Several people with the same or related types of cancer  Cancers diagnosed at a young age (before age 50)  Individuals with more than one primary cancer  Multiple generations of the family affected with cancer    Genetic Testing  Genetic testing involves a blood or saliva test and will look at the genetic information in select genes for any harmful mutations that are associated with increased cancer risk.  If possible, it is recommended that the person(s) who has had cancer be tested before other family members.  That person will give us the most useful information about whether or not a specific gene is associated with the cancer in the family.     Results  There are three possible results of genetic testing:  Positive--a harmful mutation was identified  Negative--no mutation was identified  Variant of unknown significance--a variation in one of the genes was identified, but it is unclear how this impacts cancer risk in the family    Advantages and Disadvantages  There are advantages and disadvantages to genetic testing.    Advantages  May clarify your cancer risk  Can help you make medical decisions  May explain the cancers in your family  May give useful information to your family members (if you share your results)    Disadvantages  Possible negative emotional impact of learning about inherited cancer risk  Uncertainty in interpreting a negative test result in some situations  Possible genetic discrimination concerns (see below)    Inheritance   Mutations in most cancer risk genes are inherited in an autosomal dominant pattern.  This means that if a parent has a mutation, each of their children will have a 50% chance of inheriting that same mutation.  Therefore, each child would have a 50% chance of being at  increased risk for developing cancer.                                              Image obtained from Genetics Home Reference, 2013     Genetic Information Nondiscrimination Act (OSIEL)  OSIEL is a federal law that protects individuals from health insurance or employment discrimination based on a genetic test result alone.  Although rare, there are currently no legal protections in terms of life insurance, long term care, or disability insurances.  Visit the National [employer] [place] at Genome.gov/94730365 to learn more.    Reducing Cancer Risk  If a harmful mutation is found in a cancer risk gene, there may be certain screening tests or preventative surgeries that can be offered. This information will be discussed after genetic testing is completed. If no mutations are found on genetic testing, screening is then recommended based on personal and/or family history of cancer.     Questions to Think About Regarding Genetic Testing  What effect will the test result have on me and my relationship with my family members if I have an inherited gene mutation?  If I don t have a gene mutation?  Should I share my test results, and how will my family react to this news, which may also affect them?  Are my children ready to learn new information that may one day affect their own health?    Resources  American Cancer Society (ACS) cancer.org   National Cancer [place] (NCI) cancer.gov     Please call us if you have any questions or concerns.   Cancer Risk Management Program [phone] ([phone])

## 2024-04-07 NOTE — PROGRESS NOTES
1. 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 Status post radiation therapy.  No indication today of spread or involvement of the left optic nerve.  Follow-up neuro-imaging per radiation oncology- Dr. Mccullough.     2. 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 Patient underwent optic canal decompression and biopsy which resulted in meningioma diagnosis. He was also diagnosed with CMML that is currently being monitored by oncology. He is now s/p radiation for the meningioma last dosage 1/2023. Today he presents with NLP vision OD and stable 20/20 vision OS. No VF defects OS with stable borderline temp thinning. OD there is severe diffuse RNFL thinning which is decreased from September 2023 but to be expected after initial injury from meningioma. Will wait for repeat MRI Orbit to evaluate for progression or improvement of optic nerve compression and will bring back for repeat OCT RNFL to observe stability in thinning OD.     Plan   Review upcoming MRI Orbit   Repeat OCT RNFL   Return to clinic-neuro-ophthalmology- 3 months    Next MRI in 1 week.     Addendum:  MRI brain 4/18/24  1.  Right frontal craniotomy with decreased underlying dural  enhancement extending along the middle cranial fossa and cavernous  sinus with unchanged encasement of the cisternal and intracanalicular  segments of the right optic nerve favored to represent posttreatment  changes. Attentional follow-up.  2.  Decreased adjacent T2/FLAIR hyperintense signal in the right  frontal lobe which could also be posttreatment related.  3.  Stable sequelae of right optic neuritis.    I reviewed the images and agree except that the T2 hyperintensity signal  VAD CLINIC NOTE      Date: 2021   Patient Name: Carlos Enrique Tanner Jr.  : 1956  PCP: Shayna Krueger DO    Chief complaint: \"I really feel great\"    Subj: Patient seen and examined for hospital follow up appointment. Patient recently admitted after multiple LFAs and HWAs. Patient asymptomatic with circumfrential thrombus present around outflow graft. Patient denies any further VAD alarms since being discharged. Reports occ HA in AM and some petechiae on hands since starting norvasc. Denies any other complaints.     HPI from Recent Hospitalization:  Carlos Enrique Tanner Jr. is a 65 year old male with a history of ischemic cardiomyopathy s/p HVAD implantation in 2019 (destination therapy due to liver cirrhosis), Atrial fibrillation, and drive line infection who presented to the VAD clinic on 21 for low flow  and high watt alarms. He was advised to increase oral fluid intake with no improvement. Pt was seen in clinic on 21 and had his VAD speed decreased from 2760 rpm to 2700 rpm d/t suction like appearance on his VAD waveform. VAD speed again decreased in clinic on 21 d/t suction. Pt was then sent to ED for further VAD evaluation. Upon admission to the ED, labs were significant for LFTs. Pt underwent RHC for further hemodynamic assessment -  CVP 10, PA 22, PCP 13, CI 2.5, CO 5.2 (thermodilution), HVAD 2660RPM, flowing 3L/min.     Key Events from recent hospitalization:  21: Afib, MAPs 90s-100s. BUN 12. Cr 1.08. CO 6.3. CI 3.1. INR 2.7. Net negative 1.3 L/24hrs.  Recent CT chest findings: possible thrombus around outflow graft, may be interfering with VAD flow. Recent ECHO: AoV opening 1:1, RV dilated, no MR  7/3/21: HR stable @ 50s. MAPs 90s-100s. BUN 11. Cr 1.06. CO 6.4. CI 3.2. INR 3.2. Net negative 154cc/24hrs. No low flow alarms last night.  21: HR 50s. MAPs 100-120 this AM.Cr up to 1.42. BUN 15. WBC stable. Hgb 10.5. INR 3.3. Net negative 1L. VAD flows 3.5-4.1 L/min. No LFA.    7/5/21: HR 5 stable @ 50-60. MAPs 80s-90s. Cr 1.26. BUN 18. WBC 8.9. Hgb 10.6. INR 2.9. Net negative 1.6L/24hrs. No VAD alarms.   7/6/21: MAP 79 mmHg, BUN 15, Cr 1.24. Hgb 11.4, WBC 8.2. I/o= -2890 ml/24 hr. No VAD alarms.       ROS:  CONSTITUTIONAL:  No weight loss, fever, chills, sweats.  HEENT:  No visual loss, blurred vision, double vision. No hearing loss, sneezing, congestion, runny nose or sore throat.  SKIN:  No rash or itching.  CARDIOVASCULAR:  No chest pain, chest pressure or chest discomfort. No palpitations, PND, orthopnea or edema.  RESPIRATORY:  No shortness of breath, cough or sputum.  GASTROINTESTINAL:  No anorexia, nausea, vomiting or diarrhea. No abdominal pain or blood. +bloating  GENITOURINARY:  No burning on urination, no decreased urine output.   NEUROLOGICAL:  No headache, dizziness, syncope, paralysis, ataxia, numbness or tingling in the extremities.   MUSCULOSKELETAL:  No joint redness or swelling. No recent trauma.  HEMATOLOGIC:  No anemia, bleeding or bruising.  PSYCHIATRIC:  No history of depression or anxiety.  ENDOCRINOLOGIC:  No reports of sweating, cold or heat intolerance. No polyuria or polydipsia.      Past Medical History:   Diagnosis Date   • LVAD (left ventricular assist device) present (CMS/[facility]) 04/18/2019    HVAD       Past Surgical History:   Procedure Laterality Date   • Left ventricular assist device N/A 04/18/2019    HVAD       No family history on file.    Social History     Tobacco Use   • Smoking status: Never Smoker   • Smokeless tobacco: Never Used   Vaping Use   • Vaping Use: never used   Substance Use Topics   • Alcohol use: Never   • Drug use: Never       Current Outpatient Medications   Medication Sig Dispense Refill   • hydrALAZINE (APRESOLINE) 100 MG tablet Take 1 tablet by mouth every 8 hours. 30 tablet 3   • warfarin (COUMADIN) 5 MG tablet Take as directed by the VAD team. INR 7/6/21= 2.2. Take Coumadin 5mg 7/6/21Tuesday. Take Coumadin 2.5mg 7/7/21 &  change in the right optic nerve is simply the result of meningioma related optic atrophy rather than prior optic neuritis.    Amado Butler is a pleasant 63 year old White male who presents to my neuro-ophthalmology clinic today having been referred by Dr. Mccullough for second opinion for optic nerve sheath meningioma s/p resection and radiation      HPI:    He was diagnosed with a right optic nerve sheath meningioma in July 2023; treatment included resection and radiation from December 2023-January 2024. He received a total of 5040 cGy over 28 fractions completed Jan 2024. He underwent a bone marrow biopsy on 8/15/2023 due to significant cytopenias that showed chronic myelomonocytic leukemia (CMML); 50% cellularity [mildly hypercellular for his age] without significant dysplasia in all 3 cell lines and without increase in blasts. He had normal cytogenetics and BCR-ABL FISH was negative.     Patient was previously seen by Dr. Serrano with last visit on 9/11/23.     VIsion loss initially noted in June 2023.  Per chart review patient on 7/5/23 had a temporal artery biopsy due to concern for GCA in the right eye. Pathology was negative for GCA. Patient went to [place] and experienced worsening vision in his right eye several times. On 7/24/23 he went to [facility]. He was restarted on high dose 1000 mg IV steroids. The vision in the right eye improved with the high dose IV steroids. He was seen again the following Monday, however over the week the vision deteriorated some but not significantly. On 7/31/23 he was seen in the eye clinic again and was admitted directly to the hospital on a Neurology floor for 5 days. He underwent another course of daily 1000 mg IV steroids. He underwent PET and MRI imaging and was diagnosed with optic perineuritis. He had extensive testing but nothing has come back positive. He is currently taking 80 mg daily of steroids. He is also taking an antiviral and antibiotic. The vision  7/8/21. Repeat INR at [facility] 7/9/21. 45 tablet 3   • polyethylene glycol (MIRALAX) 17 g packet Take 17 g by mouth daily. Do not start before July 7, 2021. Stir and dissolve powder in any 4 to 8 ounces of beverage, then drink. 4 packet 0   • AMIODarone (PACERONE) 200 MG tablet Take 1 tablet by mouth daily. 90 tablet 3   • aspirin (ECOTRIN) 325 MG EC tablet Take 1 tablet by mouth daily. 90 tablet 3   • rosuvastatin (Crestor) 10 MG tablet Take 1 tablet by mouth daily. 90 tablet 3   • cephalexin (Keflex) 500 MG capsule Take 1 capsule by mouth every 8 hours. 90 capsule 11   • PARoxetine (PAXIL) 20 MG tablet Take 20 mg by mouth daily.     • acetaminophen (TYLENOL) 325 MG tablet Take 325 mg by mouth as needed.     • clindamycin (CLEOCIN) 300 MG capsule Take 2 capsules one hour before dental procedure. 2 capsule 0   • sitaGLIPTIN-metFORMIN (JANUMET)  MG per tablet Take 1 tablet by mouth daily.     • levothyroxine 100 MCG capsule Take 100 mcg by mouth daily.     • oxymetazoline (AFRIN) 0.05 % nasal spray Spray 2 sprays in each nostril as needed.     • sildenafil (REVATIO) 20 MG tablet Take 20 mg by mouth 3 times daily.     • lisinopril (ZESTRIL) 5 MG tablet Take 1 tablet by mouth daily. 30 tablet 3     No current facility-administered medications for this visit.       ALLERGIES:   Allergen Reactions   • Amoxicillin Runny Nose     congestion             Physical exam:  Visit Vitals  /85 (BP Location: LUE - Left upper extremity, Patient Position: Sitting, Cuff Size: Regular)   Pulse 60   Temp 98.2 °F (36.8 °C) (Oral)   Resp 18   Wt 88.8 kg (195 lb 12.3 oz)   SpO2 99%   BMI 27.30 kg/m²       GENERAL:  NAD, alert and oriented x 3.   HEENT:  Sclerae were anicteric and oropharynx was clear.      CHEST: The lungs were clear to auscultation. No rales  CV:  Hypertensive, Afib    Audible VAD tones     No gallops, murmurs or rubs.     The PMI was not enlarged or displaced. No RV heave.    There was no  improved on oral steroids, antivirals, and oral abx. He was hospitalized again his vision continued to deteriorate throughout admission. He was discharged on oral prednisone and his vision continued to deteriorate. Patient subsequently underwent PLEX treatment without improvement in vision.     He is s/p right frontotemporal craniotomy and decompression of the right optic canal and orbital apex for resection of right optic canal and intradural mass 2023 of which pathology showed optic nerve meningioma.     He was also diagnosed with CMML which oncology is monitoring for now. He is also now s/p radiation treatment and is here for a second opinion.    Since completing radiation in January patient feels like his vision in the left eye isn't as sharp in his left eye. Patient has repeat MRI next week. Patient is wondering what next steps are from a neuro-ophthalmology standpoint. Patient is wanting to establish care with Dr. Waldron.     Independent historians:  Patient    Review of outside testin23 MRI Orbit WWO:  Impression:   Postoperative changes following right orbital mass resection,  including right frontal extra-axial fluid collection and dural  thickening, mild mass effect on the right frontal lobe, inflammatory  changes within and surrounding the orbit. Continued T2 hyperintensity  within the subcortical white matter of the right frontal lobe beneath  the fluid collection with decreased extension of the T2 hyperintensity  in the sulci of the right frontal lobe compared to prior. No abnormal  restricted diffusion to suggest empyema or sulcal enhancement to  suggest leptomeningitis. Recommend attention on follow-up imaging as  postoperative changes improve.    MRI Brain/Orbits 7/3/23  HEAD MRI:  1.  No acute infarct.  2.  Age-related changes described above.  3.  Right frontal lobe small area tissue loss and gliosis.     ORBIT MRI:  1.  Unremarkable MRI of the orbits.   2.  No evidence for optic  hepatojugular reflex  ABDOMEN: Soft, non-distended with active bowel sounds.     No hepatomegaly. No ascites  EXTREM: Warm, well-perfused and with no cyanosis, clubbing    No peripheral edema.   SKIN:  No ulcerations or rashes.   NEURO: Non-focal.  PSYCH:  Normal affect, normal mood    VAD Interrogation  Device Type: [employer] HVAD   Implant Date: 19  VAD Flows 3.6  LPM   Pump Speed   2660    RPM    4  Power   Peak 6, Trough 2, Normal pulsatility and No negative deflections  HCT: 36%  Alarm Settings:  Low Flow Alarm Settin.8  High Watt Alarm Settin.5  Alarms:No Alarms and Last alarm on 21, low flow.   Changes Made:None  Waveforms Sent: yes, battery switching noted. Also 1 battery noted to have no lights illuminating on battery gauge. Replaced 2 batteries.    Labs:  Sodium (mmol/L)   Date Value   2021 136     Potassium (mmol/L)   Date Value   2021 4.3     Chloride (mmol/L)   Date Value   2021 103     Glucose (mg/dL)   Date Value   2021 94     Calcium (mg/dL)   Date Value   2021 9.2     Carbon Dioxide (mmol/L)   Date Value   2021 28     BUN (mg/dL)   Date Value   2021 17     Creatinine (mg/dL)   Date Value   2021 1.29 (H)       WBC (K/mcL)   Date Value   2021 9.8     RBC (mil/mcL)   Date Value   2021 4.51     HCT (%)   Date Value   2021 37.8 (L)     HGB (g/dL)   Date Value   2021 11.5 (L)     PLT (K/mcL)   Date Value   2021 233       TSH (mcUnits/mL)   Date Value   2021 2.745     NT-proBNP (pg/mL)   Date Value   2021 462 (H)       Encounter Date: 21   Electrocardiogram 12-Lead   Result Value    Ventricular Rate EKG/Min (BPM) 50    Atrial Rate (BPM) 0    MT-Interval (MSEC) 262    QRS-Interval (MSEC) 156    QT-Interval (MSEC) 596    QTc 544    R Axis (Degrees) -121    T Axis (Degrees) 126    REPORT TEXT      Atrial fibrillation  RBBB, 2 second pause  Inferior infarct  , age undetermined  Marked T wave  neuritis..     MRI Brain/Orbits 6/28/23  HEAD MRI:   1.  Right frontal lobe encephalomalacia and surrounding gliotic   change without acute intracranial abnormality.     ORBIT MRI:   1.  Unremarkable orbits      Ehrlichia - negative  Babesia- negative  Anaplasma - negative  Lysozyme elevated to >10  IgG subclasses - normal  ANCA elevated to 1:20  Lyme negative  Treponema negative  BUDDY negative  ACE decreased to <10  CNS demyelinating panel negative  CRP <3  ESR 2  CBC normal        Temporal artery biopsy 7/5/23  Temporal artery, right, biopsy:   No evidence of giant cell arteritis.  Moderate arteriosclerosis.  Mild medial calcific sclerosis.      Labs from [facility] 8/1/23  Lyme CNS negative  Hep B surface antibody negative    My interpretation performed today of outside testing:  I have independently reviewed MRI Orbit WWO performed 11/2023 and agree with radiology read.    Review of outside clinical notes:    9/11/23 -- Visit with Dr. Serrano   Optic neuropathy RIGHT eye in setting of right posterior orbital lesion with biopsy showing meningioma.  His vision is stable with increased retinal nerve fiber layer thinning RIGHT eye.  I discussed with Dr. Camacho about possible treatment options in the future due to atypical nature of the meningioma, and agreed that potentially radiation is an option to consider depending on MRI findings on 10/2/23.  We discussed monocular safety precautions and advised glasses for safety.  Follow up in 1 year or sooner as needed for worsening symptoms.       He will discuss with Dr. Camacho regarding right sided head pain.  If not felt to be consistent with postoperative pain, then could consider headache neurology consultation.    1/11/24 -- Visit with Dr. Mccullough   Assessment:    Tolerating radiation therapy well.  All questions and concerns addressed.     Toxicities:  Anorexia: Grade 0: No toxicity  Fatigue: Grade 1: Fatigue relieved by rest  Headache: Grade 0: No  abnormality, consider lateral ischemia  Abnormal ECG  When compared with ECG of  02-JUL-2021 18:32,  Significant changes have occurred  Confirmed by LESLIE HAYES MD ([id number]) on 7/4/2021 1:13:00 PM         Echocardiogram 7/1/21: STUDY CONCLUSIONS  SUMMARY:     1. Left ventricle: The cavity size is mildly dilated. Wall thickness is     mildly increased. The ejection fraction was measured by single plane     method of disks. A left ventricular assist device (LVAD) is visualized,     with an inflow cannula at the apex. LVAD rpm 2660. The ejection     fraction is 18%.  2. Aortic valve: Trivial regurgitation. In correlation with the LVAD, the     leaflets open with each beat.  3. Left atrium: The atrium is severely dilated.  4. Right ventricle: Pacemaker lead noted in right ventricle.  5. Right atrium: Pacemaker lead noted in right atrium. The atrium is     moderately dilated.     --------------------------------------------------------------------------  STUDY DATA:   Procedure:  Transthoracic echocardiography was performed.  Image quality was good.  M-mode, complete 2D, complete spectral Doppler,  and color Doppler.  Study status:  STAT.  Study completion:  There were no  complications.     FINDINGS     LEFT VENTRICLE:  The cavity size is mildly dilated. Wall thickness is  mildly increased. Systolic function is severely reduced.  Severe global  hypokinesis. Regional wall motion abnormalities cannot be excluded. Unable  to accurately assess left ventricular diastolic function parameters due to  LVAD. A left ventricular assist device (LVAD) is visualized, with an  inflow cannula at the apex. LVAD rpm 2660.    The ejection fraction was  measured by single plane method of disks. The ejection fraction is 18%.  The tissue Doppler parameters are abnormal.     AORTIC VALVE:   The valve is trileaflet. The leaflets are mildly  calcified.  Doppler:  Transvalvular velocity is within the normal range.  There is no stenosis.   toxicity  Nausea: Grade 0: No toxicity  Mucositis: Grade 0: No toxicity  Dermatitis: Grade 0: No toxicity     Plan:   Continue current therapy.    MRI perfusion in 3 months, follow-up with Dr. Mccullough Thursday after scan   Follow-up with neuro-ophthalmology Dr. Waldron    Past medical history:    Patient Active Problem List   Diagnosis    Vision loss of right eye    Vision, loss, sudden, right    Steroid-induced hyperglycemia    CMML (chronic myelomonocytic leukemia) (H)       Patient has a current medication list which includes the following prescription(s): carvedilol, lisinopril-hydrochlorothiazide, magnesium oxide -mg supplement, metformin, and multivitamin w/minerals.. List is confirmed today.    Family history / social history:  Patient's family history includes Leukemia in his mother.     Patient  reports that he has never smoked. He has never been exposed to tobacco smoke. He has never used smokeless tobacco. He reports that he does not currently use alcohol. He reports that he does not currently use drugs.     Exam:  VA NLP OD, 20/20 OS. Pupils with right rAPD. Color vision 0/11 OD, 11/11 OS. Anterior segment shows mixed cataracts in both eyes more so on the left.  The right optic nerve shows severe pallor with shallow cupping.  The left optic nerve head appears normal.    Please see epic chart for complete exam     Tests ordered and interpreted today:     OCT RNFL:  diffuse severe thinning OD, borderline temp thinning compared to age-matched controls (likely physiologic given his -8 myopia) - stable OS     Octopus automated 30 degree visual field was reliable and essentially full in the left eye         Alberto Abdi MD   Fellow, Neuro-Ophthalmology    35 minutes were spent on the date of the encounter by me doing chart review, history and exam, documentation, and further activities as noted above    Complete documentation of historical and exam elements from today's encounter can be found in the  Trivial regurgitation. In correlation with the  LVAD, the leaflets open with each beat.     AORTA:  Aortic root: The aortic root is normal in size.     MITRAL VALVE:  The leaflets are normal thickness.  Doppler:  Transvalvular  velocity is within the normal range. There is no evidence for stenosis.  Trivial regurgitation.     LEFT ATRIUM:  The atrium is severely dilated.     RIGHT VENTRICLE:  Pacemaker lead noted in right ventricle. The cavity size  is dilated. Systolic function is reduced. Systolic pressure is mildly  increased. The estimated peak pressure is 39mm Hg.     PULMONIC VALVE:   The leaflets are normal thickness.  Doppler:   Trivial  regurgitation.     TRICUSPID VALVE:   Structurally normal valve.    Doppler:   Mild-moderate  regurgitation.     RIGHT ATRIUM:  Pacemaker lead noted in right atrium. The atrium is  moderately dilated.     PERICARDIUM:  There is no pericardial effusion.     SYSTEMIC VEINS:  Inferior vena cava: The vessel is dilated. The respirophasic diameter  changes are in the normal range (greater than or equal to 50%).     BASELINE ECG:   Bradycardia.     --------------------------------------------------------------------------  Measurements      Left ventricle         Value        05/05/2021 Ref       Ventricular septum    Value        05/05/2021 Ref   ANTELMO, LAX chord    (H)  5.9   cm     5.6        4.2 -     IVS, ED               1.0   cm     1.0        0.6 - 1.0                                                  5.8   ESD, LAX chord    (H)  5.6   cm     5.0        2.5 -     Right ventricle       Value        05/05/2021 Ref                                                  4.0       ANTELMO, LAX              4.1   cm     4.1        ---------   ANTELMO/bsa, LAX           2.9   cm/m^2 2.6        2.2 -     Pressure, S           39    mm Hg  ---------- ---------   chord                                          3.0   ESD/bsa, LAX      (H)  2.7   cm/m^2 2.4        1.3 -     Left atrium           Value    full encounter summary report (not reduplicated in this progress note).  I personally obtained the chief complaint(s) and history of present illness.  I confirmed and edited as necessary the review of systems, past medical/surgical history, family history, social history, and examination findings as documented by others; and I examined the patient myself.  I personally reviewed the relevant tests, images, and reports as documented above.  I formulated and edited as necessary the assessment and plan and discussed the findings and management plan with the patient and family.  I personally reviewed the ophthalmic test(s) associated with this encounter, agree with the interpretation(s) as documented by the resident/fellow, and have edited the corresponding report(s) as necessary.     Roderick Waldron MD                    05/05/2021 Ref   chord                                          2.1       AP dim, ES    (H)     5.3   cm     4.6        3.0 - 4.0   PW, ED, LAX       (H)  1.2   cm     1.1        0.6 -     AP dim index  (H)     2.6   cm/m^2 2.2        1.5 - 2.3                                                  1.0       Area ES, A4C  (H)     27    cm^2   18         <=20   ANTELMO major ax, A4C      7.9   cm     6.3        --------  Vol, S        (H)     96    ml     54         18 - 58   ESD major ax, A4C      8.1   cm     5.6        --------  Vol/bsa, S    (H)     47    ml/m^2 25         16 - 34   FS major axis,         -3    %      12         --------  Vol, ES, 1-p  (H)     96    ml     53         18 - 58   A4C                                                      A4C   ANTELMO/bsa major ax,      3.8   cm/m^2 3.0        --------  Vol/bsa, ES,  (H)     47    ml/m^2 25         12 - 37   A4C                                                      1-p A4C   ESD/bsa major ax,      4.0   cm/m^2 2.6        --------   A4C                                                      Mitral valve          Value        05/05/2021 Ref   ZAKIYA, A4C               41.0  cm^2   26.2       --------  Peak E                0.58  m/sec  ---------- ---------   CUAUHTEMOC, A4C               36.8  cm^2   21.5       --------  Peak A                0.28  m/sec  ---------- ---------   FAC, A4C               10    %      18         --------  Decel time            222   ms     ---------- ---------   PW, ED            (H)  1.2   cm     1.1        0.6 -     Peak E/A              2.1          ---------- ---------                                                  1.0       ratio   IVS/PW, ED             0.81         0.91       --------   EDV               (H)  207   ml     176        62 - 150  Tricuspid valve       Value        05/05/2021 Ref   ESV               (H)  178   ml     125        21 - 61   TR peak v             2.8   m/sec  ---------- <=2.8   EF                (L)  18    %       23         52 - 72   Peak RV-RA            31    mm Hg  ---------- ---------   SV                     19    ml     36         --------  grad, S   EDV/bsa           (H)  101   ml/m^2 83         34 - 74   Max TR casandra            2.77  m/sec  ---------- ---------   ESV/bsa           (H)  87    ml/m^2 59         11 - 31   SV/bsa                 9     ml/m^2 17         --------  Pulmonary artery      Value        05/05/2021 Ref   SV, 1-p A4C            32    ml     21         --------  Pressure, S           39    mm Hg  ---------- ---------   SV/bsa, 1-p A4C        16    ml/m^2 10         --------   ESV, 2-p          (H)  145   ml     68         21 - 61   Systemic veins        Value        05/05/2021 Ref   ESV/bsa, 2-p      (H)  71    ml/m^2 32         11 - 31   Estimated CVP         8     mm Hg  ---------- ---------   E', lat haley, TDI  (L)  8.48  cm/sec ---------- >=10   E/e', lat haley,         7            ---------- --------   TDI   E', med haley, TDI  (L)  5.75  cm/sec ---------- >=7   E/e', med haley,         10           ---------- --------   TDI   E', avg, TDI           7.115 cm/sec ---------- --------   E/e', avg, TDI         8            ---------- <=14         [facility] 7/1/21:     FINDINGS:   1. Right atrial pressures: 13/12/10mmHg   2. Right ventricular pressures: 36/1/10mmHg   3. Pulmonary artery pressures: 38/13/22mmHg   4. Pulmonary capillary wedge pressures: 17/19/13mmHg   5. Transpulmonary Gradient: (9)   6. Pulmonary artery saturations: 62%   7. Aortic saturations: 96%   8. Cardiac output and index calculated by Carlos method: 4.3L/min, 2.1L/min/m2   9. Cardiac output and index by thermodilution method: 5.2L/min, 2.5L/min/m2   10. Systemic blood pressure and heart rate: 149/97mmHg, 48bpm       HVAD 2660 rpm   Flows 3 lpm       Impression and plan:    Carlos Enrique Tanner Jr. is a 65 year old male with a history of ischemic cardiomyopathy s/p HVAD implantation in April 2019 (destination therapy due to liver cirrhosis),  Atrial fibrillation, and drive line infection who presented to the VAD clinic for low flow  and high watt alarms 7/1/21. Pt was then sent to ED for further VAD evaluation.     Acute on chronic ICM systolic and diastolic CHF, s/p HVAD 4/18/19, DT d/t cirrhosis  -NYHA Class 2  2760 RPM- LFAs recently- dec speed to 2700rpms --> 2660 rpm d/t LFAs and possible suction (07/01/21)  -ECHO 5/5/21: reviewed by Dr. Toledo: AV opens, no AI or MR, cont current speed off diuretics  -ECHO 2/8/21: reviewed by Dr. Hamilton: AV opening, septum midline, RV okay, no AI or MR, IVC collapsible   -ECHO 9/10: reviewed by Dr. Hamilton: AV opening, LV small, near septum, septum midline, trace AI, no MR, mild TR, RV mild to mod dysfxn --consider dec speed if further LFAs  -ECHO 7/1/21: reviewed by Dr Barnett: AV opening 1:1, no MR  - Appears euvolemic, cont off diuretics, only PRN per VAD discretion  - MAP elevated, stop norvasc d/t Has/petechiae, start lisinopril 5mg daily  -Cath lab for RHC 7/1/21 - HVAD 2600RPM, flowing 3L/min. CVP 10, PA 22, PCP 13, CI 2.5, CO 5.2 (thermodilution).  -CTA: circumferential thrombus along outflow graft. Asymptomatic, treat medically    - GDMT:           -  Diuretic:                   -Lasix PRN -> Dc'd, remain held                  -Was previously on spironolactone 25 mg daily, stopped d/t gynecomastia.      -  Inotrope: None      -  AA: Amio 100mg daily      -  ACEI/ARB: lisinopril 5mg daily      - CCB: Amlodipine 5 mg once a day- stopped      -  BB: None at this time      -  Other: Digoxin 125mcg daily ->remain held      -  AC: Coumadin and ASA 325mg daily. INR Goal 2-3.      - Sildenafil 20mg q8h  - hydralazine 100 mg TID  - LDH baseline 200s, stable no evidence of hemolysis  - DL CDI with dry kit twice weekly. DL site healed well with full ingrowth.     Fractured RV & LV leads  - set to VVI at recent device interrogation on Monday 8/31  - Per ICD interrogation on 9/4/20 both RV & LV leads fx, all therapies  off, Dr. Patel aware  - Hx: occ short runs NSVT, last 8/6  - [employer] ICD CRT:  He is 99% RV and LV paced.  \"He has significant RV dysfunction on echo potential that the leads are not impeding the tricuspid valve are affecting the right ventricle\"     Staph Aureus bacteremia BC + 8/5/2020  - Afebrile, no leukocytosis  - CT CAP 8/5/2020 reviewed by Dr. Webster, no infectious findings.  - Per ID 8/10/20:Continue Cefazolin (Ancef) for total of 6wks - Tx for MSSA, Repeat blood culture at the end of 6wks ABx tx, Suppressant ABx afterwards with Cefalexin (Keflex) PO  - continue IV cefazolin until 9/20, start Keflex 500mg q8hr 9/21- pt stopped after 2 weeks  -Blood cx 11/24: NGTD     Chronic Afib  - EKG 2/24/20: A-flutter, reviewed by Dr. 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:   -  Minimal residual deficit   -  CT head on 3/20/19 - moderate sized chronic infarct of the right temporoparietal region noted     Liver cirrhosis  - Diagnosed by liver biopsy and noted on imaging  - Contraindication to OHT    Plan:  -Stop norvasc  -Start lisnopril 5mg daily  -BP check with labs in 2 weeks  -Full visit in 4-5 weeks with labs    Greater than 50% of clinic visit included counseling and coordination of care, including education regarding device safety and when to notify VAD Coordinator on call, follow-up care, reducing the risk of adverse events with mechanical circulatory support, and communication with primary care provider and other members of multi-disciplinary team.     Carrie Corbett, CNP  [facility]  Advanced Heart Failure VAD Transplant Cardiology

## 2024-04-11 ENCOUNTER — OFFICE VISIT (OUTPATIENT)
Dept: OPHTHALMOLOGY | Facility: CLINIC | Age: 64
End: 2024-04-11
Attending: OPHTHALMOLOGY
Payer: COMMERCIAL

## 2024-04-11 DIAGNOSIS — H53.40 VISUAL FIELD DEFECT: ICD-10-CM

## 2024-04-11 DIAGNOSIS — H53.10 SUBJECTIVE VISUAL DISTURBANCE: Primary | ICD-10-CM

## 2024-04-11 PROCEDURE — 99214 OFFICE O/P EST MOD 30 MIN: CPT | Mod: GC | Performed by: OPHTHALMOLOGY

## 2024-04-11 PROCEDURE — 92083 EXTENDED VISUAL FIELD XM: CPT | Performed by: OPHTHALMOLOGY

## 2024-04-11 PROCEDURE — G0463 HOSPITAL OUTPT CLINIC VISIT: HCPCS | Performed by: OPHTHALMOLOGY

## 2024-04-11 PROCEDURE — 92133 CPTRZD OPH DX IMG PST SGM ON: CPT | Performed by: OPHTHALMOLOGY

## 2024-04-11 ASSESSMENT — CONF VISUAL FIELD
OD_INFERIOR_NASAL_RESTRICTION: 1
METHOD: COUNTING FINGERS
OS_INFERIOR_TEMPORAL_RESTRICTION: 0
OD_SUPERIOR_NASAL_RESTRICTION: 1
OS_SUPERIOR_NASAL_RESTRICTION: 0
OS_NORMAL: 1
OS_SUPERIOR_TEMPORAL_RESTRICTION: 0
OD_SUPERIOR_TEMPORAL_RESTRICTION: 1
OD_INFERIOR_TEMPORAL_RESTRICTION: 1
OS_INFERIOR_NASAL_RESTRICTION: 0

## 2024-04-11 ASSESSMENT — REFRACTION_WEARINGRX
OD_SPHERE: -8.50
OD_AXIS: 009
OD_CYLINDER: +1.00
OS_AXIS: 009
SPECS_TYPE: TRIFOCAL
OD_ADD: +2.50
OS_CYLINDER: +1.00
OS_ADD: +2.50
OS_SPHERE: -8.75

## 2024-04-11 ASSESSMENT — EXTERNAL EXAM - RIGHT EYE: OD_EXAM: NORMAL

## 2024-04-11 ASSESSMENT — TONOMETRY
OD_IOP_MMHG: 20
IOP_METHOD: ICARE
OS_IOP_MMHG: 20

## 2024-04-11 ASSESSMENT — VISUAL ACUITY
METHOD: SNELLEN - LINEAR
OS_CC: 20/20
OD_SC: NLP
OS_CC+: +2
CORRECTION_TYPE: CONTACTS

## 2024-04-11 ASSESSMENT — SLIT LAMP EXAM - LIDS
COMMENTS: NORMAL
COMMENTS: NORMAL

## 2024-04-11 ASSESSMENT — EXTERNAL EXAM - LEFT EYE: OS_EXAM: NORMAL

## 2024-04-11 ASSESSMENT — CUP TO DISC RATIO
OD_RATIO: 0.7
OS_RATIO: 0.15

## 2024-04-11 NOTE — NURSING NOTE
Chief Complaints and History of Present Illnesses   Patient presents with    New Patient     Primary meningioma of optic nerve sheath     Chief Complaint(s) and History of Present Illness(es)       New Patient              Comments: Primary meningioma of optic nerve sheath              Comments    Pt feels vision in LE is worse. No new flashes. More frequent floaters in LE.  No new redness or dryness.    SUDHA Subramanian April 11, 2024 10:50 AM

## 2024-04-18 ENCOUNTER — HOSPITAL ENCOUNTER (OUTPATIENT)
Dept: RESEARCH | Facility: CLINIC | Age: 64
Discharge: HOME | End: 2024-04-18
Attending: STUDENT IN AN ORGANIZED HEALTH CARE EDUCATION/TRAINING PROGRAM
Payer: COMMERCIAL

## 2024-04-18 ENCOUNTER — OFFICE VISIT (OUTPATIENT)
Dept: RADIATION ONCOLOGY | Facility: CLINIC | Age: 64
End: 2024-04-18
Attending: STUDENT IN AN ORGANIZED HEALTH CARE EDUCATION/TRAINING PROGRAM
Payer: COMMERCIAL

## 2024-04-18 ENCOUNTER — HOSPITAL ENCOUNTER (OUTPATIENT)
Dept: MRI IMAGING | Facility: CLINIC | Age: 64
Discharge: HOME | End: 2024-04-18
Attending: STUDENT IN AN ORGANIZED HEALTH CARE EDUCATION/TRAINING PROGRAM
Payer: COMMERCIAL

## 2024-04-18 VITALS
SYSTOLIC BLOOD PRESSURE: 137 MMHG | HEART RATE: 68 BPM | RESPIRATION RATE: 16 BRPM | DIASTOLIC BLOOD PRESSURE: 81 MMHG | OXYGEN SATURATION: 98 %

## 2024-04-18 DIAGNOSIS — D32.9 MENINGIOMA (H): ICD-10-CM

## 2024-04-18 DIAGNOSIS — D32.9 MENINGIOMA (H): Primary | ICD-10-CM

## 2024-04-18 LAB
ALBUMIN SERPL BCG-MCNC: 4.8 G/DL (ref 3.5–5.2)
ALP SERPL-CCNC: 91 U/L (ref 40–150)
ALT SERPL W P-5'-P-CCNC: 13 U/L (ref 0–70)
ANION GAP SERPL CALCULATED.3IONS-SCNC: 13 MMOL/L (ref 7–15)
AST SERPL W P-5'-P-CCNC: 26 U/L (ref 0–45)
BASOPHILS # BLD AUTO: ABNORMAL 10*3/UL
BASOPHILS # BLD MANUAL: 0.1 10E3/UL (ref 0–0.2)
BASOPHILS NFR BLD AUTO: ABNORMAL %
BASOPHILS NFR BLD MANUAL: 2 %
BILIRUB SERPL-MCNC: 0.6 MG/DL
BUN SERPL-MCNC: 15.2 MG/DL (ref 8–23)
CALCIUM SERPL-MCNC: 9.6 MG/DL (ref 8.8–10.2)
CHLORIDE SERPL-SCNC: 94 MMOL/L (ref 98–107)
CREAT SERPL-MCNC: 0.94 MG/DL (ref 0.67–1.17)
DEPRECATED HCO3 PLAS-SCNC: 24 MMOL/L (ref 22–29)
EGFRCR SERPLBLD CKD-EPI 2021: 90 ML/MIN/1.73M2
EOSINOPHIL # BLD AUTO: ABNORMAL 10*3/UL
EOSINOPHIL # BLD MANUAL: 0.1 10E3/UL (ref 0–0.7)
EOSINOPHIL NFR BLD AUTO: ABNORMAL %
EOSINOPHIL NFR BLD MANUAL: 2 %
ERYTHROCYTE [DISTWIDTH] IN BLOOD BY AUTOMATED COUNT: 13.9 % (ref 10–15)
GLUCOSE SERPL-MCNC: 147 MG/DL (ref 70–99)
HCT VFR BLD AUTO: 40.3 % (ref 40–53)
HGB BLD-MCNC: 13.7 G/DL (ref 13.3–17.7)
IMM GRANULOCYTES # BLD: ABNORMAL 10*3/UL
IMM GRANULOCYTES NFR BLD: ABNORMAL %
LYMPHOCYTES # BLD AUTO: ABNORMAL 10*3/UL
LYMPHOCYTES # BLD MANUAL: 1.3 10E3/UL (ref 0.8–5.3)
LYMPHOCYTES NFR BLD AUTO: ABNORMAL %
LYMPHOCYTES NFR BLD MANUAL: 18 %
MCH RBC QN AUTO: 27.7 PG (ref 26.5–33)
MCHC RBC AUTO-ENTMCNC: 34 G/DL (ref 31.5–36.5)
MCV RBC AUTO: 81 FL (ref 78–100)
METAMYELOCYTES # BLD MANUAL: 0.1 10E3/UL
METAMYELOCYTES NFR BLD MANUAL: 2 %
MONOCYTES # BLD AUTO: ABNORMAL 10*3/UL
MONOCYTES # BLD MANUAL: 1.8 10E3/UL (ref 0–1.3)
MONOCYTES NFR BLD AUTO: ABNORMAL %
MONOCYTES NFR BLD MANUAL: 26 %
MYELOCYTES # BLD MANUAL: 0.2 10E3/UL
MYELOCYTES NFR BLD MANUAL: 3 %
NEUTROPHILS # BLD AUTO: ABNORMAL 10*3/UL
NEUTROPHILS # BLD MANUAL: 3.3 10E3/UL (ref 1.6–8.3)
NEUTROPHILS NFR BLD AUTO: ABNORMAL %
NEUTROPHILS NFR BLD MANUAL: 47 %
NRBC # BLD AUTO: 0 10E3/UL
NRBC BLD AUTO-RTO: 0 /100
PLAT MORPH BLD: ABNORMAL
PLATELET # BLD AUTO: 149 10E3/UL (ref 150–450)
POLYCHROMASIA BLD QL SMEAR: SLIGHT
POTASSIUM SERPL-SCNC: 3.7 MMOL/L (ref 3.4–5.3)
PROT SERPL-MCNC: 7.5 G/DL (ref 6.4–8.3)
RBC # BLD AUTO: 4.95 10E6/UL (ref 4.4–5.9)
RBC MORPH BLD: ABNORMAL
SODIUM SERPL-SCNC: 131 MMOL/L (ref 135–145)
WBC # BLD AUTO: 7 10E3/UL (ref 4–11)

## 2024-04-18 PROCEDURE — G0463 HOSPITAL OUTPT CLINIC VISIT: HCPCS | Performed by: STUDENT IN AN ORGANIZED HEALTH CARE EDUCATION/TRAINING PROGRAM

## 2024-04-18 PROCEDURE — 510N000009 HC RESEARCH FACILITY, PER 15 MIN

## 2024-04-18 PROCEDURE — 255N000002 HC RX 255 OP 636: Performed by: STUDENT IN AN ORGANIZED HEALTH CARE EDUCATION/TRAINING PROGRAM

## 2024-04-18 PROCEDURE — 85027 COMPLETE CBC AUTOMATED: CPT

## 2024-04-18 PROCEDURE — 70553 MRI BRAIN STEM W/O & W/DYE: CPT

## 2024-04-18 PROCEDURE — 510N000017 HC CRU PATIENT CARE, PER 15 MIN

## 2024-04-18 PROCEDURE — 36415 COLL VENOUS BLD VENIPUNCTURE: CPT

## 2024-04-18 PROCEDURE — 99213 OFFICE O/P EST LOW 20 MIN: CPT | Performed by: STUDENT IN AN ORGANIZED HEALTH CARE EDUCATION/TRAINING PROGRAM

## 2024-04-18 PROCEDURE — A9585 GADOBUTROL INJECTION: HCPCS | Performed by: STUDENT IN AN ORGANIZED HEALTH CARE EDUCATION/TRAINING PROGRAM

## 2024-04-18 PROCEDURE — 82374 ASSAY BLOOD CARBON DIOXIDE: CPT

## 2024-04-18 PROCEDURE — 70553 MRI BRAIN STEM W/O & W/DYE: CPT | Mod: 26 | Performed by: RADIOLOGY

## 2024-04-18 PROCEDURE — 85007 BL SMEAR W/DIFF WBC COUNT: CPT

## 2024-04-18 RX ORDER — GADOBUTROL 604.72 MG/ML
10 INJECTION INTRAVENOUS ONCE
Status: COMPLETED | OUTPATIENT
Start: 2024-04-18 | End: 2024-04-18

## 2024-04-18 RX ADMIN — GADOBUTROL 9.7 ML: 604.72 INJECTION INTRAVENOUS at 10:41

## 2024-04-18 ASSESSMENT — PAIN SCALES - GENERAL: PAINLEVEL: NO PAIN (0)

## 2024-04-18 NOTE — PROGRESS NOTES
FOLLOW-UP VISIT    Patient Name: Amado Butler      : 1960     Age: 63 year old        ______________________________________________________________________________     Chief Complaint   Patient presents with    Radiation Therapy     Follow up to radiation therapy      There were no vitals taken for this visit.     Date Radiation Completed: Meningioma:Right optic nerve 5040 cGy completed 01/15/24    Pain  Denies    Labs  Other Labs: Yes: 24    Imaging  MRI: Brain 24    Additional Instructions: Follow up with MRI in 3 months

## 2024-04-18 NOTE — LETTER
2024         RE: Amado Butler  [address] [facility] [address]        Dear Colleague,    Thank you for referring your patient, Amado Butler, to the [facility] RADIATION ONCOLOGY. Please see a copy of my visit note below.    FOLLOW-UP VISIT    Patient Name: Amado Butler      : 1960     Age: 63 year old        ______________________________________________________________________________     Chief Complaint   Patient presents with     Radiation Therapy     Follow up to radiation therapy      There were no vitals taken for this visit.     Date Radiation Completed: Meningioma:Right optic nerve 5040 cGy completed 01/15/24    Pain  Denies    Labs  Other Labs: Yes: 24    Imaging  MRI: Brain 24    Additional Instructions: Follow up with MRI in 3 months       2024  Amado Butler  [phone] (home)   : 1960  MRN: [id number]  Neuro-Oncologist: N/A  Neurosurgeon: Jose Guadalupe Camacho MD PhD     Attending Radiation Oncologist: Kaylee Mccullough MD PhD    RADIATION ONCOLOGY FOLLOW UP    History of Present Illness:  Amado Butler is a 63 year old with a history of CMML, who initially presented with subacute right-sided periorbital pain   and vision loss, s/p frontotemporal craniotomy for decompression and resection of right optic nerve mass (23),   confirmed to be a meningioma on pathology (grade unable to be determined due to crush artifact though presumed Grade   1) He was treated with IMRT to a total dose of 5040 cGy completed 1/15/2024.      The patient  was last seen on 1/15/2024 at [facility] and presents today for 3 month post-radiation follow up evaluation.      Interval History:     Last MRI brain with and without contrast on 23:  Impression:  1.  Right frontal craniotomy with decreased underlying dural  enhancement extending along the middle cranial fossa and cavernous  sinus with unchanged encasement of the cisternal and intracanalicular  segments of the right  optic nerve favored to represent posttreatment  changes. Attentional follow-up.  2.  Decreased adjacent T2/FLAIR hyperintense signal in the right  frontal lobe which could also be posttreatment related.  3.  Stable sequelae of right optic neuritis.    Today, he specifically reports the following symptoms:    Brain ROS:  Headaches-   Denies  Seizures- Denies  Nausea/vomiting-  Denies  Changes in cognition/behavior-  Denies  Speech-  Denies  Vision/hearing changes-  Decreased night vision, followed by neuro-optho  Gait symptoms or imbalance-  Denies  Other focal neuro deficits-  Denies      Review of Systems:  Denies additional symptoms related to current diagnosis.     Current Outpatient Medications   Medication Sig Dispense Refill     carvedilol (COREG) 12.5 MG tablet Take 1 tablet (12.5 mg) by mouth 2 times daily (with meals) 180 tablet 3     lisinopril-hydrochlorothiazide (ZESTORETIC) 20-25 MG tablet Take 1 tablet by mouth daily       Magnesium Oxide -Mg Supplement 500 MG CAPS        metFORMIN (GLUCOPHAGE) 500 MG tablet Take 1 tablet (500 mg) by mouth daily (with breakfast) (Patient taking differently: Take 500 mg by mouth daily) 7 tablet 0     multivitamin w/minerals (MULTI-VITAMIN) tablet Take 1 tablet by mouth daily Over 50 mens       No current facility-administered medications for this visit.        Allergies   Allergen Reactions     Seasonal Allergies        Physical Exam:      ECO  KPS: 90    /81   Pulse 68   Resp 16   SpO2 98%     GENERAL: Healthy, alert and no distress  HEAD: Normocephalic, atraumatic  EYES: Eyes grossly normal to inspection.  No discharge or erythema, or obvious scleral/conjunctival abnormalities.  NOSE: No discharge, normal appearance   RESP: No audible wheeze, cough, or visible cyanosis.  No visible retractions or increased work of breathing.    SKIN: Visible skin clear. No significant rash, abnormal pigmentation or lesions. Incision well healed.   NEURO: Cranial nerves  grossly intact.  Mentation and speech appropriate for age  EXTREMITIES: No obvious edema, normal appearing range of motion, 5/5 strength upper and lower extremities, sensation intact bilaterally   PSYCH: Mentation appears normal, affect normal/bright, judgement and insight intact, normal speech and appearance well-groomed.    Last CBC with Diff  WBC Count   Date Value Ref Range Status   04/18/2024 7.0 4.0 - 11.0 10e3/uL Final     RBC Count   Date Value Ref Range Status   04/18/2024 4.95 4.40 - 5.90 10e6/uL Final     Hemoglobin   Date Value Ref Range Status   04/18/2024 13.7 13.3 - 17.7 g/dL Final     Hematocrit   Date Value Ref Range Status   04/18/2024 40.3 40.0 - 53.0 % Final     MCV   Date Value Ref Range Status   04/18/2024 81 78 - 100 fL Final     MCH   Date Value Ref Range Status   04/18/2024 27.7 26.5 - 33.0 pg Final     MCHC   Date Value Ref Range Status   04/18/2024 34.0 31.5 - 36.5 g/dL Final     RDW   Date Value Ref Range Status   04/18/2024 13.9 10.0 - 15.0 % Final     Platelet Count   Date Value Ref Range Status   04/18/2024 149 (L) 150 - 450 10e3/uL Final     % Neutrophils   Date Value Ref Range Status   04/18/2024 47 % Final   08/09/2023 64 % Final     % Lymphocytes   Date Value Ref Range Status   04/18/2024 18 % Final   08/09/2023 17 % Final     % Monocytes   Date Value Ref Range Status   04/18/2024 26 % Final   08/09/2023 17 % Final     % Eosinophils   Date Value Ref Range Status   04/18/2024 2 % Final   08/09/2023 0 % Final     % Basophils   Date Value Ref Range Status   04/18/2024 2 % Final   08/09/2023 0 % Final     Absolute Neutrophils   Date Value Ref Range Status   04/18/2024 3.3 1.6 - 8.3 10e3/uL Final     Absolute Lymphocytes   Date Value Ref Range Status   04/18/2024 1.3 0.8 - 5.3 10e3/uL Final     Absolute Monocytes   Date Value Ref Range Status   04/18/2024 1.8 (H) 0.0 - 1.3 10e3/uL Final        Last Comprehensive Metabolic Panel:  Sodium   Date Value Ref Range Status   04/18/2024 131 (L)  135 - 145 mmol/L Final     Comment:     Reference intervals for this test were updated on 09/26/2023 to more accurately reflect our healthy population. There may be differences in the flagging of prior results with similar values performed with this method. Interpretation of those prior results can be made in the context of the updated reference intervals.      Potassium   Date Value Ref Range Status   04/18/2024 3.7 3.4 - 5.3 mmol/L Final     Potassium POCT   Date Value Ref Range Status   08/14/2023 3.4 (L) 3.5 - 5.0 mmol/L Final     Comment:     CRITICAL VALUES NOTED AND REPORTED TO MD     Chloride   Date Value Ref Range Status   04/18/2024 94 (L) 98 - 107 mmol/L Final     Carbon Dioxide (CO2)   Date Value Ref Range Status   04/18/2024 24 22 - 29 mmol/L Final     Anion Gap   Date Value Ref Range Status   04/18/2024 13 7 - 15 mmol/L Final     Glucose   Date Value Ref Range Status   04/18/2024 147 (H) 70 - 99 mg/dL Final     GLUCOSE BY METER POCT   Date Value Ref Range Status   08/18/2023 141 (H) 70 - 99 mg/dL Final     Urea Nitrogen   Date Value Ref Range Status   04/18/2024 15.2 8.0 - 23.0 mg/dL Final     Creatinine   Date Value Ref Range Status   04/18/2024 0.94 0.67 - 1.17 mg/dL Final     GFR Estimate   Date Value Ref Range Status   04/18/2024 90 >60 mL/min/1.73m2 Final     Calcium   Date Value Ref Range Status   04/18/2024 9.6 8.8 - 10.2 mg/dL Final        Imaging and Diagnostic Studies:   See HPI above    Assessment/Plan:  Amado Butler is a 63 year old with a history of CMML, who initially presented with subacute right-sided periorbital pain and vision loss, s/p frontotemporal craniotomy for decompression and resection of right optic nerve mass (8/14/23),  confirmed to be a meningioma on pathology (grade unable to be determined due to crush artifact though presumed Grade  1) He was treated with IMRT to a total dose of 5040 cGy completed 1/15/2024.      The patient presents without evidence of disease  recurrence.     1)  In person follow up with our team in 3 months with MRI with and without contrast prior   2) continue to follow with neuro-optho    All the patient's questions were answered to verbalized satisfaction. We instructed the patient to call with any questions or concerns in the interim.      The overall time I spent on direct patient care including self review of records, labs, and radiographic studies, as well as, direct face-to-face interaction with the patient and coordination of care with other providers was 25 minutes.     Kaylee Mccullough MD, PhD     Department of Radiation Oncology  [facility]       Referring Provider:  No referring provider defined for this encounter.

## 2024-04-21 NOTE — PROGRESS NOTES
2024  Amado Butler  [phone] (home)   : 1960  MRN: [id number]  Neuro-Oncologist: N/A  Neurosurgeon: Jose Guadalupe Camacho MD PhD     Attending Radiation Oncologist: Kaylee Mccullough MD PhD    RADIATION ONCOLOGY FOLLOW UP    History of Present Illness:  Amado Butler is a 63 year old with a history of CMML, who initially presented with subacute right-sided periorbital pain   and vision loss, s/p frontotemporal craniotomy for decompression and resection of right optic nerve mass (23),   confirmed to be a meningioma on pathology (grade unable to be determined due to crush artifact though presumed Grade   1) He was treated with IMRT to a total dose of 5040 cGy completed 1/15/2024.      The patient  was last seen on 1/15/2024 at [facility] and presents today for 3 month post-radiation follow up evaluation.      Interval History:     Last MRI brain with and without contrast on 23:  Impression:  1.  Right frontal craniotomy with decreased underlying dural  enhancement extending along the middle cranial fossa and cavernous  sinus with unchanged encasement of the cisternal and intracanalicular  segments of the right optic nerve favored to represent posttreatment  changes. Attentional follow-up.  2.  Decreased adjacent T2/FLAIR hyperintense signal in the right  frontal lobe which could also be posttreatment related.  3.  Stable sequelae of right optic neuritis.    Today, he specifically reports the following symptoms:    Brain ROS:  Headaches-   Denies  Seizures- Denies  Nausea/vomiting-  Denies  Changes in cognition/behavior-  Denies  Speech-  Denies  Vision/hearing changes-  Decreased night vision, followed by neuro-optho  Gait symptoms or imbalance-  Denies  Other focal neuro deficits-  Denies      Review of Systems:  Denies additional symptoms related to current diagnosis.     Current Outpatient Medications   Medication Sig Dispense Refill     carvedilol (COREG) 12.5 MG tablet Take 1 tablet (12.5 mg) by  mouth 2 times daily (with meals) 180 tablet 3     lisinopril-hydrochlorothiazide (ZESTORETIC) 20-25 MG tablet Take 1 tablet by mouth daily       Magnesium Oxide -Mg Supplement 500 MG CAPS        metFORMIN (GLUCOPHAGE) 500 MG tablet Take 1 tablet (500 mg) by mouth daily (with breakfast) (Patient taking differently: Take 500 mg by mouth daily) 7 tablet 0     multivitamin w/minerals (MULTI-VITAMIN) tablet Take 1 tablet by mouth daily Over 50 mens       No current facility-administered medications for this visit.        Allergies   Allergen Reactions     Seasonal Allergies        Physical Exam:      ECO  KPS: 90    /81   Pulse 68   Resp 16   SpO2 98%     GENERAL: Healthy, alert and no distress  HEAD: Normocephalic, atraumatic  EYES: Eyes grossly normal to inspection.  No discharge or erythema, or obvious scleral/conjunctival abnormalities.  NOSE: No discharge, normal appearance   RESP: No audible wheeze, cough, or visible cyanosis.  No visible retractions or increased work of breathing.    SKIN: Visible skin clear. No significant rash, abnormal pigmentation or lesions. Incision well healed.   NEURO: Cranial nerves grossly intact.  Mentation and speech appropriate for age  EXTREMITIES: No obvious edema, normal appearing range of motion, 5/5 strength upper and lower extremities, sensation intact bilaterally   PSYCH: Mentation appears normal, affect normal/bright, judgement and insight intact, normal speech and appearance well-groomed.    Last CBC with Diff  WBC Count   Date Value Ref Range Status   2024 7.0 4.0 - 11.0 10e3/uL Final     RBC Count   Date Value Ref Range Status   2024 4.95 4.40 - 5.90 10e6/uL Final     Hemoglobin   Date Value Ref Range Status   2024 13.7 13.3 - 17.7 g/dL Final     Hematocrit   Date Value Ref Range Status   2024 40.3 40.0 - 53.0 % Final     MCV   Date Value Ref Range Status   2024 81 78 - 100 fL Final     MCH   Date Value Ref Range Status    04/18/2024 27.7 26.5 - 33.0 pg Final     MCHC   Date Value Ref Range Status   04/18/2024 34.0 31.5 - 36.5 g/dL Final     RDW   Date Value Ref Range Status   04/18/2024 13.9 10.0 - 15.0 % Final     Platelet Count   Date Value Ref Range Status   04/18/2024 149 (L) 150 - 450 10e3/uL Final     % Neutrophils   Date Value Ref Range Status   04/18/2024 47 % Final   08/09/2023 64 % Final     % Lymphocytes   Date Value Ref Range Status   04/18/2024 18 % Final   08/09/2023 17 % Final     % Monocytes   Date Value Ref Range Status   04/18/2024 26 % Final   08/09/2023 17 % Final     % Eosinophils   Date Value Ref Range Status   04/18/2024 2 % Final   08/09/2023 0 % Final     % Basophils   Date Value Ref Range Status   04/18/2024 2 % Final   08/09/2023 0 % Final     Absolute Neutrophils   Date Value Ref Range Status   04/18/2024 3.3 1.6 - 8.3 10e3/uL Final     Absolute Lymphocytes   Date Value Ref Range Status   04/18/2024 1.3 0.8 - 5.3 10e3/uL Final     Absolute Monocytes   Date Value Ref Range Status   04/18/2024 1.8 (H) 0.0 - 1.3 10e3/uL Final        Last Comprehensive Metabolic Panel:  Sodium   Date Value Ref Range Status   04/18/2024 131 (L) 135 - 145 mmol/L Final     Comment:     Reference intervals for this test were updated on 09/26/2023 to more accurately reflect our healthy population. There may be differences in the flagging of prior results with similar values performed with this method. Interpretation of those prior results can be made in the context of the updated reference intervals.      Potassium   Date Value Ref Range Status   04/18/2024 3.7 3.4 - 5.3 mmol/L Final     Potassium POCT   Date Value Ref Range Status   08/14/2023 3.4 (L) 3.5 - 5.0 mmol/L Final     Comment:     CRITICAL VALUES NOTED AND REPORTED TO MD     Chloride   Date Value Ref Range Status   04/18/2024 94 (L) 98 - 107 mmol/L Final     Carbon Dioxide (CO2)   Date Value Ref Range Status   04/18/2024 24 22 - 29 mmol/L Final     Anion Gap   Date  Value Ref Range Status   04/18/2024 13 7 - 15 mmol/L Final     Glucose   Date Value Ref Range Status   04/18/2024 147 (H) 70 - 99 mg/dL Final     GLUCOSE BY METER POCT   Date Value Ref Range Status   08/18/2023 141 (H) 70 - 99 mg/dL Final     Urea Nitrogen   Date Value Ref Range Status   04/18/2024 15.2 8.0 - 23.0 mg/dL Final     Creatinine   Date Value Ref Range Status   04/18/2024 0.94 0.67 - 1.17 mg/dL Final     GFR Estimate   Date Value Ref Range Status   04/18/2024 90 >60 mL/min/1.73m2 Final     Calcium   Date Value Ref Range Status   04/18/2024 9.6 8.8 - 10.2 mg/dL Final        Imaging and Diagnostic Studies:   See HPI above    Assessment/Plan:  Amado Butler is a 63 year old with a history of CMML, who initially presented with subacute right-sided periorbital pain and vision loss, s/p frontotemporal craniotomy for decompression and resection of right optic nerve mass (8/14/23),  confirmed to be a meningioma on pathology (grade unable to be determined due to crush artifact though presumed Grade  1) He was treated with IMRT to a total dose of 5040 cGy completed 1/15/2024.      The patient presents without evidence of disease recurrence.     1)  In person follow up with our team in 3 months with MRI with and without contrast prior   2) continue to follow with neuro-optho    All the patient's questions were answered to verbalized satisfaction. We instructed the patient to call with any questions or concerns in the interim.      The overall time I spent on direct patient care including self review of records, labs, and radiographic studies, as well as, direct face-to-face interaction with the patient and coordination of care with other providers was 25 minutes.     Kaylee Mccullough MD, PhD     Department of Radiation Oncology  [facility]       Referring Provider:  No referring provider defined for this encounter.

## 2024-04-23 ENCOUNTER — TELEPHONE (OUTPATIENT)
Dept: NEUROSURGERY | Facility: CLINIC | Age: 64
End: 2024-04-23
Payer: COMMERCIAL

## 2024-04-23 NOTE — TELEPHONE ENCOUNTER
Left Voicemail (1st Attempt) for the patient to call back and schedule the following:    Appointment type: Return skull base  Provider: Jp  Return date: next couple of weeks  Specialty phone number: [phone]  Additional appointment(s) needed: na  Additonal Notes: Evette OK

## 2024-05-03 ENCOUNTER — APPOINTMENT (OUTPATIENT)
Dept: LAB | Facility: CLINIC | Age: 64
End: 2024-05-03
Attending: STUDENT IN AN ORGANIZED HEALTH CARE EDUCATION/TRAINING PROGRAM
Payer: COMMERCIAL

## 2024-05-03 ENCOUNTER — OFFICE VISIT (OUTPATIENT)
Dept: NEUROSURGERY | Facility: CLINIC | Age: 64
End: 2024-05-03
Payer: COMMERCIAL

## 2024-05-03 ENCOUNTER — ONCOLOGY VISIT (OUTPATIENT)
Dept: ONCOLOGY | Facility: CLINIC | Age: 64
End: 2024-05-03
Attending: STUDENT IN AN ORGANIZED HEALTH CARE EDUCATION/TRAINING PROGRAM
Payer: COMMERCIAL

## 2024-05-03 VITALS
BODY MASS INDEX: 26.98 KG/M2 | RESPIRATION RATE: 16 BRPM | HEART RATE: 57 BPM | DIASTOLIC BLOOD PRESSURE: 80 MMHG | OXYGEN SATURATION: 98 % | WEIGHT: 217 LBS | HEIGHT: 75 IN | TEMPERATURE: 98 F | SYSTOLIC BLOOD PRESSURE: 157 MMHG

## 2024-05-03 VITALS
SYSTOLIC BLOOD PRESSURE: 123 MMHG | OXYGEN SATURATION: 98 % | BODY MASS INDEX: 26.95 KG/M2 | DIASTOLIC BLOOD PRESSURE: 77 MMHG | WEIGHT: 210 LBS | HEART RATE: 76 BPM | HEIGHT: 74 IN | RESPIRATION RATE: 16 BRPM

## 2024-05-03 DIAGNOSIS — D32.0 BENIGN NEOPLASM OF CEREBRAL MENINGES (H): Primary | ICD-10-CM

## 2024-05-03 DIAGNOSIS — C93.10 CHRONIC MYELOMONOCYTIC LEUKEMIA NOT HAVING ACHIEVED REMISSION (H): ICD-10-CM

## 2024-05-03 LAB
ALBUMIN SERPL BCG-MCNC: 4.5 G/DL (ref 3.5–5.2)
ALP SERPL-CCNC: 76 U/L (ref 40–150)
ALT SERPL W P-5'-P-CCNC: 8 U/L (ref 0–70)
ANION GAP SERPL CALCULATED.3IONS-SCNC: 14 MMOL/L (ref 7–15)
AST SERPL W P-5'-P-CCNC: 23 U/L (ref 0–45)
BASOPHILS # BLD AUTO: ABNORMAL 10*3/UL
BASOPHILS # BLD MANUAL: 0 10E3/UL (ref 0–0.2)
BASOPHILS NFR BLD AUTO: ABNORMAL %
BASOPHILS NFR BLD MANUAL: 1 %
BILIRUB SERPL-MCNC: 0.5 MG/DL
BUN SERPL-MCNC: 17.8 MG/DL (ref 8–23)
CALCIUM SERPL-MCNC: 9.3 MG/DL (ref 8.8–10.2)
CHLORIDE SERPL-SCNC: 97 MMOL/L (ref 98–107)
CREAT SERPL-MCNC: 0.96 MG/DL (ref 0.67–1.17)
DEPRECATED HCO3 PLAS-SCNC: 24 MMOL/L (ref 22–29)
EGFRCR SERPLBLD CKD-EPI 2021: 87 ML/MIN/1.73M2
EOSINOPHIL # BLD AUTO: ABNORMAL 10*3/UL
EOSINOPHIL # BLD MANUAL: 0.1 10E3/UL (ref 0–0.7)
EOSINOPHIL NFR BLD AUTO: ABNORMAL %
EOSINOPHIL NFR BLD MANUAL: 2 %
ERYTHROCYTE [DISTWIDTH] IN BLOOD BY AUTOMATED COUNT: 14 % (ref 10–15)
GLUCOSE SERPL-MCNC: 211 MG/DL (ref 70–99)
HCT VFR BLD AUTO: 39.7 % (ref 40–53)
HGB BLD-MCNC: 13.5 G/DL (ref 13.3–17.7)
IMM GRANULOCYTES # BLD: ABNORMAL 10*3/UL
IMM GRANULOCYTES NFR BLD: ABNORMAL %
LYMPHOCYTES # BLD AUTO: ABNORMAL 10*3/UL
LYMPHOCYTES # BLD MANUAL: 1.3 10E3/UL (ref 0.8–5.3)
LYMPHOCYTES NFR BLD AUTO: ABNORMAL %
LYMPHOCYTES NFR BLD MANUAL: 31 %
MCH RBC QN AUTO: 27.5 PG (ref 26.5–33)
MCHC RBC AUTO-ENTMCNC: 34 G/DL (ref 31.5–36.5)
MCV RBC AUTO: 81 FL (ref 78–100)
MONOCYTES # BLD AUTO: ABNORMAL 10*3/UL
MONOCYTES # BLD MANUAL: 1.7 10E3/UL (ref 0–1.3)
MONOCYTES NFR BLD AUTO: ABNORMAL %
MONOCYTES NFR BLD MANUAL: 39 %
MYELOCYTES # BLD MANUAL: 0.1 10E3/UL
MYELOCYTES NFR BLD MANUAL: 3 %
NEUTROPHILS # BLD AUTO: ABNORMAL 10*3/UL
NEUTROPHILS # BLD MANUAL: 1 10E3/UL (ref 1.6–8.3)
NEUTROPHILS NFR BLD AUTO: ABNORMAL %
NEUTROPHILS NFR BLD MANUAL: 24 %
NRBC # BLD AUTO: 0 10E3/UL
NRBC BLD AUTO-RTO: 0 /100
PLAT MORPH BLD: ABNORMAL
PLATELET # BLD AUTO: 113 10E3/UL (ref 150–450)
POTASSIUM SERPL-SCNC: 3.6 MMOL/L (ref 3.4–5.3)
PROT SERPL-MCNC: 7.2 G/DL (ref 6.4–8.3)
RBC # BLD AUTO: 4.91 10E6/UL (ref 4.4–5.9)
RBC MORPH BLD: ABNORMAL
SODIUM SERPL-SCNC: 135 MMOL/L (ref 135–145)
WBC # BLD AUTO: 4.3 10E3/UL (ref 4–11)

## 2024-05-03 PROCEDURE — G0463 HOSPITAL OUTPT CLINIC VISIT: HCPCS | Performed by: STUDENT IN AN ORGANIZED HEALTH CARE EDUCATION/TRAINING PROGRAM

## 2024-05-03 PROCEDURE — 80053 COMPREHEN METABOLIC PANEL: CPT | Performed by: STUDENT IN AN ORGANIZED HEALTH CARE EDUCATION/TRAINING PROGRAM

## 2024-05-03 PROCEDURE — G2211 COMPLEX E/M VISIT ADD ON: HCPCS | Performed by: STUDENT IN AN ORGANIZED HEALTH CARE EDUCATION/TRAINING PROGRAM

## 2024-05-03 PROCEDURE — 85027 COMPLETE CBC AUTOMATED: CPT | Performed by: STUDENT IN AN ORGANIZED HEALTH CARE EDUCATION/TRAINING PROGRAM

## 2024-05-03 PROCEDURE — 99213 OFFICE O/P EST LOW 20 MIN: CPT | Performed by: PHYSICIAN ASSISTANT

## 2024-05-03 PROCEDURE — 85007 BL SMEAR W/DIFF WBC COUNT: CPT | Performed by: STUDENT IN AN ORGANIZED HEALTH CARE EDUCATION/TRAINING PROGRAM

## 2024-05-03 PROCEDURE — 99215 OFFICE O/P EST HI 40 MIN: CPT | Performed by: STUDENT IN AN ORGANIZED HEALTH CARE EDUCATION/TRAINING PROGRAM

## 2024-05-03 PROCEDURE — 36415 COLL VENOUS BLD VENIPUNCTURE: CPT | Performed by: STUDENT IN AN ORGANIZED HEALTH CARE EDUCATION/TRAINING PROGRAM

## 2024-05-03 ASSESSMENT — PAIN SCALES - GENERAL
PAINLEVEL: NO PAIN (0)
PAINLEVEL: NO PAIN (0)

## 2024-05-03 NOTE — PROGRESS NOTES
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 He underwent the above mentioned procedure after presenting with right sided vision loss which was initially steroid responsive but then recurred. Follow up MRI showed enhancement around the right optic canal and orbital apex. He completed IMRT with Dr. Mccullough in January. He just had an updated MRI and visit with her in the past few weeks. He's here today with his wife, Mary Lou, and is feeling well. He is followed by Dr. Rodney in Oncology for CMML, is being monitored with lab work. His vision is subjectively stable and just checked at his NeuroOphthalmology office 2024.      Review of Systems:   Pertinent items are noted in HPI or as in patient entered ROS below, remainder of complete ROS is negative.     Physical Exam:   /77 (BP Location: Right arm, Patient Position: Sitting)   Pulse 76   Resp 16   Ht 1.88 m (6' 2\")   Wt 95.3 kg (210 lb)   SpO2 98%   BMI 26.96 kg/m     General: No acute distress.    Eyes: Conjunctivae are normal.  MSK: Moves all extremities.  No obvious deformity.  Neuro: The patient is fully oriented. Speech is normal. Extraocular movements are intact without nystagmus. Facial sensation is intact in V1, V2, V3 distributions. Facial nerve function is normal, rated as a House Brackmann 1. Gait is normal.   Psych: Normal mood and affect. Behavior is normal.      Imaging:  We reviewed the MRI from " 4/18/2024:    Impression:  1.  Right frontal craniotomy with decreased underlying dural  enhancement extending along the middle cranial fossa and cavernous  sinus with unchanged encasement of the cisternal and intracanalicular  segments of the right optic nerve favored to represent posttreatment  changes. Attentional follow-up.  2.  Decreased adjacent T2/FLAIR hyperintense signal in the right  frontal lobe which could also be posttreatment related.  3.  Stable sequelae of right optic neuritis.     I have personally reviewed the examination and initial interpretation  and I agree with the findings.     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 Mccullough regarding next follow up needs and see him back accordingly. They were encouraged to reach out with new concerns.        Desiree Trammell PA-C  Department of Neurosurgery

## 2024-05-03 NOTE — NURSING NOTE
Chief Complaint   Patient presents with    Blood Draw     Labs collected from venipuncture by RN. Vitals taken. Checked in for appointment(s).      Labs collected from venipuncture by RN. Vitals taken. Checked in for appointment(s).     Tavia Reyes RN

## 2024-05-03 NOTE — LETTER
5/3/2024         RE: Amado Butler  [address] [address] [address]        Dear Colleague,    Thank you for referring your patient, Amaod Butler, to the [facility]. Please see a copy of my visit note below.    [facility]  HEMATOLOGY & ONCOLOGY  FOLLOW UP VISIT    PATIENT NAME: Amado Butler          MRN # [id number]  YOB: 1960  DATE OF VISIT: October 18, 2023           REFERRING PROVIDER:   Mr. Amado Butler was seen at the request of Dr. Solomon Reza for further evaluation and/or management.     History of Presenting Illness  Mr. Amado Butler is a pleasant 64 year old male with a past medical history significant for DM2- Last A1c, HTN, ?thrombocytopenia since 2012 s/p recent frontotemporal craniotomy for right optic nerve mass now pathology proven meningioma c/b complete vision loss in right eye and was also found to have significant cytopenias during admission leading to a bone marrow biopsy on 8/15/2023.  His bone marrow biopsy shows 50% cellularity [mildly hypercellular for his age] without significant dysplasia in all 3 cell lines and without increase in blasts.  He had normal cytogenetics and BCR-ABL FISH was negative.  However his Nex[employer] ration sequencing testing showed that there was a clonal process with mutations detected in SRSF2, TET2 x 2 and ASXL1.  Given prior history of monocytosis dating back many years he was referred to our clinic.    Subjective  Here with wife Mary Lou.  He had an injury to his left pinky possible tendon rupture. No Headaches at this point..he continues to feel better and has had improvement in his overall wellbeing as well as fatigue symptoms. Denies any fevers or chills, night sweats, BRBPR or melena.    Past Medical History  Diabetes type 2  Hypertension  Paroxysmal SVT  Thrombocytopenia dating back since 2012    Family History  Any family history of cancers or blood or bleeding disorders.    Social  "History  Smoking: Never  Alcohol use: drinks approximately 2drinks/week  Status:  37 years.   Children/grandchildren: 3 children. 26 years old girl, 31 boy, 28 girl  Occupation: Retired, .     Current Outpatient Medications  Current Outpatient Medications   Medication Sig Dispense Refill    carvedilol (COREG) 12.5 MG tablet Take 1 tablet (12.5 mg) by mouth 2 times daily (with meals) 180 tablet 3    lisinopril-hydrochlorothiazide (ZESTORETIC) 20-25 MG tablet Take 1 tablet by mouth daily      Magnesium Oxide -Mg Supplement 500 MG CAPS       multivitamin w/minerals (MULTI-VITAMIN) tablet Take 1 tablet by mouth daily Over 50 mens      metFORMIN (GLUCOPHAGE) 500 MG tablet Take 1 tablet (500 mg) by mouth daily (with breakfast) (Patient not taking: Reported on 5/3/2024) 7 tablet 0       Allergies  Allergies   Allergen Reactions    Seasonal Allergies        Review of systems  A complete ROS was performed and was negative except as mentioned in HPI    Physical Exam  BP (!) 157/80   Pulse 57   Temp 98  F (36.7  C)   Resp 16   Ht 1.915 m (6' 3.39\")   Wt 98.4 kg (217 lb)   SpO2 98%   BMI 26.84 kg/m    Wt Readings from Last 3 Encounters:   24 98.4 kg (217 lb)   24 97.5 kg (215 lb)   23 97.1 kg (214 lb)       ECO   male sitting in chair in NAD  HEET: NC/AT, EOMI w/ PERRL, anicteric sclera. MMM. No mouth sores.   Neck: supple no JVP  Lymph: No cervical, supraclavicular, axillary or inguinal LAD  CV: normal S1,S2 with RRR no m/r/g  Resp: lungs CTA bilaterally with adequate air movement. No wheezes or crackles  Abd: soft, NTND, no organomegaly or masses. BS normoactive.   Ext: WWP no edema or cyanosis  Skin: no concerning lesions or rashes or petechiae  Neuro: A&Ox4, no lateralizing sx. Grossly nonfocal. Strength appears preserved.      Labs and Imaging  I reviewed patient's labs and imaging from care everywhere and here.    WBC 4.3, Hgb 13.5, , MCV 81, " ANC 1000    BMBx 8/15/23  Final Diagnosis   Bone marrow, posterior iliac crest, left decalcified trephine biopsy and touch imprint; left aspirate clot, particle crush, direct aspirate smear, concentrate aspirate smear, and peripheral blood smear:     - Cellular marrow (overall 50% in intact areas) with granulocytic hyperplasia with slight left shift, relative erythroid hypoplasia, unremarkable megakaryopoiesis, no overt dysplasia, and overall less than 2% blasts  - Overall normal reticulin fibrosis (MF-0)  - Peripheral blood showing moderate normochromic, normocytic anemia; moderate leukocytosis due to neutrophilia and monocytosis; slight thrombocytopenia  - See comment   No clonal chromosomal abnormality was detected among the 20 metaphase cells analyzed. These findings confirm and expand those of the previously reported interphase FISH anlaysis ([id number]) that showed no evidence of a BCR::ABL1 fusion.   SRSF2 VAF 46.2%  TET 2 VAF 51.6%  TET 2 VAF 46.5%  ASXL1 VAF 11.3%      CMML WHO Diagnostic Criteria:    Persistent (?3 months) peripheral blood monocytosis; absolute monocyte count >500/microL and greater than 10 percent of the entire white blood cell differential   Yes   Not meeting WHO criteria for BCR-ABL1 positive chronic myeloid leukemia, primary myelofibrosis, polycythemia vera, or essential thrombocytosis  Yes   <20 percent myeloblasts + monoblasts + promonocytes in peripheral blood and bone marrow  Yes   Dysplastic changes in one or more myeloid lineages. If myelodysplastic changes are absent or minimal, the diagnosis of CMML can be made if the above three criteria are met and:  No   An acquired clonal cytogenetic (or mutational abnormality) is present in bone marrow cells or  Yes   Persistent monocytosis for ?3 months and all other causes of monocytosis have been excluded  Yes   Total Score Meets WHO Criteria (4 major or 3 major and 2 minor)  Yes     CMML WHO Staging in 2016 now  discontinued:    CMML-0: <2 % blasts in PB and <5% blasts in BM  Yes   CMML-1: 2 - 4%  blasts in PB and/or 5 - 9% blasts in BM  No   CMML-2: 5 -19 % blasts in PB, 10 - 19% blasts in BM, OR presence of Maurilio rods  No     Prognostic Scoring System: CPSS-Mol      Criteria Score     Cytogenetic Risk Low: normal, isolated -Y  Intermediate: all else  High: Tri 8, complex, chrom 7   0    ASXL1 WT/Mut  1    NRAS WT/Mut  0    RUNX1 WT/Mut  0    SETBP1 WT/Mut  0    Total Genetic Score Low = 0  Intermediate-1 = 1  Intermediate-2 = 2  High = >3 (Max 3 points) 1 Genetic risk points for CPSS-mol   (Max 3):  1   BM Blasts <5% or >5%  0    WBC count <13 or > 13  0    Transfusion dependent No/Yes  0    CPSS-mol   Risk group Low = 0  Intermediate-1 = 1  Intermediate-2 = 2-3  High = >4    1     The CPSS-Mol was able to identify 4 risk groups having a signi?cantly different OS, with median survival time >144, and 68, 30, and 17 months in the low, intermediate-1, intermediate-2, and high-risk group, respectively. Mr. Amado Butler has a Intermediate-1 risk disease with an estimated median survival of 68 months. The 4 risk groups also showed a signi?cantly different cumulative incidence of leukemic evolution, with a 48-month cumulative incidence of AML evolution of 0%, 8%, 24%, and 52% for the low, intermediate-1, intermediate-2, and high-risk group respectively. Mr. Amado Butler has a Intermediate-1 risk disease with an estimated 4 year cumulative AML evolution risk of 8 %.     Will need to obtain BCR-ABL1 testing and PDGFRA, PDGFRB, FGFR1 and PCM-JAK2 to rule out other disorders if eosinophilia is present.     A number of conditions can also cause monocytosis such as asplenic state, inflamma ASXL 1 tory conditions and autoimmune disorders, major depression, steroids or colony stimulating factors.        Assessment and Plan  Mr. Amado Butler is a 64 year old male who is here for further evaluation and/or management of chronic  myelomonocytic leukemia.    Oncology:  Chronic myelomonocytic leukemia  WHO Classification: Dysplastic-CMML  Date of diagnosis: 8/14/2023  CPSS Mol score: 1-intermediate 1  Bone Marrow Blast %: 2   Cytogenetics: normal karyotype, 46 XY  Molecular: SRSF2, TET 2, TET 2, ASXL1  Past Treatment: None  Current Treatment: TBD    I discussed the pathophysiology and natural history of CMML with Mr. Amado Butler today. We went over the current prognostic models and scoring systems that are used in clinical practice as well as the potential for disease evolution into acute myeloid leukemia. We went over the current FDA approved treatments for CMML and covered that the only curative option is through an allogeneic hematopoietic cell transplantation. His disease is intermediate 1 risk and we will revisit this as needed.    We discussed about supportive care for lower risk disease and CMML including erythropoietin stimulating agents as well as thrombopoietin receptor agonist for thrombocytopenia.  At this point of time we will take a wait and watch approach. His counts have recovered.     His hemoglobin and white blood cell counts remained stable.  His platelets keep fluctuating and is at 113 today.  We will obtain an ultrasound spleen to document spleen size and may also be contributing to his thrombocytopenia.    Plan:  - RTC with MD with labs prior in 3 months.  - US spleen    Hematology:  Anemia/Thrombocytopenia:   Transfuse leukocyte reduced and irradiated blood products: 1 unit pRBC if hgb is 7.0-7.9, 2 units pRBCs if Hgb 6.0-6.9 g/dl, 1 unit platelets if PLT count is <10,000 or <50,000 with clinical bleeding.    Immunocompromised:  As a result of his disease and/or previous treatment. Mr. Amado Butler is immunocompromised. his last ANC is >500 and there is no need for prophylactic antibiotics at this time.     Cardiac:  Mr. Amado Butler has no history of heart disease.     Renal:  Creatinine results reviewed today.   Amado Butler does not have any renal disease.    Hepatic:  Liver function tests reviewed today.    Psychosocial:  Mr. Amado Butler is coping well with his diagnosis.     All other questions and concerns were addressed and answered to . Amado Butler's satisfaction.    Today, I spent a total of 40 minutes of face-to-face and non face-to-face time with Mr. Amado Butler. Time spent included review and discussion of diagnostic test results, patient counseling, and coordination of care.    The longitudinal plan of care for the diagnosis(es)/condition(s) as documented were addressed during this visit. Due to the added complexity in care, I will continue to support Amado in the subsequent management and with ongoing continuity of care.    Jamal Rodney MD    Division of Hematology, Oncology and Transplantation  [facility]  P: [phone]

## 2024-05-03 NOTE — NURSING NOTE
"Oncology Rooming Note    May 3, 2024 11:35 AM   Amado Butler is a 64 year old male who presents for:    Chief Complaint   Patient presents with    Blood Draw     Labs collected from venipuncture by RN. Vitals taken. Checked in for appointment(s).     Oncology Clinic Visit     [facility] RETURN - CML     Initial Vitals: BP (!) 157/80   Pulse 57   Temp 98  F (36.7  C)   Resp 16   Ht 1.915 m (6' 3.39\")   Wt 98.4 kg (217 lb)   SpO2 98%   BMI 26.84 kg/m   Estimated body mass index is 26.84 kg/m  as calculated from the following:    Height as of this encounter: 1.915 m (6' 3.39\").    Weight as of this encounter: 98.4 kg (217 lb). Body surface area is 2.29 meters squared.  No Pain (0) Comment: Data Unavailable   No LMP for male patient.  Allergies reviewed: Yes  Medications reviewed: Yes    Medications: Medication refills not needed today.  Pharmacy name entered into [employer]: [id number] - [place], [place] - [address] [facility]    Frailty Screening:   Is the patient here for a new oncology consult visit in cancer care? 2. No    Maged Campos LPN              "

## 2024-05-03 NOTE — PROGRESS NOTES
[facility]  HEMATOLOGY & ONCOLOGY  FOLLOW UP VISIT    PATIENT NAME: Amado Butler          MRN # [id number]  YOB: 1960  DATE OF VISIT: October 18, 2023           REFERRING PROVIDER:   Mr. Amado Butler was seen at the request of Dr. Solomon Reza for further evaluation and/or management.     History of Presenting Illness  Mr. Amado Butler is a pleasant 64 year old male with a past medical history significant for DM2- Last A1c, HTN, ?thrombocytopenia since 2012 s/p recent frontotemporal craniotomy for right optic nerve mass now pathology proven meningioma c/b complete vision loss in right eye and was also found to have significant cytopenias during admission leading to a bone marrow biopsy on 8/15/2023.  His bone marrow biopsy shows 50% cellularity [mildly hypercellular for his age] without significant dysplasia in all 3 cell lines and without increase in blasts.  He had normal cytogenetics and BCR-ABL FISH was negative.  However his [employer] ration sequencing testing showed that there was a clonal process with mutations detected in SRSF2, TET2 x 2 and ASXL1.  Given prior history of monocytosis dating back many years he was referred to our clinic.    Subjective  Here with wife Mary Lou.  He had an injury to his left pinky possible tendon rupture. No Headaches at this point..he continues to feel better and has had improvement in his overall wellbeing as well as fatigue symptoms. Denies any fevers or chills, night sweats, BRBPR or melena.    Past Medical History  Diabetes type 2  Hypertension  Paroxysmal SVT  Thrombocytopenia dating back since 2012    Family History  Any family history of cancers or blood or bleeding disorders.    Social History  Smoking: Never  Alcohol use: drinks approximately 2drinks/week  Status:  37 years.   Children/grandchildren: 3 children. 26 years old girl, 31 boy, 28 girl  Occupation: Retired, .     Current Outpatient  "Medications  Current Outpatient Medications   Medication Sig Dispense Refill    carvedilol (COREG) 12.5 MG tablet Take 1 tablet (12.5 mg) by mouth 2 times daily (with meals) 180 tablet 3    lisinopril-hydrochlorothiazide (ZESTORETIC) 20-25 MG tablet Take 1 tablet by mouth daily      Magnesium Oxide -Mg Supplement 500 MG CAPS       multivitamin w/minerals (MULTI-VITAMIN) tablet Take 1 tablet by mouth daily Over 50 mens      metFORMIN (GLUCOPHAGE) 500 MG tablet Take 1 tablet (500 mg) by mouth daily (with breakfast) (Patient not taking: Reported on 5/3/2024) 7 tablet 0       Allergies  Allergies   Allergen Reactions    Seasonal Allergies        Review of systems  A complete ROS was performed and was negative except as mentioned in HPI    Physical Exam  BP (!) 157/80   Pulse 57   Temp 98  F (36.7  C)   Resp 16   Ht 1.915 m (6' 3.39\")   Wt 98.4 kg (217 lb)   SpO2 98%   BMI 26.84 kg/m    Wt Readings from Last 3 Encounters:   24 98.4 kg (217 lb)   24 97.5 kg (215 lb)   23 97.1 kg (214 lb)       ECO   male sitting in chair in NAD  HEET: NC/AT, EOMI w/ PERRL, anicteric sclera. MMM. No mouth sores.   Neck: supple no JVP  Lymph: No cervical, supraclavicular, axillary or inguinal LAD  CV: normal S1,S2 with RRR no m/r/g  Resp: lungs CTA bilaterally with adequate air movement. No wheezes or crackles  Abd: soft, NTND, no organomegaly or masses. BS normoactive.   Ext: WWP no edema or cyanosis  Skin: no concerning lesions or rashes or petechiae  Neuro: A&Ox4, no lateralizing sx. Grossly nonfocal. Strength appears preserved.      Labs and Imaging  I reviewed patient's labs and imaging from care everywhere and here.    WBC 4.3, Hgb 13.5, , MCV 81, ANC 1000    BMBx 8/15/23  Final Diagnosis   Bone marrow, posterior iliac crest, left decalcified trephine biopsy and touch imprint; left aspirate clot, particle crush, direct aspirate smear, concentrate aspirate smear, and peripheral blood " smear:     - Cellular marrow (overall 50% in intact areas) with granulocytic hyperplasia with slight left shift, relative erythroid hypoplasia, unremarkable megakaryopoiesis, no overt dysplasia, and overall less than 2% blasts  - Overall normal reticulin fibrosis (MF-0)  - Peripheral blood showing moderate normochromic, normocytic anemia; moderate leukocytosis due to neutrophilia and monocytosis; slight thrombocytopenia  - See comment   No clonal chromosomal abnormality was detected among the 20 metaphase cells analyzed. These findings confirm and expand those of the previously reported interphase FISH anlaysis ([id number]) that showed no evidence of a BCR::ABL1 fusion.   SRSF2 VAF 46.2%  TET 2 VAF 51.6%  TET 2 VAF 46.5%  ASXL1 VAF 11.3%      CMML WHO Diagnostic Criteria:    Persistent (?3 months) peripheral blood monocytosis; absolute monocyte count >500/microL and greater than 10 percent of the entire white blood cell differential   Yes   Not meeting WHO criteria for BCR-ABL1 positive chronic myeloid leukemia, primary myelofibrosis, polycythemia vera, or essential thrombocytosis  Yes   <20 percent myeloblasts + monoblasts + promonocytes in peripheral blood and bone marrow  Yes   Dysplastic changes in one or more myeloid lineages. If myelodysplastic changes are absent or minimal, the diagnosis of CMML can be made if the above three criteria are met and:  No   An acquired clonal cytogenetic (or mutational abnormality) is present in bone marrow cells or  Yes   Persistent monocytosis for ?3 months and all other causes of monocytosis have been excluded  Yes   Total Score Meets WHO Criteria (4 major or 3 major and 2 minor)  Yes     CMML WHO Staging in 2016 now discontinued:    CMML-0: <2 % blasts in PB and <5% blasts in BM  Yes   CMML-1: 2 - 4%  blasts in PB and/or 5 - 9% blasts in BM  No   CMML-2: 5 -19 % blasts in PB, 10 - 19% blasts in BM, OR presence of Maurilio rods  No     Prognostic Scoring System: CPSS-Mol       Criteria Score     Cytogenetic Risk Low: normal, isolated -Y  Intermediate: all else  High: Tri 8, complex, chrom 7   0    ASXL1 WT/Mut  1    NRAS WT/Mut  0    RUNX1 WT/Mut  0    SETBP1 WT/Mut  0    Total Genetic Score Low = 0  Intermediate-1 = 1  Intermediate-2 = 2  High = >3 (Max 3 points) 1 Genetic risk points for CPSS-mol   (Max 3):  1   BM Blasts <5% or >5%  0    WBC count <13 or > 13  0    Transfusion dependent No/Yes  0    CPSS-mol   Risk group Low = 0  Intermediate-1 = 1  Intermediate-2 = 2-3  High = >4    1     The CPSS-Mol was able to identify 4 risk groups having a signi?cantly different OS, with median survival time >144, and 68, 30, and 17 months in the low, intermediate-1, intermediate-2, and high-risk group, respectively. Mr. Amado Butler has a Intermediate-1 risk disease with an estimated median survival of 68 months. The 4 risk groups also showed a signi?cantly different cumulative incidence of leukemic evolution, with a 48-month cumulative incidence of AML evolution of 0%, 8%, 24%, and 52% for the low, intermediate-1, intermediate-2, and high-risk group respectively. Mr. Amado Butler has a Intermediate-1 risk disease with an estimated 4 year cumulative AML evolution risk of 8 %.     Will need to obtain BCR-ABL1 testing and PDGFRA, PDGFRB, FGFR1 and PCM-JAK2 to rule out other disorders if eosinophilia is present.     A number of conditions can also cause monocytosis such as asplenic state, inflamma ASXL 1 tory conditions and autoimmune disorders, major depression, steroids or colony stimulating factors.        Assessment and Plan  Mr. Amado Butler is a 64 year old male who is here for further evaluation and/or management of chronic myelomonocytic leukemia.    Oncology:  Chronic myelomonocytic leukemia  WHO Classification: Dysplastic-CMML  Date of diagnosis: 8/14/2023  CPSS Mol score: 1-intermediate 1  Bone Marrow Blast %: 2   Cytogenetics: normal karyotype, 46 XY  Molecular: SRSF2, TET 2, TET 2,  ASXL1  Past Treatment: None  Current Treatment: TBD    I discussed the pathophysiology and natural history of CMML with Mr. Amado Butler today. We went over the current prognostic models and scoring systems that are used in clinical practice as well as the potential for disease evolution into acute myeloid leukemia. We went over the current FDA approved treatments for CMML and covered that the only curative option is through an allogeneic hematopoietic cell transplantation. His disease is intermediate 1 risk and we will revisit this as needed.    We discussed about supportive care for lower risk disease and CMML including erythropoietin stimulating agents as well as thrombopoietin receptor agonist for thrombocytopenia.  At this point of time we will take a wait and watch approach. His counts have recovered.     His hemoglobin and white blood cell counts remained stable.  His platelets keep fluctuating and is at 113 today.  We will obtain an ultrasound spleen to document spleen size and may also be contributing to his thrombocytopenia.    Plan:  - RTC with MD with labs prior in 3 months.  - US spleen    Hematology:  Anemia/Thrombocytopenia:   Transfuse leukocyte reduced and irradiated blood products: 1 unit pRBC if hgb is 7.0-7.9, 2 units pRBCs if Hgb 6.0-6.9 g/dl, 1 unit platelets if PLT count is <10,000 or <50,000 with clinical bleeding.    Immunocompromised:  As a result of his disease and/or previous treatment. Mr. Amado Butler is immunocompromised. his last ANC is >500 and there is no need for prophylactic antibiotics at this time.     Cardiac:  Mr. Amado Butler has no history of heart disease.     Renal:  Creatinine results reviewed today. Mr. Amado Butler does not have any renal disease.    Hepatic:  Liver function tests reviewed today.    Psychosocial:  Mr. Amado Butler is coping well with his diagnosis.     All other questions and concerns were addressed and answered to Mr. Amado Butler's  satisfaction.    Today, I spent a total of 40 minutes of face-to-face and non face-to-face time with . Amado Butler. Time spent included review and discussion of diagnostic test results, patient counseling, and coordination of care.    The longitudinal plan of care for the diagnosis(es)/condition(s) as documented were addressed during this visit. Due to the added complexity in care, I will continue to support Amado in the subsequent management and with ongoing continuity of care.    Jamal Rodney MD    Division of Hematology, Oncology and Transplantation  [facility]  P: [phone]

## 2024-06-03 NOTE — PROGRESS NOTES
Detail Level: Simple [facility]  General Neurology - Progress Note    Patient Name:  Amado Butler  Date of Service:  August 13, 2023    Subjective:    Patient again w/ symptomatic hypoglycemia to 34 overnight, started on D5, NPH held.  No further episodes of bradycardia evident.  Pt has been NPO overnight for planned biopsy per NSGY this afternoon.    R-monocular vision continues to worsen: Now only has minimal light and movement perception in his peripheral vision, but no light perception centrally.  Reports vision on L is stable.       Additionally, pt reports a sensation of cold and somewhat dulled sensation on the plantar aspect of his bilateral feet.  Otherwise, no new concerns or complaints.      Objective:    Vitals: /82 (BP Location: Left arm)   Pulse 69   Temp 98.3  F (36.8  C) (Oral)   Resp 16   Wt 91.1 kg (200 lb 12.8 oz)   SpO2 97%   BMI 25.10 kg/m    General: Seated in chair, NAD  Head: Atraumatic, normocephalic   Cardiac: no lower extremity edema, feet are cool to the touch.  Malleolar pulses present bilaterally.  Neurologic:  Mental Status: Fully alert, attentive and oriented. Speech clear and fluent.  Asks thoughtful questions.  Cranial Nerves: EOM intact, without nystagmus. RAPD on right.  Minimal light and movement perception on the right.  Facial movements symmetric at rest and with activation. No dysarthria.  Shoulder shrug strong.   Tongue protrusion and soft palate elevation midline.   Motor: No abnormal movements.  5/5 strength in biceps, triceps, wrist flexors/extensors, hib flexors, lower leg flexors/extensors, foot plantar/dorsiflexors  Reflexes: Normoreflexic and symmetric at biceps, patella.    Sensory: Intact to light touch in bilateral feet.  Mild early extinction to vibratory sensation at bilateral great toe.    Station/Gait: Deferred    Pertinent Investigations:    I have personally reviewed most recent and pertinent labs, tests, and radiological images.      ~Prior to admission~  MRI brain and orbits w/o contrast 6/28/23  IMPRESSION:  HEAD MRI:   1.  Right frontal lobe encephalomalacia and surrounding gliotic   change without acute intracranial abnormality.   ORBIT MRI:   1.  Unremarkable orbits.      MRI brain and orbits with and w/o contrast 7/3/23  IMPRESSION:  HEAD MRI:  1.  No acute infarct.  2.  Age-related changes described above.  3.  Right frontal lobe small area tissue loss and gliosis.  ORBIT MRI:  1.  Unremarkable MRI of the orbits.   2.  No evidence for optic neuritis.     MRI BRAIN WITH AND WITHOUT CONTRAST, MRI FACE (ORBITS) WITH AND WITHOUT CONTRAST 7/28/23  1.  No acute intracranial hemorrhage, mass lesion, or acute infarction.   2.  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   3.  Minimal T2 hyperintensity and enhancement in the right optic nerve sheath, which may represent mild perineuritis. No clear evidence of optic neuritis.      18F-FDG PET Scan With Attenuation Correction CT Only 8/3/23  1. No specific evidence of an FDG-avid malignancy.   2. Non-avid left adrenal nodule, which may be too small to be accurately characterized by FDG PET. Reported right adrenal nodules not visualized on the low dose CT. Adrenal nodules are not well characterized by FDG-PET, and would be more sensitively/specifically assessed with an adrenal protocolled CT or MRI.   3. Apparent abrupt termination of the left maxilla posteriorly without abnormal tracer uptake, suspected to be related to prior dental extraction or post-surgical changes.      MRI CERVICAL SPINE WITH AND WITHOUT CONTRAST, MRI THORACIC SPINE WITH AND WITHOUT CONTRAST 8/5/23  No evidence of demyelinating disease or transverse myelitis within the cervical or thoracic spine.      MR ORBIT W/O & W CONTRAST 8/9/2023 11:35 AM  Impression:    1. Regarding the orbits and globes, there is stable slight asymmetric  hyperintensity and perhaps slight enhancement of the right optic nerve  sheath, suggesting  Has The Patient Ever Received A Transplant?: No Which Antibiotic Do They Take For Surgical Prophylaxis?: Amoxicillin (2 grams) optic perineuritis. No underlying mass lesion.  2. Regarding the remainder of the brain, chronic right frontal infarct  and mild small vessel ischemic disease.      Temporal artery, right, biopsy 23  1. No evidence of giant cell arteritis.  2. Moderate arteriosclerosis.  3. Mild medial calcific sclerosis.      CRP < 3.0 on multiple occasions  ESR < 2 on multiple occasions     CNS demyelinating disease (serum): negative  Encephalopathy/autoimmune panel (serum): negative  ACE: < 10  BUDDY: negative  ANCA Ig:20 (high)  IgG subclass: negative  [place], [place] light chain: negative  SPEP: negative  Lysozyme (serum): > 10.00 (high)     HBV: negative  Treponema juany: negative  Lyme juany: negative  Anaplamsa IgG IgM: negative  Babseia IgG IgM: negative  Erlichia IgG IgM: negative  Quantiferon TB: negative     CSF 23  Nucleated cells: 0  RBC: 2,967  Protein: 20  Glucose: 133  Kappa Free light chain: negative  ACE: 4  Lyme: non-reactive    Assessment and Plan:  Amado Butler is a 63 year old male w/ PMHx of of SARA, HTN, DM2, paroxysmal SVT p/w vision loss and periorbital pain w/ MRI showing subtle perineural enhancement, disc edema on prior dilated exams, 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      Extensive work-up by multiple services as summarized in HPI without clear final diagnosis.  Auto-immune etiology certainly possible given steroid responsiveness.  However, given negative extensive work-up, responsiveness to high-dose steroids with rapid return of visual loss at lower doses, his presentation is concerning for infiltrative (donny. lymphoma) vs significantly less likely a simmering infectious process.     Given persistent symptoms and suspicion for an autoimmune etiology, patient was admitted for empiric PLEX, with repeat high dose steroids per pt preference after risk/benefit discussion.      Unfortunately, although he has had some  Patient Reported Location: Right Cheek and Left Forehead return of vision, PLEX/steroids have not been very efficacious.  After extended risk/benefit discussion w/ Mr. Butler, we have elected to defer further steroids given 1) complications (hyperglycemia requiring insulin gtt per endo, itself c/b hypokalemia 2) limited benefit and 3) possibility of non-diagnostic biopsy.  Regarding PLEX, given low fibrinogen and platelets, NSGY has appropriately raised concerns re bleeding risk given planned biopsy Monday.  Following discussion with apheresis team was determined that plasma exchange with plasma rather than albumin was unlikely to significantly impact fibrinogen levels and that the patient should be at a normal fibrinogen level by Monday.  Given this, elected to do plasma exchange Sunday 8/14.  To the credit of our hematology colleagues, fibrinogen this AM is reassuring and patient has been NPO for planned biopsy this afternoon.       #R-eye vision loss, concern for optic perineuritis of unclear etiology  -Plan for biopsy M 8/14 per NSGY   >Note: Heme/onc recommending marrow biopsy, would like to coordinate if possible   >Page either Dr. Angelica Llamas or SILVIO Quinn   -s/p PLEX x2, IV methylprednisone 1g x 2   >Holding further methylpred given high-grade hyperglycemia, limited efficacy   > Changing to plasma exchange with plasma   > Recheck fibrinogen in the AM  -Continue PTA 80 mg prednisone for now   >Pending course, possible taper    #Hypokalemia  To nixon 2.8 8/12 with start of insulin gtt per endo, hydrochlorothiazide PTA.  Again hypokalemic this morning (8/14) to 2.7, in the setting of recurrent iatrogenic hypoglycemia to 34.  Continue to suspect chronic low-grade hypokalemic in c/o hydrochlorothiazide, exacerbated by over-exposure to insulin.  - s/p 40 mEq potassium chloride ~0900 AM 8/14  - Starting 10 mEq KCl IVx3  - Repeat K+ at 12:00    #Symptomatic bradycardia  Patient developed mild dizziness with bradycardia to as low as 40 8/13/23 in the setting of  Time Of Mohs Surgery: 814 hypoglycemia.  EKG was unremarkable, bradycardia has not recurred.    -Continue to monitor    #Pre-diabetes  #Steroid induced hyperglycemia  Pt w/ Hx of pre-diabetes PTA on glipizide and metformin, now presenting with BG intermittently in the 300s prior to steroids.  With methylpred, BG to near 500 prompting start of insulin gtt c/b hypokalemia as above.  Insulin gtt now discontinued given improved ctrl, no plans for further steroids.    -Inpatient diabetes following  -Co-management per their assessment appreciated.   >Please see their notes for details    #Pseudohyponatremia, resolved   #Hypochloremia, ongoing  Na to 127 at admission c/o BG near 300.  Resolved with better BG control.     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 No evidence of active bleeding on exam, ROS. Will continue to monitor.    - Hematology consulted   >Recommend BMB   >If possible, will try to coordinate w/ NSGY  - Transfusion medicine following, appreciate recommendations on this front  - Daily CBC     #Hypertension  - Continue pta coreg 12.5 mg BID  - HOLD lisinopril-hydrochlorothiazide given contraindicated w/ PLEX  - Continue hydrochlorothiazide   >Given recurrent hypokalemia, may consider adjustment moving forward     #SARA  - CPAP at night, home settings     FEN: Regular diet  Malnutrition: Patient does not meet two of the above criteria necessary for diagnosing malnutrition  PPx: lovenox  Code: FULL     Patient was discussed with Dr. Boykin.    Richmond Joseph MD, MS  PGY-2, Neurology  Please page César pass pager at [phone] ([facility] Inpatients [facility])     Date Of Mohs Surgery: 06/19/2024 Office Location Of Mohs Surgery: [place] Has The Pathology Report Been Received?: Yes

## 2024-07-19 ENCOUNTER — ANCILLARY PROCEDURE (OUTPATIENT)
Dept: MRI IMAGING | Facility: CLINIC | Age: 64
End: 2024-07-19
Attending: STUDENT IN AN ORGANIZED HEALTH CARE EDUCATION/TRAINING PROGRAM
Payer: COMMERCIAL

## 2024-07-19 ENCOUNTER — OFFICE VISIT (OUTPATIENT)
Dept: OPHTHALMOLOGY | Facility: CLINIC | Age: 64
End: 2024-07-19
Attending: OPHTHALMOLOGY
Payer: COMMERCIAL

## 2024-07-19 ENCOUNTER — OFFICE VISIT (OUTPATIENT)
Dept: RADIATION ONCOLOGY | Facility: CLINIC | Age: 64
End: 2024-07-19
Attending: STUDENT IN AN ORGANIZED HEALTH CARE EDUCATION/TRAINING PROGRAM
Payer: COMMERCIAL

## 2024-07-19 VITALS
RESPIRATION RATE: 18 BRPM | BODY MASS INDEX: 27.48 KG/M2 | DIASTOLIC BLOOD PRESSURE: 80 MMHG | SYSTOLIC BLOOD PRESSURE: 143 MMHG | OXYGEN SATURATION: 97 % | HEART RATE: 82 BPM | WEIGHT: 214 LBS

## 2024-07-19 DIAGNOSIS — D32.0 PRIMARY MENINGIOMA OF OPTIC NERVE SHEATH (H): ICD-10-CM

## 2024-07-19 DIAGNOSIS — D32.9 MENINGIOMA (H): ICD-10-CM

## 2024-07-19 DIAGNOSIS — D32.9 MENINGIOMA (H): Primary | ICD-10-CM

## 2024-07-19 DIAGNOSIS — H53.10 SUBJECTIVE VISUAL DISTURBANCE: Primary | ICD-10-CM

## 2024-07-19 DIAGNOSIS — H47.20 OPTIC ATROPHY: ICD-10-CM

## 2024-07-19 PROCEDURE — 99212 OFFICE O/P EST SF 10 MIN: CPT | Mod: GC | Performed by: STUDENT IN AN ORGANIZED HEALTH CARE EDUCATION/TRAINING PROGRAM

## 2024-07-19 PROCEDURE — A9585 GADOBUTROL INJECTION: HCPCS | Performed by: RADIOLOGY

## 2024-07-19 PROCEDURE — G0463 HOSPITAL OUTPT CLINIC VISIT: HCPCS | Performed by: OPHTHALMOLOGY

## 2024-07-19 PROCEDURE — G0463 HOSPITAL OUTPT CLINIC VISIT: HCPCS | Mod: 27 | Performed by: STUDENT IN AN ORGANIZED HEALTH CARE EDUCATION/TRAINING PROGRAM

## 2024-07-19 PROCEDURE — 70553 MRI BRAIN STEM W/O & W/DYE: CPT | Mod: GC | Performed by: RADIOLOGY

## 2024-07-19 PROCEDURE — 92083 EXTENDED VISUAL FIELD XM: CPT | Performed by: OPHTHALMOLOGY

## 2024-07-19 PROCEDURE — 99213 OFFICE O/P EST LOW 20 MIN: CPT | Performed by: OPHTHALMOLOGY

## 2024-07-19 PROCEDURE — 92133 CPTRZD OPH DX IMG PST SGM ON: CPT | Performed by: OPHTHALMOLOGY

## 2024-07-19 RX ORDER — GADOBUTROL 604.72 MG/ML
10 INJECTION INTRAVENOUS ONCE
Status: COMPLETED | OUTPATIENT
Start: 2024-07-19 | End: 2024-07-19

## 2024-07-19 RX ADMIN — GADOBUTROL 9.5 ML: 604.72 INJECTION INTRAVENOUS at 11:58

## 2024-07-19 ASSESSMENT — CONF VISUAL FIELD
OD_INFERIOR_NASAL_RESTRICTION: 1
OS_NORMAL: 1
OS_SUPERIOR_NASAL_RESTRICTION: 0
OD_INFERIOR_TEMPORAL_RESTRICTION: 1
OD_SUPERIOR_TEMPORAL_RESTRICTION: 1
OS_SUPERIOR_TEMPORAL_RESTRICTION: 0
OD_SUPERIOR_NASAL_RESTRICTION: 1
OS_INFERIOR_TEMPORAL_RESTRICTION: 0
METHOD: COUNTING FINGERS
OS_INFERIOR_NASAL_RESTRICTION: 0

## 2024-07-19 ASSESSMENT — TONOMETRY
OS_IOP_MMHG: 20
OD_IOP_MMHG: 20
IOP_METHOD: ICARE

## 2024-07-19 ASSESSMENT — EXTERNAL EXAM - LEFT EYE: OS_EXAM: NORMAL

## 2024-07-19 ASSESSMENT — VISUAL ACUITY
METHOD: SNELLEN - LINEAR
CORRECTION_TYPE: CONTACTS
OD_SC: NLP
OS_CC: 20/20

## 2024-07-19 ASSESSMENT — REFRACTION_WEARINGRX: SPECS_TYPE: TRIFOCAL

## 2024-07-19 ASSESSMENT — SLIT LAMP EXAM - LIDS
COMMENTS: NORMAL
COMMENTS: NORMAL

## 2024-07-19 ASSESSMENT — CUP TO DISC RATIO
OS_RATIO: 0.15
OD_RATIO: 0.8

## 2024-07-19 ASSESSMENT — EXTERNAL EXAM - RIGHT EYE: OD_EXAM: NORMAL

## 2024-07-19 ASSESSMENT — PAIN SCALES - GENERAL: PAINLEVEL: NO PAIN (0)

## 2024-07-19 NOTE — LETTER
2024      Amado Butler  [address] [facility] [address]      Dear Colleague,    Thank you for referring your patient, Amado Butler, to the [facility] RADIATION ONCOLOGY. Please see a copy of my visit note below.    2024  Amado Butler  [phone] (home)   : 1960  MRN: [id number]  Neuro-Oncologist: N/A  Neurosurgeon: Jose Guadalupe Camacho MD PhD     Attending Radiation Oncologist: Kaylee Mccullough MD PhD    RADIATION ONCOLOGY FOLLOW UP    History of Present Illness:  Amado Butler is a 64 year old with a history of CMML, who initially presented with subacute right-sided periorbital pain   and vision loss, s/p frontotemporal craniotomy for decompression and resection of right optic nerve mass (23),  confirmed to be a meningioma on pathology (grade unable to be determined due to crush artifact though presumed Grade 1). He was treated with IMRT to a total dose of 5040 cGy completed 1/15/2024.      The patient was last seen in 2024 during which time he had a stable scan and presents today for 6-month follow-up since and treatment.    Last visit with neuropathy on 2024 with Dr. Waldron.  Signal change on MRI from 2024 showed enhancement along the dura extending along middle cranial fossa and cavernous sinus.  An area of T2 hyperintensity signal in the right optic nerve was seen, thought to be related to optic atrophy. At the time he reported worse visual acuity in right eye than left.    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  He does endorse sensitivity to light in his left eye.  Prolonged reading or looking outside during the morning stonewall because his eyes deteriorate.  He similarly complains of excessive tearing in his left eye.     Interval History:   MR brain with MRI orbit 2024                                                                       IMPRESSION:  1. Stable postsurgical changes of right frontal craniotomy  status post  mass resection with similar appearance of overlying dural thickening  and enhancement adjacent to the resection cavity, likely sequela of  posttreatment changes.  2. No substantial change in size of chronic right subdural hematoma.  3. Unchanged degree of increased T2 signal surrounding the right optic  nerve without underlying enhancement or significant atrophy, may  represent posttreatment changes and/or prior sequelae of optic  neuritis.     I have personally reviewed the examination and initial interpretation  and I agree with the findings.     MYKE CHILDS MD        prior MRI brain with and without contrast on 04/18/23:  Impression:  1.  Right frontal craniotomy with decreased underlying dural  enhancement extending along the middle cranial fossa and cavernous  sinus with unchanged encasement of the cisternal and intracanalicular  segments of the right optic nerve favored to represent posttreatment  changes. Attentional follow-up.  2.  Decreased adjacent T2/FLAIR hyperintense signal in the right  frontal lobe which could also be posttreatment related.  3.  Stable sequelae of right optic neuritis.          Today, he specifically reports the following symptoms:    Brain ROS:  Headaches-   Denies  Seizures- Denies  Nausea/vomiting-  Denies  Changes in cognition/behavior-  Denies  Speech-  Denies  Vision/hearing changes-loss of vision in right eye, followed by neuro-optho  Gait symptoms or imbalance-  Denies  Other focal neuro deficits-  Denies      Review of Systems:  Denies additional symptoms related to current diagnosis.     Current Outpatient Medications   Medication Sig Dispense Refill     carvedilol (COREG) 12.5 MG tablet Take 1 tablet (12.5 mg) by mouth 2 times daily (with meals) 180 tablet 3     lisinopril-hydrochlorothiazide (ZESTORETIC) 20-25 MG tablet Take 1 tablet by mouth daily       Magnesium Oxide -Mg Supplement 500 MG CAPS        metFORMIN (GLUCOPHAGE) 500 MG tablet Take 1 tablet  (500 mg) by mouth daily (with breakfast) 7 tablet 0     multivitamin w/minerals (MULTI-VITAMIN) tablet Take 1 tablet by mouth daily Over 50 mens       No current facility-administered medications for this visit.        Allergies   Allergen Reactions     Seasonal Allergies        Physical Exam:      ECO  KPS: 90    BP (!) 143/80   Pulse 82   Resp 18   Wt 97.1 kg (214 lb)   SpO2 97%   BMI 27.48 kg/m      GENERAL: Healthy, alert and no distress  HEAD: Normocephalic, atraumatic  EYES: Eyes grossly normal to inspection.  No discharge or erythema, or obvious scleral/conjunctival abnormalities.  NOSE: No discharge, normal appearance   RESP: No audible wheeze, cough, or visible cyanosis.  No visible retractions or increased work of breathing.    SKIN: Visible skin clear. No significant rash, abnormal pigmentation or lesions. Incision well healed.   NEURO: Cranial nerves grossly intact, except for right cranial nerve II.  Mentation and speech appropriate for age  EXTREMITIES: No obvious edema, normal appearing range of motion, 5/5 strength upper and lower extremities, sensation intact bilaterally   PSYCH: Mentation appears normal, affect normal/bright, judgement and insight intact, normal speech and appearance well-groomed.  Labs:  CBC RESULTS:   Recent Labs   Lab Test 24  1128   WBC 4.3   RBC 4.91   HGB 13.5   HCT 39.7*   MCV 81   MCH 27.5   MCHC 34.0   RDW 14.0   *     ELECTROLYTES:  Recent Labs   Lab Test 24  1128      POTASSIUM 3.6   CHLORIDE 97*   FELIX 9.3   CO2 24   BUN 17.8   CR 0.96   *       Imaging and Diagnostic Studies:   See HPI above    Assessment/Plan:  Amado Butler is a 64 year old with a history of CMML, who initially presented with subacute right-sided periorbital pain and vision loss, s/p frontotemporal craniotomy for decompression and resection of right optic nerve mass (23),  confirmed to be a meningioma on pathology (grade unable to be determined due to  crush artifact though presumed Grade  1).  He was treated with IMRT to a total dose of 5040 cGy completed 1/15/2024.      The patient presents without evidence of disease recurrence.     1)  In person follow up with our team in 6 months with MRI with and without contrast prior (we will order MR Orbit as well)   2) continue to follow with neuro-optho    All the patient's questions were answered to verbalized satisfaction. We instructed the patient to call with any questions or concerns in the interim.        Omkar Rod MD  PGY-4 Radiation Oncology  Department of Radiation Oncology  [facility]  Phone: [phone]    A medical resident participated in the care of this patient and in the preparation of this note. I have verified and edited this note. I personally performed key elements of the physical exam and medical decision making for this patient.  I agree with the assessment and plan of care as documented in the note above.    The overall time I spent on direct patient care including self review of records, labs, and radiographic studies, as well as, direct face-to-face interaction with the patient and coordination of care with other providers was 15 minutes on the day of the encounter.     Kaylee Mccullough MD, PhD     Department of Radiation Oncology  [facility]                Again, thank you for allowing me to participate in the care of your patient.        Sincerely,        Kaylee Mccullough MD PhD

## 2024-07-19 NOTE — PROGRESS NOTES
2024  Amado Butler  [phone] (home)   : 1960  MRN: [id number]  Neuro-Oncologist: N/A  Neurosurgeon: Jose Guadalupe Camacho MD PhD     Attending Radiation Oncologist: Kaylee Mccullough MD PhD    RADIATION ONCOLOGY FOLLOW UP    History of Present Illness:  Amado Butler is a 64 year old with a history of CMML, who initially presented with subacute right-sided periorbital pain   and vision loss, s/p frontotemporal craniotomy for decompression and resection of right optic nerve mass (23),  confirmed to be a meningioma on pathology (grade unable to be determined due to crush artifact though presumed Grade 1). He was treated with IMRT to a total dose of 5040 cGy completed 1/15/2024.      The patient was last seen in 2024 during which time he had a stable scan and presents today for 6-month follow-up since and treatment.    Last visit with neuropathy on 2024 with Dr. Waldron.  Signal change on MRI from 2024 showed enhancement along the dura extending along middle cranial fossa and cavernous sinus.  An area of T2 hyperintensity signal in the right optic nerve was seen, thought to be related to optic atrophy. At the time he reported worse visual acuity in right eye than left.    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  He does endorse sensitivity to light in his left eye.  Prolonged reading or looking outside during the morning [place] because his eyes deteriorate.  He similarly complains of excessive tearing in his left eye.     Interval History:   MR brain with MRI orbit 2024                                                                       IMPRESSION:  1. Stable postsurgical changes of right frontal craniotomy status post  mass resection with similar appearance of overlying dural thickening  and enhancement adjacent to the resection cavity, likely sequela of  posttreatment changes.  2. No substantial change in size of chronic right subdural  hematoma.  3. Unchanged degree of increased T2 signal surrounding the right optic  nerve without underlying enhancement or significant atrophy, may  represent posttreatment changes and/or prior sequelae of optic  neuritis.     I have personally reviewed the examination and initial interpretation  and I agree with the findings.     MYKE CHILDS MD        prior MRI brain with and without contrast on 04/18/23:  Impression:  1.  Right frontal craniotomy with decreased underlying dural  enhancement extending along the middle cranial fossa and cavernous  sinus with unchanged encasement of the cisternal and intracanalicular  segments of the right optic nerve favored to represent posttreatment  changes. Attentional follow-up.  2.  Decreased adjacent T2/FLAIR hyperintense signal in the right  frontal lobe which could also be posttreatment related.  3.  Stable sequelae of right optic neuritis.          Today, he specifically reports the following symptoms:    Brain ROS:  Headaches-   Denies  Seizures- Denies  Nausea/vomiting-  Denies  Changes in cognition/behavior-  Denies  Speech-  Denies  Vision/hearing changes-loss of vision in right eye, followed by neuro-optho  Gait symptoms or imbalance-  Denies  Other focal neuro deficits-  Denies      Review of Systems:  Denies additional symptoms related to current diagnosis.     Current Outpatient Medications   Medication Sig Dispense Refill    carvedilol (COREG) 12.5 MG tablet Take 1 tablet (12.5 mg) by mouth 2 times daily (with meals) 180 tablet 3    lisinopril-hydrochlorothiazide (ZESTORETIC) 20-25 MG tablet Take 1 tablet by mouth daily      Magnesium Oxide -Mg Supplement 500 MG CAPS       metFORMIN (GLUCOPHAGE) 500 MG tablet Take 1 tablet (500 mg) by mouth daily (with breakfast) 7 tablet 0    multivitamin w/minerals (MULTI-VITAMIN) tablet Take 1 tablet by mouth daily Over 50 mens       No current facility-administered medications for this visit.        Allergies   Allergen  Reactions    Seasonal Allergies        Physical Exam:      ECO  KPS: 90    BP (!) 143/80   Pulse 82   Resp 18   Wt 97.1 kg (214 lb)   SpO2 97%   BMI 27.48 kg/m      GENERAL: Healthy, alert and no distress  HEAD: Normocephalic, atraumatic  EYES: Eyes grossly normal to inspection.  No discharge or erythema, or obvious scleral/conjunctival abnormalities.  NOSE: No discharge, normal appearance   RESP: No audible wheeze, cough, or visible cyanosis.  No visible retractions or increased work of breathing.    SKIN: Visible skin clear. No significant rash, abnormal pigmentation or lesions. Incision well healed.   NEURO: Cranial nerves grossly intact, except for right cranial nerve II.  Mentation and speech appropriate for age  EXTREMITIES: No obvious edema, normal appearing range of motion, 5/5 strength upper and lower extremities, sensation intact bilaterally   PSYCH: Mentation appears normal, affect normal/bright, judgement and insight intact, normal speech and appearance well-groomed.  Labs:  CBC RESULTS:   Recent Labs   Lab Test 24  1128   WBC 4.3   RBC 4.91   HGB 13.5   HCT 39.7*   MCV 81   MCH 27.5   MCHC 34.0   RDW 14.0   *     ELECTROLYTES:  Recent Labs   Lab Test 24  1128      POTASSIUM 3.6   CHLORIDE 97*   FELIX 9.3   CO2 24   BUN 17.8   CR 0.96   *       Imaging and Diagnostic Studies:   See HPI above    Assessment/Plan:  Amado Butler is a 64 year old with a history of CMML, who initially presented with subacute right-sided periorbital pain and vision loss, s/p frontotemporal craniotomy for decompression and resection of right optic nerve mass (23),  confirmed to be a meningioma on pathology (grade unable to be determined due to crush artifact though presumed Grade  1).  He was treated with IMRT to a total dose of 5040 cGy completed 1/15/2024.      The patient presents without evidence of disease recurrence.     1)  In person follow up with our team in 6 months with  MRI with and without contrast prior (we will order MR Orbit as well)   2) continue to follow with neuro-optho    All the patient's questions were answered to verbalized satisfaction. We instructed the patient to call with any questions or concerns in the interim.        Omkar Rod MD  PGY-4 Radiation Oncology  Department of Radiation Oncology  [facility]  Phone: [phone]    A medical resident participated in the care of this patient and in the preparation of this note. I have verified and edited this note. I personally performed key elements of the physical exam and medical decision making for this patient.  I agree with the assessment and plan of care as documented in the note above.    The overall time I spent on direct patient care including self review of records, labs, and radiographic studies, as well as, direct face-to-face interaction with the patient and coordination of care with other providers was 15 minutes on the day of the encounter.     Kaylee Mccullough MD, PhD     Department of Radiation Oncology  [facility]

## 2024-07-19 NOTE — PROGRESS NOTES
1. 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 Status post radiation therapy.  No indication today of spread or involvement of the left optic nerve.  Follow-up neuro-imaging per radiation oncology- Dr. Mccullough.     After clinic MRI from today read by radiology as showing no active optic nerve enhancement on the right side and only right sided T2 signal change of the right optic nerve.    2. 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 Patient underwent optic canal decompression and biopsy which resulted in meningioma diagnosis. He was also diagnosed with CMML that is currently being monitored by oncology. He is now s/p radiation for the meningioma last dosage 1/2023.     Today he returns with stable no light perception vision OD and 20/20 vision OS. Full visual field in the left eye with stable OCT retinal nerve fiber layer thickness (no indication of optic neuropathy left eye).    Plan   Review upcoming MRI Orbit (addendum after visit- STABLE without evidence of active optic nerve or sheath enhancement). Post surgical meninges changes are stable.    Follow-up with neuro-ophthalmology in 6 months      Historical data including initial visit HPI  He was diagnosed with a right optic nerve sheath meningioma in July 2023; treatment included resection and radiation from December 2023-January 2024. He received a total of 5040 cGy over 28 fractions completed Jan 2024. He underwent a bone marrow biopsy on 8/15/2023 due to significant cytopenias that showed chronic myelomonocytic leukemia (CMML); 50% cellularity [mildly hypercellular for his age] without significant dysplasia in all 3 cell lines and without increase in blasts. He  had normal cytogenetics and BCR-ABL FISH was negative.     Patient was previously seen by Dr. Serrano with last visit on 9/11/23.     VIsion loss initially noted in June 2023.  Per chart review patient on 7/5/23 had a temporal artery biopsy due to concern for GCA in the right eye. Pathology was negative for GCA. Patient went to [place] and experienced worsening vision in his right eye several times. On 7/24/23 he went to [facility]. He was restarted on high dose 1000 mg IV steroids. The vision in the right eye improved with the high dose IV steroids. He was seen again the following Monday, however over the week the vision deteriorated some but not significantly. On 7/31/23 he was seen in the eye clinic again and was admitted directly to the hospital on a Neurology floor for 5 days. He underwent another course of daily 1000 mg IV steroids. He underwent PET and MRI imaging and was diagnosed with optic perineuritis. He had extensive testing but nothing has come back positive. He is currently taking 80 mg daily of steroids. He is also taking an antiviral and antibiotic. The vision improved on oral steroids, antivirals, and oral abx. He was hospitalized again his vision continued to deteriorate throughout admission. He was discharged on oral prednisone and his vision continued to deteriorate. Patient subsequently underwent PLEX treatment without improvement in vision.     He is s/p right frontotemporal craniotomy and decompression of the right optic canal and orbital apex for resection of right optic canal and intradural mass 8/14/2023 of which pathology showed optic nerve meningioma.     He was also diagnosed with CMML which oncology is monitoring for now. He is also now s/p radiation treatment and is here for a second opinion.    Since completing radiation in January patient feels like his vision in the left eye isn't as sharp in his left eye. Patient has repeat MRI next week. Patient is wondering what next  steps are from a neuro-ophthalmology standpoint. Patient is wanting to establish care with Dr. Waldron.     Review of outside testing:  Labs 5/8/24  -Lyme: Neg  -CMP: Na 134, GFR 75    MRI brain 4/18/24  1.  Right frontal craniotomy with decreased underlying dural  enhancement extending along the middle cranial fossa and cavernous  sinus with unchanged encasement of the cisternal and intracanalicular  segments of the right optic nerve favored to represent posttreatment  changes. Attentional follow-up.  2.  Decreased adjacent T2/FLAIR hyperintense signal in the right  frontal lobe which could also be posttreatment related.  3.  Stable sequelae of right optic neuritis.    Labs 1/10/24  -A1c: 5.8  -Lipid: HDL 36    11/28/23 MRI Orbit [facility]:  Impression:   Postoperative changes following right orbital mass resection,  including right frontal extra-axial fluid collection and dural  thickening, mild mass effect on the right frontal lobe, inflammatory  changes within and surrounding the orbit. Continued T2 hyperintensity  within the subcortical white matter of the right frontal lobe beneath  the fluid collection with decreased extension of the T2 hyperintensity  in the sulci of the right frontal lobe compared to prior. No abnormal  restricted diffusion to suggest empyema or sulcal enhancement to  suggest leptomeningitis. Recommend attention on follow-up imaging as  postoperative changes improve.    MRI Brain/Orbits 7/3/23  HEAD MRI:  1.  No acute infarct.  2.  Age-related changes described above.  3.  Right frontal lobe small area tissue loss and gliosis.     ORBIT MRI:  1.  Unremarkable MRI of the orbits.   2.  No evidence for optic neuritis..     MRI Brain/Orbits 6/28/23  HEAD MRI:   1.  Right frontal lobe encephalomalacia and surrounding gliotic   change without acute intracranial abnormality.     ORBIT MRI:   1.  Unremarkable orbits     Ehrlichia - negative  Babesia- negative  Anaplasma - negative  Lysozyme elevated  to >10  IgG subclasses - normal  ANCA elevated to 1:20  Lyme negative  Treponema negative  BUDDY negative  ACE decreased to <10  CNS demyelinating panel negative  CRP <3  ESR 2  CBC normal     Temporal artery biopsy 7/5/23  Temporal artery, right, biopsy:   No evidence of giant cell arteritis.  Moderate arteriosclerosis.  Mild medial calcific sclerosis.      Labs from [facility] 8/1/23  Lyme CNS negative  Hep B surface antibody negative    Review of outside clinical notes:    9/11/23 -- Visit with Dr. Serrano   Optic neuropathy RIGHT eye in setting of right posterior orbital lesion with biopsy showing meningioma.  His vision is stable with increased retinal nerve fiber layer thinning RIGHT eye.  I discussed with Dr. Camacho about possible treatment options in the future due to atypical nature of the meningioma, and agreed that potentially radiation is an option to consider depending on MRI findings on 10/2/23.  We discussed monocular safety precautions and advised glasses for safety.  Follow up in 1 year or sooner as needed for worsening symptoms.       He will discuss with Dr. Camacho regarding right sided head pain.  If not felt to be consistent with postoperative pain, then could consider headache neurology consultation.    1/11/24 -- Visit with Dr. Mccullough   Assessment:    Tolerating radiation therapy well.  All questions and concerns addressed.     Toxicities:  Anorexia: Grade 0: No toxicity  Fatigue: Grade 1: Fatigue relieved by rest  Headache: Grade 0: No toxicity  Nausea: Grade 0: No toxicity  Mucositis: Grade 0: No toxicity  Dermatitis: Grade 0: No toxicity     Plan:   Continue current therapy.    MRI perfusion in 3 months, follow-up with Dr. Mccullough Thursday after scan   Follow-up with neuro-ophthalmology Dr. Waldron    Past medical history:    Patient Active Problem List   Diagnosis    Vision loss of right eye    Vision, loss, sudden, right    Steroid-induced hyperglycemia    CMML (chronic  myelomonocytic leukemia) (H)       Patient has a current medication list which includes the following prescription(s): carvedilol, lisinopril-hydrochlorothiazide, magnesium oxide -mg supplement, metformin, and multivitamin w/minerals.. List is confirmed today.    Family history / social history:  Patient's family history includes Leukemia in his mother.     Patient  reports that he has never smoked. He has never been exposed to tobacco smoke. He has never used smokeless tobacco. He reports that he does not currently use alcohol. He reports that he does not currently use drugs.     Interim history since last visit with me 4/11/24:  Reports new glasses refraction in 5/2024, but otherwise right eye remains with no vision and left eye vision remains stable. Endorses some right eye watering after reading for prolonged periods of time - not in the left eye. No pain, double vision, ophthalmoplegia, or other vision concerns.    MRI set for later today after this visit.    Exam:  STABLE    Please see epic chart for complete exam     Tests ordered and interpreted today:     OCT RNFL:     -right eye: diffuse thinning, - stable- avg thickness 33 from 36   -left eye: stable borderline temporal thinning; avg thickness 78 from 79    Octopus automated 30 degree visual field:   -right eye: NA   -left eye: reliable and essentially full- mild inferior points of depression of unclear significance- likely testing artifact       Cr Arnold, MS4    25 minutes were spent on the date of the encounter by me doing chart review, history and exam, documentation, and further activities as noted above    Complete documentation of historical and exam elements from today's encounter can be found in the full encounter summary report (not reduplicated in this progress note).  I personally re-obtained the chief complaint(s) and history of present illness.  I confirmed and edited as necessary the review of systems, past medical/surgical history,  family history, social history, and examination findings as documented by others; and I examined the patient myself.  I personally reviewed the relevant tests, images, and reports as documented above.  I formulated and edited as necessary the assessment and plan and discussed the findings and management plan with the patient and family     A medical student was involved in the care of the patient. I was present with the medical student who participated in the service and in the documentation of the note. I have  verified the history and personally performed the physical exam and medical decision making. I extensively reviewed and edited when necessary the assessment and plan. I agree with the assessment and plan of care as documented in the note    Roderick Waldron MD

## 2024-07-19 NOTE — LETTER
2024    RE: Amado Butler  : 1960  MRN: [id number]    Dear Providers,    I saw our mutual patient, Amado Butler, in follow-up in my clinic recently.  After a thorough neuro-ophthalmic history and examination, I came to the following conclusions:     1. 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 Status post radiation therapy.  No indication today of spread or involvement of the left optic nerve.  Follow-up neuro-imaging per radiation oncology- Dr. Mccullough.     After clinic MRI from today read by radiology as showing no active optic nerve enhancement on the right side and only right sided T2 signal change of the right optic nerve.    2. 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  Mixed cataracts in both eyes- not yet visually significant- observe    Patient with hx of right optic nerve meningioma diagnosed in late  after multiple rounds of oral and IV steroids and PLEX. Patient underwent optic canal decompression and biopsy which resulted in meningioma diagnosis. He was also diagnosed with CMML that is currently being monitored by oncology. He is now s/p radiation for the meningioma last dosage 2023.     Today he returns with stable no light perception vision OD and 20/20 vision OS. Full visual field in the left eye with stable OCT retinal nerve fiber layer thickness (no indication of optic neuropathy left eye).    Plan   Review upcoming MRI Orbit (addendum after visit- STABLE without evidence of active optic nerve or sheath enhancement). Post surgical meninges changes are stable.    Follow-up with neuro-ophthalmology in 6 months      Historical data including initial visit HPI  He was diagnosed with a right optic nerve sheath meningioma in 2023; treatment included resection and radiation from 2023-2024. He received a total of 5040 cGy over 28 fractions completed 2024.  He underwent a bone marrow biopsy on 8/15/2023 due to significant cytopenias that showed chronic myelomonocytic leukemia (CMML); 50% cellularity [mildly hypercellular for his age] without significant dysplasia in all 3 cell lines and without increase in blasts. He had normal cytogenetics and BCR-ABL FISH was negative.     Patient was previously seen by Dr. Serrano with last visit on 9/11/23.     VIsion loss initially noted in June 2023.  Per chart review patient on 7/5/23 had a temporal artery biopsy due to concern for GCA in the right eye. Pathology was negative for GCA. Patient went to [place] and experienced worsening vision in his right eye several times. On 7/24/23 he went to [facility]. He was restarted on high dose 1000 mg IV steroids. The vision in the right eye improved with the high dose IV steroids. He was seen again the following Monday, however over the week the vision deteriorated some but not significantly. On 7/31/23 he was seen in the eye clinic again and was admitted directly to the hospital on a Neurology floor for 5 days. He underwent another course of daily 1000 mg IV steroids. He underwent PET and MRI imaging and was diagnosed with optic perineuritis. He had extensive testing but nothing has come back positive. He is currently taking 80 mg daily of steroids. He is also taking an antiviral and antibiotic. The vision improved on oral steroids, antivirals, and oral abx. He was hospitalized again his vision continued to deteriorate throughout admission. He was discharged on oral prednisone and his vision continued to deteriorate. Patient subsequently underwent PLEX treatment without improvement in vision.     He is s/p right frontotemporal craniotomy and decompression of the right optic canal and orbital apex for resection of right optic canal and intradural mass 8/14/2023 of which pathology showed optic nerve meningioma.     He was also diagnosed with CMML which oncology is monitoring  for now. He is also now s/p radiation treatment and is here for a second opinion.    Since completing radiation in January patient feels like his vision in the left eye isn't as sharp in his left eye. Patient has repeat MRI next week. Patient is wondering what next steps are from a neuro-ophthalmology standpoint. Patient is wanting to establish care with Dr. Waldron.     Review of outside testing:  Labs 5/8/24  -Lyme: Neg  -CMP: Na 134, GFR 75    MRI brain 4/18/24  1.  Right frontal craniotomy with decreased underlying dural  enhancement extending along the middle cranial fossa and cavernous  sinus with unchanged encasement of the cisternal and intracanalicular  segments of the right optic nerve favored to represent posttreatment  changes. Attentional follow-up.  2.  Decreased adjacent T2/FLAIR hyperintense signal in the right  frontal lobe which could also be posttreatment related.  3.  Stable sequelae of right optic neuritis.    Labs 1/10/24  -A1c: 5.8  -Lipid: HDL 36    11/28/23 MRI Orbit [facility]:  Impression:   Postoperative changes following right orbital mass resection,  including right frontal extra-axial fluid collection and dural  thickening, mild mass effect on the right frontal lobe, inflammatory  changes within and surrounding the orbit. Continued T2 hyperintensity  within the subcortical white matter of the right frontal lobe beneath  the fluid collection with decreased extension of the T2 hyperintensity  in the sulci of the right frontal lobe compared to prior. No abnormal  restricted diffusion to suggest empyema or sulcal enhancement to  suggest leptomeningitis. Recommend attention on follow-up imaging as  postoperative changes improve.    MRI Brain/Orbits 7/3/23  HEAD MRI:  1.  No acute infarct.  2.  Age-related changes described above.  3.  Right frontal lobe small area tissue loss and gliosis.     ORBIT MRI:  1.  Unremarkable MRI of the orbits.   2.  No evidence for optic neuritis..     MRI  Brain/Orbits 6/28/23  HEAD MRI:   1.  Right frontal lobe encephalomalacia and surrounding gliotic   change without acute intracranial abnormality.     ORBIT MRI:   1.  Unremarkable orbits     Ehrlichia - negative  Babesia- negative  Anaplasma - negative  Lysozyme elevated to >10  IgG subclasses - normal  ANCA elevated to 1:20  Lyme negative  Treponema negative  BUDDY negative  ACE decreased to <10  CNS demyelinating panel negative  CRP <3  ESR 2  CBC normal     Temporal artery biopsy 7/5/23  Temporal artery, right, biopsy:   No evidence of giant cell arteritis.  Moderate arteriosclerosis.  Mild medial calcific sclerosis.      Labs from [facility] 8/1/23  Lyme CNS negative  Hep B surface antibody negative    Review of outside clinical notes:    9/11/23 -- Visit with Dr. Serrano   Optic neuropathy RIGHT eye in setting of right posterior orbital lesion with biopsy showing meningioma.  His vision is stable with increased retinal nerve fiber layer thinning RIGHT eye.  I discussed with Dr. Camacho about possible treatment options in the future due to atypical nature of the meningioma, and agreed that potentially radiation is an option to consider depending on MRI findings on 10/2/23.  We discussed monocular safety precautions and advised glasses for safety.  Follow up in 1 year or sooner as needed for worsening symptoms.       He will discuss with Dr. Camacho regarding right sided head pain.  If not felt to be consistent with postoperative pain, then could consider headache neurology consultation.    1/11/24 -- Visit with Dr. Mccullough   Assessment:    Tolerating radiation therapy well.  All questions and concerns addressed.     Toxicities:  Anorexia: Grade 0: No toxicity  Fatigue: Grade 1: Fatigue relieved by rest  Headache: Grade 0: No toxicity  Nausea: Grade 0: No toxicity  Mucositis: Grade 0: No toxicity  Dermatitis: Grade 0: No toxicity     Plan:   Continue current therapy.    MRI perfusion in 3 months, follow-up  with Dr. Mccullough Thursday after scan   Follow-up with neuro-ophthalmology Dr. Waldron    Past medical history:    Patient Active Problem List   Diagnosis    Vision loss of right eye    Vision, loss, sudden, right    Steroid-induced hyperglycemia    CMML (chronic myelomonocytic leukemia) (H)       Patient has a current medication list which includes the following prescription(s): carvedilol, lisinopril-hydrochlorothiazide, magnesium oxide -mg supplement, metformin, and multivitamin w/minerals.. List is confirmed today.    Family history / social history:  Patient's family history includes Leukemia in his mother.     Patient  reports that he has never smoked. He has never been exposed to tobacco smoke. He has never used smokeless tobacco. He reports that he does not currently use alcohol. He reports that he does not currently use drugs.     Interim history since last visit with me 4/11/24:  Reports new glasses refraction in 5/2024, but otherwise right eye remains with no vision and left eye vision remains stable. Endorses some right eye watering after reading for prolonged periods of time - not in the left eye. No pain, double vision, ophthalmoplegia, or other vision concerns.    MRI set for later today after this visit.    Exam:  STABLE    Please see epic chart for complete exam     Tests ordered and interpreted today:     OCT RNFL:     -right eye: diffuse thinning, - stable- avg thickness 33 from 36   -left eye: stable borderline temporal thinning; avg thickness 78 from 79    Octopus automated 30 degree visual field:   -right eye: NA   -left eye: reliable and essentially full- mild inferior points of depression of unclear significance- likely testing artifact      For further exam details, please feel free to contact our office for additional records.  If you wish to contact me regarding this patient please email me at [email or web address] or give my clinic a call to arrange a phone conversation.    Sincerely,    Roderick  MD Chivo  , Neuro-Ophthalmology and Adult Strabismus Surgery  The Demian Quintana Chair in Neuro-Ophthalmology  Department of Ophthalmology and Visual Neurosciences  [facility]

## 2024-07-19 NOTE — NURSING NOTE
Chief Complaints and History of Present Illnesses   Patient presents with    Intracanalicular occult optic nerve sheath meningioma     Chief Complaint(s) and History of Present Illness(es)       Intracanalicular occult optic nerve sheath meningioma               Comments    Patient states that his vision is the same since he was here last. No pain and irritation. No flashes of light. He states that he does have floaters at times. No headaches.    Ocular Meds:artifical tears BID in BE    Ahsan SALAZAR, July 19, 2024 8:35 AM

## 2024-07-22 ENCOUNTER — TELEPHONE (OUTPATIENT)
Dept: OPHTHALMOLOGY | Facility: CLINIC | Age: 64
End: 2024-07-22

## 2024-07-22 ENCOUNTER — ANCILLARY PROCEDURE (OUTPATIENT)
Dept: ULTRASOUND IMAGING | Facility: CLINIC | Age: 64
End: 2024-07-22
Attending: STUDENT IN AN ORGANIZED HEALTH CARE EDUCATION/TRAINING PROGRAM
Payer: COMMERCIAL

## 2024-07-22 DIAGNOSIS — C93.10 CHRONIC MYELOMONOCYTIC LEUKEMIA NOT HAVING ACHIEVED REMISSION (H): ICD-10-CM

## 2024-07-22 PROCEDURE — 76705 ECHO EXAM OF ABDOMEN: CPT | Mod: TC | Performed by: RADIOLOGY

## 2024-08-07 ENCOUNTER — APPOINTMENT (OUTPATIENT)
Dept: LAB | Facility: CLINIC | Age: 64
End: 2024-08-07
Attending: STUDENT IN AN ORGANIZED HEALTH CARE EDUCATION/TRAINING PROGRAM
Payer: COMMERCIAL

## 2024-08-07 ENCOUNTER — ONCOLOGY VISIT (OUTPATIENT)
Dept: ONCOLOGY | Facility: CLINIC | Age: 64
End: 2024-08-07
Attending: STUDENT IN AN ORGANIZED HEALTH CARE EDUCATION/TRAINING PROGRAM
Payer: COMMERCIAL

## 2024-08-07 VITALS
BODY MASS INDEX: 27.6 KG/M2 | HEART RATE: 68 BPM | WEIGHT: 215 LBS | DIASTOLIC BLOOD PRESSURE: 73 MMHG | OXYGEN SATURATION: 96 % | RESPIRATION RATE: 18 BRPM | SYSTOLIC BLOOD PRESSURE: 122 MMHG | TEMPERATURE: 97.4 F

## 2024-08-07 DIAGNOSIS — C93.10 CHRONIC MYELOMONOCYTIC LEUKEMIA NOT HAVING ACHIEVED REMISSION (H): Primary | ICD-10-CM

## 2024-08-07 LAB
ACANTHOCYTES BLD QL SMEAR: NORMAL
ALBUMIN SERPL BCG-MCNC: 4.4 G/DL (ref 3.5–5.2)
ALP SERPL-CCNC: 70 U/L (ref 40–150)
ALT SERPL W P-5'-P-CCNC: 13 U/L (ref 0–70)
ANION GAP SERPL CALCULATED.3IONS-SCNC: 12 MMOL/L (ref 7–15)
AST SERPL W P-5'-P-CCNC: 28 U/L (ref 0–45)
AUER BODIES BLD QL SMEAR: NORMAL
BASO STIPL BLD QL SMEAR: NORMAL
BASOPHILS # BLD AUTO: 0.1 10E3/UL (ref 0–0.2)
BASOPHILS NFR BLD AUTO: 1 %
BILIRUB SERPL-MCNC: 0.6 MG/DL
BITE CELLS BLD QL SMEAR: NORMAL
BLISTER CELLS BLD QL SMEAR: NORMAL
BUN SERPL-MCNC: 11.7 MG/DL (ref 8–23)
BURR CELLS BLD QL SMEAR: NORMAL
CALCIUM SERPL-MCNC: 9.4 MG/DL (ref 8.8–10.4)
CHLORIDE SERPL-SCNC: 97 MMOL/L (ref 98–107)
CREAT SERPL-MCNC: 0.91 MG/DL (ref 0.67–1.17)
DACRYOCYTES BLD QL SMEAR: NORMAL
EGFRCR SERPLBLD CKD-EPI 2021: >90 ML/MIN/1.73M2
ELLIPTOCYTES BLD QL SMEAR: NORMAL
EOSINOPHIL # BLD AUTO: 0 10E3/UL (ref 0–0.7)
EOSINOPHIL NFR BLD AUTO: 0 %
ERYTHROCYTE [DISTWIDTH] IN BLOOD BY AUTOMATED COUNT: 13.9 % (ref 10–15)
FRAGMENTS BLD QL SMEAR: NORMAL
GLUCOSE SERPL-MCNC: 211 MG/DL (ref 70–99)
HCO3 SERPL-SCNC: 26 MMOL/L (ref 22–29)
HCT VFR BLD AUTO: 40.1 % (ref 40–53)
HGB BLD-MCNC: 13.6 G/DL (ref 13.3–17.7)
HGB C CRYSTALS: NORMAL
HOWELL-JOLLY BOD BLD QL SMEAR: NORMAL
IMM GRANULOCYTES # BLD: 0.1 10E3/UL
IMM GRANULOCYTES NFR BLD: 2 %
LYMPHOCYTES # BLD AUTO: 1.4 10E3/UL (ref 0.8–5.3)
LYMPHOCYTES NFR BLD AUTO: 24 %
MCH RBC QN AUTO: 27.9 PG (ref 26.5–33)
MCHC RBC AUTO-ENTMCNC: 33.9 G/DL (ref 31.5–36.5)
MCV RBC AUTO: 82 FL (ref 78–100)
MONOCYTES # BLD AUTO: 1.5 10E3/UL (ref 0–1.3)
MONOCYTES NFR BLD AUTO: 25 %
NEUTROPHILS # BLD AUTO: 2.9 10E3/UL (ref 1.6–8.3)
NEUTROPHILS NFR BLD AUTO: 48 %
NEUTS HYPERSEG BLD QL SMEAR: NORMAL
NRBC # BLD AUTO: 0 10E3/UL
NRBC BLD AUTO-RTO: 0 /100
PLAT MORPH BLD: NORMAL
PLATELET # BLD AUTO: 146 10E3/UL (ref 150–450)
POLYCHROMASIA BLD QL SMEAR: NORMAL
POTASSIUM SERPL-SCNC: 3.8 MMOL/L (ref 3.4–5.3)
PROT SERPL-MCNC: 7.1 G/DL (ref 6.4–8.3)
RBC # BLD AUTO: 4.88 10E6/UL (ref 4.4–5.9)
RBC AGGLUT BLD QL: NORMAL
RBC MORPH BLD: NORMAL
ROULEAUX BLD QL SMEAR: NORMAL
SICKLE CELLS BLD QL SMEAR: NORMAL
SMUDGE CELLS BLD QL SMEAR: NORMAL
SODIUM SERPL-SCNC: 135 MMOL/L (ref 135–145)
SPHEROCYTES BLD QL SMEAR: NORMAL
STOMATOCYTES BLD QL SMEAR: NORMAL
TARGETS BLD QL SMEAR: NORMAL
TOXIC GRANULES BLD QL SMEAR: NORMAL
VARIANT LYMPHS BLD QL SMEAR: NORMAL
WBC # BLD AUTO: 5.9 10E3/UL (ref 4–11)

## 2024-08-07 PROCEDURE — G2211 COMPLEX E/M VISIT ADD ON: HCPCS | Performed by: STUDENT IN AN ORGANIZED HEALTH CARE EDUCATION/TRAINING PROGRAM

## 2024-08-07 PROCEDURE — G0463 HOSPITAL OUTPT CLINIC VISIT: HCPCS | Performed by: STUDENT IN AN ORGANIZED HEALTH CARE EDUCATION/TRAINING PROGRAM

## 2024-08-07 PROCEDURE — 36415 COLL VENOUS BLD VENIPUNCTURE: CPT | Performed by: STUDENT IN AN ORGANIZED HEALTH CARE EDUCATION/TRAINING PROGRAM

## 2024-08-07 PROCEDURE — 85025 COMPLETE CBC W/AUTO DIFF WBC: CPT | Performed by: STUDENT IN AN ORGANIZED HEALTH CARE EDUCATION/TRAINING PROGRAM

## 2024-08-07 PROCEDURE — 99215 OFFICE O/P EST HI 40 MIN: CPT | Performed by: STUDENT IN AN ORGANIZED HEALTH CARE EDUCATION/TRAINING PROGRAM

## 2024-08-07 PROCEDURE — 80053 COMPREHEN METABOLIC PANEL: CPT | Performed by: STUDENT IN AN ORGANIZED HEALTH CARE EDUCATION/TRAINING PROGRAM

## 2024-08-07 ASSESSMENT — PAIN SCALES - GENERAL: PAINLEVEL: NO PAIN (0)

## 2024-08-07 NOTE — NURSING NOTE
"Oncology Rooming Note    August 7, 2024 11:04 AM   Amado Butler is a 64 year old male who presents for:    Chief Complaint   Patient presents with    Blood Draw     Vitals, blood draw,  by MA. Pt checked into appt.    Oncology Clinic Visit     Chronic Myelomonocytic Leukemia     Initial Vitals: /73   Pulse 68   Temp 97.4  F (36.3  C) (Oral)   Resp 18   Wt 97.5 kg (215 lb)   SpO2 96%   BMI 27.60 kg/m   Estimated body mass index is 27.6 kg/m  as calculated from the following:    Height as of 5/3/24: 1.88 m (6' 2\").    Weight as of this encounter: 97.5 kg (215 lb). Body surface area is 2.26 meters squared.  No Pain (0) Comment: Data Unavailable   No LMP for male patient.  Allergies reviewed: Yes  Medications reviewed: Yes    Medications: Medication refills not needed today.  Pharmacy name entered into [employer] Apps: [id number] - [place], [place] - [address] [facility] [address] [facility] [address]    Frailty Screening:   Is the patient here for a new oncology consult visit in cancer care? 2. No      Clinical concerns: None      Deepali Motta LPN  8/7/2024                "

## 2024-08-07 NOTE — NURSING NOTE
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   CURRENT THERAPY:  Status post radical hysterectomy, bilateral salpingo-oophorectomy and lymph node dissection 10/25/2023  chemoimmunotherapy with carboplatin, Taxol and Dostarlimab postoperatively  BRIEF CASE HISTORY:   Renetta Malcolm is a very pleasant 70 y.o. female who is referred to us for recently diagnosed endometrial cancer.  She presented with postmenopausal bleeding.  She was initially on anticoagulation and the bleeding was thought to be due to anticoagulation.  However further work-up showed an ultrasound that showed thickened endometrium.  In 2019, underwent endometrial sampling that was benign.  Because of persistent symptoms, CT scan was done in October/23 and revealed a hypodensity endometrial canal concerning for endometrial mass.  Biopsy showed serous carcinoma on 10/9/2023.  The patient was referred to gynecology oncology and ultimately underwent robotic assisted radical hysterectomy and lymph node sampling on 10/25/2023.  The tumor measured 5.4 cm and invaded through the [place] atrium to the serosal surface.  There was significant lymphovascular invasion.  Ovaries and fallopian tubes were negative.  There was metastatic carcinoma that present on the right obturator and external iliac (bilateral)  lymph nodes.  Pelvic washings were positive for non-small cell carcinoma .of note, that the right obturator node was stuck to the obturator canal and was not removed for risk of bleeding.  Paraortic lymph nodes were negative.  Pathological staging is T3 aN1 M0  Patient did well postoperatively.  She was referred to us for consideration of chemoimmunotherapy based on guidelines.  She is sent to us for a consultation, covering nicely from surgery.  Case was discussed with GYN oncology as needed that she might.  He to  Chief Complaint   Patient presents with    Blood Draw     Vitals, blood draw,  by MA. Pt checked into appt.     Flower Malone MA

## 2024-08-07 NOTE — LETTER
8/7/2024      Amado Butler  [id number] [facility] [address]      Dear Colleague,    Thank you for referring your patient, Amado Butler, to the [facility]. Please see a copy of my visit note below.    [facility]  HEMATOLOGY & ONCOLOGY  FOLLOW UP VISIT    PATIENT NAME: Amado Butler          MRN # [id number]  YOB: 1960  DATE OF VISIT: Aug 7, 2024      REFERRING PROVIDER:   Mr. Amado Butler was seen at the request of Dr. Solomon Reza for further evaluation and/or management.     History of Presenting Illness  Mr. Amado Butler is a pleasant 64 year old male with a past medical history significant for DM2- Last A1c, HTN, ?thrombocytopenia since 2012 s/p recent frontotemporal craniotomy for right optic nerve mass now pathology proven meningioma c/b complete vision loss in right eye and was also found to have significant cytopenias during admission leading to a bone marrow biopsy on 8/15/2023.  His bone marrow biopsy shows 50% cellularity [mildly hypercellular for his age] without significant dysplasia in all 3 cell lines and without increase in blasts.  He had normal cytogenetics and BCR-ABL FISH was negative.  However his [employer] ration sequencing testing showed that there was a clonal process with mutations detected in SRSF2, TET2 x 2 and ASXL1.  Given prior history of monocytosis dating back many years he was referred to our clinic.    Subjective  Patient is here alone. Signed up for golf lesson. He had an injury to his left pinky with possible tendon rupture. No Headaches at this point. he continues to feel better and has had improvement in his overall wellbeing as well as fatigue symptoms. Denies any fevers or chills, night sweats, BRBPR or melena.    Past Medical History  Diabetes type 2  Hypertension  Paroxysmal SVT  Thrombocytopenia dating back since 2012    Family History  Any family history of cancers or blood or bleeding disorders.    Social  History  Smoking: Never  Alcohol use: drinks approximately 2drinks/week  Status:  37 years.   Children/grandchildren: 3 children. 26 years old girl, 31 boy, 28 girl  Occupation: Retired, .     Current Outpatient Medications  Current Outpatient Medications   Medication Sig Dispense Refill     carvedilol (COREG) 12.5 MG tablet Take 1 tablet (12.5 mg) by mouth 2 times daily (with meals) 180 tablet 3     lisinopril-hydrochlorothiazide (ZESTORETIC) 20-25 MG tablet Take 1 tablet by mouth daily       Magnesium Oxide -Mg Supplement 500 MG CAPS Take 500 mg by mouth daily       metFORMIN (GLUCOPHAGE) 500 MG tablet Take 1 tablet (500 mg) by mouth daily (with breakfast) 7 tablet 0     multivitamin w/minerals (MULTI-VITAMIN) tablet Take 1 tablet by mouth daily Over 50 mens         Allergies  Allergies   Allergen Reactions     Seasonal Allergies        Review of systems  A complete ROS was performed and was negative except as mentioned in HPI    Physical Exam  /73   Pulse 68   Temp 97.4  F (36.3  C) (Oral)   Resp 18   Wt 97.5 kg (215 lb)   SpO2 96%   BMI 27.60 kg/m    Wt Readings from Last 3 Encounters:   24 97.5 kg (215 lb)   24 97.1 kg (214 lb)   24 95.3 kg (210 lb)       ECO   male sitting in chair in NAD  HEET: NC/AT, EOMI w/ PERRL, anicteric sclera. MMM. No mouth sores.   Neck: supple no JVP  Lymph: No cervical, supraclavicular, axillary or inguinal LAD  CV: normal S1,S2 with RRR no m/r/g  Resp: lungs CTA bilaterally with adequate air movement. No wheezes or crackles  Abd: soft, NTND, no organomegaly or masses. BS normoactive.   Ext: WWP no edema or cyanosis  Skin: no concerning lesions or rashes or petechiae  Neuro: A&Ox4, no lateralizing sx. Grossly nonfocal. Strength appears preserved.      Labs and Imaging  I reviewed patient's labs and imaging from care everywhere and here.    WBC 5.9, Hgb 13.6, , MCV 82,, ANC 2900    BMBx 8/15/23  Final  Diagnosis   Bone marrow, posterior iliac crest, left decalcified trephine biopsy and touch imprint; left aspirate clot, particle crush, direct aspirate smear, concentrate aspirate smear, and peripheral blood smear:     - Cellular marrow (overall 50% in intact areas) with granulocytic hyperplasia with slight left shift, relative erythroid hypoplasia, unremarkable megakaryopoiesis, no overt dysplasia, and overall less than 2% blasts  - Overall normal reticulin fibrosis (MF-0)  - Peripheral blood showing moderate normochromic, normocytic anemia; moderate leukocytosis due to neutrophilia and monocytosis; slight thrombocytopenia  - See comment   No clonal chromosomal abnormality was detected among the 20 metaphase cells analyzed. These findings confirm and expand those of the previously reported interphase FISH anlaysis ([id number]) that showed no evidence of a BCR::ABL1 fusion.   SRSF2 VAF 46.2%  TET 2 VAF 51.6%  TET 2 VAF 46.5%  ASXL1 VAF 11.3%      CMML WHO Diagnostic Criteria:    Persistent (=3 months) peripheral blood monocytosis; absolute monocyte count >500/microL and greater than 10 percent of the entire white blood cell differential   Yes   Not meeting WHO criteria for BCR-ABL1 positive chronic myeloid leukemia, primary myelofibrosis, polycythemia vera, or essential thrombocytosis  Yes   <20 percent myeloblasts + monoblasts + promonocytes in peripheral blood and bone marrow  Yes   Dysplastic changes in one or more myeloid lineages. If myelodysplastic changes are absent or minimal, the diagnosis of CMML can be made if the above three criteria are met and:  No   An acquired clonal cytogenetic (or mutational abnormality) is present in bone marrow cells or  Yes   Persistent monocytosis for =3 months and all other causes of monocytosis have been excluded  Yes   Total Score Meets WHO Criteria (4 major or 3 major and 2 minor)  Yes     CMML WHO Staging in 2016 now discontinued:    CMML-0: <2 % blasts in PB and <5%  blasts in BM  Yes   CMML-1: 2 - 4%  blasts in PB and/or 5 - 9% blasts in BM  No   CMML-2: 5 -19 % blasts in PB, 10 - 19% blasts in BM, OR presence of Maurilio rods  No     Prognostic Scoring System: CPSS-Mol      Criteria Score     Cytogenetic Risk Low: normal, isolated -Y  Intermediate: all else  High: Tri 8, complex, chrom 7   0    ASXL1 WT/Mut  1    NRAS WT/Mut  0    RUNX1 WT/Mut  0    SETBP1 WT/Mut  0    Total Genetic Score Low = 0  Intermediate-1 = 1  Intermediate-2 = 2  High = >3 (Max 3 points) 1 Genetic risk points for CPSS-mol   (Max 3):  1   BM Blasts <5% or >5%  0    WBC count <13 or > 13  0    Transfusion dependent No/Yes  0    CPSS-mol   Risk group Low = 0  Intermediate-1 = 1  Intermediate-2 = 2-3  High = >4    1     The CPSS-Mol was able to identify 4 risk groups having a signi?cantly different OS, with median survival time >144, and 68, 30, and 17 months in the low, intermediate-1, intermediate-2, and high-risk group, respectively. Mr. Amado Butler has a Intermediate-1 risk disease with an estimated median survival of 68 months. The 4 risk groups also showed a signi?cantly different cumulative incidence of leukemic evolution, with a 48-month cumulative incidence of AML evolution of 0%, 8%, 24%, and 52% for the low, intermediate-1, intermediate-2, and high-risk group respectively. Mr. Amado Butler has a Intermediate-1 risk disease with an estimated 4 year cumulative AML evolution risk of 8 %.     Will need to obtain BCR-ABL1 testing and PDGFRA, PDGFRB, FGFR1 and PCM-JAK2 to rule out other disorders if eosinophilia is present.     A number of conditions can also cause monocytosis such as asplenic state, inflamma ASXL1 tory conditions and autoimmune disorders, major depression, steroids or colony stimulating factors.     US abdomen 7/22/2024  Spleen is borderline enlarged at 13 cm. No ascites.      Assessment and Plan  Mr. Amado Butler is a 64 year old male who is here for further evaluation and/or  management of chronic myelomonocytic leukemia.    Oncology:  Chronic myelomonocytic leukemia  WHO Classification: Dysplastic-CMML  Date of diagnosis: 8/14/2023  CPSS Mol score: 1-intermediate 1  Bone Marrow Blast %: 2   Cytogenetics: normal karyotype, 46 XY  Molecular: SRSF2, TET 2, TET 2, ASXL1  Past Treatment: None  Current Treatment: TBD    I discussed the pathophysiology and natural history of CMML with Mr. Amado Butler today. We went over the current prognostic models and scoring systems that are used in clinical practice as well as the potential for disease evolution into acute myeloid leukemia. We went over the current FDA approved treatments for CMML and covered that the only curative option is through an allogeneic hematopoietic cell transplantation. His disease is intermediate 1 risk and we will revisit this as needed.    We discussed about supportive care for lower risk disease and CMML including erythropoietin stimulating agents as well as thrombopoietin receptor agonist for thrombocytopenia.  At this point of time we will take a wait and watch approach. His counts have recovered.     His hemoglobin and white blood cell counts remained stable.  We got an ultrasound spleen that shows a spleen size of  13cm which could explain some of the thrombocytopenia.    We discussed that his counts continue to remain very stable.  We also discussed about the possibility of looking at certain clinical trials in [place] using IV ascorbic acid.  I will reach out to the [place] PI and discuss regarding time commitment for this.    Plan:  - RTC with me with labs prior including vitamin C levels in 2 months.    Hematology:  Anemia/Thrombocytopenia:   Transfuse leukocyte reduced and irradiated blood products: 1 unit pRBC if hgb is 7.0-7.9, 2 units pRBCs if Hgb 6.0-6.9 g/dl, 1 unit platelets if PLT count is <10,000 or <50,000 with clinical bleeding.    Immunocompromised:  As a result of his disease and/or previous treatment.   Amado Butler is immunocompromised. his last ANC is >500 and there is no need for prophylactic antibiotics at this time.     Cardiac:  Mr. Amado Butler has no history of heart disease.     Renal:  Creatinine results reviewed today. Mr. Amado Butler does not have any renal disease.    Hepatic:  Liver function tests reviewed today.    Psychosocial:  Mr. Amado Butler is coping well with his diagnosis.     All other questions and concerns were addressed and answered to Mr. Amado Butler's satisfaction.    Today, I spent a total of 40 minutes of face-to-face and non face-to-face time with Mr. Amado Butler. Time spent included review and discussion of diagnostic test results, patient counseling, and coordination of care.    The longitudinal plan of care for the diagnosis(es)/condition(s) as documented were addressed during this visit. Due to the added complexity in care, I will continue to support Amado in the subsequent management and with ongoing continuity of care.    Jamal Rodney MD    Division of Hematology, Oncology and Transplantation  [facility]  P: [phone]      Again, thank you for allowing me to participate in the care of your patient.        Sincerely,        Jamal Rodney MD

## 2024-08-07 NOTE — PROGRESS NOTES
[facility]  HEMATOLOGY & ONCOLOGY  FOLLOW UP VISIT    PATIENT NAME: Amado Butler          MRN # [id number]  YOB: 1960  DATE OF VISIT: Aug 7, 2024      REFERRING PROVIDER:   Mr. Amado Butler was seen at the request of Dr. Solomon Reza for further evaluation and/or management.     History of Presenting Illness  Mr. Amado Butler is a pleasant 64 year old male with a past medical history significant for DM2- Last A1c, HTN, ?thrombocytopenia since 2012 s/p recent frontotemporal craniotomy for right optic nerve mass now pathology proven meningioma c/b complete vision loss in right eye and was also found to have significant cytopenias during admission leading to a bone marrow biopsy on 8/15/2023.  His bone marrow biopsy shows 50% cellularity [mildly hypercellular for his age] without significant dysplasia in all 3 cell lines and without increase in blasts.  He had normal cytogenetics and BCR-ABL FISH was negative.  However his [employer] ration sequencing testing showed that there was a clonal process with mutations detected in SRSF2, TET2 x 2 and ASXL1.  Given prior history of monocytosis dating back many years he was referred to our clinic.    Subjective  Patient is here alone. Signed up for golf lesson. He had an injury to his left pinky with possible tendon rupture. No Headaches at this point. he continues to feel better and has had improvement in his overall wellbeing as well as fatigue symptoms. Denies any fevers or chills, night sweats, BRBPR or melena.    Past Medical History  Diabetes type 2  Hypertension  Paroxysmal SVT  Thrombocytopenia dating back since 2012    Family History  Any family history of cancers or blood or bleeding disorders.    Social History  Smoking: Never  Alcohol use: drinks approximately 2drinks/week  Status:  37 years.   Children/grandchildren: 3 children. 26 years old girl, 31 boy, 28 girl  Occupation: Retired, .     Current  Outpatient Medications  Current Outpatient Medications   Medication Sig Dispense Refill    carvedilol (COREG) 12.5 MG tablet Take 1 tablet (12.5 mg) by mouth 2 times daily (with meals) 180 tablet 3    lisinopril-hydrochlorothiazide (ZESTORETIC) 20-25 MG tablet Take 1 tablet by mouth daily      Magnesium Oxide -Mg Supplement 500 MG CAPS Take 500 mg by mouth daily      metFORMIN (GLUCOPHAGE) 500 MG tablet Take 1 tablet (500 mg) by mouth daily (with breakfast) 7 tablet 0    multivitamin w/minerals (MULTI-VITAMIN) tablet Take 1 tablet by mouth daily Over 50 mens         Allergies  Allergies   Allergen Reactions    Seasonal Allergies        Review of systems  A complete ROS was performed and was negative except as mentioned in HPI    Physical Exam  /73   Pulse 68   Temp 97.4  F (36.3  C) (Oral)   Resp 18   Wt 97.5 kg (215 lb)   SpO2 96%   BMI 27.60 kg/m    Wt Readings from Last 3 Encounters:   24 97.5 kg (215 lb)   24 97.1 kg (214 lb)   24 95.3 kg (210 lb)       ECO   male sitting in chair in NAD  HEET: NC/AT, EOMI w/ PERRL, anicteric sclera. MMM. No mouth sores.   Neck: supple no JVP  Lymph: No cervical, supraclavicular, axillary or inguinal LAD  CV: normal S1,S2 with RRR no m/r/g  Resp: lungs CTA bilaterally with adequate air movement. No wheezes or crackles  Abd: soft, NTND, no organomegaly or masses. BS normoactive.   Ext: WWP no edema or cyanosis  Skin: no concerning lesions or rashes or petechiae  Neuro: A&Ox4, no lateralizing sx. Grossly nonfocal. Strength appears preserved.      Labs and Imaging  I reviewed patient's labs and imaging from care everywhere and here.    WBC 5.9, Hgb 13.6, , MCV 82,, ANC 2900    BMBx 8/15/23  Final Diagnosis   Bone marrow, posterior iliac crest, left decalcified trephine biopsy and touch imprint; left aspirate clot, particle crush, direct aspirate smear, concentrate aspirate smear, and peripheral blood smear:     - Cellular marrow  (overall 50% in intact areas) with granulocytic hyperplasia with slight left shift, relative erythroid hypoplasia, unremarkable megakaryopoiesis, no overt dysplasia, and overall less than 2% blasts  - Overall normal reticulin fibrosis (MF-0)  - Peripheral blood showing moderate normochromic, normocytic anemia; moderate leukocytosis due to neutrophilia and monocytosis; slight thrombocytopenia  - See comment   No clonal chromosomal abnormality was detected among the 20 metaphase cells analyzed. These findings confirm and expand those of the previously reported interphase FISH anlaysis ([id number]) that showed no evidence of a BCR::ABL1 fusion.   SRSF2 VAF 46.2%  TET 2 VAF 51.6%  TET 2 VAF 46.5%  ASXL1 VAF 11.3%      CMML WHO Diagnostic Criteria:    Persistent (?3 months) peripheral blood monocytosis; absolute monocyte count >500/microL and greater than 10 percent of the entire white blood cell differential   Yes   Not meeting WHO criteria for BCR-ABL1 positive chronic myeloid leukemia, primary myelofibrosis, polycythemia vera, or essential thrombocytosis  Yes   <20 percent myeloblasts + monoblasts + promonocytes in peripheral blood and bone marrow  Yes   Dysplastic changes in one or more myeloid lineages. If myelodysplastic changes are absent or minimal, the diagnosis of CMML can be made if the above three criteria are met and:  No   An acquired clonal cytogenetic (or mutational abnormality) is present in bone marrow cells or  Yes   Persistent monocytosis for ?3 months and all other causes of monocytosis have been excluded  Yes   Total Score Meets WHO Criteria (4 major or 3 major and 2 minor)  Yes     CMML WHO Staging in 2016 now discontinued:    CMML-0: <2 % blasts in PB and <5% blasts in BM  Yes   CMML-1: 2 - 4%  blasts in PB and/or 5 - 9% blasts in BM  No   CMML-2: 5 -19 % blasts in PB, 10 - 19% blasts in BM, OR presence of Maurilio rods  No     Prognostic Scoring System: CPSS-Mol      Criteria Score     Cytogenetic  Risk Low: normal, isolated -Y  Intermediate: all else  High: Tri 8, complex, chrom 7   0    ASXL1 WT/Mut  1    NRAS WT/Mut  0    RUNX1 WT/Mut  0    SETBP1 WT/Mut  0    Total Genetic Score Low = 0  Intermediate-1 = 1  Intermediate-2 = 2  High = >3 (Max 3 points) 1 Genetic risk points for CPSS-mol   (Max 3):  1   BM Blasts <5% or >5%  0    WBC count <13 or > 13  0    Transfusion dependent No/Yes  0    CPSS-mol   Risk group Low = 0  Intermediate-1 = 1  Intermediate-2 = 2-3  High = >4    1     The CPSS-Mol was able to identify 4 risk groups having a signi?cantly different OS, with median survival time >144, and 68, 30, and 17 months in the low, intermediate-1, intermediate-2, and high-risk group, respectively. Mr. Amado Butler has a Intermediate-1 risk disease with an estimated median survival of 68 months. The 4 risk groups also showed a signi?cantly different cumulative incidence of leukemic evolution, with a 48-month cumulative incidence of AML evolution of 0%, 8%, 24%, and 52% for the low, intermediate-1, intermediate-2, and high-risk group respectively. Mr. Amado Butler has a Intermediate-1 risk disease with an estimated 4 year cumulative AML evolution risk of 8 %.     Will need to obtain BCR-ABL1 testing and PDGFRA, PDGFRB, FGFR1 and PCM-JAK2 to rule out other disorders if eosinophilia is present.     A number of conditions can also cause monocytosis such as asplenic state, inflamma ASXL1 tory conditions and autoimmune disorders, major depression, steroids or colony stimulating factors.     US abdomen 7/22/2024  Spleen is borderline enlarged at 13 cm. No ascites.      Assessment and Plan  Mr. Amado Butler is a 64 year old male who is here for further evaluation and/or management of chronic myelomonocytic leukemia.    Oncology:  Chronic myelomonocytic leukemia  WHO Classification: Dysplastic-CMML  Date of diagnosis: 8/14/2023  CPSS Mol score: 1-intermediate 1  Bone Marrow Blast %: 2   Cytogenetics: normal  karyotype, 46 XY  Molecular: SRSF2, TET 2, TET 2, ASXL1  Past Treatment: None  Current Treatment: TBD    I discussed the pathophysiology and natural history of CMML with Mr. Amado Butler today. We went over the current prognostic models and scoring systems that are used in clinical practice as well as the potential for disease evolution into acute myeloid leukemia. We went over the current FDA approved treatments for CMML and covered that the only curative option is through an allogeneic hematopoietic cell transplantation. His disease is intermediate 1 risk and we will revisit this as needed.    We discussed about supportive care for lower risk disease and CMML including erythropoietin stimulating agents as well as thrombopoietin receptor agonist for thrombocytopenia.  At this point of time we will take a wait and watch approach. His counts have recovered.     His hemoglobin and white blood cell counts remained stable.  We got an ultrasound spleen that shows a spleen size of  13cm which could explain some of the thrombocytopenia.    We discussed that his counts continue to remain very stable.  We also discussed about the possibility of looking at certain clinical trials in [place] using IV ascorbic acid.  I will reach out to the [place] PI and discuss regarding time commitment for this.    Plan:  - RTC with me with labs prior including vitamin C levels in 2 months.    Hematology:  Anemia/Thrombocytopenia:   Transfuse leukocyte reduced and irradiated blood products: 1 unit pRBC if hgb is 7.0-7.9, 2 units pRBCs if Hgb 6.0-6.9 g/dl, 1 unit platelets if PLT count is <10,000 or <50,000 with clinical bleeding.    Immunocompromised:  As a result of his disease and/or previous treatment. Mr. Amado Butler is immunocompromised. his last ANC is >500 and there is no need for prophylactic antibiotics at this time.     Cardiac:  Mr. Amado Butler has no history of heart disease.     Renal:  Creatinine results reviewed today.   Amado Butler does not have any renal disease.    Hepatic:  Liver function tests reviewed today.    Psychosocial:  Mr. Amado Butler is coping well with his diagnosis.     All other questions and concerns were addressed and answered to . Amado Butler's satisfaction.    Today, I spent a total of 40 minutes of face-to-face and non face-to-face time with Mr. Amado Butler. Time spent included review and discussion of diagnostic test results, patient counseling, and coordination of care.    The longitudinal plan of care for the diagnosis(es)/condition(s) as documented were addressed during this visit. Due to the added complexity in care, I will continue to support Amado in the subsequent management and with ongoing continuity of care.    Jamal Rodney MD    Division of Hematology, Oncology and Transplantation  [facility]  P: [phone]

## 2024-10-16 ENCOUNTER — ONCOLOGY VISIT (OUTPATIENT)
Dept: ONCOLOGY | Facility: CLINIC | Age: 64
End: 2024-10-16
Attending: STUDENT IN AN ORGANIZED HEALTH CARE EDUCATION/TRAINING PROGRAM
Payer: COMMERCIAL

## 2024-10-16 ENCOUNTER — APPOINTMENT (OUTPATIENT)
Dept: LAB | Facility: CLINIC | Age: 64
End: 2024-10-16
Attending: STUDENT IN AN ORGANIZED HEALTH CARE EDUCATION/TRAINING PROGRAM
Payer: COMMERCIAL

## 2024-10-16 VITALS
BODY MASS INDEX: 27.69 KG/M2 | OXYGEN SATURATION: 98 % | WEIGHT: 215.7 LBS | RESPIRATION RATE: 18 BRPM | SYSTOLIC BLOOD PRESSURE: 117 MMHG | DIASTOLIC BLOOD PRESSURE: 75 MMHG | HEART RATE: 61 BPM | TEMPERATURE: 97.7 F

## 2024-10-16 DIAGNOSIS — C93.10 CHRONIC MYELOMONOCYTIC LEUKEMIA NOT HAVING ACHIEVED REMISSION (H): ICD-10-CM

## 2024-10-16 LAB
ALBUMIN SERPL BCG-MCNC: 4.5 G/DL (ref 3.5–5.2)
ALP SERPL-CCNC: 77 U/L (ref 40–150)
ALT SERPL W P-5'-P-CCNC: 13 U/L (ref 0–70)
ANION GAP SERPL CALCULATED.3IONS-SCNC: 11 MMOL/L (ref 7–15)
AST SERPL W P-5'-P-CCNC: 25 U/L (ref 0–45)
BASOPHILS # BLD AUTO: 0 10E3/UL (ref 0–0.2)
BASOPHILS NFR BLD AUTO: 1 %
BILIRUB SERPL-MCNC: 0.5 MG/DL
BUN SERPL-MCNC: 13.9 MG/DL (ref 8–23)
CALCIUM SERPL-MCNC: 9.5 MG/DL (ref 8.8–10.4)
CHLORIDE SERPL-SCNC: 96 MMOL/L (ref 98–107)
CREAT SERPL-MCNC: 0.91 MG/DL (ref 0.67–1.17)
EGFRCR SERPLBLD CKD-EPI 2021: >90 ML/MIN/1.73M2
EOSINOPHIL # BLD AUTO: 0.1 10E3/UL (ref 0–0.7)
EOSINOPHIL NFR BLD AUTO: 2 %
ERYTHROCYTE [DISTWIDTH] IN BLOOD BY AUTOMATED COUNT: 13.2 % (ref 10–15)
GLUCOSE SERPL-MCNC: 128 MG/DL (ref 70–99)
HCO3 SERPL-SCNC: 27 MMOL/L (ref 22–29)
HCT VFR BLD AUTO: 39.7 % (ref 40–53)
HGB BLD-MCNC: 13.7 G/DL (ref 13.3–17.7)
IMM GRANULOCYTES # BLD: 0.1 10E3/UL
IMM GRANULOCYTES NFR BLD: 1 %
LYMPHOCYTES # BLD AUTO: 1.7 10E3/UL (ref 0.8–5.3)
LYMPHOCYTES NFR BLD AUTO: 28 %
MCH RBC QN AUTO: 28 PG (ref 26.5–33)
MCHC RBC AUTO-ENTMCNC: 34.5 G/DL (ref 31.5–36.5)
MCV RBC AUTO: 81 FL (ref 78–100)
MONOCYTES # BLD AUTO: 2 10E3/UL (ref 0–1.3)
MONOCYTES NFR BLD AUTO: 33 %
NEUTROPHILS # BLD AUTO: 2.3 10E3/UL (ref 1.6–8.3)
NEUTROPHILS NFR BLD AUTO: 37 %
NRBC # BLD AUTO: 0 10E3/UL
NRBC BLD AUTO-RTO: 0 /100
PLAT MORPH BLD: NORMAL
PLATELET # BLD AUTO: 94 10E3/UL (ref 150–450)
POTASSIUM SERPL-SCNC: 3.7 MMOL/L (ref 3.4–5.3)
PROT SERPL-MCNC: 7.3 G/DL (ref 6.4–8.3)
RBC # BLD AUTO: 4.9 10E6/UL (ref 4.4–5.9)
RBC MORPH BLD: NORMAL
SODIUM SERPL-SCNC: 134 MMOL/L (ref 135–145)
WBC # BLD AUTO: 6.2 10E3/UL (ref 4–11)

## 2024-10-16 PROCEDURE — 85004 AUTOMATED DIFF WBC COUNT: CPT | Performed by: STUDENT IN AN ORGANIZED HEALTH CARE EDUCATION/TRAINING PROGRAM

## 2024-10-16 PROCEDURE — 80053 COMPREHEN METABOLIC PANEL: CPT | Performed by: STUDENT IN AN ORGANIZED HEALTH CARE EDUCATION/TRAINING PROGRAM

## 2024-10-16 PROCEDURE — 99215 OFFICE O/P EST HI 40 MIN: CPT | Performed by: STUDENT IN AN ORGANIZED HEALTH CARE EDUCATION/TRAINING PROGRAM

## 2024-10-16 PROCEDURE — 36415 COLL VENOUS BLD VENIPUNCTURE: CPT | Performed by: STUDENT IN AN ORGANIZED HEALTH CARE EDUCATION/TRAINING PROGRAM

## 2024-10-16 PROCEDURE — G2211 COMPLEX E/M VISIT ADD ON: HCPCS | Performed by: STUDENT IN AN ORGANIZED HEALTH CARE EDUCATION/TRAINING PROGRAM

## 2024-10-16 PROCEDURE — 82180 ASSAY OF ASCORBIC ACID: CPT | Performed by: STUDENT IN AN ORGANIZED HEALTH CARE EDUCATION/TRAINING PROGRAM

## 2024-10-16 PROCEDURE — G0463 HOSPITAL OUTPT CLINIC VISIT: HCPCS | Performed by: STUDENT IN AN ORGANIZED HEALTH CARE EDUCATION/TRAINING PROGRAM

## 2024-10-16 PROCEDURE — 85014 HEMATOCRIT: CPT | Performed by: STUDENT IN AN ORGANIZED HEALTH CARE EDUCATION/TRAINING PROGRAM

## 2024-10-16 ASSESSMENT — PAIN SCALES - GENERAL: PAINLEVEL: NO PAIN (0)

## 2024-10-16 NOTE — NURSING NOTE
Chief Complaint   Patient presents with    Oncology Clinic Visit     CMML    Blood Draw     Labs drawn via  by RN in lab, vitals taken.      Labs collected from venipuncture by RN. Vitals taken. Checked in for appointment(s).    Grisel Mishra RN

## 2024-10-16 NOTE — PROGRESS NOTES
[facility]  HEMATOLOGY & ONCOLOGY  FOLLOW UP VISIT    PATIENT NAME: Amado Butler          MRN # [id number]  YOB: 1960  DATE OF VISIT: Oct 16, 2024      REFERRING PROVIDER:   Mr. Amado Butler was seen at the request of Dr. Solomon Reza for further evaluation and/or management.     History of Presenting Illness  Mr. Amado Butler is a pleasant 64 year old male with a past medical history significant for DM2- Last A1c, HTN, ?thrombocytopenia since 2012 s/p recent frontotemporal craniotomy for right optic nerve mass now pathology proven meningioma c/b complete vision loss in right eye and was also found to have significant cytopenias during admission leading to a bone marrow biopsy on 8/15/2023.  His bone marrow biopsy shows 50% cellularity [mildly hypercellular for his age] without significant dysplasia in all 3 cell lines and without increase in blasts.  He had normal cytogenetics and BCR-ABL FISH was negative.  However his [employer] ration sequencing testing showed that there was a clonal process with mutations detected in SRSF2, TET2 x 2 and ASXL1.  Given prior history of monocytosis dating back many years he was referred to our clinic.    Subjective  Patient is here alone. Took a trip to [place] and had some knee pain. He had an injury to his left pinky with possible tendon rupture. He tested positive for COVID thereafter but has had no symptoms. no Headaches at this point. he continues to feel better and has had improvement in his overall wellbeing as well as fatigue symptoms. Denies any fevers or chills, night sweats, BRBPR or melena.    Past Medical History  Diabetes type 2  Hypertension  Paroxysmal SVT  Thrombocytopenia dating back since 2012    Family History  Any family history of cancers or blood or bleeding disorders.    Social History  Smoking: Never  Alcohol use: drinks approximately 2drinks/week  Status:  39 years.   Children/grandchildren: 3 children. 26 years old  girl, 31 boy, 28 girl  Occupation: Retired, .     Current Outpatient Medications  Current Outpatient Medications   Medication Sig Dispense Refill    carvedilol (COREG) 12.5 MG tablet Take 1 tablet (12.5 mg) by mouth 2 times daily (with meals) 180 tablet 3    lisinopril-hydrochlorothiazide (ZESTORETIC) 20-25 MG tablet Take 1 tablet by mouth daily      Magnesium Oxide -Mg Supplement 500 MG CAPS Take 500 mg by mouth daily      metFORMIN (GLUCOPHAGE) 500 MG tablet Take 1 tablet (500 mg) by mouth daily (with breakfast) 7 tablet 0    multivitamin w/minerals (MULTI-VITAMIN) tablet Take 1 tablet by mouth daily Over 50 mens         Allergies  Allergies   Allergen Reactions    Seasonal Allergies        Review of systems  A complete ROS was performed and was negative except as mentioned in HPI    Physical Exam  /75   Pulse 61   Temp 97.7  F (36.5  C) (Oral)   Resp 18   Wt 97.8 kg (215 lb 11.2 oz)   SpO2 98%   BMI 27.69 kg/m    Wt Readings from Last 3 Encounters:   10/16/24 97.8 kg (215 lb 11.2 oz)   24 97.5 kg (215 lb)   24 97.1 kg (214 lb)       ECO   male sitting in chair in NAD  HEET: NC/AT, EOMI w/ PERRL, anicteric sclera. MMM. No mouth sores.   Neck: supple no JVP  Lymph: No cervical, supraclavicular, axillary or inguinal LAD  CV: normal S1,S2 with RRR no m/r/g  Resp: lungs CTA bilaterally with adequate air movement. No wheezes or crackles  Abd: soft, NTND, no organomegaly or masses. BS normoactive.   Ext: WWP no edema or cyanosis  Skin: no concerning lesions or rashes or petechiae  Neuro: A&Ox4, no lateralizing sx. Grossly nonfocal. Strength appears preserved.      Labs and Imaging  I reviewed patient's labs and imaging from care everywhere and here.    WBC 5.9, Hgb 13.6, , MCV 82,, ANC 2900    BMBx 8/15/23  Final Diagnosis   Bone marrow, posterior iliac crest, left decalcified trephine biopsy and touch imprint; left aspirate clot, particle crush,  direct aspirate smear, concentrate aspirate smear, and peripheral blood smear:     - Cellular marrow (overall 50% in intact areas) with granulocytic hyperplasia with slight left shift, relative erythroid hypoplasia, unremarkable megakaryopoiesis, no overt dysplasia, and overall less than 2% blasts  - Overall normal reticulin fibrosis (MF-0)  - Peripheral blood showing moderate normochromic, normocytic anemia; moderate leukocytosis due to neutrophilia and monocytosis; slight thrombocytopenia  - See comment   No clonal chromosomal abnormality was detected among the 20 metaphase cells analyzed. These findings confirm and expand those of the previously reported interphase FISH anlaysis ([id number]) that showed no evidence of a BCR::ABL1 fusion.   SRSF2 VAF 46.2%  TET 2 VAF 51.6%  TET 2 VAF 46.5%  ASXL1 VAF 11.3%      CMML WHO Diagnostic Criteria:    Persistent (?3 months) peripheral blood monocytosis; absolute monocyte count >500/microL and greater than 10 percent of the entire white blood cell differential   Yes   Not meeting WHO criteria for BCR-ABL1 positive chronic myeloid leukemia, primary myelofibrosis, polycythemia vera, or essential thrombocytosis  Yes   <20 percent myeloblasts + monoblasts + promonocytes in peripheral blood and bone marrow  Yes   Dysplastic changes in one or more myeloid lineages. If myelodysplastic changes are absent or minimal, the diagnosis of CMML can be made if the above three criteria are met and:  No   An acquired clonal cytogenetic (or mutational abnormality) is present in bone marrow cells or  Yes   Persistent monocytosis for ?3 months and all other causes of monocytosis have been excluded  Yes   Total Score Meets WHO Criteria (4 major or 3 major and 2 minor)  Yes     CMML WHO Staging in 2016 now discontinued:    CMML-0: <2 % blasts in PB and <5% blasts in BM  Yes   CMML-1: 2 - 4%  blasts in PB and/or 5 - 9% blasts in BM  No   CMML-2: 5 -19 % blasts in PB, 10 - 19% blasts in BM, OR  presence of Maurilio rods  No     Prognostic Scoring System: CPSS-Mol      Criteria Score     Cytogenetic Risk Low: normal, isolated -Y  Intermediate: all else  High: Tri 8, complex, chrom 7   0    ASXL1 WT/Mut  1    NRAS WT/Mut  0    RUNX1 WT/Mut  0    SETBP1 WT/Mut  0    Total Genetic Score Low = 0  Intermediate-1 = 1  Intermediate-2 = 2  High = >3 (Max 3 points) 1 Genetic risk points for CPSS-mol   (Max 3):  1   BM Blasts <5% or >5%  0    WBC count <13 or > 13  0    Transfusion dependent No/Yes  0    CPSS-mol   Risk group Low = 0  Intermediate-1 = 1  Intermediate-2 = 2-3  High = >4    1     The CPSS-Mol was able to identify 4 risk groups having a signi?cantly different OS, with median survival time >144, and 68, 30, and 17 months in the low, intermediate-1, intermediate-2, and high-risk group, respectively. Mr. Amado Butler has a Intermediate-1 risk disease with an estimated median survival of 68 months. The 4 risk groups also showed a signi?cantly different cumulative incidence of leukemic evolution, with a 48-month cumulative incidence of AML evolution of 0%, 8%, 24%, and 52% for the low, intermediate-1, intermediate-2, and high-risk group respectively. Mr. Amado Butler has a Intermediate-1 risk disease with an estimated 4 year cumulative AML evolution risk of 8 %.     Will need to obtain BCR-ABL1 testing and PDGFRA, PDGFRB, FGFR1 and PCM-JAK2 to rule out other disorders if eosinophilia is present.     A number of conditions can also cause monocytosis such as asplenic state, inflamma ASXL1 tory conditions and autoimmune disorders, major depression, steroids or colony stimulating factors.     US abdomen 7/22/2024  Spleen is borderline enlarged at 13 cm. No ascites.      Assessment and Plan  Mr. Amado Butler is a 64 year old male who is here for further evaluation and/or management of chronic myelomonocytic leukemia.    Oncology:  Chronic myelomonocytic leukemia  WHO Classification: Dysplastic-CMML  Date of  diagnosis: 8/14/2023  CPSS Mol score: 1-intermediate 1  Bone Marrow Blast %: 2   Cytogenetics: normal karyotype, 46 XY  Molecular: SRSF2, TET 2, TET 2, ASXL1  Past Treatment: None  Current Treatment: TBD    I discussed the pathophysiology and natural history of CMML with Mr. Amado Butler today. We went over the current prognostic models and scoring systems that are used in clinical practice as well as the potential for disease evolution into acute myeloid leukemia. We went over the current FDA approved treatments for CMML and covered that the only curative option is through an allogeneic hematopoietic cell transplantation. His disease is intermediate 1 risk and we will revisit this as needed.    We discussed about supportive care for lower risk disease and CMML including erythropoietin stimulating agents as well as thrombopoietin receptor agonist for thrombocytopenia.  At this point of time we will take a wait and watch approach. His counts have recovered.     His hemoglobin and white blood cell counts remained stable.  We got an ultrasound spleen that shows a spleen size of  13cm which could explain some of the thrombocytopenia.    We discussed that his counts has continued to remain very stable except platelets today which is a bit on the lower side.  This could likely be post his recent COVID infection.  I have send a vitamin C level. If lower we will start PO ascorbic acid.     Plan:  - RTC with me with me with labs prior in 2 months.    Hematology:  Anemia/Thrombocytopenia:   Transfuse leukocyte reduced and irradiated blood products: 1 unit pRBC if hgb is 7.0-7.9, 2 units pRBCs if Hgb 6.0-6.9 g/dl, 1 unit platelets if PLT count is <10,000 or <50,000 with clinical bleeding.    Immunocompromised:  As a result of his disease and/or previous treatment. Mr. Amado Butler is immunocompromised. his last ANC is >500 and there is no need for prophylactic antibiotics at this time.     Cardiac:  Mr. Amado Butler has no  history of heart disease.     Renal:  Creatinine results reviewed today. Mr. Amado Butler does not have any renal disease.    Hepatic:  Liver function tests reviewed today.    Psychosocial:  Mr. Amado Butler is coping well with his diagnosis.     All other questions and concerns were addressed and answered to . Amado Butler's satisfaction.    Today, I spent a total of 40 minutes of face-to-face and non face-to-face time with Mr. Amado Butler. Time spent included review and discussion of diagnostic test results, patient counseling, and coordination of care.    The longitudinal plan of care for the diagnosis(es)/condition(s) as documented were addressed during this visit. Due to the added complexity in care, I will continue to support Amado in the subsequent management and with ongoing continuity of care.    Jamal Rodney MD    Division of Hematology, Oncology and Transplantation  [facility]  P: [phone]

## 2024-10-16 NOTE — LETTER
10/16/2024      Amado Butler  [id number] [address] [address]      Dear Colleague,    Thank you for referring your patient, Amado Butler, to the [facility]. Please see a copy of my visit note below.    [facility]  HEMATOLOGY & ONCOLOGY  FOLLOW UP VISIT    PATIENT NAME: Amado Butler          MRN # [id number]  YOB: 1960  DATE OF VISIT: Oct 16, 2024      REFERRING PROVIDER:   Mr. Amado Butler was seen at the request of Dr. Solomon Reza for further evaluation and/or management.     History of Presenting Illness  Mr. Amado Butler is a pleasant 64 year old male with a past medical history significant for DM2- Last A1c, HTN, ?thrombocytopenia since 2012 s/p recent frontotemporal craniotomy for right optic nerve mass now pathology proven meningioma c/b complete vision loss in right eye and was also found to have significant cytopenias during admission leading to a bone marrow biopsy on 8/15/2023.  His bone marrow biopsy shows 50% cellularity [mildly hypercellular for his age] without significant dysplasia in all 3 cell lines and without increase in blasts.  He had normal cytogenetics and BCR-ABL FISH was negative.  However his [employer] ration sequencing testing showed that there was a clonal process with mutations detected in SRSF2, TET2 x 2 and ASXL1.  Given prior history of monocytosis dating back many years he was referred to our clinic.    Subjective  Patient is here alone. Took a trip to [place] and had some knee pain. He had an injury to his left pinky with possible tendon rupture. He tested positive for COVID thereafter but has had no symptoms. no Headaches at this point. he continues to feel better and has had improvement in his overall wellbeing as well as fatigue symptoms. Denies any fevers or chills, night sweats, BRBPR or melena.    Past Medical History  Diabetes type 2  Hypertension  Paroxysmal SVT  Thrombocytopenia dating back since      Family History  Any family history of cancers or blood or bleeding disorders.    Social History  Smoking: Never  Alcohol use: drinks approximately 2drinks/week  Status:  37 years.   Children/grandchildren: 3 children. 26 years old girl, 31 boy, 28 girl  Occupation: Retired, .     Current Outpatient Medications  Current Outpatient Medications   Medication Sig Dispense Refill     carvedilol (COREG) 12.5 MG tablet Take 1 tablet (12.5 mg) by mouth 2 times daily (with meals) 180 tablet 3     lisinopril-hydrochlorothiazide (ZESTORETIC) 20-25 MG tablet Take 1 tablet by mouth daily       Magnesium Oxide -Mg Supplement 500 MG CAPS Take 500 mg by mouth daily       metFORMIN (GLUCOPHAGE) 500 MG tablet Take 1 tablet (500 mg) by mouth daily (with breakfast) 7 tablet 0     multivitamin w/minerals (MULTI-VITAMIN) tablet Take 1 tablet by mouth daily Over 50 mens         Allergies  Allergies   Allergen Reactions     Seasonal Allergies        Review of systems  A complete ROS was performed and was negative except as mentioned in HPI    Physical Exam  /75   Pulse 61   Temp 97.7  F (36.5  C) (Oral)   Resp 18   Wt 97.8 kg (215 lb 11.2 oz)   SpO2 98%   BMI 27.69 kg/m    Wt Readings from Last 3 Encounters:   10/16/24 97.8 kg (215 lb 11.2 oz)   24 97.5 kg (215 lb)   24 97.1 kg (214 lb)       ECO   male sitting in chair in NAD  HEET: NC/AT, EOMI w/ PERRL, anicteric sclera. MMM. No mouth sores.   Neck: supple no JVP  Lymph: No cervical, supraclavicular, axillary or inguinal LAD  CV: normal S1,S2 with RRR no m/r/g  Resp: lungs CTA bilaterally with adequate air movement. No wheezes or crackles  Abd: soft, NTND, no organomegaly or masses. BS normoactive.   Ext: WWP no edema or cyanosis  Skin: no concerning lesions or rashes or petechiae  Neuro: A&Ox4, no lateralizing sx. Grossly nonfocal. Strength appears preserved.      Labs and Imaging  I reviewed patient's labs  and imaging from care everywhere and here.    WBC 5.9, Hgb 13.6, , MCV 82,, ANC 2900    BMBx 8/15/23  Final Diagnosis   Bone marrow, posterior iliac crest, left decalcified trephine biopsy and touch imprint; left aspirate clot, particle crush, direct aspirate smear, concentrate aspirate smear, and peripheral blood smear:     - Cellular marrow (overall 50% in intact areas) with granulocytic hyperplasia with slight left shift, relative erythroid hypoplasia, unremarkable megakaryopoiesis, no overt dysplasia, and overall less than 2% blasts  - Overall normal reticulin fibrosis (MF-0)  - Peripheral blood showing moderate normochromic, normocytic anemia; moderate leukocytosis due to neutrophilia and monocytosis; slight thrombocytopenia  - See comment   No clonal chromosomal abnormality was detected among the 20 metaphase cells analyzed. These findings confirm and expand those of the previously reported interphase FISH anlaysis ([id number]) that showed no evidence of a BCR::ABL1 fusion.   SRSF2 VAF 46.2%  TET 2 VAF 51.6%  TET 2 VAF 46.5%  ASXL1 VAF 11.3%      CMML WHO Diagnostic Criteria:    Persistent (=3 months) peripheral blood monocytosis; absolute monocyte count >500/microL and greater than 10 percent of the entire white blood cell differential   Yes   Not meeting WHO criteria for BCR-ABL1 positive chronic myeloid leukemia, primary myelofibrosis, polycythemia vera, or essential thrombocytosis  Yes   <20 percent myeloblasts + monoblasts + promonocytes in peripheral blood and bone marrow  Yes   Dysplastic changes in one or more myeloid lineages. If myelodysplastic changes are absent or minimal, the diagnosis of CMML can be made if the above three criteria are met and:  No   An acquired clonal cytogenetic (or mutational abnormality) is present in bone marrow cells or  Yes   Persistent monocytosis for =3 months and all other causes of monocytosis have been excluded  Yes   Total Score Meets WHO Criteria (4 major  or 3 major and 2 minor)  Yes     CMML WHO Staging in 2016 now discontinued:    CMML-0: <2 % blasts in PB and <5% blasts in BM  Yes   CMML-1: 2 - 4%  blasts in PB and/or 5 - 9% blasts in BM  No   CMML-2: 5 -19 % blasts in PB, 10 - 19% blasts in BM, OR presence of Maurilio rods  No     Prognostic Scoring System: CPSS-Mol      Criteria Score     Cytogenetic Risk Low: normal, isolated -Y  Intermediate: all else  High: Tri 8, complex, chrom 7   0    ASXL1 WT/Mut  1    NRAS WT/Mut  0    RUNX1 WT/Mut  0    SETBP1 WT/Mut  0    Total Genetic Score Low = 0  Intermediate-1 = 1  Intermediate-2 = 2  High = >3 (Max 3 points) 1 Genetic risk points for CPSS-mol   (Max 3):  1   BM Blasts <5% or >5%  0    WBC count <13 or > 13  0    Transfusion dependent No/Yes  0    CPSS-mol   Risk group Low = 0  Intermediate-1 = 1  Intermediate-2 = 2-3  High = >4    1     The CPSS-Mol was able to identify 4 risk groups having a signi?cantly different OS, with median survival time >144, and 68, 30, and 17 months in the low, intermediate-1, intermediate-2, and high-risk group, respectively. Mr. Amado Butler has a Intermediate-1 risk disease with an estimated median survival of 68 months. The 4 risk groups also showed a signi?cantly different cumulative incidence of leukemic evolution, with a 48-month cumulative incidence of AML evolution of 0%, 8%, 24%, and 52% for the low, intermediate-1, intermediate-2, and high-risk group respectively. Mr. Amado Butler has a Intermediate-1 risk disease with an estimated 4 year cumulative AML evolution risk of 8 %.     Will need to obtain BCR-ABL1 testing and PDGFRA, PDGFRB, FGFR1 and PCM-JAK2 to rule out other disorders if eosinophilia is present.     A number of conditions can also cause monocytosis such as asplenic state, inflamma ASXL1 tory conditions and autoimmune disorders, major depression, steroids or colony stimulating factors.     US abdomen 7/22/2024  Spleen is borderline enlarged at 13 cm. No ascites.       Assessment and Plan  Mr. Amado Butler is a 64 year old male who is here for further evaluation and/or management of chronic myelomonocytic leukemia.    Oncology:  Chronic myelomonocytic leukemia  WHO Classification: Dysplastic-CMML  Date of diagnosis: 8/14/2023  CPSS Mol score: 1-intermediate 1  Bone Marrow Blast %: 2   Cytogenetics: normal karyotype, 46 XY  Molecular: SRSF2, TET 2, TET 2, ASXL1  Past Treatment: None  Current Treatment: TBD    I discussed the pathophysiology and natural history of CMML with Mr. Amado Butler today. We went over the current prognostic models and scoring systems that are used in clinical practice as well as the potential for disease evolution into acute myeloid leukemia. We went over the current FDA approved treatments for CMML and covered that the only curative option is through an allogeneic hematopoietic cell transplantation. His disease is intermediate 1 risk and we will revisit this as needed.    We discussed about supportive care for lower risk disease and CMML including erythropoietin stimulating agents as well as thrombopoietin receptor agonist for thrombocytopenia.  At this point of time we will take a wait and watch approach. His counts have recovered.     His hemoglobin and white blood cell counts remained stable.  We got an ultrasound spleen that shows a spleen size of  13cm which could explain some of the thrombocytopenia.    We discussed that his counts has continued to remain very stable except platelets today which is a bit on the lower side.  This could likely be post his recent COVID infection.  I have send a vitamin C level. If lower we will start PO ascorbic acid.     Plan:  - RTC with me with me with labs prior in 2 months.    Hematology:  Anemia/Thrombocytopenia:   Transfuse leukocyte reduced and irradiated blood products: 1 unit pRBC if hgb is 7.0-7.9, 2 units pRBCs if Hgb 6.0-6.9 g/dl, 1 unit platelets if PLT count is <10,000 or <50,000 with clinical  bleeding.    Immunocompromised:  As a result of his disease and/or previous treatment. Mr. Amado Butler is immunocompromised. his last ANC is >500 and there is no need for prophylactic antibiotics at this time.     Cardiac:  Mr. Amado Butler has no history of heart disease.     Renal:  Creatinine results reviewed today. Mr. Amado Butler does not have any renal disease.    Hepatic:  Liver function tests reviewed today.    Psychosocial:  Mr. Amado Butler is coping well with his diagnosis.     All other questions and concerns were addressed and answered to Mr. Amado Butler's satisfaction.    Today, I spent a total of 40 minutes of face-to-face and non face-to-face time with Mr. Amado Butler. Time spent included review and discussion of diagnostic test results, patient counseling, and coordination of care.    The longitudinal plan of care for the diagnosis(es)/condition(s) as documented were addressed during this visit. Due to the added complexity in care, I will continue to support Amado in the subsequent management and with ongoing continuity of care.    Jamal Rodney MD    Division of Hematology, Oncology and Transplantation  [facility]  P: [phone]      Again, thank you for allowing me to participate in the care of your patient.        Sincerely,        Jamal Rodney MD

## 2024-10-16 NOTE — NURSING NOTE
"Oncology Rooming Note    October 16, 2024 11:48 AM   Amado Butler is a 64 year old male who presents for:    Chief Complaint   Patient presents with    Oncology Clinic Visit     CMML    Blood Draw     Labs drawn via  by RN in lab, vitals taken.      Initial Vitals: /75   Pulse 61   Temp 97.7  F (36.5  C) (Oral)   Resp 18   Wt 97.8 kg (215 lb 11.2 oz)   SpO2 98%   BMI 27.69 kg/m   Estimated body mass index is 27.69 kg/m  as calculated from the following:    Height as of 5/3/24: 1.88 m (6' 2\").    Weight as of this encounter: 97.8 kg (215 lb 11.2 oz). Body surface area is 2.26 meters squared.  No Pain (0) Comment: Data Unavailable   No LMP for male patient.  Allergies reviewed: Yes  Medications reviewed: Yes    Medications: Medication refills not needed today.  Pharmacy name entered into [employer]: [id number] - [place], [place] - [address]    Frailty Screening:   Is the patient here for a new oncology consult visit in cancer care? 2. No      Clinical concerns:        Letty Morelos              "

## 2024-10-19 LAB — VIT C SERPL-MCNC: 34 UMOL/L

## 2024-12-15 ENCOUNTER — HEALTH MAINTENANCE LETTER (OUTPATIENT)
Age: 64
End: 2024-12-15

## 2024-12-20 ENCOUNTER — ONCOLOGY VISIT (OUTPATIENT)
Dept: ONCOLOGY | Facility: CLINIC | Age: 64
End: 2024-12-20
Attending: STUDENT IN AN ORGANIZED HEALTH CARE EDUCATION/TRAINING PROGRAM
Payer: COMMERCIAL

## 2024-12-20 ENCOUNTER — APPOINTMENT (OUTPATIENT)
Dept: LAB | Facility: CLINIC | Age: 64
End: 2024-12-20
Attending: STUDENT IN AN ORGANIZED HEALTH CARE EDUCATION/TRAINING PROGRAM
Payer: COMMERCIAL

## 2024-12-20 VITALS
OXYGEN SATURATION: 96 % | WEIGHT: 210.9 LBS | BODY MASS INDEX: 27.08 KG/M2 | HEART RATE: 65 BPM | DIASTOLIC BLOOD PRESSURE: 78 MMHG | TEMPERATURE: 97.7 F | SYSTOLIC BLOOD PRESSURE: 127 MMHG

## 2024-12-20 DIAGNOSIS — C93.10 CHRONIC MYELOMONOCYTIC LEUKEMIA NOT HAVING ACHIEVED REMISSION (H): Primary | ICD-10-CM

## 2024-12-20 LAB
ALBUMIN SERPL BCG-MCNC: 4.6 G/DL (ref 3.5–5.2)
ALP SERPL-CCNC: 76 U/L (ref 40–150)
ALT SERPL W P-5'-P-CCNC: 20 U/L (ref 0–70)
ANION GAP SERPL CALCULATED.3IONS-SCNC: 12 MMOL/L (ref 7–15)
AST SERPL W P-5'-P-CCNC: 41 U/L (ref 0–45)
BASOPHILS # BLD AUTO: 0.1 10E3/UL (ref 0–0.2)
BASOPHILS NFR BLD AUTO: 1 %
BILIRUB SERPL-MCNC: 0.6 MG/DL
BUN SERPL-MCNC: 13.7 MG/DL (ref 8–23)
CALCIUM SERPL-MCNC: 9.4 MG/DL (ref 8.8–10.4)
CHLORIDE SERPL-SCNC: 96 MMOL/L (ref 98–107)
CREAT SERPL-MCNC: 0.94 MG/DL (ref 0.67–1.17)
EGFRCR SERPLBLD CKD-EPI 2021: 89 ML/MIN/1.73M2
EOSINOPHIL # BLD AUTO: 0 10E3/UL (ref 0–0.7)
EOSINOPHIL NFR BLD AUTO: 0 %
ERYTHROCYTE [DISTWIDTH] IN BLOOD BY AUTOMATED COUNT: 13.5 % (ref 10–15)
GLUCOSE SERPL-MCNC: 187 MG/DL (ref 70–99)
HCO3 SERPL-SCNC: 26 MMOL/L (ref 22–29)
HCT VFR BLD AUTO: 39.1 % (ref 40–53)
HGB BLD-MCNC: 13.8 G/DL (ref 13.3–17.7)
IMM GRANULOCYTES # BLD: 0.2 10E3/UL
IMM GRANULOCYTES NFR BLD: 3 %
LYMPHOCYTES # BLD AUTO: 1.8 10E3/UL (ref 0.8–5.3)
LYMPHOCYTES NFR BLD AUTO: 28 %
MCH RBC QN AUTO: 28 PG (ref 26.5–33)
MCHC RBC AUTO-ENTMCNC: 35.3 G/DL (ref 31.5–36.5)
MCV RBC AUTO: 79 FL (ref 78–100)
MONOCYTES # BLD AUTO: 2 10E3/UL (ref 0–1.3)
MONOCYTES NFR BLD AUTO: 33 %
NEUTROPHILS # BLD AUTO: 2.2 10E3/UL (ref 1.6–8.3)
NEUTROPHILS NFR BLD AUTO: 35 %
NRBC # BLD AUTO: 0 10E3/UL
NRBC BLD AUTO-RTO: 0 /100
PLAT MORPH BLD: NORMAL
PLATELET # BLD AUTO: 131 10E3/UL (ref 150–450)
POTASSIUM SERPL-SCNC: 3.8 MMOL/L (ref 3.4–5.3)
PROT SERPL-MCNC: 7.4 G/DL (ref 6.4–8.3)
RBC # BLD AUTO: 4.93 10E6/UL (ref 4.4–5.9)
RBC MORPH BLD: NORMAL
SODIUM SERPL-SCNC: 134 MMOL/L (ref 135–145)
WBC # BLD AUTO: 6.3 10E3/UL (ref 4–11)

## 2024-12-20 PROCEDURE — 99214 OFFICE O/P EST MOD 30 MIN: CPT | Performed by: STUDENT IN AN ORGANIZED HEALTH CARE EDUCATION/TRAINING PROGRAM

## 2024-12-20 PROCEDURE — 84155 ASSAY OF PROTEIN SERUM: CPT | Performed by: STUDENT IN AN ORGANIZED HEALTH CARE EDUCATION/TRAINING PROGRAM

## 2024-12-20 PROCEDURE — G0463 HOSPITAL OUTPT CLINIC VISIT: HCPCS | Performed by: STUDENT IN AN ORGANIZED HEALTH CARE EDUCATION/TRAINING PROGRAM

## 2024-12-20 PROCEDURE — 85004 AUTOMATED DIFF WBC COUNT: CPT | Performed by: STUDENT IN AN ORGANIZED HEALTH CARE EDUCATION/TRAINING PROGRAM

## 2024-12-20 PROCEDURE — 80053 COMPREHEN METABOLIC PANEL: CPT | Performed by: STUDENT IN AN ORGANIZED HEALTH CARE EDUCATION/TRAINING PROGRAM

## 2024-12-20 PROCEDURE — 36415 COLL VENOUS BLD VENIPUNCTURE: CPT | Performed by: STUDENT IN AN ORGANIZED HEALTH CARE EDUCATION/TRAINING PROGRAM

## 2024-12-20 PROCEDURE — G2211 COMPLEX E/M VISIT ADD ON: HCPCS | Performed by: STUDENT IN AN ORGANIZED HEALTH CARE EDUCATION/TRAINING PROGRAM

## 2024-12-20 RX ORDER — ROSUVASTATIN CALCIUM 40 MG/1
40 TABLET, COATED ORAL DAILY
COMMUNITY
Start: 2024-12-05

## 2024-12-20 RX ORDER — ASPIRIN 81 MG/1
81 TABLET ORAL
COMMUNITY
Start: 2024-11-11

## 2024-12-20 ASSESSMENT — PAIN SCALES - GENERAL: PAINLEVEL_OUTOF10: NO PAIN (0)

## 2024-12-20 NOTE — NURSING NOTE
"Oncology Rooming Note    December 20, 2024 11:46 AM   Amado Butler is a 64 year old male who presents for:    Chief Complaint   Patient presents with    Oncology Clinic Visit     RTN CMML      Initial Vitals: /78 (BP Location: Left arm, Patient Position: Sitting, Cuff Size: Adult Regular)   Pulse 65   Temp 97.7  F (36.5  C) (Oral)   Wt 95.7 kg (210 lb 14.4 oz)   SpO2 96%   BMI 27.08 kg/m   Estimated body mass index is 27.08 kg/m  as calculated from the following:    Height as of 5/3/24: 1.88 m (6' 2\").    Weight as of this encounter: 95.7 kg (210 lb 14.4 oz). Body surface area is 2.24 meters squared.  No Pain (0) Comment: Data Unavailable   No LMP for male patient.  Allergies reviewed: Yes  Medications reviewed: No    Medications: Medication refills not needed today.  Pharmacy name entered into [employer]tips: [id number] - [place], [place] - [address]    Frailty Screening:   Is the patient here for a new oncology consult visit in cancer care? 2. No      Clinical concerns: none      Chitra Zhang             "

## 2024-12-20 NOTE — LETTER
12/20/2024      Amado Butler  [address] [facility] [address]      Dear Colleague,    Thank you for referring your patient, Amado Butler, to the [facility]. Please see a copy of my visit note below.    [facility]  HEMATOLOGY & ONCOLOGY  FOLLOW UP VISIT    PATIENT NAME: Amado Butler          MRN # [id number]  YOB: 1960  DATE OF VISIT: Dec 20, 2024      REFERRING PROVIDER:   Mr. Amado Butler was seen at the request of Dr. Solomon Reza for further evaluation and/or management.     History of Presenting Illness  Mr. Amado Butler is a pleasant 64 year old male with a past medical history significant for DM2- Last A1c, HTN, ?thrombocytopenia since 2012 s/p recent frontotemporal craniotomy for right optic nerve mass now pathology proven meningioma c/b complete vision loss in right eye and was also found to have significant cytopenias during admission leading to a bone marrow biopsy on 8/15/2023.  His bone marrow biopsy shows 50% cellularity [mildly hypercellular for his age] without significant dysplasia in all 3 cell lines and without increase in blasts.  He had normal cytogenetics and BCR-ABL FISH was negative.  However his Nex[employer] ration sequencing testing showed that there was a clonal process with mutations detected in SRSF2, TET2 x 2 and ASXL1.  Given prior history of monocytosis dating back many years he was referred to our clinic.    Subjective  Patient is here alone.  He had an episode of syncope and had a Holter monitor placed that showed some arrhythmias.  He had a CT cardiac that showed high CAC score.  He just underwent a stress MRI.  He is awaiting results/discussion with his cardiologist.  He continues to feel better and has been having fatigue symptoms as well as dyspnea on exertion. Denies any fevers or chills, night sweats, BRBPR or melena.    Past Medical History  Diabetes type 2  Hypertension  Paroxysmal SVT  Thrombocytopenia dating  back since     Family History  Any family history of cancers or blood or bleeding disorders.    Social History  Smoking: Never  Alcohol use: drinks approximately 2drinks/week  Status:  37 years.   Children/grandchildren: 3 children. 26 years old girl, 31 boy, 28 girl  Occupation: Retired, .     Current Outpatient Medications  Current Outpatient Medications   Medication Sig Dispense Refill     carvedilol (COREG) 12.5 MG tablet Take 1 tablet (12.5 mg) by mouth 2 times daily (with meals) 180 tablet 3     lisinopril-hydrochlorothiazide (ZESTORETIC) 20-25 MG tablet Take 1 tablet by mouth daily       Magnesium Oxide -Mg Supplement 500 MG CAPS Take 500 mg by mouth daily       metFORMIN (GLUCOPHAGE) 500 MG tablet Take 1 tablet (500 mg) by mouth daily (with breakfast) 7 tablet 0     multivitamin w/minerals (MULTI-VITAMIN) tablet Take 1 tablet by mouth daily Over 50 mens         Allergies  Allergies   Allergen Reactions     Seasonal Allergies        Review of systems  A complete ROS was performed and was negative except as mentioned in HPI    Physical Exam  There were no vitals taken for this visit.  Wt Readings from Last 3 Encounters:   10/16/24 97.8 kg (215 lb 11.2 oz)   24 97.5 kg (215 lb)   24 97.1 kg (214 lb)       ECO   male sitting in chair in NAD  HEET: NC/AT, EOMI w/ PERRL, anicteric sclera. MMM. No mouth sores.   Neck: supple no JVP  Lymph: No cervical, supraclavicular, axillary or inguinal LAD  CV: normal S1,S2 with RRR no m/r/g  Resp: lungs CTA bilaterally with adequate air movement. No wheezes or crackles  Abd: soft, NTND, no organomegaly or masses. BS normoactive.   Ext: WWP no edema or cyanosis  Skin: no concerning lesions or rashes or petechiae  Neuro: A&Ox4, no lateralizing sx. Grossly nonfocal. Strength appears preserved.      Labs and Imaging  I reviewed patient's labs and imaging from care everywhere and here.    WBC 6.3, Hgb 13.8, , MCV  79, ANC 2200    BMBx 8/15/23  Final Diagnosis   Bone marrow, posterior iliac crest, left decalcified trephine biopsy and touch imprint; left aspirate clot, particle crush, direct aspirate smear, concentrate aspirate smear, and peripheral blood smear:     - Cellular marrow (overall 50% in intact areas) with granulocytic hyperplasia with slight left shift, relative erythroid hypoplasia, unremarkable megakaryopoiesis, no overt dysplasia, and overall less than 2% blasts  - Overall normal reticulin fibrosis (MF-0)  - Peripheral blood showing moderate normochromic, normocytic anemia; moderate leukocytosis due to neutrophilia and monocytosis; slight thrombocytopenia  - See comment   No clonal chromosomal abnormality was detected among the 20 metaphase cells analyzed. These findings confirm and expand those of the previously reported interphase FISH anlaysis ([id number]) that showed no evidence of a BCR::ABL1 fusion.   SRSF2 VAF 46.2%  TET 2 VAF 51.6%  TET 2 VAF 46.5%  ASXL1 VAF 11.3%      CMML WHO Diagnostic Criteria:    Persistent (>=3 months) peripheral blood monocytosis; absolute monocyte count >500/microL and greater than 10 percent of the entire white blood cell differential   Yes   Not meeting WHO criteria for BCR-ABL1 positive chronic myeloid leukemia, primary myelofibrosis, polycythemia vera, or essential thrombocytosis  Yes   <20 percent myeloblasts + monoblasts + promonocytes in peripheral blood and bone marrow  Yes   Dysplastic changes in one or more myeloid lineages. If myelodysplastic changes are absent or minimal, the diagnosis of CMML can be made if the above three criteria are met and:  No   An acquired clonal cytogenetic (or mutational abnormality) is present in bone marrow cells or  Yes   Persistent monocytosis for >=3 months and all other causes of monocytosis have been excluded  Yes   Total Score Meets WHO Criteria (4 major or 3 major and 2 minor)  Yes     CMML WHO Staging in 2016 now  discontinued:    CMML-0: <2 % blasts in PB and <5% blasts in BM  Yes   CMML-1: 2 - 4%  blasts in PB and/or 5 - 9% blasts in BM  No   CMML-2: 5 -19 % blasts in PB, 10 - 19% blasts in BM, OR presence of Maurilio rods  No     Prognostic Scoring System: CPSS-Mol      Criteria Score     Cytogenetic Risk Low: normal, isolated -Y  Intermediate: all else  High: Tri 8, complex, chrom 7   0    ASXL1 WT/Mut  1    NRAS WT/Mut  0    RUNX1 WT/Mut  0    SETBP1 WT/Mut  0    Total Genetic Score Low = 0  Intermediate-1 = 1  Intermediate-2 = 2  High = >3 (Max 3 points) 1 Genetic risk points for CPSS-mol   (Max 3):  1   BM Blasts <5% or >5%  0    WBC count <13 or > 13  0    Transfusion dependent No/Yes  0    CPSS-mol   Risk group Low = 0  Intermediate-1 = 1  Intermediate-2 = 2-3  High = >4    1     The CPSS-Mol was able to identify 4 risk groups having a signi?cantly different OS, with median survival time >144, and 68, 30, and 17 months in the low, intermediate-1, intermediate-2, and high-risk group, respectively. Mr. Amado Butler has a Intermediate-1 risk disease with an estimated median survival of 68 months. The 4 risk groups also showed a signi?cantly different cumulative incidence of leukemic evolution, with a 48-month cumulative incidence of AML evolution of 0%, 8%, 24%, and 52% for the low, intermediate-1, intermediate-2, and high-risk group respectively. Mr. Amado Butler has a Intermediate-1 risk disease with an estimated 4 year cumulative AML evolution risk of 8 %.     Will need to obtain BCR-ABL1 testing and PDGFRA, PDGFRB, FGFR1 and PCM-JAK2 to rule out other disorders if eosinophilia is present.     A number of conditions can also cause monocytosis such as asplenic state, inflamma ASXL1 tory conditions and autoimmune disorders, major depression, steroids or colony stimulating factors.     US abdomen 7/22/2024  Spleen is borderline enlarged at 13 cm. No ascites.      CT CAC score 12/3/2024  Impression:   Calcium score of 770  place the patient at a high risk of a cardiovascular   event in the next 10 years.   Other cardiac findings include,  mildly dilated ascending aorta measured   at 4.0 cm   Non cardiac findings will be reported by the radiologist.     MR cardiac 12/3/2024  Final conclusions:   Stress MRI is negative for significant ischemia.   The gadolinium delayed enhancement is suggestive of nonischemic scar.    There is patchy mid myocardial noted in the basal lateral, mid/distal   anterior, anteroseptal segments.  This appears to extend into the   subendocardial however overall has the appearance of a nonischemic scar.    There is also patchy mid myocardial scar noted in the basal   inferior/inferior lateral and basal septal segments. These findings could   represent infiltrative cardiomyopathy such as sarcoidosis versus   arrhythmogenic cardiomyopathy.  Recommend further workup for delineation.   The left ventricle size is normal.  The LV wall thickness is increased.    The LV wall motion is normal. The LV systolic function is normal.    Calculated LVEF is 61%.    The right ventricle is normal size with normal function.   Non cardiac finding will be reported separately per radiology.     Assessment and Plan  Mr. Amado Butler is a 64 year old male who is here for further evaluation and/or management of chronic myelomonocytic leukemia.    Oncology:  Chronic myelomonocytic leukemia  WHO Classification: Dysplastic-CMML  Date of diagnosis: 8/14/2023  CPSS Mol score: 1-intermediate 1  Bone Marrow Blast %: 2   Cytogenetics: normal karyotype, 46 XY  Molecular: SRSF2, TET 2, TET 2, ASXL1  Past Treatment: None  Current Treatment: TBD    I discussed the pathophysiology and natural history of CMML with Mr. Amado Butler today. We went over the current prognostic models and scoring systems that are used in clinical practice as well as the potential for disease evolution into acute myeloid leukemia. We went over the current FDA approved  treatments for CMML and covered that the only curative option is through an allogeneic hematopoietic cell transplantation. His disease is intermediate 1 risk and we will revisit this as needed.    We discussed about supportive care for lower risk disease and CMML including erythropoietin stimulating agents as well as thrombopoietin receptor agonist for thrombocytopenia.  At this point of time we will take a wait and watch approach. His counts have recovered.     His hemoglobin and white blood cell counts remained stable.  We got an ultrasound spleen that shows a spleen size of  13cm which could explain some of the thrombocytopenia.    His labs continue to remain stable.  He was recently found to have high calcium score on his CT cardiac after an episode of syncope for which she got a Holter monitor that is showing some arrhythmias.  An MR cardiac shows no inducible ischemia but does in fact show an area of scar?  Nonischemic suggestive of infiltrative disease.  He has going to follow-up with us cardiologist.  Is already been started on primary prevention with aspirin and Crestor/rosuvastatin.  We discussed that people with hematological malignancy are at increased risk of inflammatory things and thereby cardiac events in this setting.  Patient request that he wants to follow-up with cardiology here at the [facility].  I will place referral for the same.    Plan:  - RTC with me with me with labs prior in 2 months.    Hematology:  Anemia/Thrombocytopenia:   Transfuse leukocyte reduced and irradiated blood products: 1 unit pRBC if hgb is 7.0-7.9, 2 units pRBCs if Hgb 6.0-6.9 g/dl, 1 unit platelets if PLT count is <10,000 or <50,000 with clinical bleeding.    Immunocompromised:  As a result of his disease and/or previous treatment. Mr. Amado Butler is immunocompromised. his last ANC is >500 and there is no need for prophylactic antibiotics at this time.     Cardiac:  Mr. Amado Butler has no history of heart disease.      Renal:  Creatinine results reviewed today. Mr. Amado Butler does not have any renal disease.    Hepatic:  Liver function tests reviewed today.    Psychosocial:  Mr. Amado Butler is coping well with his diagnosis.     All other questions and concerns were addressed and answered to Mr. Amado Butler's satisfaction.    Today, I spent a total of 30 minutes of face-to-face and non face-to-face time with Mr. Amado Butler. Time spent included review and discussion of diagnostic test results, patient counseling, and coordination of care.    The longitudinal plan of care for the diagnosis(es)/condition(s) as documented were addressed during this visit. Due to the added complexity in care, I will continue to support Amado in the subsequent management and with ongoing continuity of care.    Jamal Rodney MD    Division of Hematology, Oncology and Transplantation  [facility]  P: [phone]      Again, thank you for allowing me to participate in the care of your patient.        Sincerely,        Jamal Rodney MD    Electronically signed

## 2024-12-23 ENCOUNTER — OFFICE VISIT (OUTPATIENT)
Dept: CARDIOLOGY | Facility: CLINIC | Age: 64
End: 2024-12-23
Attending: STUDENT IN AN ORGANIZED HEALTH CARE EDUCATION/TRAINING PROGRAM
Payer: COMMERCIAL

## 2024-12-23 VITALS
BODY MASS INDEX: 27.99 KG/M2 | WEIGHT: 218 LBS | DIASTOLIC BLOOD PRESSURE: 75 MMHG | HEART RATE: 65 BPM | SYSTOLIC BLOOD PRESSURE: 128 MMHG | OXYGEN SATURATION: 97 %

## 2024-12-23 DIAGNOSIS — I47.10 PAROXYSMAL SUPRAVENTRICULAR TACHYCARDIA (H): ICD-10-CM

## 2024-12-23 DIAGNOSIS — C93.10 CHRONIC MYELOMONOCYTIC LEUKEMIA NOT HAVING ACHIEVED REMISSION (H): ICD-10-CM

## 2024-12-23 DIAGNOSIS — I25.118 ATHEROSCLEROSIS OF NATIVE CORONARY ARTERY OF NATIVE HEART WITH STABLE ANGINA PECTORIS (H): ICD-10-CM

## 2024-12-23 DIAGNOSIS — I47.29 NSVT (NONSUSTAINED VENTRICULAR TACHYCARDIA) (H): Primary | ICD-10-CM

## 2024-12-23 PROCEDURE — 93005 ELECTROCARDIOGRAM TRACING: CPT

## 2024-12-23 PROCEDURE — G0463 HOSPITAL OUTPT CLINIC VISIT: HCPCS | Performed by: INTERNAL MEDICINE

## 2024-12-23 ASSESSMENT — PAIN SCALES - GENERAL: PAINLEVEL_OUTOF10: NO PAIN (0)

## 2024-12-23 NOTE — PATIENT INSTRUCTIONS
Plan:   Discussed Angiogram, pt will message through [employer] if he would like to proceed with this.      Your Care Team:         Cardiology   Telephone Number     Darin Laurent RN [phone]    After business hours: [phone], ask for cardiologist on-call               Cardiovascular Clinic:   [address] [place] [address]. [place] [address]      As always, thank you for trusting us with your health care needs!

## 2024-12-23 NOTE — LETTER
12/23/2024      RE: Amado Butler  [address] [facility] [address]       Dear Colleague,    Thank you for the opportunity to participate in the care of your patient, Amado Butler, at the [facility] [place] [facility]. Please see a copy of my visit note below.    History:    Amado Butler is a very pleasant 64 year old man with chronic myelomonocytic leukemia. He was noted to have a clonal process with mutations detected in SRSF2, TET2 x 2 and ASXL1. Given the  strong association of CV disease with TET2 and ASXL1 mutations he is referred to the Cardio-oncology clinic for cardiac evaluation.    He has no prior history of MI/CHF. He has DMII, HTN, paroxysmal SVT, thrombocytopenia. In September 2024, after golf was at the [employer] looking at a painting sustained a syncopal episode. He informs me that he did not develop any prodrome and suddenly fell down to the ground and woke up quickly. While he reports stable dyspnea with moderate exertion and also has chest pain with it. He denies orthopnea, paroxysmal nocturnal dyspnea. He notes occasional palpitations     His cardiac investigations from outside records include  CT coronary calcium score - high 880  Stress MRI - no ischemia, but patchy mid-myocardial and subendocardial fibrosis.Normal biventricular function.    Patch ECG - 1 episode of ventricular tachycardia and several episodes of SVT  Carotid ultrasound in 2023- mild plaque in both left and right internal carotid arteries.       Current Outpatient Medications   Medication Sig Dispense Refill     aspirin 81 MG EC tablet Take 81 mg by mouth.       carvedilol (COREG) 12.5 MG tablet Take 1 tablet (12.5 mg) by mouth 2 times daily (with meals) 180 tablet 3     CONTOUR NEXT TEST test strip TEST 1 TIME/DAY       lisinopril-hydrochlorothiazide (ZESTORETIC) 20-25 MG tablet Take 1 tablet by mouth daily       Magnesium Oxide -Mg Supplement 500 MG CAPS  "Take 500 mg by mouth daily       metFORMIN (GLUCOPHAGE) 500 MG tablet Take 1 tablet (500 mg) by mouth daily (with breakfast) (Patient taking differently: Take 500 mg by mouth 2 times daily (with meals).) 7 tablet 0     multivitamin w/minerals (MULTI-VITAMIN) tablet Take 1 tablet by mouth daily Over 50 mens       rosuvastatin (CRESTOR) 40 MG tablet Take 40 mg by mouth daily.         Allergies - reviewed     Allergies   Allergen Reactions     Seasonal Allergies        Past history -reviewed  Past Medical History:   Diagnosis Date     CMML (chronic myelomonocytic leukemia) (H)      Hypertension      Malignant meningioma of optic nerve sheath, right (H) 08/14/2023     SARA (obstructive sleep apnea)      Paroxysmal supraventricular tachycardia (H)      Type 2 diabetes mellitus (H)     \"Steroid induced\"        Social history - reviewed  Social History     Tobacco Use     Smoking status: Never     Passive exposure: Never     Smokeless tobacco: Never   Vaping Use     Vaping status: Never Used   Substance Use Topics     Alcohol use: Not Currently     Comment: social     Drug use: Not Currently       Family history -reviewed  Family History   Problem Relation Age of Onset     Leukemia Mother      Glaucoma No family hx of      Macular Degeneration No family hx of        ROS: non contributory on the 10-point review of system    Exam:     /75 (BP Location: Right arm, Patient Position: Chair, Cuff Size: Adult Regular)   Pulse 65   Wt 98.9 kg (218 lb)   SpO2 97%   BMI 27.99 kg/m    In general, the patient is in no apparent distress.      HEENT: Sclerae white, not injected.    Neck: No JVD. No thyromegaly  Heart: RRR. Normal S1, S2. No murmur, rub, click, or gallop.    Lungs: Clear bilaterally.  No rhonchi, wheezes, rales.   Extremities: No edema.  The pulses are 2+at the radial bilaterally.      I have independently reviewed this patient's relevant laboratory and cardiac data :    T chol 146, trig 99, LDL 92, HDL 34 "     Recent Labs   Lab Test 12/20/24  1130 10/16/24  1141   AST 41 25     Recent Labs   Lab Test 12/20/24  1130 10/16/24  1141   ALT 20 13     Recent Labs   Lab Test 12/20/24  1130 10/16/24  1141   * 134*   POTASSIUM 3.8 3.7   CHLORIDE 96* 96*   CO2 26 27   ANIONGAP 12 11   BUN 13.7 13.9   CR 0.94 0.91     Recent Labs   Lab Test 12/20/24  1130 10/16/24  1141   WBC 6.3 6.2   RBC 4.93 4.90   HGB 13.8 13.7   MCV 79 81   * 94*     ECG 8/2024 - sinus rhythm with non specific T wave inversions (present before)    Coronary CT calcium 12/3/24  Total calcium score:   Calcium Score: 776   Left Main: 0   LAD: 195   LCX: 259   RCA: 322   Calcium Score Percentile: 87      12/3/24: Stress MRI   Negative for significant ischemia.   The gadolinium delayed enhancement is suggestive of nonischemic scar.    There is patchy mid myocardial noted in the basal lateral, mid/distal   anterior, anteroseptal segments.  This appears to extend into the subendocardial however overall has the appearance of a nonischemic scar.  There is also patchy mid myocardial scar noted in the basal  inferior/inferior lateral and basal septal segments. These findings could represent infiltrative cardiomyopathy such as sarcoidosis versus arrhythmogenic cardiomyopathy     Patch ECG September 2024 -   one run of ventricular tachycardia with heart rate about 160 beats per minute for 5 beats. The patient also has multiple short bursts of supraventricular tachycardia     Assessment and Plan:  64 year old patient with    CMML  Coronary Atherosclerosis  Non sustained VT  pSVT  Normal biventricular function  DM II  Clonal process with mutations SRSF2, TET2 x 2 and ASXL1    Amado Butler has coronary atherosclerosis, HTN, dyslipidemia, DM II. He has clonal process with mutations SRSF2, TET2 x 2 and ASXL1. His syncopal history is concerning for arrhythmogenic syncope, and he had non sustained VT on Patch ECG monitoring. The calcium score is fairly high and  the cardiac MRI did not indicate ischemia. He has chest discomfort and dyspnea with moderate exertion. Given the above, I think it would be reasonable to consider a coronary angiogram to exclude obstructive CAD. He has normal biventricular function.    His cardiac MRI images are not available at the time of his visit. MRI report notes patchy mid myocardial and subendocardial scar and this will have to be reviewed to see if it fits the pattern of arrhythmogenic cardiomyopathy vs. hypertensive heart disease vs. ischemic cardiomyopathy vs. sarcoid cardiomyopathy.    Patient is euvolemic on exam today with a normal heart rate and blood pressure. Patient is in a regular rhythm today on exam and CV exam is benign. He is on appropriate medications including aspirin, rosuvastatin, carvedilol and lisinopril-hydrochlorothiazide and he was advised to continue them.      Recommendations:   Continue current medications.  Obtain outside CMR for overread  Coronary angiogram   Follow up after the angiogram      Ling Rock MD, MS  Professor of Medicine  Cardiovascular Medicine      Please do not hesitate to contact me if you have any questions/concerns.     Sincerely,     Ling Rock MD

## 2024-12-23 NOTE — PROGRESS NOTES
History:    Amado Butler is a very pleasant 64 year old man with chronic myelomonocytic leukemia. He was noted to have a clonal process with mutations detected in SRSF2, TET2 x 2 and ASXL1. Given the  strong association of CV disease with TET2 and ASXL1 mutations he is referred to the Cardio-oncology clinic for cardiac evaluation.    He has no prior history of MI/CHF. He has DMII, HTN, paroxysmal SVT, thrombocytopenia. In September 2024, after golf was at the [employer] looking at a painting sustained a syncopal episode. He informs me that he did not develop any prodrome and suddenly fell down to the ground and woke up quickly. While he reports stable dyspnea with moderate exertion and also has chest pain with it. He denies orthopnea, paroxysmal nocturnal dyspnea. He notes occasional palpitations     His cardiac investigations from outside records include  CT coronary calcium score - high 880  Stress MRI - no ischemia, but patchy mid-myocardial and subendocardial fibrosis.Normal biventricular function.    Patch ECG - 1 episode of ventricular tachycardia and several episodes of SVT  Carotid ultrasound in 2023- mild plaque in both left and right internal carotid arteries.       Current Outpatient Medications   Medication Sig Dispense Refill    aspirin 81 MG EC tablet Take 81 mg by mouth.      carvedilol (COREG) 12.5 MG tablet Take 1 tablet (12.5 mg) by mouth 2 times daily (with meals) 180 tablet 3    CONTOUR NEXT TEST test strip TEST 1 TIME/DAY      lisinopril-hydrochlorothiazide (ZESTORETIC) 20-25 MG tablet Take 1 tablet by mouth daily      Magnesium Oxide -Mg Supplement 500 MG CAPS Take 500 mg by mouth daily      metFORMIN (GLUCOPHAGE) 500 MG tablet Take 1 tablet (500 mg) by mouth daily (with breakfast) (Patient taking differently: Take 500 mg by mouth 2 times daily (with meals).) 7 tablet 0    multivitamin w/minerals (MULTI-VITAMIN) tablet Take 1 tablet by mouth daily Over 50 mens      rosuvastatin (CRESTOR) 40 MG  "tablet Take 40 mg by mouth daily.         Allergies - reviewed     Allergies   Allergen Reactions    Seasonal Allergies        Past history -reviewed  Past Medical History:   Diagnosis Date    CMML (chronic myelomonocytic leukemia) (H)     Hypertension     Malignant meningioma of optic nerve sheath, right (H) 08/14/2023    SARA (obstructive sleep apnea)     Paroxysmal supraventricular tachycardia (H)     Type 2 diabetes mellitus (H)     \"Steroid induced\"        Social history - reviewed  Social History     Tobacco Use    Smoking status: Never     Passive exposure: Never    Smokeless tobacco: Never   Vaping Use    Vaping status: Never Used   Substance Use Topics    Alcohol use: Not Currently     Comment: social    Drug use: Not Currently       Family history -reviewed  Family History   Problem Relation Age of Onset    Leukemia Mother     Glaucoma No family hx of     Macular Degeneration No family hx of        ROS: non contributory on the 10-point review of system    Exam:     /75 (BP Location: Right arm, Patient Position: Chair, Cuff Size: Adult Regular)   Pulse 65   Wt 98.9 kg (218 lb)   SpO2 97%   BMI 27.99 kg/m    In general, the patient is in no apparent distress.      HEENT: Sclerae white, not injected.    Neck: No JVD. No thyromegaly  Heart: RRR. Normal S1, S2. No murmur, rub, click, or gallop.    Lungs: Clear bilaterally.  No rhonchi, wheezes, rales.   Extremities: No edema.  The pulses are 2+at the radial bilaterally.      I have independently reviewed this patient's relevant laboratory and cardiac data :    T chol 146, trig 99, LDL 92, HDL 34     Recent Labs   Lab Test 12/20/24  1130 10/16/24  1141   AST 41 25     Recent Labs   Lab Test 12/20/24  1130 10/16/24  1141   ALT 20 13     Recent Labs   Lab Test 12/20/24  1130 10/16/24  1141   * 134*   POTASSIUM 3.8 3.7   CHLORIDE 96* 96*   CO2 26 27   ANIONGAP 12 11   BUN 13.7 13.9   CR 0.94 0.91     Recent Labs   Lab Test 12/20/24  1130 " 10/16/24  1141   WBC 6.3 6.2   RBC 4.93 4.90   HGB 13.8 13.7   MCV 79 81   * 94*     ECG 8/2024 - sinus rhythm with non specific T wave inversions (present before)    Coronary CT calcium 12/3/24  Total calcium score:   Calcium Score: 776   Left Main: 0   LAD: 195   LCX: 259   RCA: 322   Calcium Score Percentile: 87      12/3/24: Stress MRI   Negative for significant ischemia.   The gadolinium delayed enhancement is suggestive of nonischemic scar.    There is patchy mid myocardial noted in the basal lateral, mid/distal   anterior, anteroseptal segments.  This appears to extend into the subendocardial however overall has the appearance of a nonischemic scar.  There is also patchy mid myocardial scar noted in the basal  inferior/inferior lateral and basal septal segments. These findings could represent infiltrative cardiomyopathy such as sarcoidosis versus arrhythmogenic cardiomyopathy     Patch ECG September 2024 -   one run of ventricular tachycardia with heart rate about 160 beats per minute for 5 beats. The patient also has multiple short bursts of supraventricular tachycardia     Assessment and Plan:  64 year old patient with    CMML  Coronary Atherosclerosis  Non sustained VT  pSVT  Normal biventricular function  DM II  Clonal process with mutations SRSF2, TET2 x 2 and ASXL1    Amado Butler has coronary atherosclerosis, HTN, dyslipidemia, DM II. He has clonal process with mutations SRSF2, TET2 x 2 and ASXL1. His syncopal history is concerning for arrhythmogenic syncope, and he had non sustained VT on Patch ECG monitoring. The calcium score is fairly high and the cardiac MRI did not indicate ischemia. He has chest discomfort and dyspnea with moderate exertion. Given the above, I think it would be reasonable to consider a coronary angiogram to exclude obstructive CAD. He has normal biventricular function.    His cardiac MRI images are not available at the time of his visit. MRI report notes patchy mid  myocardial and subendocardial scar and this will have to be reviewed to see if it fits the pattern of arrhythmogenic cardiomyopathy vs. hypertensive heart disease vs. ischemic cardiomyopathy vs. sarcoid cardiomyopathy.    Patient is euvolemic on exam today with a normal heart rate and blood pressure. Patient is in a regular rhythm today on exam and CV exam is benign. He is on appropriate medications including aspirin, rosuvastatin, carvedilol and lisinopril-hydrochlorothiazide and he was advised to continue them.      Recommendations:   Continue current medications.  Obtain outside CMR for overread  Coronary angiogram   Follow up after the angiogram      [place] MD, MS  Professor of Medicine  Cardiovascular Medicine

## 2024-12-23 NOTE — NURSING NOTE
Chief Complaint   Patient presents with    New Patient     New Cardio-Oncology     Vitals were taken and medications reconciled.    MARILYN Iraheta  4:22 PM

## 2025-01-02 NOTE — TELEPHONE ENCOUNTER
[facility] Call Center    Phone Message    May a detailed message be left on voicemail: yes     Reason for Call: Other: Patient called requesting a call back from care team. He would like to discuss some things. Please call to advise.      Action Taken: Other: eye    Travel Screening: Not Applicable                                                                       Detail Level: Generalized Detail Level: Detailed

## 2025-01-09 ENCOUNTER — PATIENT OUTREACH (OUTPATIENT)
Dept: ONCOLOGY | Facility: CLINIC | Age: 65
End: 2025-01-09
Payer: COMMERCIAL

## 2025-01-09 NOTE — PROGRESS NOTES
Situation: Patient chart reviewed by care coordinator.    Background: [facility], last saw Dr. Rodney in clinic on 12/20/24     Plan/Recommendations: Plan to follow up in clinic in two months.

## 2025-02-19 NOTE — PROGRESS NOTES
2025  Amado Butler  [phone] (home)   : 1960  MRN: [id number]  Neuro-Oncologist: N/A  Neurosurgeon: Jose Guadalupe Camacho MD PhD     Attending Radiation Oncologist: Kaylee Mccullough MD PhD    RADIATION ONCOLOGY FOLLOW UP    History of Present Illness:  Amado Butler is a 64 year old with a history of CMML, who initially presented with subacute right-sided periorbital pain   and vision loss, s/p frontotemporal craniotomy for decompression and resection of right optic nerve mass (23),  confirmed to be a meningioma on pathology (grade unable to be determined due to crush artifact though presumed Grade 1). He was treated with IMRT to a total dose of 5040 cGy completed 1/15/2024.      The patient was last seen in 2024 during which time he had a stable scan and presents today for 6-month follow-up since and treatment.    Last visit with neuropathy on 2024 with Dr. Waldron.  Signal change on MRI from 2024 showed enhancement along the dura extending along middle cranial fossa and cavernous sinus.  An area of T2 hyperintensity signal in the right optic nerve was seen, thought to be related to optic atrophy. At the time he reported worse visual acuity in right eye than left. 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  He does endorse sensitivity to light in his left eye.  Prolonged reading or looking outside during the morning [place] because his eyes deteriorate.  He similarly complains of excessive tearing in his left eye.    In clinic today, the patient reports feeling well. He has stable loss of vision in his R eye. He has dry eye as well, more prominent in the R eye vs. The L eye. He has a cataract that is bothering him in his L eye, for which he will be meeting with the ophthalmology team.     Interval History:   MRI Brain Perfusion scheduled 2025:  Narrative & Impression   EXAM: MR BRAIN PERFUSION W/O & W CONTRAST  2025 11:02 AM      HISTORY:  Right  posterior orbit meningioma sp XRT for surveillance.  Please include MR Orbits; Meningioma (H)        COMPARISON:  MR brain 4/18/2024     TECHNIQUE: Sagittal and axial T1-weighted, axial diffusion,  multiplanar T2 FLAIR with fat saturation images were obtained without  intravenous contrast. Following intravenous gadolinium-based contrast  administration, axial T2-weighted, axial susceptibility, and  T1-weighted images (in multiple planes) were obtained.     CONTRAST: 10mL Gadavist.     FINDINGS:  Postsurgical changes of right frontal craniotomy status post tumor  resection. There is no substantial change in degree of overlying dural  thickening and enhancement along the resection cavity of the right  frontotemporal lobes and right middle cranial fossa. Unchanged small  amount of fluid underneath the cranioplasty. Stable degree of T2 FLAIR  hyperintense signal within the right frontal lobe.      There is no mass effect, midline shift, or intracranial hemorrhage.  The ventricles are proportionate to the Cerebral sulci. Diffusion  weighted images are negative for acute/focal abnormality. Hemosiderin  deposition with scattered microhemorrhages adjacent to the resection  cavity most pronounced on the right frontal lobe. Major intracranial  vascular structures are within normal limits.     No suspicious abnormality of the skull marrow signal. Mild paranasal  sinus mucosal thickening. Mastoid air cells are clear. No focal  abnormality of the pituitary gland, sella, skull base and upper  cervical spinal structures on sagittal images. Unchanged nonenhancing  T2 hyperintense and atrophic appearance of the right optic nerve.  Remainder of the orbits are unremarkable.                                                                      IMPRESSION: Stable postsurgical changes of right frontal craniotomy  status post mass resection with similar appearance of overlying dural  thickening and enhancement adjacent to the resection  cavity, likely  sequela of posttreatment changes. No evidence of residual meningioma.  No new acute intracranial finding. Unchanged chronic atrophic right  optic neuropathy.           MR brain with MRI orbit 7/19/2024                                                                       IMPRESSION:  1. Stable postsurgical changes of right frontal craniotomy status post  mass resection with similar appearance of overlying dural thickening  and enhancement adjacent to the resection cavity, likely sequela of  posttreatment changes.  2. No substantial change in size of chronic right subdural hematoma.  3. Unchanged degree of increased T2 signal surrounding the right optic  nerve without underlying enhancement or significant atrophy, may  represent posttreatment changes and/or prior sequelae of optic  neuritis.     I have personally reviewed the examination and initial interpretation  and I agree with the findings.     MYKE CHILDS MD      Prior MRI brain with and without contrast on 04/18/23:  Impression:  1.  Right frontal craniotomy with decreased underlying dural  enhancement extending along the middle cranial fossa and cavernous  sinus with unchanged encasement of the cisternal and intracanalicular  segments of the right optic nerve favored to represent posttreatment  changes. Attentional follow-up.  2.  Decreased adjacent T2/FLAIR hyperintense signal in the right  frontal lobe which could also be posttreatment related.  3.  Stable sequelae of right optic neuritis.    Today, he specifically reports the following symptoms:    Brain ROS:  Headaches-   Denies  Seizures- Denies  Nausea/vomiting-  Denies  Changes in cognition/behavior-  Denies  Speech-  Denies  Vision/hearing changes-loss of vision in right eye, followed by neuro-optho  Gait symptoms or imbalance-  Denies  Other focal neuro deficits-  Denies    Review of Systems:  Denies additional symptoms related to current diagnosis.     Current Outpatient  Medications   Medication Sig Dispense Refill    aspirin 81 MG EC tablet Take 81 mg by mouth.      carvedilol (COREG) 12.5 MG tablet Take 1 tablet (12.5 mg) by mouth 2 times daily (with meals) 180 tablet 3    CONTOUR NEXT TEST test strip TEST 1 TIME/DAY      lisinopril-hydrochlorothiazide (ZESTORETIC) 20-25 MG tablet Take 1 tablet by mouth daily      Magnesium Oxide -Mg Supplement 500 MG CAPS Take 500 mg by mouth daily      metFORMIN (GLUCOPHAGE) 500 MG tablet Take 1 tablet (500 mg) by mouth daily (with breakfast) (Patient taking differently: Take 500 mg by mouth 2 times daily (with meals).) 7 tablet 0    multivitamin w/minerals (MULTI-VITAMIN) tablet Take 1 tablet by mouth daily Over 50 mens      rosuvastatin (CRESTOR) 40 MG tablet Take 40 mg by mouth daily.       No current facility-administered medications for this visit.        Allergies   Allergen Reactions    Seasonal Allergies        Physical Exam:      ECO  KPS: 90    There were no vitals taken for this visit.    GENERAL: Healthy, alert and no distress  RESP: No audible wheeze, cough, or visible cyanosis.  No visible retractions or increased work of breathing.    SKIN: Visible skin clear. No significant rash, abnormal pigmentation or lesions. Incision well healed.   NEURO: Cranial nerves grossly intact, except for right cranial nerve II.  Mentation and speech appropriate for age  EXTREMITIES: No obvious edema, normal appearing range of motion, 5/5 strength upper and lower extremities, sensation intact bilaterally   PSYCH: Mentation appears normal, affect normal/bright, judgement and insight intact, normal speech and appearance well-groomed.    Labs:  CBC RESULTS:   Recent Labs   Lab Test 24  1128   WBC 4.3   RBC 4.91   HGB 13.5   HCT 39.7*   MCV 81   MCH 27.5   MCHC 34.0   RDW 14.0   *     ELECTROLYTES:  Recent Labs   Lab Test 24  1128      POTASSIUM 3.6   CHLORIDE 97*   FELIX 9.3   CO2 24   BUN 17.8   CR 0.96   *        Imaging and Diagnostic Studies:   See HPI above    Assessment/Plan:  Amado Butler is a 64 year old with a history of CMML, who initially presented with subacute right-sided periorbital pain and vision loss, s/p frontotemporal craniotomy for decompression and resection of right optic nerve mass (8/14/23),  confirmed to be a meningioma on pathology (grade unable to be determined due to crush artifact though presumed Grade  1).  He was treated with IMRT to a total dose of 5040 cGy completed 1/15/2024.      The patient presents without evidence of disease recurrence.     1) In person follow up with our team in 12 months with MRI with and without contrast prior   2) Continue to follow with neuro-optho  3) His next follow-up will be with neurosurgical team in 6 months with MRI    All the patient's questions were answered to verbalized satisfaction. We instructed the patient to call with any questions or concerns in the interim.      Omar Khan MD MS PGY3

## 2025-02-20 ENCOUNTER — OFFICE VISIT (OUTPATIENT)
Dept: RADIATION ONCOLOGY | Facility: CLINIC | Age: 65
End: 2025-02-20
Attending: STUDENT IN AN ORGANIZED HEALTH CARE EDUCATION/TRAINING PROGRAM
Payer: COMMERCIAL

## 2025-02-20 ENCOUNTER — HOSPITAL ENCOUNTER (OUTPATIENT)
Dept: MRI IMAGING | Facility: CLINIC | Age: 65
End: 2025-02-20
Attending: STUDENT IN AN ORGANIZED HEALTH CARE EDUCATION/TRAINING PROGRAM
Payer: COMMERCIAL

## 2025-02-20 VITALS
SYSTOLIC BLOOD PRESSURE: 127 MMHG | BODY MASS INDEX: 27.51 KG/M2 | RESPIRATION RATE: 16 BRPM | OXYGEN SATURATION: 98 % | WEIGHT: 214.3 LBS | HEART RATE: 64 BPM | DIASTOLIC BLOOD PRESSURE: 65 MMHG

## 2025-02-20 DIAGNOSIS — D32.9 MENINGIOMA (H): Primary | ICD-10-CM

## 2025-02-20 DIAGNOSIS — D32.9 MENINGIOMA (H): ICD-10-CM

## 2025-02-20 PROBLEM — H53.131 VISION, LOSS, SUDDEN, RIGHT: Status: RESOLVED | Noted: 2023-08-14 | Resolved: 2025-02-20

## 2025-02-20 PROCEDURE — 99212 OFFICE O/P EST SF 10 MIN: CPT | Mod: GC | Performed by: STUDENT IN AN ORGANIZED HEALTH CARE EDUCATION/TRAINING PROGRAM

## 2025-02-20 PROCEDURE — G0463 HOSPITAL OUTPT CLINIC VISIT: HCPCS | Mod: 25 | Performed by: STUDENT IN AN ORGANIZED HEALTH CARE EDUCATION/TRAINING PROGRAM

## 2025-02-20 PROCEDURE — 3074F SYST BP LT 130 MM HG: CPT | Performed by: STUDENT IN AN ORGANIZED HEALTH CARE EDUCATION/TRAINING PROGRAM

## 2025-02-20 PROCEDURE — 1126F AMNT PAIN NOTED NONE PRSNT: CPT | Performed by: STUDENT IN AN ORGANIZED HEALTH CARE EDUCATION/TRAINING PROGRAM

## 2025-02-20 PROCEDURE — A9585 GADOBUTROL INJECTION: HCPCS | Performed by: STUDENT IN AN ORGANIZED HEALTH CARE EDUCATION/TRAINING PROGRAM

## 2025-02-20 PROCEDURE — 255N000002 HC RX 255 OP 636: Performed by: STUDENT IN AN ORGANIZED HEALTH CARE EDUCATION/TRAINING PROGRAM

## 2025-02-20 PROCEDURE — 70553 MRI BRAIN STEM W/O & W/DYE: CPT

## 2025-02-20 PROCEDURE — 3078F DIAST BP <80 MM HG: CPT | Performed by: STUDENT IN AN ORGANIZED HEALTH CARE EDUCATION/TRAINING PROGRAM

## 2025-02-20 RX ORDER — CARVEDILOL 25 MG/1
25 TABLET ORAL 2 TIMES DAILY WITH MEALS
COMMUNITY
Start: 2024-10-11

## 2025-02-20 RX ORDER — GADOBUTROL 604.72 MG/ML
10 INJECTION INTRAVENOUS ONCE
Status: COMPLETED | OUTPATIENT
Start: 2025-02-20 | End: 2025-02-20

## 2025-02-20 RX ADMIN — GADOBUTROL 10 ML: 604.72 INJECTION INTRAVENOUS at 11:00

## 2025-02-20 ASSESSMENT — PAIN SCALES - GENERAL: PAINLEVEL_OUTOF10: NO PAIN (0)

## 2025-02-20 NOTE — LETTER
2025      Amado Butler  [address] [facility] [address]      Dear Colleague,    Thank you for referring your patient, Amado Butler, to the [facility] RADIATION ONCOLOGY. Please see a copy of my visit note below.    2025  Amado Butler  [phone] (home)   : 1960  MRN: [id number]  Neuro-Oncologist: N/A  Neurosurgeon: Jose Guadalupe Camacho MD PhD     Attending Radiation Oncologist: Kaylee Mccullough MD PhD    RADIATION ONCOLOGY FOLLOW UP    History of Present Illness:  Amado Butler is a 64 year old with a history of CMML, who initially presented with subacute right-sided periorbital pain   and vision loss, s/p frontotemporal craniotomy for decompression and resection of right optic nerve mass (23),  confirmed to be a meningioma on pathology (grade unable to be determined due to crush artifact though presumed Grade 1). He was treated with IMRT to a total dose of 5040 cGy completed 1/15/2024.      The patient was last seen in 2024 during which time he had a stable scan and presents today for 6-month follow-up since and treatment.    Last visit with neuropathy on 2024 with Dr. Waldron.  Signal change on MRI from 2024 showed enhancement along the dura extending along middle cranial fossa and cavernous sinus.  An area of T2 hyperintensity signal in the right optic nerve was seen, thought to be related to optic atrophy. At the time he reported worse visual acuity in right eye than left. 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  He does endorse sensitivity to light in his left eye.  Prolonged reading or looking outside during the morning [place] because his eyes deteriorate.  He similarly complains of excessive tearing in his left eye.    In clinic today, the patient reports feeling well. He has stable loss of vision in his R eye. He has dry eye as well, more prominent in the R eye vs. The L eye. He has a cataract that is bothering him in his L  eye, for which he will be meeting with the ophthalmology team.     Interval History:   MRI Brain Perfusion scheduled 2/20/2025:  Narrative & Impression   EXAM: MR BRAIN PERFUSION W/O & W CONTRAST  2/20/2025 11:02 AM      HISTORY:  Right posterior orbit meningioma sp XRT for surveillance.  Please include MR Orbits; Meningioma (H)        COMPARISON:  MR brain 4/18/2024     TECHNIQUE: Sagittal and axial T1-weighted, axial diffusion,  multiplanar T2 FLAIR with fat saturation images were obtained without  intravenous contrast. Following intravenous gadolinium-based contrast  administration, axial T2-weighted, axial susceptibility, and  T1-weighted images (in multiple planes) were obtained.     CONTRAST: 10mL Gadavist.     FINDINGS:  Postsurgical changes of right frontal craniotomy status post tumor  resection. There is no substantial change in degree of overlying dural  thickening and enhancement along the resection cavity of the right  frontotemporal lobes and right middle cranial fossa. Unchanged small  amount of fluid underneath the cranioplasty. Stable degree of T2 FLAIR  hyperintense signal within the right frontal lobe.      There is no mass effect, midline shift, or intracranial hemorrhage.  The ventricles are proportionate to the Cerebral sulci. Diffusion  weighted images are negative for acute/focal abnormality. Hemosiderin  deposition with scattered microhemorrhages adjacent to the resection  cavity most pronounced on the right frontal lobe. Major intracranial  vascular structures are within normal limits.     No suspicious abnormality of the skull marrow signal. Mild paranasal  sinus mucosal thickening. Mastoid air cells are clear. No focal  abnormality of the pituitary gland, sella, skull base and upper  cervical spinal structures on sagittal images. Unchanged nonenhancing  T2 hyperintense and atrophic appearance of the right optic nerve.  Remainder of the orbits are unremarkable.                                                                       IMPRESSION: Stable postsurgical changes of right frontal craniotomy  status post mass resection with similar appearance of overlying dural  thickening and enhancement adjacent to the resection cavity, likely  sequela of posttreatment changes. No evidence of residual meningioma.  No new acute intracranial finding. Unchanged chronic atrophic right  optic neuropathy.           MR brain with MRI orbit 7/19/2024                                                                       IMPRESSION:  1. Stable postsurgical changes of right frontal craniotomy status post  mass resection with similar appearance of overlying dural thickening  and enhancement adjacent to the resection cavity, likely sequela of  posttreatment changes.  2. No substantial change in size of chronic right subdural hematoma.  3. Unchanged degree of increased T2 signal surrounding the right optic  nerve without underlying enhancement or significant atrophy, may  represent posttreatment changes and/or prior sequelae of optic  neuritis.     I have personally reviewed the examination and initial interpretation  and I agree with the findings.     MYKE CHILDS MD      Prior MRI brain with and without contrast on 04/18/23:  Impression:  1.  Right frontal craniotomy with decreased underlying dural  enhancement extending along the middle cranial fossa and cavernous  sinus with unchanged encasement of the cisternal and intracanalicular  segments of the right optic nerve favored to represent posttreatment  changes. Attentional follow-up.  2.  Decreased adjacent T2/FLAIR hyperintense signal in the right  frontal lobe which could also be posttreatment related.  3.  Stable sequelae of right optic neuritis.    Today, he specifically reports the following symptoms:    Brain ROS:  Headaches-   Denies  Seizures- Denies  Nausea/vomiting-  Denies  Changes in cognition/behavior-  Denies  Speech-  Denies  Vision/hearing  changes-loss of vision in right eye, followed by neuro-optho  Gait symptoms or imbalance-  Denies  Other focal neuro deficits-  Denies    Review of Systems:  Denies additional symptoms related to current diagnosis.     Current Outpatient Medications   Medication Sig Dispense Refill     aspirin 81 MG EC tablet Take 81 mg by mouth.       carvedilol (COREG) 12.5 MG tablet Take 1 tablet (12.5 mg) by mouth 2 times daily (with meals) 180 tablet 3     CONTOUR NEXT TEST test strip TEST 1 TIME/DAY       lisinopril-hydrochlorothiazide (ZESTORETIC) 20-25 MG tablet Take 1 tablet by mouth daily       Magnesium Oxide -Mg Supplement 500 MG CAPS Take 500 mg by mouth daily       metFORMIN (GLUCOPHAGE) 500 MG tablet Take 1 tablet (500 mg) by mouth daily (with breakfast) (Patient taking differently: Take 500 mg by mouth 2 times daily (with meals).) 7 tablet 0     multivitamin w/minerals (MULTI-VITAMIN) tablet Take 1 tablet by mouth daily Over 50 mens       rosuvastatin (CRESTOR) 40 MG tablet Take 40 mg by mouth daily.       No current facility-administered medications for this visit.        Allergies   Allergen Reactions     Seasonal Allergies        Physical Exam:      ECO  KPS: 90    There were no vitals taken for this visit.    GENERAL: Healthy, alert and no distress  RESP: No audible wheeze, cough, or visible cyanosis.  No visible retractions or increased work of breathing.    SKIN: Visible skin clear. No significant rash, abnormal pigmentation or lesions. Incision well healed.   NEURO: Cranial nerves grossly intact, except for right cranial nerve II.  Mentation and speech appropriate for age  EXTREMITIES: No obvious edema, normal appearing range of motion, 5/5 strength upper and lower extremities, sensation intact bilaterally   PSYCH: Mentation appears normal, affect normal/bright, judgement and insight intact, normal speech and appearance well-groomed.    Labs:  CBC RESULTS:   Recent Labs   Lab Test 24  1128   WBC 4.3    RBC 4.91   HGB 13.5   HCT 39.7*   MCV 81   MCH 27.5   MCHC 34.0   RDW 14.0   *     ELECTROLYTES:  Recent Labs   Lab Test 05/03/24  1128      POTASSIUM 3.6   CHLORIDE 97*   FELIX 9.3   CO2 24   BUN 17.8   CR 0.96   *       Imaging and Diagnostic Studies:   See HPI above    Assessment/Plan:  Amado Butler is a 64 year old with a history of CMML, who initially presented with subacute right-sided periorbital pain and vision loss, s/p frontotemporal craniotomy for decompression and resection of right optic nerve mass (8/14/23),  confirmed to be a meningioma on pathology (grade unable to be determined due to crush artifact though presumed Grade  1).  He was treated with IMRT to a total dose of 5040 cGy completed 1/15/2024.      The patient presents without evidence of disease recurrence.     1) In person follow up with our team in 12 months with MRI with and without contrast prior   2) Continue to follow with neuro-optho  3) His next follow-up will be with neurosurgical team in 6 months with MRI    All the patient's questions were answered to verbalized satisfaction. We instructed the patient to call with any questions or concerns in the interim.      Omar Khan MD MS PGY3    A medical resident participated in the care of this patient and in the preparation of this note. I have verified and edited this note. I personally performed key elements of the physical exam and medical decision making for this patient.  I agree with the assessment and plan of care as documented in the note above.      The overall time I spent on direct patient care including self review of records, labs, and radiographic studies, as well as, direct face-to-face interaction with the patient and coordination of care with other providers was 15 minutes.        Kaylee Mccullough MD, PhD     Department of Radiation Oncology  [facility]       Oncology Rooming Note    February  "2025 1:12 PM   Amado Butler is a 64 year old male who presents for:    Chief Complaint   Patient presents with     Oncology Clinic Visit     Radiation Oncology Follow-Up      Initial Vitals: There were no vitals taken for this visit. Estimated body mass index is 27.99 kg/m  as calculated from the following:    Height as of 5/3/24: 1.88 m (6' 2\").    Weight as of 24: 98.9 kg (218 lb). There is no height or weight on file to calculate BSA.  No Pain (0) Comment: Data Unavailable   No LMP for male patient.  Allergies reviewed: Yes  Medications reviewed: Yes    Medications: Medication refills not needed today.  Pharmacy name entered into [employer]: [id number] - [place], [place] - [address] [facility]    Frailty Screening:   Is the patient here for a new oncology consult visit in cancer care? 2. No    PHQ9:  Did this patient require a PHQ9?: No    Considerations for radiation treatment   Pregnancy status: Male   Implanted Cardiac Devices: No   Any previous radiation therapy: Yes  Meningioma:Right optic nerve 5040 cGy/28 fx completed 23 - 01/15/24 (Mccullough)    FOLLOW-UP VISIT    Patient Name: Amado Butler      : 1960     Age: 64 year old        ______________________________________________________________________________     Chief Complaint   Patient presents with     Oncology Clinic Visit     Radiation Oncology Follow-Up      /65 (BP Location: Right arm)   Pulse 64   Resp 16   Wt 97.2 kg (214 lb 4.8 oz)   SpO2 98%   BMI 27.51 kg/m       Date Radiation Completed:   Meningioma:Right optic nerve 5040 cGy/28 fx completed 23 - 01/15/24 (Jamel)    Pain  0/10    Labs  None    Imaging  25 - MR brain wo/w contrast,       Other Appointments:   MD Name: Dr. Rodney Appointment Date: 25   MD Name: Dr. Waldron Appointment Date: 25   MD Name: Appointment Date:   Other Appointment Notes:     Residual Radiation side effect:  complete vision lost " right eye, unchanged.       Clinical concerns: Patient is here for a Radiation Oncology Follow Up.    Dr. Mccullough was notified.      Crystal Ernandez RN              Again, thank you for allowing me to participate in the care of your patient.        Sincerely,        Kaylee Mccullough MD PhD    Electronically signed

## 2025-02-20 NOTE — PROGRESS NOTES
"Oncology Rooming Note    2025 1:12 PM   Amado Butler is a 64 year old male who presents for:    Chief Complaint   Patient presents with    Oncology Clinic Visit     Radiation Oncology Follow-Up      Initial Vitals: There were no vitals taken for this visit. Estimated body mass index is 27.99 kg/m  as calculated from the following:    Height as of 5/3/24: 1.88 m (6' 2\").    Weight as of 24: 98.9 kg (218 lb). There is no height or weight on file to calculate BSA.  No Pain (0) Comment: Data Unavailable   No LMP for male patient.  Allergies reviewed: Yes  Medications reviewed: Yes    Medications: Medication refills not needed today.  Pharmacy name entered into [employer]: [id number] - [place], [place] - [address] [facility]    Frailty Screening:   Is the patient here for a new oncology consult visit in cancer care? 2. No    PHQ9:  Did this patient require a PHQ9?: No    Considerations for radiation treatment   Pregnancy status: Male   Implanted Cardiac Devices: No   Any previous radiation therapy: Yes  Meningioma:Right optic nerve 5040 cGy/28 fx completed 23 - 01/15/24 (Jamel)    FOLLOW-UP VISIT    Patient Name: Amado Butler      : 1960     Age: 64 year old        ______________________________________________________________________________     Chief Complaint   Patient presents with    Oncology Clinic Visit     Radiation Oncology Follow-Up      /65 (BP Location: Right arm)   Pulse 64   Resp 16   Wt 97.2 kg (214 lb 4.8 oz)   SpO2 98%   BMI 27.51 kg/m       Date Radiation Completed:   Meningioma:Right optic nerve 5040 cGy/28 fx completed 23 - 01/15/24 (Jamel)    Pain  0/10    Labs  None    Imaging  25 - MR brain wo/w contrast,       Other Appointments:   MD Name: Dr. Rodney Appointment Date: 25   MD Name: Dr. Waldron Appointment Date: 25   MD Name: Appointment Date:   Other Appointment Notes:     Residual Radiation " side effect:  complete vision lost right eye, unchanged.       Clinical concerns: Patient is here for a Radiation Oncology Follow Up.    Dr. Mccullough was notified.      Crystal Ernandez RN

## 2025-02-21 ENCOUNTER — ONCOLOGY VISIT (OUTPATIENT)
Dept: ONCOLOGY | Facility: CLINIC | Age: 65
End: 2025-02-21
Attending: STUDENT IN AN ORGANIZED HEALTH CARE EDUCATION/TRAINING PROGRAM
Payer: COMMERCIAL

## 2025-02-21 ENCOUNTER — APPOINTMENT (OUTPATIENT)
Dept: LAB | Facility: CLINIC | Age: 65
End: 2025-02-21
Attending: STUDENT IN AN ORGANIZED HEALTH CARE EDUCATION/TRAINING PROGRAM
Payer: COMMERCIAL

## 2025-02-21 VITALS
SYSTOLIC BLOOD PRESSURE: 133 MMHG | RESPIRATION RATE: 16 BRPM | TEMPERATURE: 97.5 F | HEART RATE: 59 BPM | WEIGHT: 214.9 LBS | BODY MASS INDEX: 27.59 KG/M2 | DIASTOLIC BLOOD PRESSURE: 74 MMHG | OXYGEN SATURATION: 98 %

## 2025-02-21 DIAGNOSIS — C93.10 CHRONIC MYELOMONOCYTIC LEUKEMIA NOT HAVING ACHIEVED REMISSION (H): ICD-10-CM

## 2025-02-21 LAB
ALBUMIN SERPL BCG-MCNC: 4.8 G/DL (ref 3.5–5.2)
ALP SERPL-CCNC: 68 U/L (ref 40–150)
ALT SERPL W P-5'-P-CCNC: 25 U/L (ref 0–70)
ANION GAP SERPL CALCULATED.3IONS-SCNC: 10 MMOL/L (ref 7–15)
AST SERPL W P-5'-P-CCNC: 33 U/L (ref 0–45)
BASOPHILS # BLD AUTO: 0 10E3/UL (ref 0–0.2)
BASOPHILS NFR BLD AUTO: 1 %
BILIRUB SERPL-MCNC: 0.6 MG/DL
BUN SERPL-MCNC: 18.2 MG/DL (ref 8–23)
CALCIUM SERPL-MCNC: 9.9 MG/DL (ref 8.8–10.4)
CHLORIDE SERPL-SCNC: 97 MMOL/L (ref 98–107)
CREAT SERPL-MCNC: 1.05 MG/DL (ref 0.67–1.17)
EGFRCR SERPLBLD CKD-EPI 2021: 78 ML/MIN/1.73M2
EOSINOPHIL # BLD AUTO: 0 10E3/UL (ref 0–0.7)
EOSINOPHIL NFR BLD AUTO: 0 %
ERYTHROCYTE [DISTWIDTH] IN BLOOD BY AUTOMATED COUNT: 13.6 % (ref 10–15)
GLUCOSE SERPL-MCNC: 104 MG/DL (ref 70–99)
HCO3 SERPL-SCNC: 28 MMOL/L (ref 22–29)
HCT VFR BLD AUTO: 38.8 % (ref 40–53)
HGB BLD-MCNC: 13.6 G/DL (ref 13.3–17.7)
IMM GRANULOCYTES # BLD: 0.1 10E3/UL
IMM GRANULOCYTES NFR BLD: 2 %
LYMPHOCYTES # BLD AUTO: 1.8 10E3/UL (ref 0.8–5.3)
LYMPHOCYTES NFR BLD AUTO: 38 %
MCH RBC QN AUTO: 28.3 PG (ref 26.5–33)
MCHC RBC AUTO-ENTMCNC: 35.1 G/DL (ref 31.5–36.5)
MCV RBC AUTO: 81 FL (ref 78–100)
MONOCYTES # BLD AUTO: 1.6 10E3/UL (ref 0–1.3)
MONOCYTES NFR BLD AUTO: 34 %
NEUTROPHILS # BLD AUTO: 1.2 10E3/UL (ref 1.6–8.3)
NEUTROPHILS NFR BLD AUTO: 25 %
NRBC # BLD AUTO: 0 10E3/UL
NRBC BLD AUTO-RTO: 0 /100
PLAT MORPH BLD: NORMAL
PLATELET # BLD AUTO: 105 10E3/UL (ref 150–450)
POTASSIUM SERPL-SCNC: 4 MMOL/L (ref 3.4–5.3)
PROT SERPL-MCNC: 7.4 G/DL (ref 6.4–8.3)
RBC # BLD AUTO: 4.81 10E6/UL (ref 4.4–5.9)
RBC MORPH BLD: NORMAL
SODIUM SERPL-SCNC: 135 MMOL/L (ref 135–145)
WBC # BLD AUTO: 4.8 10E3/UL (ref 4–11)

## 2025-02-21 PROCEDURE — 85041 AUTOMATED RBC COUNT: CPT | Performed by: STUDENT IN AN ORGANIZED HEALTH CARE EDUCATION/TRAINING PROGRAM

## 2025-02-21 PROCEDURE — 99215 OFFICE O/P EST HI 40 MIN: CPT | Performed by: STUDENT IN AN ORGANIZED HEALTH CARE EDUCATION/TRAINING PROGRAM

## 2025-02-21 PROCEDURE — 82040 ASSAY OF SERUM ALBUMIN: CPT | Performed by: STUDENT IN AN ORGANIZED HEALTH CARE EDUCATION/TRAINING PROGRAM

## 2025-02-21 PROCEDURE — 85004 AUTOMATED DIFF WBC COUNT: CPT | Performed by: STUDENT IN AN ORGANIZED HEALTH CARE EDUCATION/TRAINING PROGRAM

## 2025-02-21 PROCEDURE — G2211 COMPLEX E/M VISIT ADD ON: HCPCS | Performed by: STUDENT IN AN ORGANIZED HEALTH CARE EDUCATION/TRAINING PROGRAM

## 2025-02-21 PROCEDURE — 36415 COLL VENOUS BLD VENIPUNCTURE: CPT | Performed by: STUDENT IN AN ORGANIZED HEALTH CARE EDUCATION/TRAINING PROGRAM

## 2025-02-21 PROCEDURE — 80053 COMPREHEN METABOLIC PANEL: CPT | Performed by: STUDENT IN AN ORGANIZED HEALTH CARE EDUCATION/TRAINING PROGRAM

## 2025-02-21 PROCEDURE — 85014 HEMATOCRIT: CPT | Performed by: STUDENT IN AN ORGANIZED HEALTH CARE EDUCATION/TRAINING PROGRAM

## 2025-02-21 PROCEDURE — G0463 HOSPITAL OUTPT CLINIC VISIT: HCPCS | Performed by: STUDENT IN AN ORGANIZED HEALTH CARE EDUCATION/TRAINING PROGRAM

## 2025-02-21 ASSESSMENT — PAIN SCALES - GENERAL: PAINLEVEL_OUTOF10: NO PAIN (0)

## 2025-02-21 NOTE — PROGRESS NOTES
[facility]  HEMATOLOGY & ONCOLOGY  FOLLOW UP VISIT    PATIENT NAME: Amado Butler          MRN # [id number]  YOB: 1960  DATE OF VISIT: Feb 21, 2025      REFERRING PROVIDER:   Mr. Amado Butler was seen at the request of Dr. Solomon Reza for further evaluation and/or management.     History of Presenting Illness  Mr. Amado Butler is a pleasant 64 year old male with a past medical history significant for DM2- Last A1c, HTN, ?thrombocytopenia since 2012 s/p recent frontotemporal craniotomy for right optic nerve mass now pathology proven meningioma c/b complete vision loss in right eye and was also found to have significant cytopenias during admission leading to a bone marrow biopsy on 8/15/2023.  His bone marrow biopsy shows 50% cellularity [mildly hypercellular for his age] without significant dysplasia in all 3 cell lines and without increase in blasts.  He had normal cytogenetics and BCR-ABL FISH was negative.  However his [employer] ration sequencing testing showed that there was a clonal process with mutations detected in SRSF2, TET2 x 2 and ASXL1.  Given prior history of monocytosis dating back many years he was referred to our clinic.    Subjective  Patient is here alone.  Still playing tennis and had a recent trip to [place]. He had a PYP scan and a cardiac PET/CT. Denies any fevers or chills, night sweats, BRBPR or melena.    Past Medical History  Diabetes type 2  Hypertension  Paroxysmal SVT  Thrombocytopenia dating back since 2012    Family History  Any family history of cancers or blood or bleeding disorders.    Social History  Smoking: Never  Alcohol use: drinks approximately 2drinks/week  Status:  37 years.   Children/grandchildren: 3 children. 26 years old girl, 31 boy, 28 girl  Occupation: Retired, .     Current Outpatient Medications  Current Outpatient Medications   Medication Sig Dispense Refill    aspirin 81 MG EC tablet Take 81 mg by  mouth.      carvedilol (COREG) 25 MG tablet Take 25 mg by mouth 2 times daily (with meals).      CONTOUR NEXT TEST test strip TEST 1 TIME/DAY      lisinopril-hydrochlorothiazide (ZESTORETIC) 20-25 MG tablet Take 1 tablet by mouth daily      Magnesium Oxide -Mg Supplement 500 MG CAPS Take 500 mg by mouth daily      metFORMIN (GLUCOPHAGE) 500 MG tablet Take 1 tablet (500 mg) by mouth daily (with breakfast) (Patient taking differently: Take 500 mg by mouth 2 times daily (with meals).) 7 tablet 0    multivitamin w/minerals (MULTI-VITAMIN) tablet Take 1 tablet by mouth daily Over 50 mens      rosuvastatin (CRESTOR) 40 MG tablet Take 40 mg by mouth daily.         Allergies  Allergies   Allergen Reactions    Seasonal Allergies        Review of systems  A complete ROS was performed and was negative except as mentioned in HPI    Physical Exam  /74 (BP Location: Right arm, Patient Position: Sitting, Cuff Size: Adult Regular)   Pulse 59   Temp 97.5  F (36.4  C) (Oral)   Resp 16   Wt 97.5 kg (214 lb 14.4 oz)   SpO2 98%   BMI 27.59 kg/m    Wt Readings from Last 3 Encounters:   25 97.5 kg (214 lb 14.4 oz)   25 97.2 kg (214 lb 4.8 oz)   24 98.9 kg (218 lb)       ECO   male sitting in chair in NAD  HEET: NC/AT, EOMI w/ PERRL, anicteric sclera. MMM. No mouth sores.   Neck: supple no JVP  Lymph: No cervical, supraclavicular, axillary or inguinal LAD  CV: normal S1,S2 with RRR no m/r/g  Resp: lungs CTA bilaterally with adequate air movement. No wheezes or crackles  Abd: soft, NTND, no organomegaly or masses. BS normoactive.   Ext: WWP no edema or cyanosis  Skin: no concerning lesions or rashes or petechiae  Neuro: A&Ox4, no lateralizing sx. Grossly nonfocal. Strength appears preserved.      Labs and Imaging  I reviewed patient's labs and imaging from care everywhere and here.    WBC 6.3, Hgb 13.8, , MCV 79, ANC 2200    PET CT cardiac 2025  1. No convincing metabolic evidence of  extra cardiac sarcoidosis.     2. Diffuse FDG uptake within the axial and appendicular skeleton in a pattern typical of marrow stimulation (a finding commonly associated with anemia)     MRI brain 2/20/2025  IMPRESSION: Stable postsurgical changes of right frontal craniotomy  status post mass resection with similar appearance of overlying dural  thickening and enhancement adjacent to the resection cavity, likely  sequela of posttreatment changes. No evidence of residual meningioma.  No new acute intracranial finding. Unchanged chronic atrophic right  optic neuropathy.     BMBx 8/15/23  Final Diagnosis   Bone marrow, posterior iliac crest, left decalcified trephine biopsy and touch imprint; left aspirate clot, particle crush, direct aspirate smear, concentrate aspirate smear, and peripheral blood smear:     - Cellular marrow (overall 50% in intact areas) with granulocytic hyperplasia with slight left shift, relative erythroid hypoplasia, unremarkable megakaryopoiesis, no overt dysplasia, and overall less than 2% blasts  - Overall normal reticulin fibrosis (MF-0)  - Peripheral blood showing moderate normochromic, normocytic anemia; moderate leukocytosis due to neutrophilia and monocytosis; slight thrombocytopenia  - See comment   No clonal chromosomal abnormality was detected among the 20 metaphase cells analyzed. These findings confirm and expand those of the previously reported interphase FISH anlaysis ([id number]) that showed no evidence of a BCR::ABL1 fusion.   SRSF2 VAF 46.2%  TET 2 VAF 51.6%  TET 2 VAF 46.5%  ASXL1 VAF 11.3%      CMML WHO Diagnostic Criteria:    Persistent (>=3 months) peripheral blood monocytosis; absolute monocyte count >500/microL and greater than 10 percent of the entire white blood cell differential   Yes   Not meeting WHO criteria for BCR-ABL1 positive chronic myeloid leukemia, primary myelofibrosis, polycythemia vera, or essential thrombocytosis  Yes   <20 percent myeloblasts +  monoblasts + promonocytes in peripheral blood and bone marrow  Yes   Dysplastic changes in one or more myeloid lineages. If myelodysplastic changes are absent or minimal, the diagnosis of CMML can be made if the above three criteria are met and:  No   An acquired clonal cytogenetic (or mutational abnormality) is present in bone marrow cells or  Yes   Persistent monocytosis for >=3 months and all other causes of monocytosis have been excluded  Yes   Total Score Meets WHO Criteria (4 major or 3 major and 2 minor)  Yes     CMML WHO Staging in 2016 now discontinued:    CMML-0: <2 % blasts in PB and <5% blasts in BM  Yes   CMML-1: 2 - 4%  blasts in PB and/or 5 - 9% blasts in BM  No   CMML-2: 5 -19 % blasts in PB, 10 - 19% blasts in BM, OR presence of Maurilio rods  No     Prognostic Scoring System: CPSS-Mol      Criteria Score     Cytogenetic Risk Low: normal, isolated -Y  Intermediate: all else  High: Tri 8, complex, chrom 7   0    ASXL1 WT/Mut  1    NRAS WT/Mut  0    RUNX1 WT/Mut  0    SETBP1 WT/Mut  0    Total Genetic Score Low = 0  Intermediate-1 = 1  Intermediate-2 = 2  High = >3 (Max 3 points) 1 Genetic risk points for CPSS-mol   (Max 3):  1   BM Blasts <5% or >5%  0    WBC count <13 or > 13  0    Transfusion dependent No/Yes  0    CPSS-mol   Risk group Low = 0  Intermediate-1 = 1  Intermediate-2 = 2-3  High = >4    1     The CPSS-Mol was able to identify 4 risk groups having a signi?cantly different OS, with median survival time >144, and 68, 30, and 17 months in the low, intermediate-1, intermediate-2, and high-risk group, respectively. Mr. Amado Butler has a Intermediate-1 risk disease with an estimated median survival of 68 months. The 4 risk groups also showed a signi?cantly different cumulative incidence of leukemic evolution, with a 48-month cumulative incidence of AML evolution of 0%, 8%, 24%, and 52% for the low, intermediate-1, intermediate-2, and high-risk group respectively. Mr. Amado Butler has a  Intermediate-1 risk disease with an estimated 4 year cumulative AML evolution risk of 8 %.     Will need to obtain BCR-ABL1 testing and PDGFRA, PDGFRB, FGFR1 and PCM-JAK2 to rule out other disorders if eosinophilia is present.     A number of conditions can also cause monocytosis such as asplenic state, inflamma ASXL1 tory conditions and autoimmune disorders, major depression, steroids or colony stimulating factors.     US abdomen 7/22/2024  Spleen is borderline enlarged at 13 cm. No ascites.      CT CAC score 12/3/2024  Impression:   Calcium score of 770 place the patient at a high risk of a cardiovascular   event in the next 10 years.   Other cardiac findings include,  mildly dilated ascending aorta measured   at 4.0 cm   Non cardiac findings will be reported by the radiologist.     MR cardiac 12/3/2024  Final conclusions:   Stress MRI is negative for significant ischemia.   The gadolinium delayed enhancement is suggestive of nonischemic scar.    There is patchy mid myocardial noted in the basal lateral, mid/distal   anterior, anteroseptal segments.  This appears to extend into the   subendocardial however overall has the appearance of a nonischemic scar.    There is also patchy mid myocardial scar noted in the basal   inferior/inferior lateral and basal septal segments. These findings could   represent infiltrative cardiomyopathy such as sarcoidosis versus   arrhythmogenic cardiomyopathy.  Recommend further workup for delineation.   The left ventricle size is normal.  The LV wall thickness is increased.    The LV wall motion is normal. The LV systolic function is normal.    Calculated LVEF is 61%.    The right ventricle is normal size with normal function.   Non cardiac finding will be reported separately per radiology.     Assessment and Plan  Mr. Amado Butler is a 64 year old male who is here for further evaluation and/or management of chronic myelomonocytic leukemia.    Oncology:  Chronic myelomonocytic  leukemia  WHO Classification: Dysplastic-CMML  Date of diagnosis: 8/14/2023  CPSS Mol score: 1-intermediate 1  Bone Marrow Blast %: 2   Cytogenetics: normal karyotype, 46 XY  Molecular: SRSF2, TET 2, TET 2, ASXL1  Past Treatment: None  Current Treatment: TBD    I discussed the pathophysiology and natural history of CMML with Mr. Amado Butler today. We went over the current prognostic models and scoring systems that are used in clinical practice as well as the potential for disease evolution into acute myeloid leukemia. We went over the current FDA approved treatments for CMML and covered that the only curative option is through an allogeneic hematopoietic cell transplantation. His disease is intermediate 1 risk and we will revisit this as needed.    We discussed about supportive care for lower risk disease and CMML including erythropoietin stimulating agents as well as thrombopoietin receptor agonist for thrombocytopenia.  At this point of time we will take a wait and watch approach. His counts have recovered.     His hemoglobin and white blood cell counts remained stable.  We got an ultrasound spleen that shows a spleen size of  13cm which could explain some of the thrombocytopenia.    His labs continue to remain stable.  He was recently found to have high calcium score on his CT cardiac after an episode of syncope for which she got a Holter monitor that is showing some arrhythmias.  An MR cardiac shows no inducible ischemia but does in fact show an area of scar?  Nonischemic suggestive of infiltrative disease.  He has going to follow-up with us cardiologist.  Is already been started on primary prevention with aspirin and Crestor/rosuvastatin.      We discussed that people with hematological malignancy are at increased risk of inflammatory things and thereby cardiac events in this setting.  Patient request that he wants to follow-up with cardiology here at the [facility].  I placed a referral for the same.  He  subsequently established with Dr. Rock.  His PYP scan did not show any evidence of TTR amyloid and PET/CT did not show evidence of sarcoidosis.  He has not underwent a cardiac angiogram yet.    Plan:  - RTC with me with me with labs prior in 3 months.    Hematology:  Anemia/Thrombocytopenia:   Transfuse leukocyte reduced and irradiated blood products: 1 unit pRBC if hgb is 7.0-7.9, 2 units pRBCs if Hgb 6.0-6.9 g/dl, 1 unit platelets if PLT count is <10,000 or <50,000 with clinical bleeding.    Immunocompromised:  As a result of his disease and/or previous treatment. Mr. Amado Butler is immunocompromised. his last ANC is >500 and there is no need for prophylactic antibiotics at this time.     Cardiac:  Mr. Amado Butler has no history of heart disease.     Renal:  Creatinine results reviewed today. Mr. Amado Butler does not have any renal disease.    Hepatic:  Liver function tests reviewed today.    Psychosocial:  Mr. Amado Butler is coping well with his diagnosis.     All other questions and concerns were addressed and answered to Mr. Amado Butler's satisfaction.    Today, I spent a total of 40 minutes of face-to-face and non face-to-face time with Mr. Amado Butler. Time spent included review and discussion of diagnostic test results, patient counseling, and coordination of care.    The longitudinal plan of care for the diagnosis(es)/condition(s) as documented were addressed during this visit. Due to the added complexity in care, I will continue to support Amado in the subsequent management and with ongoing continuity of care.    Jamal Rodney MD    Division of Hematology, Oncology and Transplantation  [facility]  P: [phone]

## 2025-02-21 NOTE — NURSING NOTE
Chief Complaint   Patient presents with    Blood Draw     Labs drawn via  by RN in lab. VS taken.      Labs collected from venipuncture by RN. Vitals taken. Checked in for appointment(s).    Ophelia Phillips RN

## 2025-02-21 NOTE — LETTER
2/21/2025      Amado Butler  [address] [facility] [address]      Dear Colleague,    Thank you for referring your patient, Amado Butler, to the [facility]. Please see a copy of my visit note below.    [facility]  HEMATOLOGY & ONCOLOGY  FOLLOW UP VISIT    PATIENT NAME: Amado Butler          MRN # [id number]  YOB: 1960  DATE OF VISIT: Feb 21, 2025      REFERRING PROVIDER:   Mr. Amado Butler was seen at the request of Dr. Solomon Reza for further evaluation and/or management.     History of Presenting Illness  Mr. Amado Butler is a pleasant 64 year old male with a past medical history significant for DM2- Last A1c, HTN, ?thrombocytopenia since 2012 s/p recent frontotemporal craniotomy for right optic nerve mass now pathology proven meningioma c/b complete vision loss in right eye and was also found to have significant cytopenias during admission leading to a bone marrow biopsy on 8/15/2023.  His bone marrow biopsy shows 50% cellularity [mildly hypercellular for his age] without significant dysplasia in all 3 cell lines and without increase in blasts.  He had normal cytogenetics and BCR-ABL FISH was negative.  However his NexGen ration sequencing testing showed that there was a clonal process with mutations detected in SRSF2, TET2 x 2 and ASXL1.  Given prior history of monocytosis dating back many years he was referred to our clinic.    Subjective  Patient is here alone.  Still playing tennis and had a recent trip to [place]. He had a PYP scan and a cardiac PET/CT. Denies any fevers or chills, night sweats, BRBPR or melena.    Past Medical History  Diabetes type 2  Hypertension  Paroxysmal SVT  Thrombocytopenia dating back since 2012    Family History  Any family history of cancers or blood or bleeding disorders.    Social History  Smoking: Never  Alcohol use: drinks approximately 2drinks/week  Status:  37 years.   Children/grandchildren: 3  children. 26 years old girl, 31 boy, 28 girl  Occupation: Retired, .     Current Outpatient Medications  Current Outpatient Medications   Medication Sig Dispense Refill     aspirin 81 MG EC tablet Take 81 mg by mouth.       carvedilol (COREG) 25 MG tablet Take 25 mg by mouth 2 times daily (with meals).       CONTOUR NEXT TEST test strip TEST 1 TIME/DAY       lisinopril-hydrochlorothiazide (ZESTORETIC) 20-25 MG tablet Take 1 tablet by mouth daily       Magnesium Oxide -Mg Supplement 500 MG CAPS Take 500 mg by mouth daily       metFORMIN (GLUCOPHAGE) 500 MG tablet Take 1 tablet (500 mg) by mouth daily (with breakfast) (Patient taking differently: Take 500 mg by mouth 2 times daily (with meals).) 7 tablet 0     multivitamin w/minerals (MULTI-VITAMIN) tablet Take 1 tablet by mouth daily Over 50 mens       rosuvastatin (CRESTOR) 40 MG tablet Take 40 mg by mouth daily.         Allergies  Allergies   Allergen Reactions     Seasonal Allergies        Review of systems  A complete ROS was performed and was negative except as mentioned in HPI    Physical Exam  /74 (BP Location: Right arm, Patient Position: Sitting, Cuff Size: Adult Regular)   Pulse 59   Temp 97.5  F (36.4  C) (Oral)   Resp 16   Wt 97.5 kg (214 lb 14.4 oz)   SpO2 98%   BMI 27.59 kg/m    Wt Readings from Last 3 Encounters:   25 97.5 kg (214 lb 14.4 oz)   25 97.2 kg (214 lb 4.8 oz)   24 98.9 kg (218 lb)       ECO   male sitting in chair in NAD  HEET: NC/AT, EOMI w/ PERRL, anicteric sclera. MMM. No mouth sores.   Neck: supple no JVP  Lymph: No cervical, supraclavicular, axillary or inguinal LAD  CV: normal S1,S2 with RRR no m/r/g  Resp: lungs CTA bilaterally with adequate air movement. No wheezes or crackles  Abd: soft, NTND, no organomegaly or masses. BS normoactive.   Ext: WWP no edema or cyanosis  Skin: no concerning lesions or rashes or petechiae  Neuro: A&Ox4, no lateralizing sx. Grossly  nonfocal. Strength appears preserved.      Labs and Imaging  I reviewed patient's labs and imaging from care everywhere and here.    WBC 6.3, Hgb 13.8, , MCV 79, ANC 2200    PET CT cardiac 2/17/2025  1. No convincing metabolic evidence of extra cardiac sarcoidosis.     2. Diffuse FDG uptake within the axial and appendicular skeleton in a pattern typical of marrow stimulation (a finding commonly associated with anemia)     MRI brain 2/20/2025  IMPRESSION: Stable postsurgical changes of right frontal craniotomy  status post mass resection with similar appearance of overlying dural  thickening and enhancement adjacent to the resection cavity, likely  sequela of posttreatment changes. No evidence of residual meningioma.  No new acute intracranial finding. Unchanged chronic atrophic right  optic neuropathy.     BMBx 8/15/23  Final Diagnosis   Bone marrow, posterior iliac crest, left decalcified trephine biopsy and touch imprint; left aspirate clot, particle crush, direct aspirate smear, concentrate aspirate smear, and peripheral blood smear:     - Cellular marrow (overall 50% in intact areas) with granulocytic hyperplasia with slight left shift, relative erythroid hypoplasia, unremarkable megakaryopoiesis, no overt dysplasia, and overall less than 2% blasts  - Overall normal reticulin fibrosis (MF-0)  - Peripheral blood showing moderate normochromic, normocytic anemia; moderate leukocytosis due to neutrophilia and monocytosis; slight thrombocytopenia  - See comment   No clonal chromosomal abnormality was detected among the 20 metaphase cells analyzed. These findings confirm and expand those of the previously reported interphase FISH anlaysis ([id number]) that showed no evidence of a BCR::ABL1 fusion.   SRSF2 VAF 46.2%  TET 2 VAF 51.6%  TET 2 VAF 46.5%  ASXL1 VAF 11.3%      CMML WHO Diagnostic Criteria:    Persistent (>=3 months) peripheral blood monocytosis; absolute monocyte count >500/microL and greater than  10 percent of the entire white blood cell differential   Yes   Not meeting WHO criteria for BCR-ABL1 positive chronic myeloid leukemia, primary myelofibrosis, polycythemia vera, or essential thrombocytosis  Yes   <20 percent myeloblasts + monoblasts + promonocytes in peripheral blood and bone marrow  Yes   Dysplastic changes in one or more myeloid lineages. If myelodysplastic changes are absent or minimal, the diagnosis of CMML can be made if the above three criteria are met and:  No   An acquired clonal cytogenetic (or mutational abnormality) is present in bone marrow cells or  Yes   Persistent monocytosis for >=3 months and all other causes of monocytosis have been excluded  Yes   Total Score Meets WHO Criteria (4 major or 3 major and 2 minor)  Yes     CMML WHO Staging in 2016 now discontinued:    CMML-0: <2 % blasts in PB and <5% blasts in BM  Yes   CMML-1: 2 - 4%  blasts in PB and/or 5 - 9% blasts in BM  No   CMML-2: 5 -19 % blasts in PB, 10 - 19% blasts in BM, OR presence of Maurilio rods  No     Prognostic Scoring System: CPSS-Mol      Criteria Score     Cytogenetic Risk Low: normal, isolated -Y  Intermediate: all else  High: Tri 8, complex, chrom 7   0    ASXL1 WT/Mut  1    NRAS WT/Mut  0    RUNX1 WT/Mut  0    SETBP1 WT/Mut  0    Total Genetic Score Low = 0  Intermediate-1 = 1  Intermediate-2 = 2  High = >3 (Max 3 points) 1 Genetic risk points for CPSS-mol   (Max 3):  1   BM Blasts <5% or >5%  0    WBC count <13 or > 13  0    Transfusion dependent No/Yes  0    CPSS-mol   Risk group Low = 0  Intermediate-1 = 1  Intermediate-2 = 2-3  High = >4    1     The CPSS-Mol was able to identify 4 risk groups having a signi?cantly different OS, with median survival time >144, and 68, 30, and 17 months in the low, intermediate-1, intermediate-2, and high-risk group, respectively. Mr. Amado Butler has a Intermediate-1 risk disease with an estimated median survival of 68 months. The 4 risk groups also showed a signi?cantly  different cumulative incidence of leukemic evolution, with a 48-month cumulative incidence of AML evolution of 0%, 8%, 24%, and 52% for the low, intermediate-1, intermediate-2, and high-risk group respectively. Mr. Amado Butler has a Intermediate-1 risk disease with an estimated 4 year cumulative AML evolution risk of 8 %.     Will need to obtain BCR-ABL1 testing and PDGFRA, PDGFRB, FGFR1 and PCM-JAK2 to rule out other disorders if eosinophilia is present.     A number of conditions can also cause monocytosis such as asplenic state, inflamma ASXL1 tory conditions and autoimmune disorders, major depression, steroids or colony stimulating factors.     US abdomen 7/22/2024  Spleen is borderline enlarged at 13 cm. No ascites.      CT CAC score 12/3/2024  Impression:   Calcium score of 770 place the patient at a high risk of a cardiovascular   event in the next 10 years.   Other cardiac findings include,  mildly dilated ascending aorta measured   at 4.0 cm   Non cardiac findings will be reported by the radiologist.     MR cardiac 12/3/2024  Final conclusions:   Stress MRI is negative for significant ischemia.   The gadolinium delayed enhancement is suggestive of nonischemic scar.    There is patchy mid myocardial noted in the basal lateral, mid/distal   anterior, anteroseptal segments.  This appears to extend into the   subendocardial however overall has the appearance of a nonischemic scar.    There is also patchy mid myocardial scar noted in the basal   inferior/inferior lateral and basal septal segments. These findings could   represent infiltrative cardiomyopathy such as sarcoidosis versus   arrhythmogenic cardiomyopathy.  Recommend further workup for delineation.   The left ventricle size is normal.  The LV wall thickness is increased.    The LV wall motion is normal. The LV systolic function is normal.    Calculated LVEF is 61%.    The right ventricle is normal size with normal function.   Non cardiac finding will  be reported separately per radiology.     Assessment and Plan  Mr. Amado Butler is a 64 year old male who is here for further evaluation and/or management of chronic myelomonocytic leukemia.    Oncology:  Chronic myelomonocytic leukemia  WHO Classification: Dysplastic-CMML  Date of diagnosis: 8/14/2023  CPSS Mol score: 1-intermediate 1  Bone Marrow Blast %: 2   Cytogenetics: normal karyotype, 46 XY  Molecular: SRSF2, TET 2, TET 2, ASXL1  Past Treatment: None  Current Treatment: TBD    I discussed the pathophysiology and natural history of CMML with Mr. Amado Butler today. We went over the current prognostic models and scoring systems that are used in clinical practice as well as the potential for disease evolution into acute myeloid leukemia. We went over the current FDA approved treatments for CMML and covered that the only curative option is through an allogeneic hematopoietic cell transplantation. His disease is intermediate 1 risk and we will revisit this as needed.    We discussed about supportive care for lower risk disease and CMML including erythropoietin stimulating agents as well as thrombopoietin receptor agonist for thrombocytopenia.  At this point of time we will take a wait and watch approach. His counts have recovered.     His hemoglobin and white blood cell counts remained stable.  We got an ultrasound spleen that shows a spleen size of  13cm which could explain some of the thrombocytopenia.    His labs continue to remain stable.  He was recently found to have high calcium score on his CT cardiac after an episode of syncope for which she got a Holter monitor that is showing some arrhythmias.  An MR cardiac shows no inducible ischemia but does in fact show an area of scar?  Nonischemic suggestive of infiltrative disease.  He has going to follow-up with us cardiologist.  Is already been started on primary prevention with aspirin and Crestor/rosuvastatin.      We discussed that people with  hematological malignancy are at increased risk of inflammatory things and thereby cardiac events in this setting.  Patient request that he wants to follow-up with cardiology here at the [facility].  I placed a referral for the same.  He subsequently established with Dr. Rock.  His PYP scan did not show any evidence of TTR amyloid and PET/CT did not show evidence of sarcoidosis.  He has not underwent a cardiac angiogram yet.    Plan:  - RTC with me with me with labs prior in 3 months.    Hematology:  Anemia/Thrombocytopenia:   Transfuse leukocyte reduced and irradiated blood products: 1 unit pRBC if hgb is 7.0-7.9, 2 units pRBCs if Hgb 6.0-6.9 g/dl, 1 unit platelets if PLT count is <10,000 or <50,000 with clinical bleeding.    Immunocompromised:  As a result of his disease and/or previous treatment. Mr. Amado Butler is immunocompromised. his last ANC is >500 and there is no need for prophylactic antibiotics at this time.     Cardiac:  Mr. Amado Butler has no history of heart disease.     Renal:  Creatinine results reviewed today. Mr. Amado Butler does not have any renal disease.    Hepatic:  Liver function tests reviewed today.    Psychosocial:  Mr. Amado Butler is coping well with his diagnosis.     All other questions and concerns were addressed and answered to Mr. Amado Butler's satisfaction.    Today, I spent a total of 40 minutes of face-to-face and non face-to-face time with Mr. Amado Butler. Time spent included review and discussion of diagnostic test results, patient counseling, and coordination of care.    The longitudinal plan of care for the diagnosis(es)/condition(s) as documented were addressed during this visit. Due to the added complexity in care, I will continue to support Amado in the subsequent management and with ongoing continuity of care.    Jamal Rodney MD    Division of Hematology, Oncology and Transplantation  [facility]  P: [phone]      Again,  thank you for allowing me to participate in the care of your patient.        Sincerely,        Jamal Rodney MD    Electronically signed

## 2025-02-21 NOTE — NURSING NOTE
"Oncology Rooming Note    February 21, 2025 1:24 PM   Amado Butler is a 64 year old male who presents for:    Chief Complaint   Patient presents with    Blood Draw     Labs drawn via  by RN in lab. VS taken.     Oncology Clinic Visit     Chronic Myelomonocytic Leukemia     Initial Vitals: /74 (BP Location: Right arm, Patient Position: Sitting, Cuff Size: Adult Regular)   Pulse 59   Temp 97.5  F (36.4  C) (Oral)   Resp 16   Wt 97.5 kg (214 lb 14.4 oz)   SpO2 98%   BMI 27.59 kg/m   Estimated body mass index is 27.59 kg/m  as calculated from the following:    Height as of 5/3/24: 1.88 m (6' 2\").    Weight as of this encounter: 97.5 kg (214 lb 14.4 oz). Body surface area is 2.26 meters squared.  No Pain (0) Comment: Data Unavailable   No LMP for male patient.  Allergies reviewed: Yes  Medications reviewed: Yes    Medications: Medication refills not needed today.  Pharmacy name entered into [employer]: [id number] [address]    Frailty Screening:   Is the patient here for a new oncology consult visit in cancer care? 2. No    PHQ9:  Did this patient require a PHQ9?: No      Clinical concerns: None       Deepali Motta LPN  2/21/2025                "

## 2025-02-24 ENCOUNTER — TELEPHONE (OUTPATIENT)
Dept: NEUROSURGERY | Facility: CLINIC | Age: 65
End: 2025-02-24
Payer: COMMERCIAL

## 2025-02-24 DIAGNOSIS — D32.0 BENIGN NEOPLASM OF CEREBRAL MENINGES (H): Primary | ICD-10-CM

## 2025-02-24 NOTE — TELEPHONE ENCOUNTER
Patient has the 6 month follow up visit scheduled with Desiree Trammell as requested.    The MRI was not scheduled as patient will be having a loop recorder implanted in the next few months prior to August.  This will need to be done, and cardiology will need to sign off that patient can have an MRI completed.

## 2025-02-28 ENCOUNTER — OFFICE VISIT (OUTPATIENT)
Dept: OPHTHALMOLOGY | Facility: CLINIC | Age: 65
End: 2025-02-28
Attending: OPHTHALMOLOGY
Payer: COMMERCIAL

## 2025-02-28 DIAGNOSIS — D33.0 BENIGN NEOPLASM OF SUPRATENTORIAL REGION OF BRAIN (H): ICD-10-CM

## 2025-02-28 DIAGNOSIS — H53.10 SUBJECTIVE VISUAL DISTURBANCE: Primary | ICD-10-CM

## 2025-02-28 PROCEDURE — 99213 OFFICE O/P EST LOW 20 MIN: CPT | Performed by: OPHTHALMOLOGY

## 2025-02-28 PROCEDURE — G0463 HOSPITAL OUTPT CLINIC VISIT: HCPCS | Performed by: OPHTHALMOLOGY

## 2025-02-28 PROCEDURE — 92133 CPTRZD OPH DX IMG PST SGM ON: CPT | Performed by: OPHTHALMOLOGY

## 2025-02-28 PROCEDURE — 92083 EXTENDED VISUAL FIELD XM: CPT | Performed by: OPHTHALMOLOGY

## 2025-02-28 ASSESSMENT — VISUAL ACUITY
OS_CC+: -2
OS_CC: 20/20
METHOD: SNELLEN - LINEAR
CORRECTION_TYPE: GLASSES
OD_CC: NLP

## 2025-02-28 ASSESSMENT — EXTERNAL EXAM - LEFT EYE: OS_EXAM: NORMAL

## 2025-02-28 ASSESSMENT — TONOMETRY
IOP_METHOD: ICARE
OS_IOP_MMHG: 18
OD_IOP_MMHG: 18

## 2025-02-28 ASSESSMENT — REFRACTION_WEARINGRX
OS_AXIS: 009
SPECS_TYPE: TRIFOCAL
OD_ADD: +2.50
OD_CYLINDER: +1.00
OD_AXIS: 009
OS_SPHERE: -8.75
OS_CYLINDER: +1.00
OD_SPHERE: -8.50
OS_ADD: +2.50

## 2025-02-28 ASSESSMENT — EXTERNAL EXAM - RIGHT EYE: OD_EXAM: NORMAL

## 2025-02-28 ASSESSMENT — SLIT LAMP EXAM - LIDS
COMMENTS: NORMAL
COMMENTS: NORMAL

## 2025-02-28 ASSESSMENT — CUP TO DISC RATIO
OS_RATIO: 0.2
OD_RATIO: 0.8

## 2025-02-28 ASSESSMENT — CONF VISUAL FIELD
OD_INFERIOR_TEMPORAL_RESTRICTION: 1
OD_SUPERIOR_TEMPORAL_RESTRICTION: 1
OD_SUPERIOR_NASAL_RESTRICTION: 1
METHOD: COUNTING FINGERS
OD_INFERIOR_NASAL_RESTRICTION: 1

## 2025-02-28 NOTE — PROGRESS NOTES
1. 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 Status post radiation therapy.  No indication today of spread or involvement of the left optic nerve.  Follow-up neuro-imaging per radiation oncology- Dr. Mccullough.     Recent 02/2025 MRI stable- does not show any evidence of residual meningioma and only findings of chronic right optic atrophy.    2. 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  Mixed cataracts in both eyes- not yet visually significant although left eye is borderline and likely approaching becoming visually significant.     Patient with hx of right optic nerve meningioma diagnosed in late 2023 after multiple rounds of oral and IV steroids and PLEX. Patient underwent optic canal decompression and biopsy which resulted in meningioma diagnosis. He was also diagnosed with CMML that is currently being monitored by oncology. He is now s/p radiation for the meningioma last dosage 1/2023.     Today he returns with stable no light perception vision OD and 20/20 vision OS. Full visual field in the left eye with stable OCT retinal nerve fiber layer thickness (no indication of optic neuropathy left eye).  No evidence of radiation retinopathy in the right eye.    Plan   Review upcoming MRI Orbit (addendum after visit- STABLE without evidence of active optic nerve or sheath enhancement). Post surgical meninges changes are stable.    Follow-up with neuro-ophthalmology in 1 year or sooner as needed.    Historical data including initial visit HPI  He was diagnosed with a right optic nerve sheath meningioma in July 2023; treatment included resection and radiation from December 2023-January 2024. He received a total of 5040 cGy over 28 fractions completed Jan 2024. He underwent a bone marrow biopsy on 8/15/2023 due to significant cytopenias that showed chronic myelomonocytic leukemia (CMML); 50% cellularity [mildly  hypercellular for his age] without significant dysplasia in all 3 cell lines and without increase in blasts. He had normal cytogenetics and BCR-ABL FISH was negative.     Patient was previously seen by Dr. Serrano with last visit on 9/11/23.     VIsion loss initially noted in June 2023.  Per chart review patient on 7/5/23 had a temporal artery biopsy due to concern for GCA in the right eye. Pathology was negative for GCA. Patient went to [place] and experienced worsening vision in his right eye several times. On 7/24/23 he went to [facility]. He was restarted on high dose 1000 mg IV steroids. The vision in the right eye improved with the high dose IV steroids. He was seen again the following Monday, however over the week the vision deteriorated some but not significantly. On 7/31/23 he was seen in the eye clinic again and was admitted directly to the hospital on a Neurology floor for 5 days. He underwent another course of daily 1000 mg IV steroids. He underwent PET and MRI imaging and was diagnosed with optic perineuritis. He had extensive testing but nothing has come back positive. He is currently taking 80 mg daily of steroids. He is also taking an antiviral and antibiotic. The vision improved on oral steroids, antivirals, and oral abx. He was hospitalized again his vision continued to deteriorate throughout admission. He was discharged on oral prednisone and his vision continued to deteriorate. Patient subsequently underwent PLEX treatment without improvement in vision.     He is s/p right frontotemporal craniotomy and decompression of the right optic canal and orbital apex for resection of right optic canal and intradural mass 8/14/2023 of which pathology showed optic nerve meningioma.     He was also diagnosed with CMML which oncology is monitoring for now. He is also now s/p radiation treatment and is here for a second opinion.    Since completing radiation in January patient feels like his vision  in the left eye isn't as sharp in his left eye. Patient has repeat MRI next week. Patient is wondering what next steps are from a neuro-ophthalmology standpoint. Patient is wanting to establish care with Dr. Waldron.     Review of outside testing:  Labs 5/8/24  -Lyme: Neg  -CMP: Na 134, GFR 75    MRI brain 4/18/24  1.  Right frontal craniotomy with decreased underlying dural  enhancement extending along the middle cranial fossa and cavernous  sinus with unchanged encasement of the cisternal and intracanalicular  segments of the right optic nerve favored to represent posttreatment  changes. Attentional follow-up.  2.  Decreased adjacent T2/FLAIR hyperintense signal in the right  frontal lobe which could also be posttreatment related.  3.  Stable sequelae of right optic neuritis.    Labs 1/10/24  -A1c: 5.8  -Lipid: HDL 36    11/28/23 MRI Orbit [facility]WO:  Impression:   Postoperative changes following right orbital mass resection,  including right frontal extra-axial fluid collection and dural  thickening, mild mass effect on the right frontal lobe, inflammatory  changes within and surrounding the orbit. Continued T2 hyperintensity  within the subcortical white matter of the right frontal lobe beneath  the fluid collection with decreased extension of the T2 hyperintensity  in the sulci of the right frontal lobe compared to prior. No abnormal  restricted diffusion to suggest empyema or sulcal enhancement to  suggest leptomeningitis. Recommend attention on follow-up imaging as  postoperative changes improve.    MRI Brain/Orbits 7/3/23  HEAD MRI:  1.  No acute infarct.  2.  Age-related changes described above.  3.  Right frontal lobe small area tissue loss and gliosis.     ORBIT MRI:  1.  Unremarkable MRI of the orbits.   2.  No evidence for optic neuritis..     MRI Brain/Orbits 6/28/23  HEAD MRI:   1.  Right frontal lobe encephalomalacia and surrounding gliotic   change without acute intracranial abnormality.     ORBIT MRI:    1.  Unremarkable orbits     Ehrlichia - negative  Babesia- negative  Anaplasma - negative  Lysozyme elevated to >10  IgG subclasses - normal  ANCA elevated to 1:20  Lyme negative  Treponema negative  BUDDY negative  ACE decreased to <10  CNS demyelinating panel negative  CRP <3  ESR 2  CBC normal     Temporal artery biopsy 7/5/23  Temporal artery, right, biopsy:   No evidence of giant cell arteritis.  Moderate arteriosclerosis.  Mild medial calcific sclerosis.      Labs from [facility] 8/1/23  Lyme CNS negative  Hep B surface antibody negative    Review of outside clinical notes:    9/11/23 -- Visit with Dr. Serrano   Optic neuropathy RIGHT eye in setting of right posterior orbital lesion with biopsy showing meningioma.  His vision is stable with increased retinal nerve fiber layer thinning RIGHT eye.  I discussed with Dr. Camacho about possible treatment options in the future due to atypical nature of the meningioma, and agreed that potentially radiation is an option to consider depending on MRI findings on 10/2/23.  We discussed monocular safety precautions and advised glasses for safety.  Follow up in 1 year or sooner as needed for worsening symptoms.       He will discuss with Dr. Camacho regarding right sided head pain.  If not felt to be consistent with postoperative pain, then could consider headache neurology consultation.    1/11/24 -- Visit with Dr. Mccullough   Assessment:    Tolerating radiation therapy well.  All questions and concerns addressed.     Toxicities:  Anorexia: Grade 0: No toxicity  Fatigue: Grade 1: Fatigue relieved by rest  Headache: Grade 0: No toxicity  Nausea: Grade 0: No toxicity  Mucositis: Grade 0: No toxicity  Dermatitis: Grade 0: No toxicity     Plan:   Continue current therapy.    MRI perfusion in 3 months, follow-up with Dr. Mccullough Thursday after scan   Follow-up with neuro-ophthalmology Dr. Waldron    Past medical history:    Patient Active Problem List   Diagnosis     Vision loss of right eye    Steroid-induced hyperglycemia    CMML (chronic myelomonocytic leukemia) (H)       Patient has a current medication list which includes the following prescription(s): aspirin, carvedilol, lisinopril-hydrochlorothiazide, magnesium oxide -mg supplement, rosuvastatin, contour next test, metformin, and multivitamin w/minerals.. List is confirmed today.    Family history / social history:  Patient's family history includes Leukemia in his mother.     Patient  reports that he has never smoked. He has never been exposed to tobacco smoke. He has never used smokeless tobacco. He reports that he does not currently use alcohol. He reports that he does not currently use drugs.     Interim history and data since last visit with me 7/19/24:  He is having more issues with his left eye not being able to see with low slanting light, shadows, or dim lighting conditions. He thinks this is probably related to his cataracts. He does not note much difficulty with glare or night driving. His vision in bright lighting conditions has not changed. He reports that when he looks at screens or reads for a long time he has mild stinging in the right eye. He is aware that he can try artificial tears.     He has not had any new symptoms like headaches or eye pain. His next appointment with Desiree Trammell is in August 2025.     MR BRAIN PERFUSION W/O & W CONTRAST 2/20/2025   IMPRESSION: Stable postsurgical changes of right frontal craniotomy  status post mass resection with similar appearance of overlying dural  thickening and enhancement adjacent to the resection cavity, likely  sequela of posttreatment changes. No evidence of residual meningioma.  No new acute intracranial finding. Unchanged chronic atrophic right  optic neuropathy.     Exam:  Vision is stable: NLP right eye and 20/20 left eye.     Please see epic chart for complete exam     Tests ordered and interpreted today:     OCT RNFL:     -right eye: diffuse  thinning, - stable- avg thickness 32 from 33   -left eye: stable borderline temporal thinning; avg thickness 77 from 78    24-2 visual field:   -right eye: NA   -left eye: reliable- essentially full       Kaycee Mccoy, MS3  [employer]    25 minutes were spent on the date of the encounter by me doing chart review, history and exam, documentation, and further activities as noted above    Complete documentation of historical and exam elements from today's encounter can be found in the full encounter summary report (not reduplicated in this progress note).  I personally re-obtained the chief complaint(s) and history of present illness.  I confirmed and edited as necessary the review of systems, past medical/surgical history, family history, social history, and examination findings as documented by others; and I examined the patient myself.  I personally reviewed the relevant tests, images, and reports as documented above.  I formulated and edited as necessary the assessment and plan and discussed the findings and management plan with the patient and family     A medical student was involved in the care of the patient. I was present with the medical student who participated in the service and in the documentation of the note. I have  verified the history and personally performed the physical exam and medical decision making. I extensively reviewed and edited when necessary the assessment and plan. I agree with the assessment and plan of care as documented in the note    Roderick Waldron MD

## 2025-02-28 NOTE — NURSING NOTE
Chief Complaint(s) and History of Present Illness(es)       Follow Up    In right eye.  Characterized as blurred vision.  Severity is mild.  Occurring intermittently.  It is worse when reading.  Context:  near vision.  Since onset it is gradually worsening.  Associated symptoms include eye pain.  Negative for headache.  Pain was noted as 0/10. Additional comments: 6 month follow-up for optic nerve sheathe meningioma.  Don reports his cataracts have been more noticeable.  Worse when he has low slanting lights.  He has noticed more difficulty with reading under dim lighting conditions.  He is having some mild eye pain in the right eye - this usually happens with long periods with reading or while using the computer.     ELA Velasco 2/28/2025 9:27 AM

## 2025-05-22 ENCOUNTER — TELEPHONE (OUTPATIENT)
Dept: NEUROSURGERY | Facility: CLINIC | Age: 65
End: 2025-05-22
Payer: COMMERCIAL

## 2025-05-22 NOTE — TELEPHONE ENCOUNTER
Called patient and scheduled MRI appointment per Desiree Trammell via task. -Desiree Swift PA-C Martin, Caryn E, RN; Mary Devine RN; [facility] Scheduling  Thanks Crystal!    Scheduling, can someone reach out to Mr. Butler and make sure he's aware he'll need imaging prior to appointment in August?    ThanksDesiree          Previous Messages       ----- Message -----  From: Crystal Bruce RN  Sent: 5/22/2025  11:10 AM CDT  To: Mary Devine, MARNI; Crystal Bruce RN; *    Jaime Ramírez,  It looks like plan is for an MRI and a follow-up with you 8/2025, and then an MRI and a follow-up with Dr. Mccullough 2/2026. I see that your follow-up is scheduled for 8/27/25, but the 8/2025 MRI is still pending scheduling. I know its still a few months out, plenty of time-- just wanted to make sure this was still on your radar.  Thanks!  Crystal  ----- Message -----  From: Crystal Bruce RN  Sent: 4/28/2025   1:15 PM CDT  To: Mary Devine RN; Crystal Bruce RN    FY 8/2025 MRI (per Dr. Camacho/Desiree), pending scheduling  Follow-up with Desiree 8/27/25  ----- Message -----  From: Crystal Bruce RN  Sent: 4/25/2025   2:16 PM CDT  To: Mary Devine, MARNI; Crystal Bruce RN    Scheduling completed.    2/19/26 MRI brain and follow-up with Dr. Mccullough.    Spreadsheet updated.  ----- Message -----  From: Crystal Bruce RN  Sent: 3/14/2025   1:58 PM CDT  To: Mary Devine RN; Crystal Bruce RN; *    Hi Team,  Following up on this message.  Can you please schedule MRI and follow-up with Dr. Mccullough for February 2026? MRI order is in.  Thanks!  Crystal Bruce RN  Radiation Oncology  ----- Message -----  From: Crystal Bruce RN  Sent: 2/28/2025   4:03 PM CDT  To: Mary Devine RN; Crystal Bruce RN; *    Hi Rush Garcia & Carole,  Can you please schedule MRI and follow-up with Dr. Mccullough for February 2026? MRI order is in.  Thanks!  Crystal Bruce RN  Radiation Oncology  -----  Message -----  From: Desiree Trammell PA-C  Sent: 2/24/2025  10:08 AM CST  To: Omar Khan MD; Mary Devine, RN; *    Sounds good.    Scheduling, 6 month follow up with me, in person or virtual, with brain MRI prior please.    Desiree Koo  ----- Message -----  From: Crystal Bruce RN  Sent: 2/21/2025   4:58 PM CST  To: Omar Khan MD; Mary Devine, RN; *    Hi Team,    Patient completed MR brain and follow-up with Dr. Mccullough on 2/20/25. Dr. Mccullough recommends next MRI and follow-up with neurosurgery in 6 months (8/2025) and MRI and follow-up with Dr. Mccullough in 1 year (2/2026).    Dr. Camacho and Desiree, would you be open to seeing Don with next MRI in August?    Rush Garcia and Carole, can you please schedule MRI and follow-up with Dr. Mccullough for February 2026? MRI order is in-- thank you Marcos!    Thank you!  Crystal Bruce, RN  Radiation Oncology

## 2025-05-28 ENCOUNTER — RESULTS FOLLOW-UP (OUTPATIENT)
Dept: ONCOLOGY | Facility: CLINIC | Age: 65
End: 2025-05-28

## 2025-05-28 ENCOUNTER — ONCOLOGY VISIT (OUTPATIENT)
Dept: ONCOLOGY | Facility: CLINIC | Age: 65
End: 2025-05-28
Attending: STUDENT IN AN ORGANIZED HEALTH CARE EDUCATION/TRAINING PROGRAM
Payer: COMMERCIAL

## 2025-05-28 VITALS
TEMPERATURE: 97.5 F | RESPIRATION RATE: 16 BRPM | OXYGEN SATURATION: 97 % | BODY MASS INDEX: 27.78 KG/M2 | DIASTOLIC BLOOD PRESSURE: 70 MMHG | SYSTOLIC BLOOD PRESSURE: 126 MMHG | WEIGHT: 216.4 LBS | HEART RATE: 58 BPM

## 2025-05-28 DIAGNOSIS — R73.09 ABNORMAL BLOOD SUGAR: ICD-10-CM

## 2025-05-28 DIAGNOSIS — C93.10 CHRONIC MYELOMONOCYTIC LEUKEMIA NOT HAVING ACHIEVED REMISSION (H): ICD-10-CM

## 2025-05-28 LAB
ALBUMIN SERPL BCG-MCNC: 4.8 G/DL (ref 3.5–5.2)
ALP SERPL-CCNC: 68 U/L (ref 40–150)
ALT SERPL W P-5'-P-CCNC: 19 U/L (ref 0–70)
ANION GAP SERPL CALCULATED.3IONS-SCNC: 13 MMOL/L (ref 7–15)
AST SERPL W P-5'-P-CCNC: 30 U/L (ref 0–45)
BASOPHILS # BLD AUTO: 0.1 10E3/UL (ref 0–0.2)
BASOPHILS NFR BLD AUTO: 1 %
BILIRUB SERPL-MCNC: 0.6 MG/DL
BUN SERPL-MCNC: 14.7 MG/DL (ref 8–23)
CALCIUM SERPL-MCNC: 9.6 MG/DL (ref 8.8–10.4)
CHLORIDE SERPL-SCNC: 97 MMOL/L (ref 98–107)
CREAT SERPL-MCNC: 1.04 MG/DL (ref 0.67–1.17)
EGFRCR SERPLBLD CKD-EPI 2021: 79 ML/MIN/1.73M2
EOSINOPHIL # BLD AUTO: 0 10E3/UL (ref 0–0.7)
EOSINOPHIL NFR BLD AUTO: 0 %
ERYTHROCYTE [DISTWIDTH] IN BLOOD BY AUTOMATED COUNT: 13.8 % (ref 10–15)
EST. AVERAGE GLUCOSE BLD GHB EST-MCNC: 143 MG/DL
GLUCOSE SERPL-MCNC: 234 MG/DL (ref 70–99)
HBA1C MFR BLD: 6.6 %
HCO3 SERPL-SCNC: 24 MMOL/L (ref 22–29)
HCT VFR BLD AUTO: 39.3 % (ref 40–53)
HGB BLD-MCNC: 13.3 G/DL (ref 13.3–17.7)
IMM GRANULOCYTES # BLD: 0.1 10E3/UL
IMM GRANULOCYTES NFR BLD: 3 %
LYMPHOCYTES # BLD AUTO: 1.4 10E3/UL (ref 0.8–5.3)
LYMPHOCYTES NFR BLD AUTO: 28 %
MCH RBC QN AUTO: 27.4 PG (ref 26.5–33)
MCHC RBC AUTO-ENTMCNC: 33.8 G/DL (ref 31.5–36.5)
MCV RBC AUTO: 81 FL (ref 78–100)
MONOCYTES # BLD AUTO: 1.4 10E3/UL (ref 0–1.3)
MONOCYTES NFR BLD AUTO: 28 %
NEUTROPHILS # BLD AUTO: 2 10E3/UL (ref 1.6–8.3)
NEUTROPHILS NFR BLD AUTO: 39 %
NRBC # BLD AUTO: 0 10E3/UL
NRBC BLD AUTO-RTO: 0 /100
PLATELET # BLD AUTO: 101 10E3/UL (ref 150–450)
POTASSIUM SERPL-SCNC: 3.8 MMOL/L (ref 3.4–5.3)
PROT SERPL-MCNC: 7.4 G/DL (ref 6.4–8.3)
RBC # BLD AUTO: 4.86 10E6/UL (ref 4.4–5.9)
SODIUM SERPL-SCNC: 134 MMOL/L (ref 135–145)
WBC # BLD AUTO: 5 10E3/UL (ref 4–11)

## 2025-05-28 PROCEDURE — G0463 HOSPITAL OUTPT CLINIC VISIT: HCPCS | Performed by: STUDENT IN AN ORGANIZED HEALTH CARE EDUCATION/TRAINING PROGRAM

## 2025-05-28 PROCEDURE — 83036 HEMOGLOBIN GLYCOSYLATED A1C: CPT | Performed by: STUDENT IN AN ORGANIZED HEALTH CARE EDUCATION/TRAINING PROGRAM

## 2025-05-28 PROCEDURE — 85018 HEMOGLOBIN: CPT | Performed by: STUDENT IN AN ORGANIZED HEALTH CARE EDUCATION/TRAINING PROGRAM

## 2025-05-28 PROCEDURE — 80053 COMPREHEN METABOLIC PANEL: CPT | Performed by: STUDENT IN AN ORGANIZED HEALTH CARE EDUCATION/TRAINING PROGRAM

## 2025-05-28 PROCEDURE — 36415 COLL VENOUS BLD VENIPUNCTURE: CPT | Performed by: STUDENT IN AN ORGANIZED HEALTH CARE EDUCATION/TRAINING PROGRAM

## 2025-05-28 NOTE — PROGRESS NOTES
[facility]  HEMATOLOGY & ONCOLOGY  FOLLOW UP VISIT    PATIENT NAME: Amado Butler          MRN # [id number]  YOB: 1960  DATE OF VISIT: May 28, 2025      REFERRING PROVIDER:   Mr. Amado Butler was seen at the request of Dr. Solomon Reza for further evaluation and/or management.     History of Presenting Illness  Mr. Amado Butler is a pleasant 64 year old male with a past medical history significant for DM2- Last A1c, HTN, ?thrombocytopenia since 2012 s/p recent frontotemporal craniotomy for right optic nerve mass now pathology proven meningioma c/b complete vision loss in right eye and was also found to have significant cytopenias during admission leading to a bone marrow biopsy on 8/15/2023.  His bone marrow biopsy shows 50% cellularity [mildly hypercellular for his age] without significant dysplasia in all 3 cell lines and without increase in blasts.  He had normal cytogenetics and BCR-ABL FISH was negative.  However his [employer] ration sequencing testing showed that there was a clonal process with mutations detected in SRSF2, TET2 x 2 and ASXL1.  Given prior history of monocytosis dating back many years he was referred to our clinic.    Subjective  Patient is here alone.  He continues to stay active.  He does note that his fatigue has been stable over the last many months to maybe a year or 2.  He has had a loop recorder placed which showed atrial fibrillation and he was started on apixaban since then.  He has not had coronary angiogram done yet. Denies any fevers or chills, night sweats, BRBPR or melena.    Past Medical History  Diabetes type 2  Hypertension  Paroxysmal SVT  Thrombocytopenia dating back since 2012    Family History  Any family history of cancers or blood or bleeding disorders.    Social History  Smoking: Never  Alcohol use: drinks approximately 2drinks/week  Status:  37 years.   Children/grandchildren: 3 children. 26 years old girl, 31 boy, 28  girl  Occupation: Retired, .     Current Outpatient Medications  Current Outpatient Medications   Medication Sig Dispense Refill    aspirin 81 MG EC tablet Take 81 mg by mouth.      carvedilol (COREG) 25 MG tablet Take 25 mg by mouth 2 times daily (with meals).      CONTOUR NEXT TEST test strip TEST 1 TIME/DAY      lisinopril-hydrochlorothiazide (ZESTORETIC) 20-25 MG tablet Take 1 tablet by mouth daily      Magnesium Oxide -Mg Supplement 500 MG CAPS Take 500 mg by mouth daily      metFORMIN (GLUCOPHAGE) 500 MG tablet Take 1 tablet (500 mg) by mouth daily (with breakfast) (Patient taking differently: Take 500 mg by mouth 2 times daily (with meals).) 7 tablet 0    multivitamin w/minerals (MULTI-VITAMIN) tablet Take 1 tablet by mouth daily Over 50 mens      rosuvastatin (CRESTOR) 40 MG tablet Take 40 mg by mouth daily.         Allergies  Allergies   Allergen Reactions    Seasonal Allergies        Review of systems  A complete ROS was performed and was negative except as mentioned in HPI    Physical Exam  /70 (BP Location: Left arm, Patient Position: Sitting, Cuff Size: Adult Large)   Pulse 58   Temp 97.5  F (36.4  C) (Oral)   Resp 16   Wt 98.2 kg (216 lb 6.4 oz)   SpO2 97%   BMI 27.78 kg/m    Wt Readings from Last 3 Encounters:   25 98.2 kg (216 lb 6.4 oz)   25 97.5 kg (214 lb 14.4 oz)   25 97.2 kg (214 lb 4.8 oz)       ECO   male sitting in chair in NAD  HEET: NC/AT, EOMI w/ PERRL, anicteric sclera. MMM. No mouth sores.   Neck: supple no JVP  Lymph: No cervical, supraclavicular, axillary or inguinal LAD  CV: normal S1,S2 with RRR no m/r/g  Resp: lungs CTA bilaterally with adequate air movement. No wheezes or crackles  Abd: soft, NTND, no organomegaly or masses. BS normoactive.   Ext: WWP no edema or cyanosis  Skin: no concerning lesions or rashes or petechiae  Neuro: A&Ox4, no lateralizing sx. Grossly nonfocal. Strength appears preserved.      Labs  and Imaging  Lab Results   Component Value Date    WBC 5.0 05/28/2025    WBC 4.8 02/21/2025    HGB 13.3 05/28/2025    HGB 13.6 02/21/2025    HCT 39.3 (L) 05/28/2025     (L) 05/28/2025     (L) 02/21/2025    MCV 81 05/28/2025     Lab Results   Component Value Date     (L) 05/28/2025    POTASSIUM 3.8 05/28/2025    CHLORIDE 97 (L) 05/28/2025    CR 1.04 05/28/2025    CR 1.05 02/21/2025    ALT 19 05/28/2025    AST 30 05/28/2025    CO2 24 05/28/2025      Lactate Dehydrogenase   Date Value Ref Range Status   09/06/2023 222 0 - 250 U/L Final       Lab Results   Component Value Date    URIC 6.4 11/29/2023       Ferritin   Date Value Ref Range Status   11/29/2023 59 31 - 409 ng/mL Final     PET CT cardiac 2/17/2025  1. No convincing metabolic evidence of extra cardiac sarcoidosis.     2. Diffuse FDG uptake within the axial and appendicular skeleton in a pattern typical of marrow stimulation (a finding commonly associated with anemia)     MRI brain 2/20/2025  IMPRESSION: Stable postsurgical changes of right frontal craniotomy  status post mass resection with similar appearance of overlying dural  thickening and enhancement adjacent to the resection cavity, likely  sequela of posttreatment changes. No evidence of residual meningioma.  No new acute intracranial finding. Unchanged chronic atrophic right  optic neuropathy.     BMBx 8/15/23  Final Diagnosis   Bone marrow, posterior iliac crest, left decalcified trephine biopsy and touch imprint; left aspirate clot, particle crush, direct aspirate smear, concentrate aspirate smear, and peripheral blood smear:     - Cellular marrow (overall 50% in intact areas) with granulocytic hyperplasia with slight left shift, relative erythroid hypoplasia, unremarkable megakaryopoiesis, no overt dysplasia, and overall less than 2% blasts  - Overall normal reticulin fibrosis (MF-0)  - Peripheral blood showing moderate normochromic, normocytic anemia; moderate leukocytosis due  to neutrophilia and monocytosis; slight thrombocytopenia  - See comment   No clonal chromosomal abnormality was detected among the 20 metaphase cells analyzed. These findings confirm and expand those of the previously reported interphase FISH anlaysis ([id number]) that showed no evidence of a BCR::ABL1 fusion.   SRSF2 VAF 46.2%  TET 2 VAF 51.6%  TET 2 VAF 46.5%  ASXL1 VAF 11.3%      CMML WHO Diagnostic Criteria:    Persistent (>=3 months) peripheral blood monocytosis; absolute monocyte count >500/microL and greater than 10 percent of the entire white blood cell differential   Yes   Not meeting WHO criteria for BCR-ABL1 positive chronic myeloid leukemia, primary myelofibrosis, polycythemia vera, or essential thrombocytosis  Yes   <20 percent myeloblasts + monoblasts + promonocytes in peripheral blood and bone marrow  Yes   Dysplastic changes in one or more myeloid lineages. If myelodysplastic changes are absent or minimal, the diagnosis of CMML can be made if the above three criteria are met and:  No   An acquired clonal cytogenetic (or mutational abnormality) is present in bone marrow cells or  Yes   Persistent monocytosis for >=3 months and all other causes of monocytosis have been excluded  Yes   Total Score Meets WHO Criteria (4 major or 3 major and 2 minor)  Yes     CMML WHO Staging in 2016 now discontinued:    CMML-0: <2 % blasts in PB and <5% blasts in BM  Yes   CMML-1: 2 - 4%  blasts in PB and/or 5 - 9% blasts in BM  No   CMML-2: 5 -19 % blasts in PB, 10 - 19% blasts in BM, OR presence of Maurilio rods  No     Prognostic Scoring System: CPSS-Mol      Criteria Score     Cytogenetic Risk Low: normal, isolated -Y  Intermediate: all else  High: Tri 8, complex, chrom 7   0    ASXL1 WT/Mut  1    NRAS WT/Mut  0    RUNX1 WT/Mut  0    SETBP1 WT/Mut  0    Total Genetic Score Low = 0  Intermediate-1 = 1  Intermediate-2 = 2  High = >3 (Max 3 points) 1 Genetic risk points for CPSS-mol   (Max 3):  1   BM Blasts <5% or >5%  0     WBC count <13 or > 13  0    Transfusion dependent No/Yes  0    CPSS-mol   Risk group Low = 0  Intermediate-1 = 1  Intermediate-2 = 2-3  High = >4    1     The CPSS-Mol was able to identify 4 risk groups having a signi?cantly different OS, with median survival time >144, and 68, 30, and 17 months in the low, intermediate-1, intermediate-2, and high-risk group, respectively. Mr. Amado Butler has a Intermediate-1 risk disease with an estimated median survival of 68 months. The 4 risk groups also showed a signi?cantly different cumulative incidence of leukemic evolution, with a 48-month cumulative incidence of AML evolution of 0%, 8%, 24%, and 52% for the low, intermediate-1, intermediate-2, and high-risk group respectively. Mr. Amado Butler has a Intermediate-1 risk disease with an estimated 4 year cumulative AML evolution risk of 8 %.     Will need to obtain BCR-ABL1 testing and PDGFRA, PDGFRB, FGFR1 and PCM-JAK2 to rule out other disorders if eosinophilia is present.     A number of conditions can also cause monocytosis such as asplenic state, inflamma ASXL1 tory conditions and autoimmune disorders, major depression, steroids or colony stimulating factors.     US abdomen 7/22/2024  Spleen is borderline enlarged at 13 cm. No ascites.      CT CAC score 12/3/2024  Impression:   Calcium score of 770 place the patient at a high risk of a cardiovascular   event in the next 10 years.   Other cardiac findings include,  mildly dilated ascending aorta measured   at 4.0 cm   Non cardiac findings will be reported by the radiologist.     MR cardiac 12/3/2024  Final conclusions:   Stress MRI is negative for significant ischemia.   The gadolinium delayed enhancement is suggestive of nonischemic scar.    There is patchy mid myocardial noted in the basal lateral, mid/distal   anterior, anteroseptal segments.  This appears to extend into the   subendocardial however overall has the appearance of a nonischemic scar.    There is  also patchy mid myocardial scar noted in the basal   inferior/inferior lateral and basal septal segments. These findings could   represent infiltrative cardiomyopathy such as sarcoidosis versus   arrhythmogenic cardiomyopathy.  Recommend further workup for delineation.   The left ventricle size is normal.  The LV wall thickness is increased.    The LV wall motion is normal. The LV systolic function is normal.    Calculated LVEF is 61%.    The right ventricle is normal size with normal function.   Non cardiac finding will be reported separately per radiology.     Assessment and Plan  Mr. Amado Butler is a 64 year old male who is here for further evaluation and/or management of chronic myelomonocytic leukemia.    Oncology:  Chronic myelomonocytic leukemia  WHO Classification: Dysplastic-CMML  Date of diagnosis: 8/14/2023  CPSS Mol score: 1-intermediate 1  Bone Marrow Blast %: 2   Cytogenetics: normal karyotype, 46 XY  Molecular: SRSF2, TET 2, TET 2, ASXL1  Past Treatment: None  Current Treatment: TBD    I discussed the pathophysiology and natural history of CMML with Mr. Amado Butler today. We went over the current prognostic models and scoring systems that are used in clinical practice as well as the potential for disease evolution into acute myeloid leukemia. We went over the current FDA approved treatments for CMML and covered that the only curative option is through an allogeneic hematopoietic cell transplantation. His disease is intermediate 1 risk and we will revisit this as needed.    We discussed about supportive care for lower risk disease and CMML including erythropoietin stimulating agents as well as thrombopoietin receptor agonist for thrombocytopenia.  At this point of time we will take a wait and watch approach. His counts have recovered.     His hemoglobin and white blood cell counts remained stable.  We got an ultrasound spleen that shows a spleen size of  13cm which could explain some of the  thrombocytopenia.    His labs continue to remain stable.  He was recently found to have high calcium score on his CT cardiac after an episode of syncope for which she got a Holter monitor that is showing some arrhythmias.  An MR cardiac shows no inducible ischemia but does in fact show an area of scar?  Nonischemic suggestive of infiltrative disease.  He has going to follow-up with us cardiologist.  Is already been started on primary prevention with aspirin and Crestor/rosuvastatin.      We discussed that people with hematological malignancy are at increased risk of inflammatory things and thereby cardiac events in this setting.  Patient request that he wants to follow-up with cardiology here at the [place].  I placed a referral for the same.  He subsequently established with Dr. Rock.  His PYP scan did not show any evidence of TTR amyloid and PET/CT did not show evidence of sarcoidosis.  He has not underwent a cardiac angiogram yet.    Loop recorder showed atrial fibrillation and he was started on apixaban and stopped aspirin.  I encouraged patient to discuss with cardiology regarding the need for a coronary angiogram given the ventricular tachycardia that was noted previously.  However this has not recurred since then.  The rest of his labs have remained stable with normal ANC, platelets and hemoglobin.    He also requested an A1c which is now come back at 6.6 showing that he is now diabetic.  He will touch base with his primary care doctor regarding this.    Plan:  - RTC with me with me with labs prior in 3 months.    Hematology:  Anemia/Thrombocytopenia:   Transfuse leukocyte reduced and irradiated blood products: 1 unit pRBC if hgb is 7.0-7.9, 2 units pRBCs if Hgb 6.0-6.9 g/dl, 1 unit platelets if PLT count is <10,000 or <50,000 with clinical bleeding.    Immunocompromised:  As a result of his disease and/or previous treatment. Mr. Amado Butler is immunocompromised. his last ANC is >500 and there is no  need for prophylactic antibiotics at this time.     Cardiac:  Mr. Amado Butler has no history of heart disease.     Renal:  Creatinine results reviewed today. Mr. Amado Butler does not have any renal disease.    Hepatic:  Liver function tests reviewed today.    Psychosocial:  Mr. Amado Butler is coping well with his diagnosis.     All other questions and concerns were addressed and answered to Mr. Amado Butler's satisfaction.    Today, I spent a total of 40 minutes of face-to-face and non face-to-face time with Mr. Amado Butler. Time spent included review and discussion of diagnostic test results, patient counseling, and coordination of care.    The longitudinal plan of care for the diagnosis(es)/condition(s) as documented were addressed during this visit. Due to the added complexity in care, I will continue to support Amado in the subsequent management and with ongoing continuity of care.    Jamal Rodney MD    Division of Hematology, Oncology and Transplantation  [facility]  P: [phone]

## 2025-05-28 NOTE — NURSING NOTE
Chief Complaint   Patient presents with    Blood Draw     Labs drawn via VPT by lab RN     Venipuncture labs drawn by lab RN, vitals taken and appointment arrived. Pt requested RN draw Hgb A1C for upcoming provider appt but there are no orders. IB sent to clinic requesting orders if possible.    Amaya Cook RN

## 2025-05-28 NOTE — NURSING NOTE
"Oncology Rooming Note    May 28, 2025 11:56 AM   Amado Butler is a 65 year old male who presents for:    Chief Complaint   Patient presents with    Blood Draw     Labs drawn via VPT by lab RN    Oncology Clinic Visit     Chronic myelomonocytic leukemia not having achieved remission     Initial Vitals: /70 (BP Location: Left arm, Patient Position: Sitting, Cuff Size: Adult Large)   Pulse 58   Temp 97.5  F (36.4  C) (Oral)   Resp 16   Wt 98.2 kg (216 lb 6.4 oz)   SpO2 97%   BMI 27.78 kg/m   Estimated body mass index is 27.78 kg/m  as calculated from the following:    Height as of 5/3/24: 1.88 m (6' 2\").    Weight as of this encounter: 98.2 kg (216 lb 6.4 oz). Body surface area is 2.26 meters squared.  No Pain (0) Comment: Data Unavailable   No LMP for male patient.  Allergies reviewed: Yes  Medications reviewed: Yes    Medications: Medication refills not needed today.  Pharmacy name entered into [facility]: [id number] - [facility] [address] [facility]    Frailty Screening:   Is the patient here for a new oncology consult visit in cancer care? 2. No    PHQ9:  Did this patient require a PHQ9?: No      Clinical concerns: Patient states there are no new concerns to discuss with provider.  Dr Rodney was not notified.         Elvie Musa CMA              "

## 2025-05-28 NOTE — LETTER
5/28/2025      Amado Butler  [address] [facility] [id number]      Dear Colleague,    Thank you for referring your patient, Amado Butler, to the [facility]. Please see a copy of my visit note below.    [facility]  HEMATOLOGY & ONCOLOGY  FOLLOW UP VISIT    PATIENT NAME: Amado Butler          MRN # [id number]  YOB: 1960  DATE OF VISIT: May 28, 2025      REFERRING PROVIDER:   Mr. Amado Butler was seen at the request of Dr. Solomon Reza for further evaluation and/or management.     History of Presenting Illness  Mr. Amado Butler is a pleasant 64 year old male with a past medical history significant for DM2- Last A1c, HTN, ?thrombocytopenia since 2012 s/p recent frontotemporal craniotomy for right optic nerve mass now pathology proven meningioma c/b complete vision loss in right eye and was also found to have significant cytopenias during admission leading to a bone marrow biopsy on 8/15/2023.  His bone marrow biopsy shows 50% cellularity [mildly hypercellular for his age] without significant dysplasia in all 3 cell lines and without increase in blasts.  He had normal cytogenetics and BCR-ABL FISH was negative.  However his Nex[employer] ration sequencing testing showed that there was a clonal process with mutations detected in SRSF2, TET2 x 2 and ASXL1.  Given prior history of monocytosis dating back many years he was referred to our clinic.    Subjective  Patient is here alone.  He continues to stay active.  He does note that his fatigue has been stable over the last many months to maybe a year or 2.  He has had a loop recorder placed which showed atrial fibrillation and he was started on apixaban since then.  He has not had coronary angiogram done yet. Denies any fevers or chills, night sweats, BRBPR or melena.    Past Medical History  Diabetes type 2  Hypertension  Paroxysmal SVT  Thrombocytopenia dating back since 2012    Family History  Any family  history of cancers or blood or bleeding disorders.    Social History  Smoking: Never  Alcohol use: drinks approximately 2drinks/week  Status:  37 years.   Children/grandchildren: 3 children. 26 years old girl, 31 boy, 28 girl  Occupation: Retired, .     Current Outpatient Medications  Current Outpatient Medications   Medication Sig Dispense Refill     aspirin 81 MG EC tablet Take 81 mg by mouth.       carvedilol (COREG) 25 MG tablet Take 25 mg by mouth 2 times daily (with meals).       CONTOUR NEXT TEST test strip TEST 1 TIME/DAY       lisinopril-hydrochlorothiazide (ZESTORETIC) 20-25 MG tablet Take 1 tablet by mouth daily       Magnesium Oxide -Mg Supplement 500 MG CAPS Take 500 mg by mouth daily       metFORMIN (GLUCOPHAGE) 500 MG tablet Take 1 tablet (500 mg) by mouth daily (with breakfast) (Patient taking differently: Take 500 mg by mouth 2 times daily (with meals).) 7 tablet 0     multivitamin w/minerals (MULTI-VITAMIN) tablet Take 1 tablet by mouth daily Over 50 mens       rosuvastatin (CRESTOR) 40 MG tablet Take 40 mg by mouth daily.         Allergies  Allergies   Allergen Reactions     Seasonal Allergies        Review of systems  A complete ROS was performed and was negative except as mentioned in HPI    Physical Exam  /70 (BP Location: Left arm, Patient Position: Sitting, Cuff Size: Adult Large)   Pulse 58   Temp 97.5  F (36.4  C) (Oral)   Resp 16   Wt 98.2 kg (216 lb 6.4 oz)   SpO2 97%   BMI 27.78 kg/m    Wt Readings from Last 3 Encounters:   25 98.2 kg (216 lb 6.4 oz)   25 97.5 kg (214 lb 14.4 oz)   25 97.2 kg (214 lb 4.8 oz)       ECO   male sitting in chair in NAD  HEET: NC/AT, EOMI w/ PERRL, anicteric sclera. MMM. No mouth sores.   Neck: supple no JVP  Lymph: No cervical, supraclavicular, axillary or inguinal LAD  CV: normal S1,S2 with RRR no m/r/g  Resp: lungs CTA bilaterally with adequate air movement. No wheezes or  crackles  Abd: soft, NTND, no organomegaly or masses. BS normoactive.   Ext: WWP no edema or cyanosis  Skin: no concerning lesions or rashes or petechiae  Neuro: A&Ox4, no lateralizing sx. Grossly nonfocal. Strength appears preserved.      Labs and Imaging  Lab Results   Component Value Date    WBC 5.0 05/28/2025    WBC 4.8 02/21/2025    HGB 13.3 05/28/2025    HGB 13.6 02/21/2025    HCT 39.3 (L) 05/28/2025     (L) 05/28/2025     (L) 02/21/2025    MCV 81 05/28/2025     Lab Results   Component Value Date     (L) 05/28/2025    POTASSIUM 3.8 05/28/2025    CHLORIDE 97 (L) 05/28/2025    CR 1.04 05/28/2025    CR 1.05 02/21/2025    ALT 19 05/28/2025    AST 30 05/28/2025    CO2 24 05/28/2025      Lactate Dehydrogenase   Date Value Ref Range Status   09/06/2023 222 0 - 250 U/L Final       Lab Results   Component Value Date    URIC 6.4 11/29/2023       Ferritin   Date Value Ref Range Status   11/29/2023 59 31 - 409 ng/mL Final     PET CT cardiac 2/17/2025  1. No convincing metabolic evidence of extra cardiac sarcoidosis.     2. Diffuse FDG uptake within the axial and appendicular skeleton in a pattern typical of marrow stimulation (a finding commonly associated with anemia)     MRI brain 2/20/2025  IMPRESSION: Stable postsurgical changes of right frontal craniotomy  status post mass resection with similar appearance of overlying dural  thickening and enhancement adjacent to the resection cavity, likely  sequela of posttreatment changes. No evidence of residual meningioma.  No new acute intracranial finding. Unchanged chronic atrophic right  optic neuropathy.     BMBx 8/15/23  Final Diagnosis   Bone marrow, posterior iliac crest, left decalcified trephine biopsy and touch imprint; left aspirate clot, particle crush, direct aspirate smear, concentrate aspirate smear, and peripheral blood smear:     - Cellular marrow (overall 50% in intact areas) with granulocytic hyperplasia with slight left shift, relative  erythroid hypoplasia, unremarkable megakaryopoiesis, no overt dysplasia, and overall less than 2% blasts  - Overall normal reticulin fibrosis (MF-0)  - Peripheral blood showing moderate normochromic, normocytic anemia; moderate leukocytosis due to neutrophilia and monocytosis; slight thrombocytopenia  - See comment   No clonal chromosomal abnormality was detected among the 20 metaphase cells analyzed. These findings confirm and expand those of the previously reported interphase FISH anlaysis ([id number]) that showed no evidence of a BCR::ABL1 fusion.   SRSF2 VAF 46.2%  TET 2 VAF 51.6%  TET 2 VAF 46.5%  ASXL1 VAF 11.3%      CMML WHO Diagnostic Criteria:    Persistent (>=3 months) peripheral blood monocytosis; absolute monocyte count >500/microL and greater than 10 percent of the entire white blood cell differential   Yes   Not meeting WHO criteria for BCR-ABL1 positive chronic myeloid leukemia, primary myelofibrosis, polycythemia vera, or essential thrombocytosis  Yes   <20 percent myeloblasts + monoblasts + promonocytes in peripheral blood and bone marrow  Yes   Dysplastic changes in one or more myeloid lineages. If myelodysplastic changes are absent or minimal, the diagnosis of CMML can be made if the above three criteria are met and:  No   An acquired clonal cytogenetic (or mutational abnormality) is present in bone marrow cells or  Yes   Persistent monocytosis for >=3 months and all other causes of monocytosis have been excluded  Yes   Total Score Meets WHO Criteria (4 major or 3 major and 2 minor)  Yes     CMML WHO Staging in 2016 now discontinued:    CMML-0: <2 % blasts in PB and <5% blasts in BM  Yes   CMML-1: 2 - 4%  blasts in PB and/or 5 - 9% blasts in BM  No   CMML-2: 5 -19 % blasts in PB, 10 - 19% blasts in BM, OR presence of Maurilio rods  No     Prognostic Scoring System: CPSS-Mol      Criteria Score     Cytogenetic Risk Low: normal, isolated -Y  Intermediate: all else  High: Tri 8, complex, chrom 7   0     ASXL1 WT/Mut  1    NRAS WT/Mut  0    RUNX1 WT/Mut  0    SETBP1 WT/Mut  0    Total Genetic Score Low = 0  Intermediate-1 = 1  Intermediate-2 = 2  High = >3 (Max 3 points) 1 Genetic risk points for CPSS-mol   (Max 3):  1   BM Blasts <5% or >5%  0    WBC count <13 or > 13  0    Transfusion dependent No/Yes  0    CPSS-mol   Risk group Low = 0  Intermediate-1 = 1  Intermediate-2 = 2-3  High = >4    1     The CPSS-Mol was able to identify 4 risk groups having a signi?cantly different OS, with median survival time >144, and 68, 30, and 17 months in the low, intermediate-1, intermediate-2, and high-risk group, respectively. Mr. Amado Butler has a Intermediate-1 risk disease with an estimated median survival of 68 months. The 4 risk groups also showed a signi?cantly different cumulative incidence of leukemic evolution, with a 48-month cumulative incidence of AML evolution of 0%, 8%, 24%, and 52% for the low, intermediate-1, intermediate-2, and high-risk group respectively. Mr. Amado Butler has a Intermediate-1 risk disease with an estimated 4 year cumulative AML evolution risk of 8 %.     Will need to obtain BCR-ABL1 testing and PDGFRA, PDGFRB, FGFR1 and PCM-JAK2 to rule out other disorders if eosinophilia is present.     A number of conditions can also cause monocytosis such as asplenic state, inflamma ASXL1 tory conditions and autoimmune disorders, major depression, steroids or colony stimulating factors.     US abdomen 7/22/2024  Spleen is borderline enlarged at 13 cm. No ascites.      CT CAC score 12/3/2024  Impression:   Calcium score of 770 place the patient at a high risk of a cardiovascular   event in the next 10 years.   Other cardiac findings include,  mildly dilated ascending aorta measured   at 4.0 cm   Non cardiac findings will be reported by the radiologist.     MR cardiac 12/3/2024  Final conclusions:   Stress MRI is negative for significant ischemia.   The gadolinium delayed enhancement is suggestive of  nonischemic scar.    There is patchy mid myocardial noted in the basal lateral, mid/distal   anterior, anteroseptal segments.  This appears to extend into the   subendocardial however overall has the appearance of a nonischemic scar.    There is also patchy mid myocardial scar noted in the basal   inferior/inferior lateral and basal septal segments. These findings could   represent infiltrative cardiomyopathy such as sarcoidosis versus   arrhythmogenic cardiomyopathy.  Recommend further workup for delineation.   The left ventricle size is normal.  The LV wall thickness is increased.    The LV wall motion is normal. The LV systolic function is normal.    Calculated LVEF is 61%.    The right ventricle is normal size with normal function.   Non cardiac finding will be reported separately per radiology.     Assessment and Plan  Mr. Amado Butler is a 64 year old male who is here for further evaluation and/or management of chronic myelomonocytic leukemia.    Oncology:  Chronic myelomonocytic leukemia  WHO Classification: Dysplastic-CMML  Date of diagnosis: 8/14/2023  CPSS Mol score: 1-intermediate 1  Bone Marrow Blast %: 2   Cytogenetics: normal karyotype, 46 XY  Molecular: SRSF2, TET 2, TET 2, ASXL1  Past Treatment: None  Current Treatment: TBD    I discussed the pathophysiology and natural history of CMML with Mr. Amado Butler today. We went over the current prognostic models and scoring systems that are used in clinical practice as well as the potential for disease evolution into acute myeloid leukemia. We went over the current FDA approved treatments for CMML and covered that the only curative option is through an allogeneic hematopoietic cell transplantation. His disease is intermediate 1 risk and we will revisit this as needed.    We discussed about supportive care for lower risk disease and CMML including erythropoietin stimulating agents as well as thrombopoietin receptor agonist for thrombocytopenia.  At this  point of time we will take a wait and watch approach. His counts have recovered.     His hemoglobin and white blood cell counts remained stable.  We got an ultrasound spleen that shows a spleen size of  13cm which could explain some of the thrombocytopenia.    His labs continue to remain stable.  He was recently found to have high calcium score on his CT cardiac after an episode of syncope for which she got a Holter monitor that is showing some arrhythmias.  An MR cardiac shows no inducible ischemia but does in fact show an area of scar?  Nonischemic suggestive of infiltrative disease.  He has going to follow-up with us cardiologist.  Is already been started on primary prevention with aspirin and Crestor/rosuvastatin.      We discussed that people with hematological malignancy are at increased risk of inflammatory things and thereby cardiac events in this setting.  Patient request that he wants to follow-up with cardiology here at the [facility].  I placed a referral for the same.  He subsequently established with Dr. Rock.  His PYP scan did not show any evidence of TTR amyloid and PET/CT did not show evidence of sarcoidosis.  He has not underwent a cardiac angiogram yet.    Loop recorder showed atrial fibrillation and he was started on apixaban and stopped aspirin.  I encouraged patient to discuss with cardiology regarding the need for a coronary angiogram given the ventricular tachycardia that was noted previously.  However this has not recurred since then.  The rest of his labs have remained stable with normal ANC, platelets and hemoglobin.    He also requested an A1c which is now come back at 6.6 showing that he is now diabetic.  He will touch base with his primary care doctor regarding this.    Plan:  - RTC with me with me with labs prior in 3 months.    Hematology:  Anemia/Thrombocytopenia:   Transfuse leukocyte reduced and irradiated blood products: 1 unit pRBC if hgb is 7.0-7.9, 2 units pRBCs if Hgb  6.0-6.9 g/dl, 1 unit platelets if PLT count is <10,000 or <50,000 with clinical bleeding.    Immunocompromised:  As a result of his disease and/or previous treatment. Mr. Amado Butler is immunocompromised. his last ANC is >500 and there is no need for prophylactic antibiotics at this time.     Cardiac:  Mr. Amado Butler has no history of heart disease.     Renal:  Creatinine results reviewed today. Mr. Amado Butler does not have any renal disease.    Hepatic:  Liver function tests reviewed today.    Psychosocial:  Mr. Amado Butler is coping well with his diagnosis.     All other questions and concerns were addressed and answered to Mr. Amado Butler's satisfaction.    Today, I spent a total of 40 minutes of face-to-face and non face-to-face time with Mr. Amado Butler. Time spent included review and discussion of diagnostic test results, patient counseling, and coordination of care.    The longitudinal plan of care for the diagnosis(es)/condition(s) as documented were addressed during this visit. Due to the added complexity in care, I will continue to support Amado in the subsequent management and with ongoing continuity of care.    Jamal Rodney MD    Division of Hematology, Oncology and Transplantation  [facility]  P: [phone]    Again, thank you for allowing me to participate in the care of your patient.        Sincerely,        Jamal Rodney MD    Electronically signed

## 2025-08-27 ENCOUNTER — ONCOLOGY VISIT (OUTPATIENT)
Dept: ONCOLOGY | Facility: CLINIC | Age: 65
End: 2025-08-27
Attending: STUDENT IN AN ORGANIZED HEALTH CARE EDUCATION/TRAINING PROGRAM
Payer: COMMERCIAL

## 2025-08-27 ENCOUNTER — ANCILLARY PROCEDURE (OUTPATIENT)
Dept: MRI IMAGING | Facility: CLINIC | Age: 65
End: 2025-08-27
Attending: PHYSICIAN ASSISTANT
Payer: COMMERCIAL

## 2025-08-27 ENCOUNTER — OFFICE VISIT (OUTPATIENT)
Dept: NEUROSURGERY | Facility: CLINIC | Age: 65
End: 2025-08-27
Payer: COMMERCIAL

## 2025-08-27 VITALS
DIASTOLIC BLOOD PRESSURE: 65 MMHG | RESPIRATION RATE: 16 BRPM | SYSTOLIC BLOOD PRESSURE: 117 MMHG | OXYGEN SATURATION: 97 % | HEART RATE: 56 BPM

## 2025-08-27 VITALS
RESPIRATION RATE: 18 BRPM | DIASTOLIC BLOOD PRESSURE: 74 MMHG | TEMPERATURE: 97.3 F | SYSTOLIC BLOOD PRESSURE: 135 MMHG | WEIGHT: 214 LBS | HEART RATE: 70 BPM | BODY MASS INDEX: 27.48 KG/M2

## 2025-08-27 DIAGNOSIS — T38.0X5A STEROID-INDUCED HYPERGLYCEMIA: ICD-10-CM

## 2025-08-27 DIAGNOSIS — C93.10 CHRONIC MYELOMONOCYTIC LEUKEMIA NOT HAVING ACHIEVED REMISSION (H): ICD-10-CM

## 2025-08-27 DIAGNOSIS — C93.10 CHRONIC MYELOMONOCYTIC LEUKEMIA NOT HAVING ACHIEVED REMISSION (H): Primary | ICD-10-CM

## 2025-08-27 DIAGNOSIS — D32.0 BENIGN NEOPLASM OF CEREBRAL MENINGES (H): Primary | ICD-10-CM

## 2025-08-27 DIAGNOSIS — R73.9 STEROID-INDUCED HYPERGLYCEMIA: ICD-10-CM

## 2025-08-27 LAB
BASOPHILS # BLD AUTO: 0.06 10E3/UL (ref 0–0.2)
BASOPHILS NFR BLD AUTO: 0.8 %
EOSINOPHIL # BLD AUTO: <0.03 10E3/UL (ref 0–0.7)
EOSINOPHIL NFR BLD AUTO: 0.1 %
ERYTHROCYTE [DISTWIDTH] IN BLOOD BY AUTOMATED COUNT: 13.9 % (ref 10–15)
HCT VFR BLD AUTO: 40 % (ref 40–53)
HGB BLD-MCNC: 13.6 G/DL (ref 13.3–17.7)
HOLD SPECIMEN: NORMAL
IMM GRANULOCYTES # BLD: 0.22 10E3/UL
IMM GRANULOCYTES NFR BLD: 2.9 %
LYMPHOCYTES # BLD AUTO: 1.52 10E3/UL (ref 0.8–5.3)
LYMPHOCYTES NFR BLD AUTO: 20.1 %
MCH RBC QN AUTO: 27.9 PG (ref 26.5–33)
MCHC RBC AUTO-ENTMCNC: 34 G/DL (ref 31.5–36.5)
MCV RBC AUTO: 82.1 FL (ref 78–100)
MONOCYTES # BLD AUTO: 2.35 10E3/UL (ref 0–1.3)
MONOCYTES NFR BLD AUTO: 31 %
NEUTROPHILS # BLD AUTO: 3.41 10E3/UL (ref 1.6–8.3)
NEUTROPHILS NFR BLD AUTO: 45.1 %
NRBC # BLD AUTO: <0.03 10E3/UL
NRBC BLD AUTO-RTO: 0 /100
PLAT MORPH BLD: NORMAL
PLATELET # BLD AUTO: 126 10E3/UL (ref 150–450)
RBC # BLD AUTO: 4.87 10E6/UL (ref 4.4–5.9)
RBC MORPH BLD: NORMAL
WBC # BLD AUTO: 7.57 10E3/UL (ref 4–11)

## 2025-08-27 PROCEDURE — 36415 COLL VENOUS BLD VENIPUNCTURE: CPT | Performed by: STUDENT IN AN ORGANIZED HEALTH CARE EDUCATION/TRAINING PROGRAM

## 2025-08-27 PROCEDURE — 99213 OFFICE O/P EST LOW 20 MIN: CPT | Performed by: PHYSICIAN ASSISTANT

## 2025-08-27 PROCEDURE — G0463 HOSPITAL OUTPT CLINIC VISIT: HCPCS | Performed by: STUDENT IN AN ORGANIZED HEALTH CARE EDUCATION/TRAINING PROGRAM

## 2025-08-27 PROCEDURE — 3074F SYST BP LT 130 MM HG: CPT | Performed by: PHYSICIAN ASSISTANT

## 2025-08-27 PROCEDURE — 85004 AUTOMATED DIFF WBC COUNT: CPT | Performed by: STUDENT IN AN ORGANIZED HEALTH CARE EDUCATION/TRAINING PROGRAM

## 2025-08-27 PROCEDURE — 3078F DIAST BP <80 MM HG: CPT | Performed by: PHYSICIAN ASSISTANT

## 2025-08-27 RX ORDER — DILTIAZEM HYDROCHLORIDE 180 MG/1
180 CAPSULE, EXTENDED RELEASE ORAL
COMMUNITY
Start: 2025-07-17

## 2025-08-27 ASSESSMENT — PAIN SCALES - GENERAL: PAINLEVEL_OUTOF10: NO PAIN (0)

## 2025-08-31 ENCOUNTER — HEALTH MAINTENANCE LETTER (OUTPATIENT)
Age: 65
End: 2025-08-31

## (undated) DEVICE — SLEEVE IRRIGATION MIS 13.0CM 5/PKG 5407-010-950

## (undated) DEVICE — TEST TUBE W/SCREW CAP 17361

## (undated) DEVICE — DRAPE MICROSCOPE LEICA 54X120" 09-MK653

## (undated) DEVICE — SPONGE COTTONOID 1/2X1 1/2" 20-06S

## (undated) DEVICE — Device

## (undated) DEVICE — DRSG PRIMAPORE 03 1/8X6" 66000318

## (undated) DEVICE — PAD CHUX UNDERPAD 23X24" 7136

## (undated) DEVICE — DRAPE SHEET REV FOLD 3/4 9349

## (undated) DEVICE — CLIP RANEY

## (undated) DEVICE — PREP POVIDONE-IODINE 10% SOLUTION 4OZ BOTTLE MDS093944

## (undated) DEVICE — RETR ELASTIC STAYS LONE STAR BLUNT DUAL LEAD 3550-1G

## (undated) DEVICE — BLADE CLIPPER SGL USE 9680

## (undated) DEVICE — LINEN TOWEL PACK X6 WHITE 5487

## (undated) DEVICE — TUBING SMOKE EVAC 3/8"X10' 0702-045-023

## (undated) DEVICE — DRAPE CRANIOTOMY W/POUCH 9450

## (undated) DEVICE — WIPES FOLEY CARE SURESTEP PROVON DFC100

## (undated) DEVICE — DRAPE POUCH INSTRUMENT 1018

## (undated) DEVICE — BUR STRK ROUND DIAMOND 2.0MM COARSE 8450-012-020DC

## (undated) DEVICE — BLADE KNIFE BEAVER MICROSHARP GREEN 377515

## (undated) DEVICE — MARKER SPHERES PASSIVE MEDT PACK 5 8801075

## (undated) DEVICE — SURGICEL HEMOSTAT 4X8" 1952

## (undated) DEVICE — PACK CRANIOTOMY

## (undated) DEVICE — SPONGE COTTONOID 1/2X3" 20-07S

## (undated) DEVICE — PREP CHLORAPREP CLEAR 3ML 930400

## (undated) DEVICE — PREP POVIDONE-IODINE 7.5% SCRUB 4OZ BOTTLE MDS093945

## (undated) DEVICE — SOL WATER IRRIG 1000ML BOTTLE 2F7114

## (undated) DEVICE — SU VICRYL 3-0 SH 8X18" UND J864D

## (undated) DEVICE — PROTECTOR ARM ONE-STEP TRENDELENBURG 40418

## (undated) DEVICE — COVER CAMERA VIDEO LASER 9X96" 04-CC227

## (undated) DEVICE — SPONGE SURGIFOAM 100 1974

## (undated) DEVICE — LINEN TOWEL PACK X30 5481

## (undated) DEVICE — SOL NACL 0.9% 10ML VIAL 0409-4888-02

## (undated) DEVICE — DRAPE MAYO STAND 23X54 8337

## (undated) DEVICE — ESU HOLSTER PLASTIC DISP E2400

## (undated) DEVICE — DRAPE CORETEMP FLUID WARM SYSTEM 62X56IN CTD200

## (undated) DEVICE — BURR ELITE PRECISION ROUND 5MM 5820009050

## (undated) DEVICE — DRAPE SHEET MED 44X70" 9355

## (undated) DEVICE — PENCIL PRESHARPENED SOFT #2 1/PK  17701/50

## (undated) DEVICE — SPONGE COTTONOID 1/2X1/2" 20-04S

## (undated) DEVICE — DRSG KERLIX 4 1/2"X4YDS ROLL 6715

## (undated) DEVICE — ESU FCP CODMAN 9"X1.0MM VERSATRU 9009100SL

## (undated) DEVICE — TUBING STRYKER IRRIGATION CASSETTE 5400-050-001

## (undated) DEVICE — SPONGE SURGIFOAM 01GM POWDER 1978

## (undated) DEVICE — ESU CORD BIPOLAR AND IRR TUBING AESCULAP US355

## (undated) DEVICE — ESU CORD BIPOLAR GREEN 10-4000

## (undated) DEVICE — BUR STRK ROUND DIAMOND 4.0MM COARSE 8450-012-040DC

## (undated) DEVICE — DRAPE IOBAN INCISE 13X13" 6640EZ

## (undated) DEVICE — SPONGE COTTONOID 1X3" 20-10S

## (undated) DEVICE — ESU GROUND PAD ADULT W/CORD E7507

## (undated) DEVICE — DRSG GAUZE 4X4" TRAY 6939

## (undated) DEVICE — SYR 30ML LL W/O NDL 302832

## (undated) DEVICE — PREP CHLORAPREP 26ML TINTED HI-LITE ORANGE 930815

## (undated) DEVICE — GLOVE BIOGEL PI MICRO SZ 7.0 48570

## (undated) DEVICE — BUR STRK 2.3MM TAPERED ROUTER - FA2 5407-FA2-023

## (undated) DEVICE — OINTMENT ANTIBIOTIC BACITRACIN ZINC .9 G 1171

## (undated) DEVICE — SU VICRYL 2-0 CT-2 CR 8X18" J726D

## (undated) DEVICE — PIN SKULL MAYFIELD ADULT TITANIUM 3/PK A1120

## (undated) DEVICE — SOL RINGERS LACTATED 1000ML BAG 07953-09

## (undated) DEVICE — BUR STRK CARBIDE MATCH HEAD 3.0MM 5820-107-530C

## (undated) DEVICE — CATH TRAY FOLEY 16FR BARDEX W/TEMP PRB URINE METER 319416AM

## (undated) DEVICE — SPONGE COTTONOID 1/4X1/4" 20-01S

## (undated) DEVICE — SYR 10ML FINGER CONTROL W/O NDL 309695

## (undated) DEVICE — SU NUROLON 4-0 TF CR 8X18" C584D

## (undated) RX ORDER — FENTANYL CITRATE 50 UG/ML
INJECTION, SOLUTION INTRAMUSCULAR; INTRAVENOUS
Status: DISPENSED
Start: 2023-08-14

## (undated) RX ORDER — ONDANSETRON 2 MG/ML
INJECTION INTRAMUSCULAR; INTRAVENOUS
Status: DISPENSED
Start: 2023-08-14

## (undated) RX ORDER — FENTANYL CITRATE-0.9 % NACL/PF 10 MCG/ML
PLASTIC BAG, INJECTION (ML) INTRAVENOUS
Status: DISPENSED
Start: 2023-08-14

## (undated) RX ORDER — HYDRALAZINE HYDROCHLORIDE 20 MG/ML
INJECTION INTRAMUSCULAR; INTRAVENOUS
Status: DISPENSED
Start: 2023-08-14

## (undated) RX ORDER — CEFAZOLIN SODIUM/WATER 2 G/20 ML
SYRINGE (ML) INTRAVENOUS
Status: DISPENSED
Start: 2023-08-14

## (undated) RX ORDER — ACETAMINOPHEN 325 MG/1
TABLET ORAL
Status: DISPENSED
Start: 2023-08-14

## (undated) RX ORDER — CEFAZOLIN SODIUM 1 G/3ML
INJECTION, POWDER, FOR SOLUTION INTRAMUSCULAR; INTRAVENOUS
Status: DISPENSED
Start: 2023-08-14

## (undated) RX ORDER — LIDOCAINE HYDROCHLORIDE 10 MG/ML
INJECTION, SOLUTION EPIDURAL; INFILTRATION; INTRACAUDAL; PERINEURAL
Status: DISPENSED
Start: 2023-08-10

## (undated) RX ORDER — SODIUM CHLORIDE 9 MG/ML
INJECTION, SOLUTION INTRAVENOUS
Status: DISPENSED
Start: 2023-08-14

## (undated) RX ORDER — HEPARIN SODIUM (PORCINE) LOCK FLUSH IV SOLN 100 UNIT/ML 100 UNIT/ML
SOLUTION INTRAVENOUS
Status: DISPENSED
Start: 2023-08-10

## (undated) RX ORDER — EPHEDRINE SULFATE 50 MG/ML
INJECTION, SOLUTION INTRAMUSCULAR; INTRAVENOUS; SUBCUTANEOUS
Status: DISPENSED
Start: 2023-08-14

## (undated) RX ORDER — BUPIVACAINE HYDROCHLORIDE AND EPINEPHRINE 2.5; 5 MG/ML; UG/ML
INJECTION, SOLUTION EPIDURAL; INFILTRATION; INTRACAUDAL; PERINEURAL
Status: DISPENSED
Start: 2023-08-14

## (undated) RX ORDER — CALCIUM GLUCONATE 94 MG/ML
INJECTION, SOLUTION INTRAVENOUS
Status: DISPENSED
Start: 2023-08-14